# Patient Record
Sex: FEMALE | Race: WHITE | NOT HISPANIC OR LATINO | Employment: OTHER | URBAN - METROPOLITAN AREA
[De-identification: names, ages, dates, MRNs, and addresses within clinical notes are randomized per-mention and may not be internally consistent; named-entity substitution may affect disease eponyms.]

---

## 2017-01-16 ENCOUNTER — TRANSCRIBE ORDERS (OUTPATIENT)
Dept: ADMINISTRATIVE | Facility: HOSPITAL | Age: 72
End: 2017-01-16

## 2017-01-16 ENCOUNTER — GENERIC CONVERSION - ENCOUNTER (OUTPATIENT)
Dept: OTHER | Facility: OTHER | Age: 72
End: 2017-01-16

## 2017-01-16 DIAGNOSIS — E04.1 NONTOXIC UNINODULAR GOITER: Primary | ICD-10-CM

## 2017-01-24 ENCOUNTER — HOSPITAL ENCOUNTER (OUTPATIENT)
Dept: ULTRASOUND IMAGING | Facility: HOSPITAL | Age: 72
Discharge: HOME | End: 2017-01-24
Admitting: RADIOLOGY
Payer: COMMERCIAL

## 2017-01-24 DIAGNOSIS — E04.1 NONTOXIC UNINODULAR GOITER: ICD-10-CM

## 2017-01-24 PROCEDURE — 76942 ECHO GUIDE FOR BIOPSY: CPT

## 2017-01-24 PROCEDURE — 10022 HB FNA W/IMAGE: CPT

## 2017-01-24 PROCEDURE — 88173 CYTOPATH EVAL FNA REPORT: CPT | Performed by: RADIOLOGY

## 2017-01-24 RX ORDER — LIDOCAINE HYDROCHLORIDE 10 MG/ML
5 INJECTION, SOLUTION INFILTRATION; PERINEURAL ONCE
Status: COMPLETED | OUTPATIENT
Start: 2017-01-24 | End: 2017-01-24

## 2017-01-24 RX ADMIN — LIDOCAINE HYDROCHLORIDE 5 ML: 10 INJECTION, SOLUTION INFILTRATION; PERINEURAL at 12:38

## 2017-04-18 ENCOUNTER — ALLSCRIPTS OFFICE VISIT (OUTPATIENT)
Dept: OTHER | Facility: OTHER | Age: 72
End: 2017-04-18

## 2017-05-10 ENCOUNTER — ALLSCRIPTS OFFICE VISIT (OUTPATIENT)
Dept: OTHER | Facility: OTHER | Age: 72
End: 2017-05-10

## 2017-07-19 ENCOUNTER — HOSPITAL ENCOUNTER (EMERGENCY)
Facility: HOSPITAL | Age: 72
Discharge: HOME | End: 2017-07-19
Attending: EMERGENCY MEDICINE | Admitting: EMERGENCY MEDICINE
Payer: COMMERCIAL

## 2017-07-19 VITALS
RESPIRATION RATE: 18 BRPM | WEIGHT: 170.19 LBS | HEART RATE: 88 BPM | DIASTOLIC BLOOD PRESSURE: 71 MMHG | BODY MASS INDEX: 30.16 KG/M2 | SYSTOLIC BLOOD PRESSURE: 148 MMHG | OXYGEN SATURATION: 98 % | TEMPERATURE: 98.4 F | HEIGHT: 63 IN

## 2017-07-19 DIAGNOSIS — N30.90 CYSTITIS: Primary | ICD-10-CM

## 2017-07-19 LAB
ALBUMIN SERPL BCP-MCNC: 3.9 G/DL (ref 3.5–5)
ALP SERPL-CCNC: 82 U/L (ref 46–116)
ALT SERPL W P-5'-P-CCNC: 36 U/L (ref 12–78)
ANION GAP SERPL CALCULATED.3IONS-SCNC: 9 MMOL/L (ref 4–13)
AST SERPL W P-5'-P-CCNC: 24 U/L (ref 5–45)
BACTERIA UR QL AUTO: ABNORMAL /HPF
BASOPHILS # BLD AUTO: 0.04 THOUSANDS/ΜL (ref 0–0.1)
BASOPHILS NFR BLD AUTO: 0 % (ref 0–1)
BILIRUB SERPL-MCNC: 0.5 MG/DL (ref 0.2–1)
BILIRUB UR QL STRIP: NEGATIVE
BUN SERPL-MCNC: 13 MG/DL (ref 5–25)
CALCIUM SERPL-MCNC: 9.5 MG/DL (ref 8.3–10.1)
CHLORIDE SERPL-SCNC: 104 MMOL/L (ref 100–108)
CLARITY UR: ABNORMAL
CLARITY, POC: CLEAR
CO2 SERPL-SCNC: 29 MMOL/L (ref 21–32)
COLOR UR: YELLOW
COLOR, POC: YELLOW
CREAT SERPL-MCNC: 0.82 MG/DL (ref 0.6–1.3)
EOSINOPHIL # BLD AUTO: 0.21 THOUSAND/ΜL (ref 0–0.61)
EOSINOPHIL NFR BLD AUTO: 2 % (ref 0–6)
ERYTHROCYTE [DISTWIDTH] IN BLOOD BY AUTOMATED COUNT: 12.7 % (ref 11.6–15.1)
EXT BILIRUBIN, UA: ABNORMAL
EXT BLOOD URINE: ABNORMAL
EXT GLUCOSE, UA: ABNORMAL
EXT KETONES: ABNORMAL
EXT NITRITE, UA: ABNORMAL
EXT PH, UA: 7.5
EXT PROTEIN, UA: ABNORMAL
EXT SPECIFIC GRAVITY, UA: 1
EXT UROBILINOGEN: 0.2
GFR SERPL CREATININE-BSD FRML MDRD: >60 ML/MIN/1.73SQ M
GLUCOSE SERPL-MCNC: 144 MG/DL (ref 65–140)
GLUCOSE UR STRIP-MCNC: NEGATIVE MG/DL
HCT VFR BLD AUTO: 41.8 % (ref 34.8–46.1)
HGB BLD-MCNC: 14.4 G/DL (ref 11.5–15.4)
HGB UR QL STRIP.AUTO: NEGATIVE
KETONES UR STRIP-MCNC: ABNORMAL MG/DL
LEUKOCYTE ESTERASE UR QL STRIP: ABNORMAL
LIPASE SERPL-CCNC: 144 U/L (ref 73–393)
LYMPHOCYTES # BLD AUTO: 2.94 THOUSANDS/ΜL (ref 0.6–4.47)
LYMPHOCYTES NFR BLD AUTO: 26 % (ref 14–44)
MAGNESIUM SERPL-MCNC: 2.1 MG/DL (ref 1.6–2.6)
MCH RBC QN AUTO: 31.8 PG (ref 26.8–34.3)
MCHC RBC AUTO-ENTMCNC: 34.4 G/DL (ref 31.4–37.4)
MCV RBC AUTO: 92 FL (ref 82–98)
MONOCYTES # BLD AUTO: 0.94 THOUSAND/ΜL (ref 0.17–1.22)
MONOCYTES NFR BLD AUTO: 8 % (ref 4–12)
NEUTROPHILS # BLD AUTO: 7.02 THOUSANDS/ΜL (ref 1.85–7.62)
NEUTS SEG NFR BLD AUTO: 64 % (ref 43–75)
NITRITE UR QL STRIP: NEGATIVE
NON-SQ EPI CELLS URNS QL MICRO: ABNORMAL /HPF
OTHER STN SPEC: ABNORMAL
PH UR STRIP.AUTO: 7 [PH] (ref 4.5–8)
PLATELET # BLD AUTO: 336 THOUSANDS/UL (ref 149–390)
PMV BLD AUTO: 9.3 FL (ref 8.9–12.7)
POTASSIUM SERPL-SCNC: 3.4 MMOL/L (ref 3.5–5.3)
PROT SERPL-MCNC: 7.8 G/DL (ref 6.4–8.2)
PROT UR STRIP-MCNC: NEGATIVE MG/DL
RBC # BLD AUTO: 4.53 MILLION/UL (ref 3.81–5.12)
RBC #/AREA URNS AUTO: ABNORMAL /HPF
SODIUM SERPL-SCNC: 142 MMOL/L (ref 136–145)
SP GR UR STRIP.AUTO: <=1.005 (ref 1–1.03)
UROBILINOGEN UR QL STRIP.AUTO: 0.2 E.U./DL
WBC # BLD AUTO: 11.15 THOUSAND/UL (ref 4.31–10.16)
WBC # BLD EST: ABNORMAL 10*3/UL
WBC #/AREA URNS AUTO: ABNORMAL /HPF
WBC CLUMPS # UR AUTO: PRESENT /UL

## 2017-07-19 PROCEDURE — 83690 ASSAY OF LIPASE: CPT | Performed by: PHYSICIAN ASSISTANT

## 2017-07-19 PROCEDURE — 83735 ASSAY OF MAGNESIUM: CPT | Performed by: PHYSICIAN ASSISTANT

## 2017-07-19 PROCEDURE — 81002 URINALYSIS NONAUTO W/O SCOPE: CPT | Performed by: PHYSICIAN ASSISTANT

## 2017-07-19 PROCEDURE — 99284 EMERGENCY DEPT VISIT MOD MDM: CPT

## 2017-07-19 PROCEDURE — 85025 COMPLETE CBC W/AUTO DIFF WBC: CPT | Performed by: PHYSICIAN ASSISTANT

## 2017-07-19 PROCEDURE — 96360 HYDRATION IV INFUSION INIT: CPT

## 2017-07-19 PROCEDURE — 87086 URINE CULTURE/COLONY COUNT: CPT | Performed by: PHYSICIAN ASSISTANT

## 2017-07-19 PROCEDURE — 81001 URINALYSIS AUTO W/SCOPE: CPT | Performed by: PHYSICIAN ASSISTANT

## 2017-07-19 PROCEDURE — 80053 COMPREHEN METABOLIC PANEL: CPT | Performed by: PHYSICIAN ASSISTANT

## 2017-07-19 PROCEDURE — 87147 CULTURE TYPE IMMUNOLOGIC: CPT | Performed by: PHYSICIAN ASSISTANT

## 2017-07-19 PROCEDURE — 36415 COLL VENOUS BLD VENIPUNCTURE: CPT | Performed by: PHYSICIAN ASSISTANT

## 2017-07-19 RX ORDER — CEPHALEXIN 500 MG/1
500 CAPSULE ORAL 2 TIMES DAILY
Qty: 10 CAPSULE | Refills: 0 | Status: SHIPPED | OUTPATIENT
Start: 2017-07-19 | End: 2017-07-24

## 2017-07-19 RX ORDER — CEPHALEXIN 250 MG/1
500 CAPSULE ORAL ONCE
Status: COMPLETED | OUTPATIENT
Start: 2017-07-19 | End: 2017-07-19

## 2017-07-19 RX ORDER — PHENAZOPYRIDINE HYDROCHLORIDE 100 MG/1
100 TABLET, FILM COATED ORAL 3 TIMES DAILY PRN
Qty: 6 TABLET | Refills: 0 | Status: SHIPPED | OUTPATIENT
Start: 2017-07-19 | End: 2017-07-21

## 2017-07-19 RX ORDER — POTASSIUM CHLORIDE 20 MEQ/1
20 TABLET, EXTENDED RELEASE ORAL ONCE
Status: COMPLETED | OUTPATIENT
Start: 2017-07-19 | End: 2017-07-19

## 2017-07-19 RX ORDER — PHENAZOPYRIDINE HYDROCHLORIDE 100 MG/1
100 TABLET, FILM COATED ORAL ONCE
Status: COMPLETED | OUTPATIENT
Start: 2017-07-19 | End: 2017-07-19

## 2017-07-19 RX ADMIN — SODIUM CHLORIDE 500 ML: 0.9 INJECTION, SOLUTION INTRAVENOUS at 20:54

## 2017-07-19 RX ADMIN — POTASSIUM CHLORIDE 20 MEQ: 1500 TABLET, EXTENDED RELEASE ORAL at 21:57

## 2017-07-19 RX ADMIN — PHENAZOPYRIDINE HYDROCHLORIDE 100 MG: 100 TABLET ORAL at 22:01

## 2017-07-19 RX ADMIN — CEPHALEXIN 500 MG: 250 CAPSULE ORAL at 22:01

## 2017-07-22 LAB
BACTERIA UR CULT: NORMAL
BACTERIA UR CULT: NORMAL

## 2017-07-28 ENCOUNTER — GENERIC CONVERSION - ENCOUNTER (OUTPATIENT)
Dept: OTHER | Facility: OTHER | Age: 72
End: 2017-07-28

## 2017-07-28 LAB
CHOLEST SERPL-MCNC: 177 MG/DL (ref 100–199)
CHOLEST/HDLC SERPL: 5.2 RATIO UNITS (ref 0–4.4)
GLUCOSE SERPL-MCNC: 120 MG/DL (ref 65–99)
HBA1C MFR BLD HPLC: 6.4 % (ref 4.8–5.6)
HDLC SERPL-MCNC: 34 MG/DL
LDLC SERPL CALC-MCNC: 101 MG/DL (ref 0–99)
TRIGL SERPL-MCNC: 209 MG/DL (ref 0–149)
VLDLC SERPL CALC-MCNC: 42 MG/DL (ref 5–40)

## 2017-07-29 LAB — TSH SERPL DL<=0.05 MIU/L-ACNC: 2.5 UIU/ML (ref 0.45–4.5)

## 2017-08-03 ENCOUNTER — ALLSCRIPTS OFFICE VISIT (OUTPATIENT)
Dept: OTHER | Facility: OTHER | Age: 72
End: 2017-08-03

## 2017-10-19 ENCOUNTER — ALLSCRIPTS OFFICE VISIT (OUTPATIENT)
Dept: OTHER | Facility: OTHER | Age: 72
End: 2017-10-19

## 2017-10-20 NOTE — PROGRESS NOTES
Assessment  1  Acute non-recurrent sinusitis, unspecified location (461 9) (J01 90)    Plan  Acute non-recurrent sinusitis, unspecified location    · Azithromycin 250 MG Oral Tablet; TAKE 2 TABLETS ON DAY 1 THEN TAKE 1  TABLET A DAY FOR 4 DAYS  Hypertension    · AmLODIPine Besylate 5 MG Oral Tablet; TAKE 1 TABLET DAILY   · Metoprolol Succinate  MG Oral Tablet Extended Release 24 Hour (Toprol  XL); TAKE 1 TABLET BY MOUTH ONCE DAILY  Hypertension, Microalbuminuria    · Lisinopril 40 MG Oral Tablet; take 1 and 1/2 tablets by mouth once daily    Discussion/Summary    Acute sinusitis that has worsened despite time and conservative treatment  treat with antibiotic  to call with any worsening or no improvement  Possible side effects of new medications were reviewed with the patient/guardian today  The treatment plan was reviewed with the patient/guardian  The patient/guardian understands and agrees with the treatment plan      Chief Complaint  Pt is here with c/o congestion      History of Present Illness  HPI: Nasal congestion and runny nose for about 1 1/2 weeks ago  Was getting better but now with sinus pain and pressure and bad HA  Denies fever,chills  Daughter and grandson with same symptoms  Denies sob and wheezing  has cough  Taking Claritin-D, Tylenol and Robitussin  Ears are popping  No sore throat  Review of Systems    Constitutional: as noted in HPI    ENT: as noted in HPI  Respiratory: as noted in HPI  Neurological: as noted in HPI  Active Problems  1  Allergic rhinitis (477 9) (J30 9)   2  Anxiety disorder (300 00) (F41 9)   3  Colon cancer screening (V76 51) (Z12 11)   4  Colon, diverticulosis (562 10) (K57 30)   5  Colonoscopy (Fiberoptic) Screening   6  Colonoscopy (Fiberoptic) Screening   7  Constipation (564 00) (K59 00)   8  Diabetes mellitus, type 2 (250 00) (E11 9)   9  Dyslipidemia (272 4) (E78 5)   10  Elevated TSH (794 5) (R94 6)   11   Encounter for screening mammogram for malignant neoplasm of breast (V76 12)    (Z12 31)   12  Esophageal reflux (530 81) (K21 9)   13  Flu vaccine need (V04 81) (Z23)   14  Headache (784 0) (R51)   15  Herniated nucleus pulposus, L4-5 (722 10) (M51 26)   16  History of allergy (V15 09) (Z88 9)   17  Hypertension (401 9) (I10)   18  Irritable bowel syndrome (564 1) (K58 9)   19  Leiomyoma of uterus (218 9) (D25 9)   20  Microalbuminuria (791 0) (R80 9)   21  Multiple thyroid nodules (241 1) (E04 2)   22  Osteopenia (733 90) (M85 80)   23  Overweight (278 02) (E66 3)   24  Pain, joint, shoulder (719 41) (M25 519)   25  Renal cyst, acquired (593 2) (N28 1)   26  Rhinitis (472 0) (J31 0)   27  Screen for colon cancer (V76 51) (Z12 11)   28  Screening for osteoporosis (V82 81) (Z13 820)   29  Streptococcus pneumoniae vaccination indicated (V49 89) (Z91 89)   30  Uncontrolled type 2 diabetes mellitus with microalbuminuria, without long-term current    use of insulin (250 42,791 0) (E11 29,E11 65,R80 9)    Past Medical History  1  History of Abdominal pain, left upper quadrant (789 02) (R10 12)   2  History of Abdominal pain, RUQ (789 01) (R10 11)   3  History of Alcohol abuse (305 00) (F10 10)   4  History of Ankle pain, unspecified laterality   5  History of Bilateral upper abdominal pain (789 01,789 02) (R10 11,R10 12)   6  History of Closed Compression Fracture Of Lumbar Vertebral Body (805 4)   7  Colonoscopy (Fiberoptic) Screening   8  History of Compression Fracture Of Thoracic Vertebral Body (805 2)   9  History of Dysfunction of left eustachian tube (381 81) (H69 82)   10  History of Dysuria (788 1) (R30 0)   11  History of acute bronchitis (V12 69) (Z87 09)   12  History of depression (V11 8) (Z86 59)   13  History of hemorrhoids (V13 89) (Z87 19)   14  History of mastitis (V13 89) (Z87 898)   15  History of nausea (V12 79) (Z87 898)   16  History of substance abuse (V13 89) (Z87 898)   17   History of upper respiratory infection (V12 09) (Z87 09) 18  History of upper respiratory infection (V12 09) (Z87 09)   19  History of urinary frequency (V13 09) (Z87 898)   20  History of Impaired fasting glucose (790 21) (R73 01)   21  History of Initial Medicare annual wellness visit (V70 0) (Z00 00)   22  History of Mass in neck (784 2) (R22 1)   23  History of Nephrolithiasis (V13 01)   24  History of Other closed nondisplaced fracture of proximal end of right humerus with    routine healing, subsequent encounter (V54 11) (S42 294D)   25  History of Other closed nondisplaced fracture of proximal end of right humerus, sequela    (905 2) (S42 294S)   26  History of Pain, upper back (724 5) (M54 9)   27  History of Skin rash (782 1) (R21)    Family History  Mother    1  Denied: Family history of Alcohol abuse   2  Denied: Family history of depression   3  Family history of hypertension (V17 49) (Z82 49)   4  Denied: Family history of substance abuse   5  Family history of thyroid disease (V18 19) (Z83 49)  Father    10  Denied: Family history of Alcohol abuse   7  Family history of    6  Family history of coronary artery disease (V17 3) (Z82 49)   9  Denied: Family history of depression   10  Family history of hypertension (V17 49) (Z82 49)   11  Denied: Family history of substance abuse   12  Family history of Smoker  Sibling    15  Denied: Family history of Alcohol abuse   14  Denied: Family history of depression   15  Denied: Family history of substance abuse  Paternal Grandmother    12  Family history of diabetes mellitus (V18 0) (Z83 3)    Social History   · Being A Social Drinker   · Marital History - Currently    · Never A Smoker   · Occupation: Retired   · History of Working Full Time  The social history was reviewed and updated today  The social history was reviewed and is unchanged  Surgical History  1  History of Appendectomy   2  History of Complete Colonoscopy   3  History of Complete Colonoscopy   4  History of Hemorrhoidectomy   5  History of Tonsillectomy   6  History of Tubal Ligation    Current Meds   1  AmLODIPine Besylate 5 MG Oral Tablet; TAKE 1 TABLET DAILY; Therapy: 34PTR7373 to (Evaluate:14Nov2017)  Requested for: 18Apr2017; Last   Rx:[id number] Ordered   2  Centrum Oral Tablet; TAKE 1 TABLET DAILY; Therapy: 49RJI7944 to Recorded   3  Ibuprofen 800 MG Oral Tablet; TAKE 1 TABLET 3 TIMES DAILY WITH FOOD AS   NEEDED; Therapy: 41JGZ8681 to (Valdez White)  Requested for: 54DXE9105; Last   Rx:51Qot9800 Ordered   4  Lisinopril 40 MG Oral Tablet; take 1 and 1/2 tablets by mouth once daily; Therapy: 22YTM5295 to (Evaluate:14Nov2017)  Requested for: 18Apr2017; Last   Rx:[id number] Ordered   5  MetFORMIN HCl - 500 MG Oral Tablet; TAKE 1 TABLET TWICE DAILY; Therapy: 67Spa0001 to (Evaluate:01Mar2018)  Requested for: 22Ckc4557; Last   Rx:[id number] Ordered   6  Metoprolol Succinate  MG Oral Tablet Extended Release 24 Hour; TAKE 1   TABLET BY MOUTH ONCE DAILY; Therapy: 40XDO4233 to (Evaluate:14Nov2017)  Requested for: 18Apr2017; Last   Rx:18Apr2017 Ordered   7  Montelukast Sodium 10 MG Oral Tablet; TAKE 1 TABLET DAILY AS DIRECTED; Therapy: 21YZB0559 to (Evaluate:69Ilq4138)  Requested for: 05HAZ4317; Last   Rx:[id number] Ordered   8  Vitamin D3 1000 UNIT Oral Tablet; TAKE 1 TABLET DAILY; Therapy: 61ZJU3772 to (Evaluate:25Oct2012); Last Rx:07Noo1743 Ordered   9  ZyrTEC Allergy 10 MG Oral Tablet; TAKE 1 TABLET DAILY AS DIRECTED; Therapy: 03MIS1250 to (Evaluate:11Jun2014); Last Rx:50Plh0581 Ordered    The medication list was reviewed and updated today  Allergies  1  Flagyl TABS   2  Penicillins   3  Sulfa Drugs   4  HydroCHLOROthiazide TABS   5  Pravastatin Sodium TABS   6  Tetracycline HCl CAPS   7   Ultram TABS    Vitals   Recorded: 98MOU6441 01:56PM   Temperature 99 2 F, Tympanic   Heart Rate 76, L Radial   Pulse Quality Regular, L Radial   Systolic 823, LUE, Sitting   Diastolic 78, LUE, Sitting   Height 5 ft 4 in Weight 168 lb    BMI Calculated 28 84   BSA Calculated 1 82     Physical Exam    Constitutional   General appearance: No acute distress, well appearing and well nourished  Head and Face   Palpation of the face and sinuses: Abnormal   Examination of the Sinuses: right maxillary tenderness,-- left maxillary tenderness,-- right frontal tenderness,-- left frontal tenderness,-- right ethmoid tenderness-- and-- left ethmoid tenderness  Ears, Nose, Mouth, and Throat   External inspection of ears and nose: Normal     Otoscopic examination: Tympanic membranes translucent with normal light reflex  Canals patent without erythema  Nasal mucosa, septum, and turbinates: Normal without edema or erythema  Oropharynx: Normal with no erythema, edema, exudate or lesions  Pulmonary   Respiratory effort: No increased work of breathing or signs of respiratory distress  Auscultation of lungs: Clear to auscultation  Cardiovascular   Auscultation of heart: Normal rate and rhythm, normal S1 and S2, no murmurs  Lymphatic   Palpation of lymph nodes in neck: Abnormal   bilateral tender-- and-- mobile submandibular node enlargement, but-- no anterior cervical node enlargement,-- no posterior cervical node enlargement-- and-- no supraclavicular node enlargement     Psychiatric   Orientation to person, place, and time: Normal     Mood and affect: Normal        Future Appointments    Date/Time Provider Specialty Site   02/05/2018 10:20 AM Agustina Ascencio DO Internal Medicine [facility] MD     Signatures   Electronically signed by : Maikol Ramos; Oct 19 2017  2:23PM EST                       (Author)    Electronically signed by : Kaitlynn Valentine DO; Oct 19 2017  3:29PM EST                       (Author)

## 2017-12-24 ENCOUNTER — APPOINTMENT (EMERGENCY)
Dept: CT IMAGING | Facility: HOSPITAL | Age: 72
End: 2017-12-24
Payer: COMMERCIAL

## 2017-12-24 ENCOUNTER — HOSPITAL ENCOUNTER (EMERGENCY)
Facility: HOSPITAL | Age: 72
Discharge: HOME | End: 2017-12-25
Attending: EMERGENCY MEDICINE | Admitting: EMERGENCY MEDICINE
Payer: COMMERCIAL

## 2017-12-24 VITALS
WEIGHT: 172 LBS | RESPIRATION RATE: 16 BRPM | OXYGEN SATURATION: 96 % | HEIGHT: 63 IN | HEART RATE: 80 BPM | BODY MASS INDEX: 30.48 KG/M2 | SYSTOLIC BLOOD PRESSURE: 131 MMHG | DIASTOLIC BLOOD PRESSURE: 62 MMHG

## 2017-12-24 DIAGNOSIS — R10.10 PAIN OF UPPER ABDOMEN: Primary | ICD-10-CM

## 2017-12-24 LAB
ALBUMIN SERPL BCP-MCNC: 4 G/DL (ref 3.5–5)
ALP SERPL-CCNC: 78 U/L (ref 46–116)
ALT SERPL W P-5'-P-CCNC: 35 U/L (ref 12–78)
ANION GAP SERPL CALCULATED.3IONS-SCNC: 7 MMOL/L (ref 4–13)
AST SERPL W P-5'-P-CCNC: 18 U/L (ref 5–45)
BASOPHILS # BLD AUTO: 0.06 THOUSANDS/ΜL (ref 0–0.1)
BASOPHILS NFR BLD AUTO: 1 % (ref 0–1)
BILIRUB SERPL-MCNC: 0.2 MG/DL (ref 0.2–1)
BUN SERPL-MCNC: 17 MG/DL (ref 5–25)
CALCIUM SERPL-MCNC: 9.3 MG/DL (ref 8.3–10.1)
CHLORIDE SERPL-SCNC: 105 MMOL/L (ref 100–108)
CO2 SERPL-SCNC: 31 MMOL/L (ref 21–32)
CREAT SERPL-MCNC: 0.73 MG/DL (ref 0.6–1.3)
EOSINOPHIL # BLD AUTO: 0.37 THOUSAND/ΜL (ref 0–0.61)
EOSINOPHIL NFR BLD AUTO: 6 % (ref 0–6)
ERYTHROCYTE [DISTWIDTH] IN BLOOD BY AUTOMATED COUNT: 12.7 % (ref 11.6–15.1)
GFR SERPL CREATININE-BSD FRML MDRD: 84 ML/MIN/1.73SQ M
GLUCOSE SERPL-MCNC: 134 MG/DL (ref 65–140)
HCT VFR BLD AUTO: 42.1 % (ref 34.8–46.1)
HGB BLD-MCNC: 14.6 G/DL (ref 11.5–15.4)
LIPASE SERPL-CCNC: 235 U/L (ref 73–393)
LYMPHOCYTES # BLD AUTO: 3.59 THOUSANDS/ΜL (ref 0.6–4.47)
LYMPHOCYTES NFR BLD AUTO: 54 % (ref 14–44)
MCH RBC QN AUTO: 31.5 PG (ref 26.8–34.3)
MCHC RBC AUTO-ENTMCNC: 34.7 G/DL (ref 31.4–37.4)
MCV RBC AUTO: 91 FL (ref 82–98)
MONOCYTES # BLD AUTO: 0.58 THOUSAND/ΜL (ref 0.17–1.22)
MONOCYTES NFR BLD AUTO: 9 % (ref 4–12)
NEUTROPHILS # BLD AUTO: 1.97 THOUSANDS/ΜL (ref 1.85–7.62)
NEUTS SEG NFR BLD AUTO: 30 % (ref 43–75)
PLATELET # BLD AUTO: 335 THOUSANDS/UL (ref 149–390)
PMV BLD AUTO: 9.2 FL (ref 8.9–12.7)
POTASSIUM SERPL-SCNC: 3.8 MMOL/L (ref 3.5–5.3)
PROT SERPL-MCNC: 7.4 G/DL (ref 6.4–8.2)
RBC # BLD AUTO: 4.63 MILLION/UL (ref 3.81–5.12)
SODIUM SERPL-SCNC: 143 MMOL/L (ref 136–145)
WBC # BLD AUTO: 6.57 THOUSAND/UL (ref 4.31–10.16)

## 2017-12-24 PROCEDURE — 96374 THER/PROPH/DIAG INJ IV PUSH: CPT

## 2017-12-24 PROCEDURE — 93005 ELECTROCARDIOGRAM TRACING: CPT

## 2017-12-24 PROCEDURE — 36415 COLL VENOUS BLD VENIPUNCTURE: CPT | Performed by: EMERGENCY MEDICINE

## 2017-12-24 PROCEDURE — 74175 CTA ABDOMEN W/CONTRAST: CPT

## 2017-12-24 PROCEDURE — 85025 COMPLETE CBC W/AUTO DIFF WBC: CPT | Performed by: EMERGENCY MEDICINE

## 2017-12-24 PROCEDURE — 71275 CT ANGIOGRAPHY CHEST: CPT

## 2017-12-24 PROCEDURE — 93005 ELECTROCARDIOGRAM TRACING: CPT | Performed by: EMERGENCY MEDICINE

## 2017-12-24 PROCEDURE — 80053 COMPREHEN METABOLIC PANEL: CPT | Performed by: EMERGENCY MEDICINE

## 2017-12-24 PROCEDURE — 83690 ASSAY OF LIPASE: CPT | Performed by: EMERGENCY MEDICINE

## 2017-12-24 RX ORDER — ONDANSETRON 2 MG/ML
4 INJECTION INTRAMUSCULAR; INTRAVENOUS ONCE
Status: COMPLETED | OUTPATIENT
Start: 2017-12-24 | End: 2017-12-24

## 2017-12-24 RX ADMIN — ONDANSETRON 4 MG: 2 INJECTION INTRAMUSCULAR; INTRAVENOUS at 22:06

## 2017-12-24 RX ADMIN — IOHEXOL 100 ML: 350 INJECTION, SOLUTION INTRAVENOUS at 23:48

## 2017-12-25 PROCEDURE — 99284 EMERGENCY DEPT VISIT MOD MDM: CPT

## 2017-12-25 NOTE — DISCHARGE INSTRUCTIONS
Abdominal Pain   WHAT YOU NEED TO KNOW:   Abdominal pain can be dull, achy, or sharp  You may have pain in one area of your abdomen, or in your entire abdomen  Your pain may be caused by a condition such as constipation, food sensitivity or poisoning, infection, or a blockage  Abdominal pain can also be from a hernia, appendicitis, or an ulcer  Liver, gallbladder, or kidney conditions can also cause abdominal pain  The cause of your abdominal pain may be unknown  DISCHARGE INSTRUCTIONS:   Return to the emergency department if:   · You have new chest pain or shortness of breath  · You have pulsing pain in your upper abdomen or lower back that suddenly becomes constant  · Your pain is in the right lower abdominal area and worsens with movement  · You have a fever over 100 4°F (38°C) or shaking chills  · You are vomiting and cannot keep food or liquids down  · Your pain does not improve or gets worse over the next 8 to 12 hours  · You see blood in your vomit or bowel movements, or they look black and tarry  · Your skin or the whites of your eyes turn yellow  · You are a woman and have a large amount of vaginal bleeding that is not your monthly period  Contact your healthcare provider if:   · You have pain in your lower back  · You are a man and have pain in your testicles  · You have pain when you urinate  · You have questions or concerns about your condition or care  Follow up with your healthcare provider within 24 hours or as directed:  Write down your questions so you remember to ask them during your visits  Medicines:   · Medicines  may be given to calm your stomach and prevent vomiting or to decrease pain  Ask how to take pain medicine safely  IF THIS RETURNS TAKE MAALOX OR TUMS OR PEPCID OR TAGAMET OR ZANTAC  · Take your medicine as directed  Contact your healthcare provider if you think your medicine is not helping or if you have side effects   Tell him of her if you are allergic to any medicine  Keep a list of the medicines, vitamins, and herbs you take  Include the amounts, and when and why you take them  Bring the list or the pill bottles to follow-up visits  Carry your medicine list with you in case of an emergency  © 2017 [employer] Information is for End User's use only and may not be sold, redistributed or otherwise used for commercial purposes  All illustrations and images included in CareNotes® are the copyrighted property of A D [employer] M [employer] Inc  or Adonis Cade  The above information is an  only  It is not intended as medical advice for individual conditions or treatments  Talk to your doctor, nurse or pharmacist before following any medical regimen to see if it is safe and effective for you

## 2017-12-25 NOTE — ED PROVIDER NOTES
History  Chief Complaint   Patient presents with    Abdominal Pain     patient presents to ED c/o of mid abdominal pain and nausea  Also has pain behin shoulder blades  This 72-year-old female with history of hypertension, hyperlipidemia and diabetes complains of sudden onset of a squeezing tightness of her abdomen with radiation to the back and nausea  This began approximately 0 5 hour ago and is much improved now  She still feels nauseous  Patient denies recent illness, injury, spoiled food and ill contacts  She denies any known family history of cholelithiasis and abdominal aortic aneurysm  Prior to Admission Medications   Prescriptions Last Dose Informant Patient Reported? Taking? Cetirizine HCl (ZYRTEC ALLERGY) 10 MG CAPS   Yes No   Sig: ZyrTEC Allergy 10 MG Oral Tablet TAKE 1 TABLET DAILY AS DIRECTED  Quantity: 30;  Refills: 0    Lynnette Saucedo;  Started 12-May-2014 Active   Cholecalciferol (VITAMIN D-3) 1000 UNITS CAPS   Yes No   Sig: Vitamin D3 1000 UNIT Oral Tablet TAKE 1 TABLET DAILY  Quantity: 30;  Refills: 0    Xochitl Tan DO;  Started 25-Sep-2012 Active   LISINOPRIL PO   Yes No   Si mg daily Lisinopril 40 MG Oral Tablet take 1 and 1/2 tablets by mouth once daily  Quantity: 45;  Refills: 6    CasaeXochitl DO;  Started 12-Sep-2011 Active    Multiple Vitamins-Minerals (CENTRUM ADULTS PO)   Yes No   Sig: Centrum Oral Tablet TAKE 1 TABLET DAILY  Refills: 0    Xochitl Tan DO;  Started 25-Sep-2012 Active   amLODIPine (NORVASC) 5 mg tablet   Yes No   Sig: AmLODIPine Besylate 5 MG Oral Tablet TAKE 1 TABLET DAILY  Quantity: 30;  Refills: 6    Xochitl Tan DO;  Started 7-May-2013 Active   butalbital-aspirin-caffeine-codeine (FIORINAL WITH CODEINE) -92-30 mg per capsule   Yes No   Sig: Butalbital-ASA-Caff-Codeine -87-30 MG Oral Capsule TAKE 1 CAPSULE EVERY 4 TO 6 HOURS AS NEEDED  Quantity: 10;   Refills: 1    Wm Harriett Saucedo Jr;  Started 10-May-2011 Active   ibuprofen (MOTRIN) 800 mg tablet   Yes No   Sig: Ibuprofen 800 MG Oral Tablet TAKE 1 TABLET 3 TIMES DAILY WITH FOOD AS NEEDED  Quantity: 30;  Refills: 0    Xochitl Tan DO;  Started 10-July-2014 Active   metFORMIN (GLUCOPHAGE) 500 mg tablet   Yes No   Sig: Take 500 mg by mouth 2 (two) times a day with meals   metoprolol succinate (TOPROL-XL) 100 mg 24 hr tablet   Yes No   Sig: daily Metoprolol Succinate ER 50 MG Oral Tablet Extended Release 24 Hour take 2 tablet daily  Quantity: 60;  Refills: 0    Xochitl Tan DO;  Started 15-Dec-2011 Active    naproxen (NAPROSYN) 500 mg tablet   No No   Sig: Take 1 tablet (500 mg total) by mouth 2 (two) times a day with meals  omeprazole (PriLOSEC) 10 mg delayed release capsule   Yes No   Sig: Omeprazole 20 MG Oral Capsule Delayed Release TAKE 1 CAPSULE DAILY AS NEEDED FOR HEART BURN  Quantity: 30;  Refills: 6    Xochitl Tan DO;  Started 27-Sep-2012 Active   oxyCODONE-acetaminophen (PERCOCET) 5-325 mg per tablet   No No   Sig: Take 1 tablet by mouth every 4 (four) hours as needed for moderate pain  Max Daily Amount: 6 tablets      Facility-Administered Medications: None       Past Medical History:   Diagnosis Date    High cholesterol     Hypertension     Migraine        Past Surgical History:   Procedure Laterality Date    APPENDECTOMY      KIDNEY STONE SURGERY      TONSILLECTOMY      TUBAL LIGATION         History reviewed  No pertinent family history  I have reviewed and agree with the history as documented  Social History   Substance Use Topics    Smoking status: Never Smoker    Smokeless tobacco: Never Used    Alcohol use Yes      Comment: rarely        Review of Systems   Constitutional: Negative  HENT: Negative  Eyes: Negative  Respiratory: Negative  Cardiovascular: Negative  Gastrointestinal: Negative  See HPI   Endocrine: Negative  Genitourinary: Negative  Musculoskeletal: Negative      Skin: Negative  Allergic/Immunologic: Negative  Neurological: Negative  Hematological: Negative  Psychiatric/Behavioral: Negative  All other systems reviewed and are negative  Physical Exam  ED Triage Vitals [12/24/17 2141]   Temp Pulse Respirations Blood Pressure SpO2   -- 102 20 163/88 98 %      Temp src Heart Rate Source Patient Position - Orthostatic VS BP Location FiO2 (%)   -- Monitor Sitting Right arm --      Pain Score       9           Orthostatic Vital Signs  Vitals:    12/24/17 2230 12/24/17 2245 12/24/17 2300 12/24/17 2315   BP: 142/67 137/63 146/64 131/62   Pulse: 81 81 81 80   Patient Position - Orthostatic VS: Lying Sitting Lying Sitting       Physical Exam   Constitutional: She is oriented to person, place, and time  She appears well-developed and well-nourished  HENT:   Head: Normocephalic and atraumatic  Eyes: Conjunctivae and EOM are normal  Pupils are equal, round, and reactive to light  Neck: Normal range of motion  Neck supple  Cardiovascular: Normal rate and regular rhythm  No murmur heard  Pulmonary/Chest: Effort normal and breath sounds normal    Abdominal: Soft  Bowel sounds are normal  She exhibits no distension and no mass  There is no tenderness  There is no guarding  No hernia  Musculoskeletal: Normal range of motion  She exhibits no edema  Neurological: She is alert and oriented to person, place, and time  She has normal reflexes  No cranial nerve deficit  Coordination normal    Skin: Skin is warm and dry  Capillary refill takes less than 2 seconds  No rash noted  Psychiatric: She has a normal mood and affect  Nursing note and vitals reviewed        ED Medications  Medications   ondansetron (ZOFRAN) injection 4 mg (4 mg Intravenous Given 12/24/17 2206)   iohexol (OMNIPAQUE) 350 MG/ML injection (SINGLE-DOSE) 100 mL (100 mL Intravenous Given 12/24/17 2348)       Diagnostic Studies  Results Reviewed     Procedure Component Value Units Date/Time Comprehensive metabolic panel [[id number]] Collected:  12/24/17 2157    Lab Status:  Final result Specimen:  Blood from Arm, Right Updated:  12/24/17 2306     Sodium 143 mmol/L      Potassium 3 8 mmol/L      Chloride 105 mmol/L      CO2 31 mmol/L      Anion Gap 7 mmol/L      BUN 17 mg/dL      Creatinine 0 73 mg/dL      Glucose 134 mg/dL      Calcium 9 3 mg/dL      AST 18 U/L      ALT 35 U/L      Alkaline Phosphatase 78 U/L      Total Protein 7 4 g/dL      Albumin 4 0 g/dL      Total Bilirubin 0 20 mg/dL      eGFR 84 ml/min/1 73sq m     Narrative:         National Kidney Disease Education Program recommendations are as follows:  GFR calculation is accurate only with a steady state creatinine  Chronic Kidney disease less than 60 ml/min/1 73 sq  meters  Kidney failure less than 15 ml/min/1 73 sq  meters  Lipase [[id number]]  (Normal) Collected:  12/24/17 2157    Lab Status:  Final result Specimen:  Blood from Arm, Right Updated:  12/24/17 2306     Lipase 235 u/L     CBC and differential [[id number]]  (Abnormal) Collected:  12/24/17 2157    Lab Status:  Final result Specimen:  Blood from Arm, Right Updated:  12/24/17 2244     WBC 6 57 Thousand/uL      RBC 4 63 Million/uL      Hemoglobin 14 6 g/dL      Hematocrit 42 1 %      MCV 91 fL      MCH 31 5 pg      MCHC 34 7 g/dL      RDW 12 7 %      MPV 9 2 fL      Platelets 058 Thousands/uL      Neutrophils Relative 30 (L) %      Lymphocytes Relative 54 (H) %      Monocytes Relative 9 %      Eosinophils Relative 6 %      Basophils Relative 1 %      Neutrophils Absolute 1 97 Thousands/µL      Lymphocytes Absolute 3 59 Thousands/µL      Monocytes Absolute 0 58 Thousand/µL      Eosinophils Absolute 0 37 Thousand/µL      Basophils Absolute 0 06 Thousands/µL                  CTA dissection protocol chest and abdomen   ED Interpretation by Loerna Robertson DO (12/25 0032)   Per [facility], Mild atherosclerosis  No dissection  Ascending aorta 4 centimeter in diameter    Small nonobstructing bilateral renal calculi  Distal colonic diverticulosis  Mild degenerative disc disease and spine  Procedures  ECG 12 Lead Documentation  Date/Time: 12/24/2017 10:04 PM  Performed by: Sid Galloway by: Rosario Goodwin     ECG reviewed by me, the ED Provider: yes    Patient location:  ED  Previous ECG:     Previous ECG:  Compared to current    Comparison ECG info:    January 15, 2012    Similarity:  No change  Interpretation:     Interpretation comment:  Normal sinus rhythm  No ectopy  Anterior fascicular  Left ventricular hypertrophy QRS widening repolarization           Phone Contacts  ED Phone Contact    ED Course  ED Course as of Dec 25 0034   Mon Dec 25, 2017   0033   Patient asymptomatic throughout most of the ER course  Hemodynamically stable  I have reviewed results with patient and her  and we talked about follow-up  All  questions answered  MDM  Number of Diagnoses or Management Options  Pain of upper abdomen: new and requires workup     Amount and/or Complexity of Data Reviewed  Clinical lab tests: ordered and reviewed  Tests in the radiology section of CPT®: ordered and reviewed  Independent visualization of images, tracings, or specimens: yes      CritCare Time    Disposition  Final diagnoses:   Pain of upper abdomen     Time reflects when diagnosis was documented in both MDM as applicable and the Disposition within this note     Time User Action Codes Description Comment    12/25/2017 12:30 AM Valencia Akhtar Add [R10 10] Pain of upper abdomen       ED Disposition     ED Disposition Condition Comment    Discharge  Ree Watts discharge to home/self care      Condition at discharge: Stable        Follow-up Information     Follow up With Specialties Details Why Noble Carrino, DO Internal Medicine, Family Medicine  As needed Alan  [facility]  [phone]          Patient's Medications   Discharge Prescriptions    No medications on file     No discharge procedures on file      ED Provider  Electronically Signed by           Alexa Sommer DO  12/25/17 2832

## 2017-12-26 LAB
ATRIAL RATE: 87 BPM
P AXIS: 28 DEGREES
PR INTERVAL: 146 MS
QRS AXIS: -61 DEGREES
QRSD INTERVAL: 122 MS
QT INTERVAL: 382 MS
QTC INTERVAL: 459 MS
T WAVE AXIS: 72 DEGREES
VENTRICULAR RATE: 87 BPM

## 2017-12-29 ENCOUNTER — GENERIC CONVERSION - ENCOUNTER (OUTPATIENT)
Dept: OTHER | Facility: OTHER | Age: 72
End: 2017-12-29

## 2018-01-10 NOTE — RESULT NOTES
Verified Results  [address] [place] [address] [id number] 12:52PM Levi Gonzalez Order Number: [id number]     Test Name Result Flag Reference   US THYROID (Report)     THYROID ULTRASOUND     INDICATION: Multinodular goiter     COMPARISON: Neck ultrasound 7/27/2015  TECHNIQUE:  Ultrasound of the thyroid was performed with a high frequency linear transducer in transverse and sagittal planes including volumetric imaging sweeps as well as traditional still imaging technique  FINDINGS:   Thyroid parenchyma is diffusely heterogeneous in echotexture with focal nodule(s) as described below  Right gland: 5 3 x 2 1 x 2 0 cm  Right upper pole  1 3 x 1 1 x 1 5 cm  Solid isoechoic nodule  Smooth, well defined margins  No calcifications  Nodule is not taller than it is wide  Given differences in measuring technique, no significant change from prior  Left gland: 5 0 x 1 8 x 2 1 cm  Left upper pole  1 2 x 1 0 x 1 2 cm  Solid isoechoic nodule  Smooth, well defined margins  No calcifications  Nodule is not taller than it is wide  Given differences in measuring technique, no significant change from prior  Left upper pole  0 8 x 0 6 x 0 8 cm  Solid isoechoic nodule  Smooth, well defined margins  No calcifications  Nodule is not taller than it is wide  Given differences in measuring technique, no significant change from prior  Left mid to lower pole  0 8 x 0 6 x 0 6 cm  Solid isoechoic nodule  Smooth, well defined margins  No calcifications  Nodule is not taller than it is wide  Given differences in measuring technique, no significant change from prior  Isthmus: 0 6 cm in AP dimension  No dominant nodules  IMPRESSION:      There are multiple thyroid nodules as above  These are stable  While stable, the following nodule does meet current American Thyroid Association criteria for requiring biopsy:     The 1 3 x 1 1 x 1 5 right upper pole nodule   (Image number 6) (CRITERIA: Low suspicion sonographic pattern, >/= 1 5 cm )         Reference: 2015 American Thyroid Association Management Guidelines for Adult Patients with Thyroid Nodules and Differentiated Thyroid Cancer  Thyroid, Volume 26, Number 1, 2016             ##sigslh##sigslh            Workstation performed: [id number]     Signed by:   Raymond Garcia MD   9/22/16        Pt aware    Discussion/Summary   please notify pt that her thyroid US con't to show multiple nodules but one nodule in the R upper thyroid gland does meet criteria for biopsy - she should make appt with Endo - either Dr Cici Wiseman in [facility] or Dr Joseph/Mark down the [place] - will put order for endo when she lets us know which office she prefers to go to

## 2018-01-12 VITALS
SYSTOLIC BLOOD PRESSURE: 126 MMHG | WEIGHT: 175.6 LBS | BODY MASS INDEX: 29.98 KG/M2 | DIASTOLIC BLOOD PRESSURE: 76 MMHG | TEMPERATURE: 99.2 F | HEART RATE: 84 BPM | HEIGHT: 64 IN

## 2018-01-13 VITALS
HEART RATE: 80 BPM | HEIGHT: 64 IN | DIASTOLIC BLOOD PRESSURE: 74 MMHG | TEMPERATURE: 99 F | BODY MASS INDEX: 28.68 KG/M2 | SYSTOLIC BLOOD PRESSURE: 122 MMHG | WEIGHT: 168 LBS

## 2018-01-13 VITALS
WEIGHT: 168 LBS | HEIGHT: 64 IN | TEMPERATURE: 99.2 F | SYSTOLIC BLOOD PRESSURE: 130 MMHG | BODY MASS INDEX: 28.68 KG/M2 | DIASTOLIC BLOOD PRESSURE: 78 MMHG | HEART RATE: 76 BPM

## 2018-01-15 VITALS
HEIGHT: 64 IN | SYSTOLIC BLOOD PRESSURE: 132 MMHG | BODY MASS INDEX: 30.9 KG/M2 | DIASTOLIC BLOOD PRESSURE: 78 MMHG | HEART RATE: 88 BPM | WEIGHT: 181 LBS | TEMPERATURE: 97.2 F

## 2018-01-15 NOTE — MISCELLANEOUS
Message   Recorded as Task   Date: 03/24/2016 10:34 AM, Created By: Noble Campuzano   Task Name: Medical Complaint Callback   Assigned To: Noble Campuzano   Regarding Patient: Shiva Perales, Status: Active   Comment:    Tatianna Brooks - 24 Mar 2016 10:34 AM     TASK CREATED  Caller: Self; Medical Complaint; [phone] (Home); [phone] (Work)  Stopped her cholesterol medicine because of severe abdominal pain, has been off  the med for 2 days and wants to know if she should try it again? PCB   Xochitl Tan - 24 Mar 2016 11:02 AM     TASK REPLIED TO: Previously Assigned To Xochitl Tan  when abd pain resolves restart the med every other day and if able to tolerate after a week at every other day try to increase back up to daily - if able to tolerate every other day but not daily then stay at every other day   Tatianna Brooks - 24 Mar 2016 11:34 AM     TASK EDITED  Patient aware        Active Problems    1  Anxiety disorder (300 00) (F41 9)   2  Colon, diverticulosis (562 10) (K57 30)   3  Colonoscopy (Fiberoptic) Screening   4  Colonoscopy (Fiberoptic) Screening   5  Diabetes mellitus, type 2 (250 00) (E11 9)   6  Dyslipidemia (272 4) (E78 5)   7  Encounter for screening mammogram for malignant neoplasm of breast (V76 12)   (Z12 31)   8  Esophageal reflux (530 81) (K21 9)   9  Flu vaccine need (V04 81) (Z23)   10  Headache (784 0) (R51)   11  Herniated nucleus pulposus, L4-5 (722 10) (M51 26)   12  History of allergy (V15 09) (Z88 9)   13  Hypertension (401 9) (I10)   14  Irritable bowel syndrome (564 1) (K58 9)   15  Leiomyoma of uterus (218 9) (D25 9)   16  Microalbuminuria (791 0) (R80 9)   17  Multiple thyroid nodules (241 1) (E04 2)   18  Overweight (278 02) (E66 3)   19  Pain, joint, shoulder (719 41) (M25 519)   20  Pain, upper back (724 5) (M54 9)   21  Renal cyst, acquired (593 2) (N28 1)   22  Rhinitis (472 0) (J31 0)   23  Screen for colon cancer (V76 51) (Z12 11)   24   Screening for osteoporosis (V82 81) (Z13 820)   25  Streptococcus pneumoniae vaccination indicated (V49 89) (Z91 89)    Current Meds   1  AmLODIPine Besylate 5 MG Oral Tablet; TAKE 1 TABLET DAILY; Therapy: 14VKD2484 to (Evaluate:10Oct2016)  Requested for: 81KEJ0275; Last   Rx:14Mar2016 Ordered   2  Butalbital-ASA-Caff-Codeine -10-30 MG Oral Capsule; TAKE 1 CAPSULE EVERY   4 TO 6 HOURS AS NEEDED; Therapy: 38CSH9167 to (Evaluate:55Szx8552)  Requested for: 59RAF2190; Last   Rx:36Gsm1766 Ordered   3  Centrum Oral Tablet; TAKE 1 TABLET DAILY; Therapy: 68CSV7274 to Recorded   4  Ibuprofen 800 MG Oral Tablet; TAKE 1 TABLET 3 TIMES DAILY WITH FOOD AS   NEEDED; Therapy: 41HOM2954 to (Benjamin Randle)  Requested for: 73XMY8288; Last   Rx:57Vey6727 Ordered   5  Lisinopril 40 MG Oral Tablet; take 1 and 1/2 tablets by mouth once daily; Therapy: 45FLT1928 to (Evaluate:10Oct2016)  Requested for: 84KDH6218; Last   Rx:14Mar2016 Ordered   6  Metoprolol Succinate  MG Oral Tablet Extended Release 24 Hour; TAKE 1   TABLET BY MOUTH ONCE DAILY; Therapy: 12ECI5230 to (Evaluate:10Oct2016)  Requested for: 42MIZ7445; Last   Rx:14Mar2016 Ordered   7  Omeprazole 20 MG Oral Capsule Delayed Release; TAKE 1 CAPSULE DAILY AS   NEEDED FOR HEART BURN;   Therapy: 46XPE1555 to (Evaluate:25Apr2013) Recorded   8  Pravastatin Sodium 20 MG Oral Tablet; TAKE 1 TABLET AT BEDTIME; Therapy: 08QLH2235 to (Evaluate:10Oct2016)  Requested for: 77GNG6091; Last   Rx:14Mar2016 Ordered   9  Vitamin D3 1000 UNIT Oral Tablet; TAKE 1 TABLET DAILY; Therapy: 81UFH3531 to (Evaluate:25Oct2012); Last Rx:87Jbi4645 Ordered   10  ZyrTEC Allergy 10 MG Oral Tablet; TAKE 1 TABLET DAILY AS DIRECTED; Therapy: 84CNC6022 to (Evaluate:11Jun2014); Last Rx:[id number] Ordered    Allergies    1  Flagyl TABS   2  Penicillins   3  Sulfa Drugs   4  Hydrochlorothiazide TABS   5  Tetracycline HCl CAPS   6   Ultram TABS    Signatures   Electronically signed by : Lynette Brito, DO; Mar 24 2016 12:05PM EST                       (Author)

## 2018-02-02 PROBLEM — IMO0002 UNCONTROLLED TYPE 2 DIABETES MELLITUS WITH MICROALBUMINURIA, WITHOUT LONG-TERM CURRENT USE OF INSULIN: Status: ACTIVE | Noted: 2017-04-18

## 2018-02-02 PROBLEM — R79.89 ELEVATED TSH: Status: ACTIVE | Noted: 2017-04-18

## 2018-02-02 PROBLEM — J30.9 ALLERGIC RHINITIS: Status: ACTIVE | Noted: 2017-05-10

## 2018-02-02 PROBLEM — K59.00 CONSTIPATION: Status: ACTIVE | Noted: 2017-08-03

## 2018-02-02 RX ORDER — MONTELUKAST SODIUM 10 MG/1
1 TABLET ORAL DAILY
COMMUNITY
Start: 2017-05-10 | End: 2018-02-12 | Stop reason: ALTCHOICE

## 2018-02-02 RX ORDER — LISINOPRIL 40 MG/1
1.5 TABLET ORAL DAILY
COMMUNITY
Start: 2011-09-12 | End: 2018-06-14 | Stop reason: SDUPTHER

## 2018-02-02 RX ORDER — METOPROLOL SUCCINATE 100 MG/1
1 TABLET, EXTENDED RELEASE ORAL DAILY
COMMUNITY
Start: 2011-12-15 | End: 2018-06-14 | Stop reason: SDUPTHER

## 2018-02-04 LAB
GLUCOSE P FAST SERPL-MCNC: 128 MG/DL (ref 65–99)
HBA1C MFR BLD: 6.6 % (ref 4.8–5.6)

## 2018-02-12 ENCOUNTER — OFFICE VISIT (OUTPATIENT)
Dept: FAMILY MEDICINE CLINIC | Facility: HOSPITAL | Age: 73
End: 2018-02-12
Payer: COMMERCIAL

## 2018-02-12 VITALS
HEART RATE: 78 BPM | BODY MASS INDEX: 30.65 KG/M2 | DIASTOLIC BLOOD PRESSURE: 78 MMHG | WEIGHT: 173 LBS | TEMPERATURE: 97.9 F | SYSTOLIC BLOOD PRESSURE: 132 MMHG | HEIGHT: 63 IN

## 2018-02-12 DIAGNOSIS — I10 HYPERTENSION, UNSPECIFIED TYPE: ICD-10-CM

## 2018-02-12 DIAGNOSIS — R80.9 CONTROLLED TYPE 2 DIABETES MELLITUS WITH MICROALBUMINURIA, WITHOUT LONG-TERM CURRENT USE OF INSULIN (HCC): ICD-10-CM

## 2018-02-12 DIAGNOSIS — Z12.31 SCREENING MAMMOGRAM, ENCOUNTER FOR: Primary | ICD-10-CM

## 2018-02-12 DIAGNOSIS — K59.00 CONSTIPATION, UNSPECIFIED CONSTIPATION TYPE: ICD-10-CM

## 2018-02-12 DIAGNOSIS — E11.29 CONTROLLED TYPE 2 DIABETES MELLITUS WITH MICROALBUMINURIA, WITHOUT LONG-TERM CURRENT USE OF INSULIN (HCC): ICD-10-CM

## 2018-02-12 PROCEDURE — 3075F SYST BP GE 130 - 139MM HG: CPT | Performed by: INTERNAL MEDICINE

## 2018-02-12 PROCEDURE — 99214 OFFICE O/P EST MOD 30 MIN: CPT | Performed by: INTERNAL MEDICINE

## 2018-02-12 PROCEDURE — 3078F DIAST BP <80 MM HG: CPT | Performed by: INTERNAL MEDICINE

## 2018-02-12 NOTE — PROGRESS NOTES
Assessment/Plan:    Constipation  Advised to increase fluids and high fiber foods in diet, advised to try OTC MiraLax and if still not better Colace, call with abd pain/blood in stool/fever or if not better    Controlled type 2 diabetes mellitus with microalbuminuria, without long-term current use of insulin (HCC)  DM type 2 with microalbuminuria - controlled with a1C 6 6 - advised to watch diet and get wgt back down, con't current metformin, on an ACE, recheck Bw in 6 mos - order given,  UTD on foot exam, urged to do eye exam again    Hypertension  BP at goal, con't current meds, low sodium diet/exercise encouraged       Diagnoses and all orders for this visit:    Screening mammogram, encounter for  -     Mammo screening bilateral w cad; Future    Controlled type 2 diabetes mellitus with microalbuminuria, without long-term current use of insulin (HCC)  -     metFORMIN (GLUCOPHAGE) 500 mg tablet; Take 1 tablet (500 mg total) by mouth 2 (two) times a day with meals for 30 days  -     CBC and differential  -     Comprehensive metabolic panel  -     Hemoglobin A1c; Future  -     Lipid panel; Future  -     Microalbumin / creatinine urine ratio; Future  -     TSH, 3rd generation with T4 reflex; Future  -     Hemoglobin A1c  -     Lipid panel  -     TSH, 3rd generation with T4 reflex    Hypertension, unspecified type  -     CBC and differential  -     Comprehensive metabolic panel  -     Hemoglobin A1c; Future  -     Lipid panel; Future  -     Microalbumin / creatinine urine ratio; Future  -     TSH, 3rd generation with T4 reflex; Future  -     Hemoglobin A1c  -     Lipid panel  -     TSH, 3rd generation with T4 reflex    Constipation, unspecified constipation type  -     CBC and differential  -     Comprehensive metabolic panel  -     Hemoglobin A1c; Future  -     Lipid panel; Future  -     Microalbumin / creatinine urine ratio; Future  -     TSH, 3rd generation with T4 reflex;  Future  -     Hemoglobin A1c  -     Lipid panel  -     TSH, 3rd generation with T4 reflex      Order for mammo given    UTD on Dexa    UTD on [place]    UTD on immunizations    Has appt with GYN in June    Subjective:      Patient ID: Neena Elam is a 81 y o  female  HPI Pt here for routine follow up appt and Bw results    BW results were d/w pt in detail: FBS/A1C up a bit at 128/6 6  Def of controlled vs uncontrolled DM was reviewed  Diet was reviewed - wgt is up 5 lbs from last appt  Pt is taking her Metformin as directed  She is on an ACE but no statin  She is overdue for eye exam still and she is UTD on her foot exam      BP at goal today and meds were reviewed and no changes have occurred  She notes no SE with the medication  She notes no Ha/dizziness/double vision/CP  She has been having some issues with constipation  She states she has not been eating very well recently  She has some intermittent abd pain when the constipation does occur but will have a BM and pain resolves  She notes no blood in the stool  She has really only tried dietary changes  Pt is due for mammo    She is UTD on Dexa    She is UTD on [place]      Review of Systems   Constitutional: Negative for chills, fatigue, fever and unexpected weight change  HENT: Negative for congestion and sore throat  Eyes: Negative for pain and discharge  Respiratory: Negative for shortness of breath and wheezing  Cardiovascular: Negative for chest pain, palpitations and leg swelling  Gastrointestinal: Negative for abdominal pain, blood in stool, constipation, diarrhea and nausea  Endocrine: Negative for polydipsia and polyuria  Genitourinary: Negative for difficulty urinating and dysuria  Musculoskeletal: Negative for back pain and neck pain  Skin: Negative for rash and wound  Neurological: Negative for dizziness, syncope, light-headedness and headaches  Hematological: Negative for adenopathy     Psychiatric/Behavioral: Negative for behavioral problems and confusion  Objective:    Vitals:    02/12/18 0752   BP: 132/78   Pulse: 78   Temp: 97 9 °F (36 6 °C)        Physical Exam   Constitutional: She is oriented to person, place, and time  She appears well-developed and well-nourished  No distress  HENT:   Head: Normocephalic and atraumatic  Mouth/Throat: No oropharyngeal exudate  Eyes: EOM are normal  Pupils are equal, round, and reactive to light  Right eye exhibits no discharge  Left eye exhibits no discharge  Neck: Neck supple  No tracheal deviation present  Cardiovascular: Normal rate, regular rhythm and normal heart sounds  Exam reveals no friction rub  No murmur heard  Pulmonary/Chest: Effort normal and breath sounds normal  No respiratory distress  She has no wheezes  She has no rales  Abdominal: Soft  She exhibits no distension  There is no tenderness  There is no guarding  Musculoskeletal: She exhibits no edema  Neurological: She is alert and oriented to person, place, and time  No cranial nerve deficit  Skin: Skin is warm  No rash noted  Psychiatric: She has a normal mood and affect   Her behavior is normal

## 2018-02-12 NOTE — ASSESSMENT & PLAN NOTE
DM type 2 with microalbuminuria - controlled with a1C 6 6 - advised to watch diet and get wgt back down, con't current metformin, on an ACE, recheck Bw in 6 mos - order given,  UTD on foot exam, urged to do eye exam again

## 2018-04-06 ENCOUNTER — HOSPITAL ENCOUNTER (OUTPATIENT)
Dept: BONE DENSITY | Facility: IMAGING CENTER | Age: 73
Discharge: HOME | End: 2018-04-06
Payer: COMMERCIAL

## 2018-04-06 DIAGNOSIS — Z12.31 SCREENING MAMMOGRAM, ENCOUNTER FOR: ICD-10-CM

## 2018-04-06 PROCEDURE — 77067 SCR MAMMO BI INCL CAD: CPT

## 2018-06-14 DIAGNOSIS — I10 ESSENTIAL HYPERTENSION: Primary | ICD-10-CM

## 2018-06-14 DIAGNOSIS — I10 ESSENTIAL HYPERTENSION: ICD-10-CM

## 2018-06-14 PROCEDURE — 4010F ACE/ARB THERAPY RXD/TAKEN: CPT | Performed by: INTERNAL MEDICINE

## 2018-06-14 RX ORDER — AMLODIPINE BESYLATE 5 MG/1
5 TABLET ORAL DAILY
Qty: 30 TABLET | Refills: 6 | Status: SHIPPED | OUTPATIENT
Start: 2018-06-14 | End: 2018-06-14 | Stop reason: SDUPTHER

## 2018-06-14 RX ORDER — METOPROLOL SUCCINATE 100 MG/1
100 TABLET, EXTENDED RELEASE ORAL DAILY
Qty: 30 TABLET | Refills: 6 | Status: SHIPPED | OUTPATIENT
Start: 2018-06-14 | End: 2018-11-15 | Stop reason: SDUPTHER

## 2018-06-14 RX ORDER — LISINOPRIL 40 MG/1
60 TABLET ORAL DAILY
Qty: 45 TABLET | Refills: 6 | Status: SHIPPED | OUTPATIENT
Start: 2018-06-14 | End: 2018-11-15 | Stop reason: SDUPTHER

## 2018-06-14 RX ORDER — AMLODIPINE BESYLATE 5 MG/1
5 TABLET ORAL DAILY
Qty: 30 TABLET | Refills: 5 | Status: SHIPPED | OUTPATIENT
Start: 2018-06-14 | End: 2018-11-15 | Stop reason: SDUPTHER

## 2018-08-10 LAB
ALBUMIN SERPL-MCNC: 4.8 G/DL (ref 3.5–4.8)
ALBUMIN/GLOB SERPL: 2 {RATIO} (ref 1.2–2.2)
ALP SERPL-CCNC: 77 IU/L (ref 39–117)
ALT SERPL-CCNC: 39 IU/L (ref 0–32)
AST SERPL-CCNC: 33 IU/L (ref 0–40)
BASOPHILS # BLD AUTO: 0.1 X10E3/UL (ref 0–0.2)
BASOPHILS NFR BLD AUTO: 1 %
BILIRUB SERPL-MCNC: 0.5 MG/DL (ref 0–1.2)
BUN SERPL-MCNC: 12 MG/DL (ref 8–27)
BUN/CREAT SERPL: 14 (ref 12–28)
CALCIUM SERPL-MCNC: 9.9 MG/DL (ref 8.7–10.3)
CHLORIDE SERPL-SCNC: 97 MMOL/L (ref 96–106)
CHOLEST SERPL-MCNC: 196 MG/DL (ref 100–199)
CHOLEST/HDLC SERPL: 5.4 RATIO (ref 0–4.4)
CO2 SERPL-SCNC: 25 MMOL/L (ref 20–29)
CREAT SERPL-MCNC: 0.86 MG/DL (ref 0.57–1)
EOSINOPHIL # BLD AUTO: 0.2 X10E3/UL (ref 0–0.4)
EOSINOPHIL NFR BLD AUTO: 4 %
ERYTHROCYTE [DISTWIDTH] IN BLOOD BY AUTOMATED COUNT: 13.5 % (ref 12.3–15.4)
EST. AVERAGE GLUCOSE BLD GHB EST-MCNC: 171 MG/DL
GLOBULIN SER-MCNC: 2.4 G/DL (ref 1.5–4.5)
GLUCOSE SERPL-MCNC: 155 MG/DL (ref 65–99)
HBA1C MFR BLD: 7.6 % (ref 4.8–5.6)
HCT VFR BLD AUTO: 42.8 % (ref 34–46.6)
HDLC SERPL-MCNC: 36 MG/DL
HGB BLD-MCNC: 14.8 G/DL (ref 11.1–15.9)
IMM GRANULOCYTES # BLD: 0 X10E3/UL (ref 0–0.1)
IMM GRANULOCYTES NFR BLD: 0 %
LDLC SERPL CALC-MCNC: 105 MG/DL (ref 0–99)
LDLC SERPL DIRECT ASSAY-MCNC: 125 MG/DL (ref 0–99)
LYMPHOCYTES # BLD AUTO: 2.8 X10E3/UL (ref 0.7–3.1)
LYMPHOCYTES NFR BLD AUTO: 46 %
MCH RBC QN AUTO: 31.7 PG (ref 26.6–33)
MCHC RBC AUTO-ENTMCNC: 34.6 G/DL (ref 31.5–35.7)
MCV RBC AUTO: 92 FL (ref 79–97)
MONOCYTES # BLD AUTO: 0.5 X10E3/UL (ref 0.1–0.9)
MONOCYTES NFR BLD AUTO: 8 %
NEUTROPHILS # BLD AUTO: 2.5 X10E3/UL (ref 1.4–7)
NEUTROPHILS NFR BLD AUTO: 41 %
PLATELET # BLD AUTO: 299 X10E3/UL (ref 150–379)
POTASSIUM SERPL-SCNC: 4.7 MMOL/L (ref 3.5–5.2)
PROT SERPL-MCNC: 7.2 G/DL (ref 6–8.5)
RBC # BLD AUTO: 4.67 X10E6/UL (ref 3.77–5.28)
SL AMB EGFR AFRICAN AMERICAN: 79 ML/MIN/1.73
SL AMB EGFR NON AFRICAN AMERICAN: 68 ML/MIN/1.73
SL AMB T4, FREE (DIRECT): 1.15 NG/DL (ref 0.82–1.77)
SL AMB VLDL CHOLESTEROL CALC: 55 MG/DL (ref 5–40)
SODIUM SERPL-SCNC: 141 MMOL/L (ref 134–144)
TRIGL SERPL-MCNC: 276 MG/DL (ref 0–149)
TSH SERPL DL<=0.005 MIU/L-ACNC: 6.65 UIU/ML (ref 0.45–4.5)
WBC # BLD AUTO: 6.1 X10E3/UL (ref 3.4–10.8)

## 2018-08-20 ENCOUNTER — OFFICE VISIT (OUTPATIENT)
Dept: FAMILY MEDICINE CLINIC | Facility: HOSPITAL | Age: 73
End: 2018-08-20
Payer: COMMERCIAL

## 2018-08-20 VITALS
BODY MASS INDEX: 31.18 KG/M2 | SYSTOLIC BLOOD PRESSURE: 148 MMHG | HEIGHT: 63 IN | WEIGHT: 176 LBS | TEMPERATURE: 99 F | DIASTOLIC BLOOD PRESSURE: 88 MMHG | HEART RATE: 92 BPM

## 2018-08-20 DIAGNOSIS — R80.9 CONTROLLED TYPE 2 DIABETES MELLITUS WITH MICROALBUMINURIA, WITHOUT LONG-TERM CURRENT USE OF INSULIN (HCC): Primary | ICD-10-CM

## 2018-08-20 DIAGNOSIS — E04.2 MULTIPLE THYROID NODULES: ICD-10-CM

## 2018-08-20 DIAGNOSIS — R79.89 ELEVATED TSH: ICD-10-CM

## 2018-08-20 DIAGNOSIS — I10 ESSENTIAL HYPERTENSION: ICD-10-CM

## 2018-08-20 DIAGNOSIS — E78.5 DYSLIPIDEMIA: ICD-10-CM

## 2018-08-20 DIAGNOSIS — E11.29 CONTROLLED TYPE 2 DIABETES MELLITUS WITH MICROALBUMINURIA, WITHOUT LONG-TERM CURRENT USE OF INSULIN (HCC): Primary | ICD-10-CM

## 2018-08-20 PROBLEM — F32.A DEPRESSION: Status: RESOLVED | Noted: 2018-08-20 | Resolved: 2018-08-20

## 2018-08-20 PROCEDURE — 3725F SCREEN DEPRESSION PERFORMED: CPT | Performed by: INTERNAL MEDICINE

## 2018-08-20 PROCEDURE — 99214 OFFICE O/P EST MOD 30 MIN: CPT | Performed by: INTERNAL MEDICINE

## 2018-08-20 PROCEDURE — 3008F BODY MASS INDEX DOCD: CPT | Performed by: INTERNAL MEDICINE

## 2018-08-20 NOTE — ASSESSMENT & PLAN NOTE
BP elevated today for first time - pt admits she was anxious over BW results - recheck in 3 mos and if not better will need to increase Amlodipine, cont' current meds for now, diet/exercise/wgt loss encouraged

## 2018-08-20 NOTE — PROGRESS NOTES
Assessment/Plan:    Multiple thyroid nodules  Order for thyroid US given, neg biopsy 2017    Controlled type 2 diabetes mellitus with microalbuminuria, without long-term current use of insulin (HCC)  Lab Results   Component Value Date    HGBA1C 7 6 (H) 08/08/2018       No results for input(s): POCGLU in the last 72 hours  Blood Sugar Average: Last 72 hrs:does not check sugars  DM type 2 with hyperglycemia - uncontrolled with A1C 7 6 - urged low sugar/carb diet and get wgt down - con't current Metformin for now - recheck BW in 3mos - if A1C still > 7 5 will increase Metformin, foot exam done today, will get copy of eye exam she states she had in the spring, on ACE but UTT statin      Hypertension  BP elevated today for first time - pt admits she was anxious over BW results - recheck in 3 mos and if not better will need to increase Amlodipine, cont' current meds for now, diet/exercise/wgt loss encouraged    Dyslipidemia  Low fat/cholesterol diet/exercise/wgt loss reviewed, pt has tried statin in past and was UTT - deferring trying a different one    Elevated TSH  Clinically euthyroid, recheck TFT's in 3 mos- order given       Diagnoses and all orders for this visit:    Controlled type 2 diabetes mellitus with microalbuminuria, without long-term current use of insulin (HCC)  -     Glucose, fasting; Future  -     Hemoglobin A1C; Future  -     Microalbumin / creatinine urine ratio; Future  -     Glucose, fasting  -     Hemoglobin A1C  -     Microalbumin / creatinine urine ratio    Dyslipidemia    Elevated TSH  -     TSH, 3rd generation with Free T4 reflex; Future  -     TSH, 3rd generation with Free T4 reflex    Essential hypertension    Multiple thyroid nodules  -     US thyroid; Future      Mammo - 4/18    Dexa 12/16    [place] 11/11 - due in 5 yrs -  having his [place] - she will d/w GI when she is there for that    PAP 6/18     Subjective:      Patient ID: Frank Roblero is a 72 y o  female      HPI Pt here for follow up appt and BW results    BW results were d/w pt in detail: FBS/A1C 155/7 6, ALT up at 39 but rest of CMP/CBC was wnl, FLP with elevated TG at 276, LDL at 105 (dLDL 125)  and HDL low at 36, TSH up at 6 650 but FT4 wnl at 1 15  Def of controlled vs uncontrolled DM was reviewed  Diet was reviewed - wgt up 3 lbs from Feb 2018  She admits diet has not been that good  She does no formal exercise  She is taking her DM meds as directed  She is due for foot and eye exam   She states she saw her eye doc in April and is watching her eye pressures  She notes no numbness/tingling/burning in the feet  She denies sores/ulcers  She is on an ACE but no ASA/statin  Goal FLP was d/w pt in detail  Diet/exercise was reviewed as noted above  RF for CVD were reviewed  She has tried cholesterol meds in the past but was not able to tolerate and con't to defer trying different statin  Goal TSH was reviewed  Pt has never been on thyroid meds  Her mother and 2 sisters both have thyroid dz  She has had about 3 lb wgt gain over the last 6 mos  She notes no excessive fatigue/C/D/hair loss/skin changes/tremor/palp  BP above goal today and meds were reviewed and no changes have occurred  She denies missing doses of meds or SE with the meds  She does not check her BP outside the office  She notes no frequent Ha's/dizziness/double vision/CP  Mammo 4/18    Dexa 12/16    [place] 11/11 - repeat in 5 yrs    PAP 6/18    Review of Systems   Constitutional: Negative for chills, fatigue and fever  HENT: Positive for hearing loss  Negative for congestion and sore throat  Eyes: Negative for pain and discharge  Respiratory: Negative for cough, shortness of breath and wheezing  Cardiovascular: Negative for chest pain, palpitations and leg swelling  Gastrointestinal: Negative for abdominal pain, constipation, diarrhea and nausea  Endocrine: Negative for polydipsia and polyuria     Genitourinary: Negative for difficulty urinating and dysuria  Musculoskeletal: Positive for arthralgias  Negative for back pain and neck pain  Skin: Negative for rash and wound  Neurological: Negative for dizziness, syncope and headaches  Hematological: Negative for adenopathy  Psychiatric/Behavioral: Negative for behavioral problems and confusion  Objective:    /88   Pulse 92   Temp 99 °F (37 2 °C)   Ht 5' 3" (1 6 m)   Wt 79 8 kg (176 lb)   BMI 31 18 kg/m²      Physical Exam   Constitutional: She appears well-developed and well-nourished  No distress  HENT:   Head: Normocephalic and atraumatic  Eyes: Conjunctivae are normal  Right eye exhibits no discharge  Left eye exhibits no discharge  Neck: Neck supple  No tracheal deviation present  Cardiovascular: Normal rate, regular rhythm and normal heart sounds  Exam reveals no friction rub  Pulses are no weak pulses  No murmur heard  Pulses:       Dorsalis pedis pulses are 2+ on the right side, and 2+ on the left side  Pulmonary/Chest: Effort normal and breath sounds normal  No respiratory distress  She has no wheezes  She has no rales  Abdominal: Soft  She exhibits no distension  There is no tenderness  There is no guarding  Musculoskeletal: She exhibits no edema  Feet:   Right Foot:   Skin Integrity: Negative for ulcer, skin breakdown, erythema, warmth, callus or dry skin  Left Foot:   Skin Integrity: Negative for ulcer, skin breakdown, erythema, warmth, callus or dry skin  Neurological: She is alert  She exhibits normal muscle tone  Skin: Skin is warm  No rash noted  Psychiatric: She has a normal mood and affect  Her behavior is normal    Nursing note and vitals reviewed  Patient's shoes and socks removed  Right Foot/Ankle   Right Foot Inspection  Skin Exam: skin normal and skin intact no dry skin, no warmth, no callus, no erythema, no maceration, no abnormal color, no pre-ulcer, no ulcer and no callus                          Toe Exam: ROM and strength within normal limits  Sensory   Vibration: intact    Monofilament testing: intact  Vascular    The right DP pulse is 2+  Left Foot/Ankle  Left Foot Inspection  Skin Exam: skin normal and skin intactno dry skin, no warmth, no erythema, no maceration, normal color, no pre-ulcer, no ulcer and no callus                         Toe Exam: ROM and strength within normal limits                   Sensory   Vibration: intact    Monofilament: intact  Vascular    The left DP pulse is 2+  Assign Risk Category:  No deformity present; No loss of protective sensation;  No weak pulses       Risk: 0

## 2018-08-20 NOTE — ASSESSMENT & PLAN NOTE
Low fat/cholesterol diet/exercise/wgt loss reviewed, pt has tried statin in past and was UTT - deferring trying a different one

## 2018-08-20 NOTE — ASSESSMENT & PLAN NOTE
Lab Results   Component Value Date    HGBA1C 7 6 (H) 08/08/2018       No results for input(s): POCGLU in the last 72 hours      Blood Sugar Average: Last 72 hrs:does not check sugars  DM type 2 with hyperglycemia - uncontrolled with A1C 7 6 - urged low sugar/carb diet and get wgt down - con't current Metformin for now - recheck BW in 3mos - if A1C still > 7 5 will increase Metformin, foot exam done today, will get copy of eye exam she states she had in the spring, on ACE but UTT statin

## 2018-09-17 DIAGNOSIS — R80.9 CONTROLLED TYPE 2 DIABETES MELLITUS WITH MICROALBUMINURIA, WITHOUT LONG-TERM CURRENT USE OF INSULIN (HCC): ICD-10-CM

## 2018-09-17 DIAGNOSIS — E11.29 CONTROLLED TYPE 2 DIABETES MELLITUS WITH MICROALBUMINURIA, WITHOUT LONG-TERM CURRENT USE OF INSULIN (HCC): ICD-10-CM

## 2018-11-11 LAB
ALBUMIN/CREAT UR: 47.6 MG/G CREAT (ref 0–30)
CREAT UR-MCNC: 58 MG/DL
EST. AVERAGE GLUCOSE BLD GHB EST-MCNC: 163 MG/DL
GLUCOSE SERPL-MCNC: 143 MG/DL (ref 65–99)
HBA1C MFR BLD: 7.3 % (ref 4.8–5.6)
MICROALBUMIN UR-MCNC: 27.6 UG/ML
TSH SERPL DL<=0.005 MIU/L-ACNC: 4.25 UIU/ML (ref 0.45–4.5)

## 2018-11-15 ENCOUNTER — OFFICE VISIT (OUTPATIENT)
Dept: FAMILY MEDICINE CLINIC | Facility: HOSPITAL | Age: 73
End: 2018-11-15
Payer: COMMERCIAL

## 2018-11-15 VITALS
SYSTOLIC BLOOD PRESSURE: 140 MMHG | HEART RATE: 80 BPM | HEIGHT: 63 IN | BODY MASS INDEX: 30.65 KG/M2 | TEMPERATURE: 98.6 F | DIASTOLIC BLOOD PRESSURE: 78 MMHG | WEIGHT: 173 LBS

## 2018-11-15 DIAGNOSIS — R79.89 ELEVATED TSH: ICD-10-CM

## 2018-11-15 DIAGNOSIS — E11.29 CONTROLLED TYPE 2 DIABETES MELLITUS WITH MICROALBUMINURIA, WITHOUT LONG-TERM CURRENT USE OF INSULIN (HCC): Primary | ICD-10-CM

## 2018-11-15 DIAGNOSIS — R80.9 MICROALBUMINURIA: ICD-10-CM

## 2018-11-15 DIAGNOSIS — R80.9 CONTROLLED TYPE 2 DIABETES MELLITUS WITH MICROALBUMINURIA, WITHOUT LONG-TERM CURRENT USE OF INSULIN (HCC): Primary | ICD-10-CM

## 2018-11-15 DIAGNOSIS — I10 ESSENTIAL HYPERTENSION: ICD-10-CM

## 2018-11-15 PROCEDURE — 99214 OFFICE O/P EST MOD 30 MIN: CPT | Performed by: INTERNAL MEDICINE

## 2018-11-15 PROCEDURE — 4040F PNEUMOC VAC/ADMIN/RCVD: CPT | Performed by: INTERNAL MEDICINE

## 2018-11-15 PROCEDURE — 1170F FXNL STATUS ASSESSED: CPT | Performed by: INTERNAL MEDICINE

## 2018-11-15 PROCEDURE — 3066F NEPHROPATHY DOC TX: CPT | Performed by: INTERNAL MEDICINE

## 2018-11-15 PROCEDURE — 3008F BODY MASS INDEX DOCD: CPT | Performed by: INTERNAL MEDICINE

## 2018-11-15 PROCEDURE — 1160F RVW MEDS BY RX/DR IN RCRD: CPT | Performed by: INTERNAL MEDICINE

## 2018-11-15 PROCEDURE — 4010F ACE/ARB THERAPY RXD/TAKEN: CPT | Performed by: INTERNAL MEDICINE

## 2018-11-15 PROCEDURE — G0439 PPPS, SUBSEQ VISIT: HCPCS | Performed by: INTERNAL MEDICINE

## 2018-11-15 PROCEDURE — 1036F TOBACCO NON-USER: CPT | Performed by: INTERNAL MEDICINE

## 2018-11-15 PROCEDURE — 1125F AMNT PAIN NOTED PAIN PRSNT: CPT | Performed by: INTERNAL MEDICINE

## 2018-11-15 RX ORDER — METOPROLOL SUCCINATE 100 MG/1
100 TABLET, EXTENDED RELEASE ORAL DAILY
Qty: 30 TABLET | Refills: 6 | Status: SHIPPED | OUTPATIENT
Start: 2018-11-15 | End: 2019-03-25 | Stop reason: SDUPTHER

## 2018-11-15 RX ORDER — LISINOPRIL 40 MG/1
60 TABLET ORAL DAILY
Qty: 45 TABLET | Refills: 6 | Status: SHIPPED | OUTPATIENT
Start: 2018-11-15 | End: 2019-03-25

## 2018-11-15 RX ORDER — AMLODIPINE BESYLATE 5 MG/1
5 TABLET ORAL DAILY
Qty: 30 TABLET | Refills: 6 | Status: SHIPPED | OUTPATIENT
Start: 2018-11-15 | End: 2019-03-25 | Stop reason: SDUPTHER

## 2018-11-15 NOTE — PROGRESS NOTES
Assessment/Plan:    Controlled type 2 diabetes mellitus with microalbuminuria, without long-term current use of insulin ([facility])  Lab Results   Component Value Date    HGBA1C 7 3 (H) 11/10/2018       No results for input(s): POCGLU in the last 72 hours  Blood Sugar Average: Last 72 hrs:does not check sugars at home  DM type 2 with microalbuminuria and hyperglycemia - uncontrolled but improved with A1C down to 7 3 - con't current Metformin and con't watching sugar/carbs and keep active, recheck BW in about 4 mos - UTD on foot exam and needs to schedule eye exam, on statin and ACE      Elevated TSH  Repeat TFT's back to normal, clinically euthyroid, con't to monitor off meds, recheck in 6 mos or so    Hypertension  Bp above goal again today - pt was rushing to appt in bad weather and was just cleaning out her mom's apartment - urged to increase Amlodipine but she is deferring until next appt after things settle down with moving her mom into an assisted living, encouraged healthy diet/exercise/wgt loss, con't current meds for now as per pts request    Microalbuminuria  Greatly improved, again stressed importance of BP/BS control,  She is on an ACE - con't current regimen       Diagnoses and all orders for this visit:    Controlled type 2 diabetes mellitus with microalbuminuria, without long-term current use of insulin ([facility])  -     Glucose, fasting; Future  -     Hemoglobin A1C; Future  -     Glucose, fasting  -     Hemoglobin A1C  -     metFORMIN (GLUCOPHAGE) 500 mg tablet; Take 1 tablet (500 mg total) by mouth 2 (two) times a day with meals for 30 days    Elevated TSH    Microalbuminuria    Essential hypertension  -     amLODIPine (NORVASC) 5 mg tablet; Take 1 tablet (5 mg total) by mouth daily AmLODIPine Besylate 5 MG Oral Tablet TAKE 1 TABLET DAILY  -     lisinopril (ZESTRIL) 40 mg tablet; Take 1 5 tablets (60 mg total) by mouth daily  -     metoprolol succinate (TOPROL-XL) 100 mg 24 hr tablet;  Take 1 tablet (100 mg total) by mouth daily      Mammo 4/18    Dexa 12/16 - osteopenia - will order with Mammo in the spring    Placentia 11/11 - 5 yr follow up - pt aware and will call for appt after mom settled in to her new assisted living apartment     PAP 6/18    Pt had flu vaccine this year    Subjective:      Patient ID: Malik Estrada is a 72 y o  female  HPI Pt here for follow up appt/BW results and AWV    BW results were d/w pt in detail: FBS/A1C slightly better at 143/7 3, microalbumin:cr ratio up at 47 6 but is down from last year, TSH wnl  Def of controlled vs uncontrolled DM was reviewed  Diet was reviewed - wgt unchanged  She is really trying to bake less and eat better  She does no formal exercise but is very active  She is taking her DM meds as directed  She does not check her sugars at home  She is UTD on foot exam (8/18) but still no copy of eye exam in the chart - she was there in April for exam but was supposed to go back in Oct and has to schedule that appt  She is on statin and ACE  TSH back to normal   Pt has had no significant wgt changes/excessive fatigue/tremor/palp/hair loss/skin changes  She has intermittent constipation but that is diet related  She has family h/o thyroid issues  BP above goal today and meds were reviewed and no changes have occurred  She is down from last appt but still elevated  She denies missing doses of meds or SE with the meds  She does not check her BP outside the office  She notes no frequent Ha's/dizziness/double vision/CP  Mammo 4/18    Dexa 12/16 - osteopenia    Placentia 11/11 - 5 yr follow up     PAP 6/18    Pt had flu vaccine this year    Review of Systems   Constitutional: Negative for chills and fever  HENT: Negative for congestion and sore throat  Eyes: Negative for pain and visual disturbance  Respiratory: Negative for cough, shortness of breath and wheezing  Cardiovascular: Negative for chest pain, palpitations and leg swelling  Gastrointestinal: Negative for abdominal pain, constipation, diarrhea and nausea  Endocrine: Negative for polydipsia and polyuria  Genitourinary: Negative for difficulty urinating and dysuria  Musculoskeletal: Negative for back pain and neck pain  Skin: Negative for rash and wound  Neurological: Negative for dizziness, syncope and headaches  Hematological: Negative for adenopathy  Psychiatric/Behavioral: Negative for behavioral problems and confusion  Objective:    /78   Pulse 80   Temp 98 6 °F (37 °C)   Ht 5' 3" (1 6 m)   Wt 78 5 kg (173 lb)   BMI 30 65 kg/m²      Physical Exam   Constitutional: She appears well-developed and well-nourished  No distress  HENT:   Head: Normocephalic and atraumatic  Right Ear: External ear normal    Left Ear: External ear normal    Mouth/Throat: Oropharynx is clear and moist    Eyes: Conjunctivae are normal  Right eye exhibits no discharge  Left eye exhibits no discharge  Neck: Neck supple  No tracheal deviation present  Cardiovascular: Normal rate, regular rhythm and normal heart sounds  Exam reveals no friction rub  No murmur heard  Neg carotid bruits B/L   Pulmonary/Chest: Effort normal and breath sounds normal  No respiratory distress  She has no wheezes  She has no rales  Abdominal: Soft  She exhibits no distension  There is no tenderness  There is no guarding  Musculoskeletal: She exhibits no edema  Lymphadenopathy:     She has no cervical adenopathy  Neurological: She is alert  She exhibits normal muscle tone  Skin: Skin is warm and dry  No rash noted  Psychiatric: She has a normal mood and affect  Her behavior is normal    Nursing note and vitals reviewed

## 2018-11-15 NOTE — PROGRESS NOTES
Assessment and Plan:  Problem List Items Addressed This Visit     Elevated TSH     Repeat TFT's back to normal, clinically euthyroid, con't to monitor off meds, recheck in 6 mos or so         Hypertension     Bp above goal again today - pt was rushing to appt in bad weather and was just cleaning out her mom's apartment - urged to increase Amlodipine but she is deferring until next appt after things settle down with moving her mom into an assisted living, encouraged healthy diet/exercise/wgt loss, con't current meds for now as per pts request         Microalbuminuria     Greatly improved, again stressed importance of BP/BS control,  She is on an ACE - con't current regimen         Controlled type 2 diabetes mellitus with microalbuminuria, without long-term current use of insulin ([facility] [address] ) - Primary     Lab Results   Component Value Date    HGBA1C 7 3 (H) 11/10/2018       No results for input(s): POCGLU in the last 72 hours      Blood Sugar Average: Last 72 hrs:does not check sugars at home  DM type 2 with microalbuminuria and hyperglycemia - uncontrolled but improved with A1C down to 7 3 - con't current Metformin and con't watching sugar/carbs and keep active, recheck BW in about 4 mos - UTD on foot exam and needs to schedule eye exam, on statin and ACE           Relevant Orders    Glucose, fasting    Hemoglobin A1C        Health Maintenance Due   Topic Date Due    Hepatitis C Screening  1945    SLP PLAN OF CARE  1945    CRC Screening: Colonoscopy  1945    DTaP,Tdap,and Td Vaccines (1 - Tdap) 05/31/2006    Fall Risk  06/23/2012    Urinary Incontinence Screening  06/23/2012    DM Eye Exam  04/21/2017         HPI:  Patient Active Problem List   Diagnosis    Allergic rhinitis    Anxiety disorder    Colon, diverticulosis    Constipation    Dyslipidemia    Elevated TSH    Esophageal reflux    Headache    Herniated nucleus pulposus, L4-5    Hypertension    Irritable bowel syndrome    Leiomyoma of uterus    Microalbuminuria    Multiple thyroid nodules    Osteopenia    Overweight    Acquired cystic kidney disease    Controlled type 2 diabetes mellitus with microalbuminuria, without long-term current use of insulin (HCC)     Past Medical History:   Diagnosis Date    Depression     Hypertension     Migraine      Past Surgical History:   Procedure Laterality Date    APPENDECTOMY      COLONOSCOPY      complete 11/29/11- 5 years / complete 6/9/04 -10years    HEMORRHOID SURGERY      KIDNEY STONE SURGERY      TONSILLECTOMY      TUBAL LIGATION       Family History   Problem Relation Age of Onset    Hypertension Mother     Thyroid disease Mother     Coronary artery disease Father     Hypertension Father     Other Father         smoker    Diabetes Paternal Grandmother      History   Smoking Status    Never Smoker   Smokeless Tobacco    Never Used     History   Alcohol Use    Yes     Comment: rarely / social per [employer]      History   Drug Use No         Current Outpatient Prescriptions   Medication Sig Dispense Refill    amLODIPine (NORVASC) 5 mg tablet Take 1 tablet (5 mg total) by mouth daily AmLODIPine Besylate 5 MG Oral Tablet TAKE 1 TABLET DAILY  Quantity: 30;  Refills: 6    Xochitl Tan DO;  Started 7-May-2013 Active 30 tablet 5    Cetirizine HCl (ZYRTEC ALLERGY) 10 MG CAPS ZyrTEC Allergy 10 MG Oral Tablet TAKE 1 TABLET DAILY AS DIRECTED  Quantity: 30;  Refills: 0    Lynnette Saucedo;  Started 12-May-2014 Active      Cholecalciferol (VITAMIN D-3) 1000 UNITS CAPS Vitamin D3 1000 UNIT Oral Tablet TAKE 1 TABLET DAILY  Quantity: 30;  Refills: 0    Xochitl Tan DO;  Started 25-Sep-2012 Active      ibuprofen (MOTRIN) 800 mg tablet Ibuprofen 800 MG Oral Tablet TAKE 1 TABLET 3 TIMES DAILY WITH FOOD AS NEEDED    Quantity: 30;  Refills: 0    Xochitl Tan DO;  Started 10-July-2014 Active      lisinopril (ZESTRIL) 40 mg tablet Take 1 5 tablets (60 mg total) by mouth daily 45 tablet 6    metFORMIN (GLUCOPHAGE) 500 mg tablet Take 1 tablet (500 mg total) by mouth 2 (two) times a day with meals for 30 days 60 tablet 5    metoprolol succinate (TOPROL-XL) 100 mg 24 hr tablet Take 1 tablet (100 mg total) by mouth daily 30 tablet 6    Multiple Vitamins-Minerals (CENTRUM ADULTS PO) Centrum Oral Tablet TAKE 1 TABLET DAILY  Refills: 0    GrazynabreonnaXochitl yeung ;  Started 25-Sep-2012 Active       No current facility-administered medications for this visit  Allergies   Allergen Reactions    Acetazolamide Hives    Metronidazole Anaphylaxis    Penicillins Hives, Itching and Rash    Statins     Tramadol     Hydrochlorothiazide Rash    Tetracycline Rash     Immunization History   Administered Date(s) Administered    Influenza 09/10/2014, 09/08/2015, 09/29/2016, 10/11/2018    Influenza Split High Dose Preservative Free IM 09/08/2015, 09/29/2016    Influenza TIV (IM) 09/15/2017    Pneumococcal Conjugate 13-Valent 09/08/2015    Pneumococcal Polysaccharide PPV23 04/18/2017    TD (adult) Preservative Free 05/30/2006    Tetanus, adsorbed 05/30/2006       Patient Care Team:  Yandel Worrell DO as PCP - General  KLAUDIA Wolff MD    Medicare Screening Tests and Risk Assessments:  Guerline Lemons is here for her Subsequent Wellness visit  Last Medicare Wellness visit information reviewed, patient interviewed and updates made to the record today  Health Risk Assessment:  Patient rates overall health as very good  Patient feels that their physical health rating is Same  Eyesight was rated as Slightly worse  Hearing was rated as Slightly worse  Patient feels that their emotional and mental health rating is Same  Pain experienced by patient in the last 7 days has been None  Patient states that she has experienced no weight loss or gain in last 6 months  Emotional/Mental Health:  Patient has been feeling nervous/anxious      PHQ-9 Depression Screening:    Frequency of the following problems over the past two weeks:      1  Little interest or pleasure in doing things: 0 - not at all      2  Feeling down, depressed, or hopeless: 0 - not at all  PHQ-2 Score: 0          Broken Bones/Falls: Fall Risk Assessment:    In the past year, patient has experienced: No history of falling in past year  visual disturbance    Bladder/Bowel:  Patient has not leaked urine accidently in the last six months  Patient reports no loss of bowel control  Immunizations:  Patient has had a flu vaccination within the last year  Patient has received a pneumonia shot  Patient has not received a shingles shot  Patient has received tetanus/diphtheria shot  Date of tetanus/diphtheria shot: 5/30/2006    Home Safety:  Patient does not have trouble with stairs inside or outside of their home  Patient currently reports that there are no safety hazards present in home, working smoke alarms, working carbon monoxide detectors  Preventative Screenings:   Breast cancer screening performed, 4/6/2018  colon cancer screen completed, 11/29/2011  cholesterol screen completed, 8/8/2018  glaucoma eye exam completed,     Nutrition:  Current diet: Regular, Unhealthy and Frequent junk food with servings of the following:    Medications:  Patient is currently taking over-the-counter supplements  List of OTC medications includes: Vit D3, Sentrum marguerite for women  Patient is able to manage medications  Lifestyle Choices:  Patient reports no tobacco use  Patient has not smoked or used tobacco in the past   Patient reports alcohol use  Alcohol use per week: 1 every 6 months  Patient drives a vehicle  Patient wears seat belt  Current level of exercise of physical activity described by patient as: no formal exercise but active with grand son and around the yard          Activities of Daily Living:  Can get out of bed by his or her self, able to dress self, able to make own meals, able to do own shopping, able to bathe self, can do own laundry/housekeeping, can manage own money, pay bills and track expenses    Previous Hospitalizations:  No hospitalization or ED visit in past 12 months        Advanced Directives:  Patient has decided on a power of   Patient has spoken to designated power of   Patient has completed advanced directive          Preventative Screening/Counseling:      Cardiovascular:      General: Risks and Benefits Discussed and Screening Current      Counseling: Healthy Diet, Healthy Weight and Improve Exercise Tolerance          Diabetes:      General: Risks and Benefits Discussed and Screening Current      Counseling: Healthy Diet and Improve Physical Activity          Colorectal Cancer:      General: Risks and Benefits Discussed      Due for studies: Colonoscopy - Low Risk      Comments: [facility]an 11/29/11 - due q 5 yrs        Breast Cancer:      General: Screening Current and Risks and Benefits Discussed          Cervical Cancer:      General: Risks and Benefits Discussed and Screening Current          Osteoporosis:      General: Risks and Benefits Discussed      Counseling: Calcium and Vitamin D Intake and Regular Weightbearing Exercise      Due for studies: Bone Density CT Axial          AAA:      General: Risks and Benefits Discussed and Screening Not Indicated          Glaucoma:      General: Risks and Benefits Discussed and Screening Current      Comments: Need to get copy of eye exam from the Spring        HIV:      General: Risks and Benefits Discussed and Screening Not Indicated          Hepatitis C:      General: Risks and Benefits Discussed and Screening Not Indicated        Advanced Directives:   Patient has living will for healthcare, has durable POA for healthcare,          Visual Acuity Screening    Right eye Left eye Both eyes   Without correction:      With correction: 20/70 20/70 20/50       Physical Exam:  Review of Systems   Constitutional: Negative for chills, fatigue, fever and unexpected weight change  HENT: Positive for hearing loss  Negative for congestion and sore throat  Eyes: Negative for pain and visual disturbance  Respiratory: Negative for cough, shortness of breath and wheezing  Cardiovascular: Negative for chest pain, palpitations and leg swelling  Gastrointestinal: Positive for constipation  Negative for abdominal pain, bowel incontinence, diarrhea and nausea  Endocrine: Negative for polydipsia and polyuria  Genitourinary: Negative for difficulty urinating and dysuria  Musculoskeletal: Negative for back pain and neck pain  Skin: Negative for rash and wound  Neurological: Negative for dizziness, syncope, light-headedness and headaches  Hematological: Negative for adenopathy  Psychiatric/Behavioral: Negative for behavioral problems and confusion  The patient is not nervous/anxious  Vitals:    11/15/18 1331 11/15/18 1405   BP: 142/82 140/78   Pulse: 80    Temp: 98 6 °F (37 °C)    Weight: 78 5 kg (173 lb)    Height: 5' 3" (1 6 m)    Body mass index is 30 65 kg/m²  Physical Exam   Constitutional: She appears well-developed and well-nourished  No distress  HENT:   Head: Atraumatic  Right Ear: External ear normal    Left Ear: External ear normal    Mouth/Throat: Oropharynx is clear and moist    Eyes: Conjunctivae are normal  Right eye exhibits no discharge  Left eye exhibits no discharge  Neck: Neck supple  No tracheal deviation present  Cardiovascular: Normal rate, regular rhythm and normal heart sounds  No murmur heard  Neg carotid bruits B/L   Pulmonary/Chest: Effort normal and breath sounds normal  No respiratory distress  She has no wheezes  She has no rales  Abdominal: Soft  There is no tenderness  There is no guarding  Musculoskeletal: She exhibits no edema or deformity  Lymphadenopathy:     She has no cervical adenopathy  Neurological: She is alert  She exhibits normal muscle tone     Skin: Skin is warm and dry  No rash noted  Psychiatric: She has a normal mood and affect  Her behavior is normal  Judgment normal    Nursing note and vitals reviewed

## 2018-11-15 NOTE — ASSESSMENT & PLAN NOTE
Greatly improved, again stressed importance of BP/BS control,  She is on an ACE - con't current regimen

## 2018-11-15 NOTE — ASSESSMENT & PLAN NOTE
Bp above goal again today - pt was rushing to appt in bad weather and was just cleaning out her mom's apartment - urged to increase Amlodipine but she is deferring until next appt after things settle down with moving her mom into an assisted living, encouraged healthy diet/exercise/wgt loss, con't current meds for now as per pts request

## 2018-11-15 NOTE — ASSESSMENT & PLAN NOTE
Repeat TFT's back to normal, clinically euthyroid, con't to monitor off meds, recheck in 6 mos or so

## 2018-11-15 NOTE — ASSESSMENT & PLAN NOTE
Lab Results   Component Value Date    HGBA1C 7 3 (H) 11/10/2018       No results for input(s): POCGLU in the last 72 hours      Blood Sugar Average: Last 72 hrs:does not check sugars at home  DM type 2 with microalbuminuria and hyperglycemia - uncontrolled but improved with A1C down to 7 3 - con't current Metformin and con't watching sugar/carbs and keep active, recheck BW in about 4 mos - UTD on foot exam and needs to schedule eye exam, on statin and ACE

## 2018-12-24 ENCOUNTER — OFFICE VISIT (OUTPATIENT)
Dept: FAMILY MEDICINE CLINIC | Facility: HOSPITAL | Age: 73
End: 2018-12-24
Payer: COMMERCIAL

## 2018-12-24 VITALS
HEIGHT: 64 IN | SYSTOLIC BLOOD PRESSURE: 138 MMHG | BODY MASS INDEX: 29.94 KG/M2 | WEIGHT: 175.4 LBS | HEART RATE: 94 BPM | TEMPERATURE: 97.7 F | DIASTOLIC BLOOD PRESSURE: 88 MMHG

## 2018-12-24 DIAGNOSIS — I10 ESSENTIAL HYPERTENSION: ICD-10-CM

## 2018-12-24 DIAGNOSIS — B96.89 BACTERIAL URI: Primary | ICD-10-CM

## 2018-12-24 DIAGNOSIS — E11.29 CONTROLLED TYPE 2 DIABETES MELLITUS WITH MICROALBUMINURIA, WITHOUT LONG-TERM CURRENT USE OF INSULIN (HCC): ICD-10-CM

## 2018-12-24 DIAGNOSIS — R80.9 CONTROLLED TYPE 2 DIABETES MELLITUS WITH MICROALBUMINURIA, WITHOUT LONG-TERM CURRENT USE OF INSULIN (HCC): ICD-10-CM

## 2018-12-24 DIAGNOSIS — J06.9 BACTERIAL URI: Primary | ICD-10-CM

## 2018-12-24 DIAGNOSIS — J30.9 ALLERGIC RHINITIS, UNSPECIFIED SEASONALITY, UNSPECIFIED TRIGGER: ICD-10-CM

## 2018-12-24 PROCEDURE — 1036F TOBACCO NON-USER: CPT | Performed by: FAMILY MEDICINE

## 2018-12-24 PROCEDURE — 3079F DIAST BP 80-89 MM HG: CPT | Performed by: FAMILY MEDICINE

## 2018-12-24 PROCEDURE — 3075F SYST BP GE 130 - 139MM HG: CPT | Performed by: FAMILY MEDICINE

## 2018-12-24 PROCEDURE — 99214 OFFICE O/P EST MOD 30 MIN: CPT | Performed by: FAMILY MEDICINE

## 2018-12-24 PROCEDURE — 1160F RVW MEDS BY RX/DR IN RCRD: CPT | Performed by: FAMILY MEDICINE

## 2018-12-24 RX ORDER — AZITHROMYCIN 250 MG/1
TABLET, FILM COATED ORAL
Qty: 6 TABLET | Refills: 0 | Status: SHIPPED | OUTPATIENT
Start: 2018-12-24 | End: 2018-12-28

## 2018-12-24 NOTE — PROGRESS NOTES
[facility]  [id number] [place] [address]  Tel: [phone]      Assessment/Plan:    Problem List Items Addressed This Visit        Endocrine    Controlled type 2 diabetes mellitus with microalbuminuria, without long-term current use of insulin (HCC)       Respiratory    Allergic rhinitis       Cardiovascular and Mediastinum    Hypertension      Other Visit Diagnoses     Bacterial URI    -  Primary    Relevant Medications    azithromycin (ZITHROMAX) 250 mg tablet          One month of coughing and chest congestion  No improvement noted  Does have underlying allergic rhinitis and is on ACE inhibitor which we will continue to consider as an etiology    At this time will empirically treat with azithromycin, increase fluids, otc cough medications  To call if not improving  Diabetes  Stable  conitnue with the same regimen      _____________________________________________________________________    History of Present Illness:     Patient is here for an acute visit      Cough; Nasal Congestion; and Headache    1 month or so of coughing, somehwat productive  With chest congestion  Worsening and worse at night  Taking otc robitussin DM with some relief  No wheezing, no sob, no chest pain  She does have some nasal congestion  Mild sore throat, some sinus pressure and PND  No discolored discharge  Over the past month this has not been improving  She does have allergies for which she takes prn zyrtec and recently has also been using otc antihistamines with no benefit  She is diabetic  Her DM has been well controlled  Cough   Associated symptoms include headaches and rhinorrhea  Pertinent negatives include no chest pain, chills, ear pain, fever, postnasal drip, sore throat, shortness of breath or wheezing  Headache    Associated symptoms include coughing, rhinorrhea and sinus pressure   Pertinent negatives include no abdominal pain, ear pain, fever, nausea or sore throat            -----------------------------  Review of Systems   Constitutional: Negative  Negative for activity change, appetite change, chills, diaphoresis, fatigue and fever  HENT: Positive for rhinorrhea, sinus pain and sinus pressure  Negative for congestion, ear discharge, ear pain, facial swelling, postnasal drip, sneezing, sore throat, trouble swallowing and voice change  Eyes: Negative for discharge and itching  Respiratory: Positive for cough  Negative for apnea, chest tightness, shortness of breath, wheezing and stridor  Cardiovascular: Negative  Negative for chest pain and palpitations  Gastrointestinal: Negative  Negative for abdominal distention, abdominal pain, blood in stool, constipation, diarrhea and nausea  Genitourinary: Negative  Negative for difficulty urinating, dysuria, flank pain and frequency  Neurological: Positive for headaches  Social Hx reviewed:    Social History     Social History    Marital status: /Civil Union     Spouse name: N/A    Number of children: N/A    Years of education: N/A     Occupational History     full time     retired      Social History Main Topics    Smoking status: Never Smoker    Smokeless tobacco: Never Used    Alcohol use Yes      Comment: rarely / social per [employer]    Drug use: No    Sexual activity: Not on file     Other Topics Concern    Not on file     Social History Narrative    No narrative on file       Past Medical History:   Diagnosis Date    Depression     Hypertension     Migraine            Allergies   Allergen Reactions    Acetazolamide Hives    Metronidazole Anaphylaxis    Penicillins Hives, Itching and Rash    Statins     Tramadol     Hydrochlorothiazide Rash    Tetracycline Rash         Vitals:    12/24/18 0938   BP: 138/88   Pulse: 94   Temp: 97 7 °F (36 5 °C)     Physical Exam   Constitutional: She appears well-developed and well-nourished     HENT:   Head: Normocephalic and atraumatic  Right Ear: Tympanic membrane, external ear and ear canal normal    Left Ear: Tympanic membrane, external ear and ear canal normal    Nose: No mucosal edema or rhinorrhea  Right sinus exhibits no maxillary sinus tenderness and no frontal sinus tenderness  Left sinus exhibits no maxillary sinus tenderness and no frontal sinus tenderness  Eyes: Pupils are equal, round, and reactive to light  Conjunctivae, EOM and lids are normal  Right eye exhibits no discharge and no exudate  Left eye exhibits no discharge and no exudate  Right conjunctiva is not injected  Right conjunctiva has no hemorrhage  Left conjunctiva is not injected  Left conjunctiva has no hemorrhage  Neck: Normal range of motion  Neck supple  Cardiovascular: Normal rate, regular rhythm and normal heart sounds  Exam reveals no gallop and no friction rub  No murmur heard  Pulmonary/Chest: Effort normal and breath sounds normal  No respiratory distress  She has no wheezes  She has no rales  She exhibits no tenderness  Abdominal: Soft  Bowel sounds are normal    Skin: Skin is warm and dry  Psychiatric: She has a normal mood and affect  Nursing note and vitals reviewed  To call our office if any concerns/questions at [phone]

## 2019-02-17 ENCOUNTER — TELEPHONE (OUTPATIENT)
Dept: OTHER | Facility: OTHER | Age: 74
End: 2019-02-17

## 2019-02-17 ENCOUNTER — HOSPITAL ENCOUNTER (EMERGENCY)
Facility: HOSPITAL | Age: 74
Discharge: HOME | End: 2019-02-17
Attending: EMERGENCY MEDICINE | Admitting: EMERGENCY MEDICINE
Payer: COMMERCIAL

## 2019-02-17 VITALS
RESPIRATION RATE: 20 BRPM | OXYGEN SATURATION: 97 % | HEIGHT: 63 IN | SYSTOLIC BLOOD PRESSURE: 146 MMHG | WEIGHT: 175 LBS | TEMPERATURE: 98.8 F | HEART RATE: 96 BPM | DIASTOLIC BLOOD PRESSURE: 69 MMHG | BODY MASS INDEX: 31.01 KG/M2

## 2019-02-17 DIAGNOSIS — R11.2 NON-INTRACTABLE VOMITING WITH NAUSEA, UNSPECIFIED VOMITING TYPE: Primary | ICD-10-CM

## 2019-02-17 LAB
ALBUMIN SERPL BCP-MCNC: 3.9 G/DL (ref 3.5–5)
ALP SERPL-CCNC: 95 U/L (ref 46–116)
ALT SERPL W P-5'-P-CCNC: 42 U/L (ref 12–78)
ANION GAP SERPL CALCULATED.3IONS-SCNC: 12 MMOL/L (ref 4–13)
AST SERPL W P-5'-P-CCNC: 27 U/L (ref 5–45)
BASOPHILS # BLD AUTO: 0.04 THOUSANDS/ΜL (ref 0–0.1)
BASOPHILS NFR BLD AUTO: 0 % (ref 0–1)
BILIRUB SERPL-MCNC: 0.6 MG/DL (ref 0.2–1)
BUN SERPL-MCNC: 20 MG/DL (ref 5–25)
CALCIUM SERPL-MCNC: 9.1 MG/DL (ref 8.3–10.1)
CHLORIDE SERPL-SCNC: 102 MMOL/L (ref 100–108)
CO2 SERPL-SCNC: 30 MMOL/L (ref 21–32)
CREAT SERPL-MCNC: 0.99 MG/DL (ref 0.6–1.3)
EOSINOPHIL # BLD AUTO: 0.22 THOUSAND/ΜL (ref 0–0.61)
EOSINOPHIL NFR BLD AUTO: 2 % (ref 0–6)
ERYTHROCYTE [DISTWIDTH] IN BLOOD BY AUTOMATED COUNT: 12.2 % (ref 11.6–15.1)
GFR SERPL CREATININE-BSD FRML MDRD: 58 ML/MIN/1.73SQ M
GLUCOSE SERPL-MCNC: 216 MG/DL (ref 65–140)
HCT VFR BLD AUTO: 45.9 % (ref 34.8–46.1)
HGB BLD-MCNC: 15.7 G/DL (ref 11.5–15.4)
IMM GRANULOCYTES # BLD AUTO: 0.03 THOUSAND/UL (ref 0–0.2)
IMM GRANULOCYTES NFR BLD AUTO: 0 % (ref 0–2)
LYMPHOCYTES # BLD AUTO: 0.56 THOUSANDS/ΜL (ref 0.6–4.47)
LYMPHOCYTES NFR BLD AUTO: 5 % (ref 14–44)
MCH RBC QN AUTO: 31.8 PG (ref 26.8–34.3)
MCHC RBC AUTO-ENTMCNC: 34.2 G/DL (ref 31.4–37.4)
MCV RBC AUTO: 93 FL (ref 82–98)
MONOCYTES # BLD AUTO: 0.39 THOUSAND/ΜL (ref 0.17–1.22)
MONOCYTES NFR BLD AUTO: 4 % (ref 4–12)
NEUTROPHILS # BLD AUTO: 9.28 THOUSANDS/ΜL (ref 1.85–7.62)
NEUTS SEG NFR BLD AUTO: 89 % (ref 43–75)
NRBC BLD AUTO-RTO: 0 /100 WBCS
PLATELET # BLD AUTO: 329 THOUSANDS/UL (ref 149–390)
PMV BLD AUTO: 9.2 FL (ref 8.9–12.7)
POTASSIUM SERPL-SCNC: 4.2 MMOL/L (ref 3.5–5.3)
PROT SERPL-MCNC: 7.6 G/DL (ref 6.4–8.2)
RBC # BLD AUTO: 4.93 MILLION/UL (ref 3.81–5.12)
SODIUM SERPL-SCNC: 144 MMOL/L (ref 136–145)
WBC # BLD AUTO: 10.52 THOUSAND/UL (ref 4.31–10.16)

## 2019-02-17 PROCEDURE — 96374 THER/PROPH/DIAG INJ IV PUSH: CPT

## 2019-02-17 PROCEDURE — 80053 COMPREHEN METABOLIC PANEL: CPT | Performed by: PHYSICIAN ASSISTANT

## 2019-02-17 PROCEDURE — 36415 COLL VENOUS BLD VENIPUNCTURE: CPT | Performed by: PHYSICIAN ASSISTANT

## 2019-02-17 PROCEDURE — 96361 HYDRATE IV INFUSION ADD-ON: CPT

## 2019-02-17 PROCEDURE — 85025 COMPLETE CBC W/AUTO DIFF WBC: CPT | Performed by: PHYSICIAN ASSISTANT

## 2019-02-17 PROCEDURE — 99283 EMERGENCY DEPT VISIT LOW MDM: CPT

## 2019-02-17 RX ORDER — ACETAMINOPHEN 325 MG/1
650 TABLET ORAL ONCE
Status: COMPLETED | OUTPATIENT
Start: 2019-02-17 | End: 2019-02-17

## 2019-02-17 RX ORDER — ONDANSETRON 4 MG/1
4 TABLET, ORALLY DISINTEGRATING ORAL EVERY 8 HOURS PRN
Qty: 10 TABLET | Refills: 0 | Status: SHIPPED | OUTPATIENT
Start: 2019-02-17 | End: 2020-01-16

## 2019-02-17 RX ORDER — METOPROLOL SUCCINATE 50 MG/1
100 TABLET, EXTENDED RELEASE ORAL DAILY
Status: DISCONTINUED | OUTPATIENT
Start: 2019-02-17 | End: 2019-02-17 | Stop reason: HOSPADM

## 2019-02-17 RX ORDER — ONDANSETRON 2 MG/ML
4 INJECTION INTRAMUSCULAR; INTRAVENOUS ONCE
Status: COMPLETED | OUTPATIENT
Start: 2019-02-17 | End: 2019-02-17

## 2019-02-17 RX ADMIN — METOPROLOL SUCCINATE 100 MG: 50 TABLET, EXTENDED RELEASE ORAL at 15:52

## 2019-02-17 RX ADMIN — ACETAMINOPHEN 650 MG: 325 TABLET, FILM COATED ORAL at 14:50

## 2019-02-17 RX ADMIN — ONDANSETRON 4 MG: 2 INJECTION INTRAMUSCULAR; INTRAVENOUS at 14:44

## 2019-02-17 RX ADMIN — SODIUM CHLORIDE 1000 ML: 0.9 INJECTION, SOLUTION INTRAVENOUS at 14:41

## 2019-02-17 NOTE — ED PROVIDER NOTES
History  Chief Complaint   Patient presents with    Vomiting     Patient presents to the ER stating she has been vomiting since 7am, denies abdominal pain, diarrhea and fever  71 yo female w/ hx of HTN and NIDDM presents to the Emergency Department for evaluation of acute onset nausea and vomiting beginning this AM  Numerous episodes of NBNB emesis today, not tolerating PO  No associated fever, chills, sweats, abd pain, CP, SOB, or LUTS   was ill with N/V/D earlier in the week  No recent international travel, antibiotic use or hospitalizations  Denies pain  Prior abd surgeries include appendectomy  Prior to Admission Medications   Prescriptions Last Dose Informant Patient Reported? Taking? Cetirizine HCl (ZYRTEC ALLERGY) 10 MG CAPS   Yes No   Sig: ZyrTEC Allergy 10 MG Oral Tablet TAKE 1 TABLET DAILY AS DIRECTED  Quantity: 30;  Refills: 0    Lynnette Saucedo;  Started 12-May-2014 Active   Cholecalciferol (VITAMIN D-3) 1000 UNITS CAPS   Yes No   Sig: Vitamin D3 1000 UNIT Oral Tablet TAKE 1 TABLET DAILY  Quantity: 30;  Refills: 0    Semonche Xochitl DO;  Started 25-Sep-2012 Active   Multiple Vitamins-Minerals (CENTRUM ADULTS PO)   Yes No   Sig: Centrum Oral Tablet TAKE 1 TABLET DAILY  Refills: 0    Semonche, Xochitl DO;  Started 25-Sep-2012 Active   amLODIPine (NORVASC) 5 mg tablet   No No   Sig: Take 1 tablet (5 mg total) by mouth daily AmLODIPine Besylate 5 MG Oral Tablet TAKE 1 TABLET DAILY     lisinopril (ZESTRIL) 40 mg tablet   No No   Sig: Take 1 5 tablets (60 mg total) by mouth daily   metFORMIN (GLUCOPHAGE) 500 mg tablet   No No   Sig: Take 1 tablet (500 mg total) by mouth 2 (two) times a day with meals for 30 days   metoprolol succinate (TOPROL-XL) 100 mg 24 hr tablet   No No   Sig: Take 1 tablet (100 mg total) by mouth daily      Facility-Administered Medications: None       Past Medical History:   Diagnosis Date    Depression     [facility]     Hypertension     Migraine        Past Surgical History:   Procedure Laterality Date    APPENDECTOMY      COLONOSCOPY      complete 11/29/11- 5 years / complete 6/9/04 -10years    HEMORRHOID SURGERY      KIDNEY STONE SURGERY      TONSILLECTOMY      TUBAL LIGATION         Family History   Problem Relation Age of Onset    Hypertension Mother     Thyroid disease Mother     Coronary artery disease Father     Hypertension Father     Other Father         smoker    Diabetes Paternal Grandmother      I have reviewed and agree with the history as documented  Social History     Tobacco Use    Smoking status: Never Smoker    Smokeless tobacco: Never Used   Substance Use Topics    Alcohol use: Yes     Comment: rarely / social per [employer]    Drug use: No        Review of Systems   Constitutional: Negative for chills, diaphoresis, fatigue and fever  Respiratory: Negative for chest tightness and shortness of breath  Cardiovascular: Negative for chest pain and palpitations  Gastrointestinal: Positive for nausea and vomiting  Negative for abdominal pain, blood in stool, constipation and diarrhea  Genitourinary: Negative for dysuria, flank pain, frequency and hematuria  Musculoskeletal: Negative for arthralgias, back pain and myalgias  Skin: Negative for color change and rash  Neurological: Negative for dizziness, weakness, light-headedness and headaches  Hematological: Does not bruise/bleed easily  Psychiatric/Behavioral: Negative for confusion  Physical Exam  Physical Exam   Constitutional: She is oriented to person, place, and time  She appears well-developed and well-nourished  No distress  HENT:   Head: Normocephalic and atraumatic  Mouth/Throat: Oropharynx is clear and moist    Eyes: Pupils are equal, round, and reactive to light  No scleral icterus  Neck: No JVD present  Cardiovascular: Regular rhythm  Tachycardia present  Exam reveals no gallop and no friction rub     No murmur heard   Pulmonary/Chest: No respiratory distress  She has no wheezes  She has no rales  Abdominal: Soft  Bowel sounds are normal  She exhibits no distension and no mass  There is no tenderness  There is no rebound and no guarding  Musculoskeletal: She exhibits no edema  Neurological: She is alert and oriented to person, place, and time  Skin: Skin is warm and dry  Capillary refill takes less than 2 seconds  She is not diaphoretic  No pallor  Psychiatric: She has a normal mood and affect  Her behavior is normal    Vitals reviewed        Vital Signs  ED Triage Vitals [02/17/19 1404]   Temperature Pulse Respirations Blood Pressure SpO2   (!) 101 1 °F (38 4 °C) (!) 114 20 128/71 93 %      Temp Source Heart Rate Source Patient Position - Orthostatic VS BP Location FiO2 (%)   Tympanic Monitor Lying Right arm --      Pain Score       No Pain           Vitals:    02/17/19 1500 02/17/19 1530 02/17/19 1552 02/17/19 1600   BP:  145/64 145/64 146/69   Pulse: 101 100 98 96   Patient Position - Orthostatic VS:  Lying  Lying       Visual Acuity      ED Medications  Medications   metoprolol succinate (TOPROL-XL) 24 hr tablet 100 mg (100 mg Oral Given 2/17/19 1552)   sodium chloride 0 9 % bolus 1,000 mL (0 mL Intravenous Stopped 2/17/19 1615)   ondansetron (ZOFRAN) injection 4 mg (4 mg Intravenous Given 2/17/19 1444)   acetaminophen (TYLENOL) tablet 650 mg (650 mg Oral Given 2/17/19 1450)       Diagnostic Studies  Results Reviewed     Procedure Component Value Units Date/Time    Comprehensive metabolic panel [[id number]]  (Abnormal) Collected:  02/17/19 1442    Lab Status:  Final result Specimen:  Blood from Line, Venous Updated:  02/17/19 1522     Sodium 144 mmol/L      Potassium 4 2 mmol/L      Chloride 102 mmol/L      CO2 30 mmol/L      ANION GAP 12 mmol/L      BUN 20 mg/dL      Creatinine 0 99 mg/dL      Glucose 216 mg/dL      Calcium 9 1 mg/dL      AST 27 U/L      ALT 42 U/L      Alkaline Phosphatase 95 U/L      Total Protein 7 6 g/dL      Albumin 3 9 g/dL      Total Bilirubin 0 60 mg/dL      eGFR 58 ml/min/1 73sq m     Narrative:       National Kidney Disease Education Program recommendations are as follows:  GFR calculation is accurate only with a steady state creatinine  Chronic Kidney disease less than 60 ml/min/1 73 sq  meters  Kidney failure less than 15 ml/min/1 73 sq  meters  CBC and differential [[id number]]  (Abnormal) Collected:  02/17/19 1442    Lab Status:  Final result Specimen:  Blood from Line, Venous Updated:  02/17/19 1504     WBC 10 52 Thousand/uL      RBC 4 93 Million/uL      Hemoglobin 15 7 g/dL      Hematocrit 45 9 %      MCV 93 fL      MCH 31 8 pg      MCHC 34 2 g/dL      RDW 12 2 %      MPV 9 2 fL      Platelets 715 Thousands/uL      nRBC 0 /100 WBCs      Neutrophils Relative 89 %      Immat GRANS % 0 %      Lymphocytes Relative 5 %      Monocytes Relative 4 %      Eosinophils Relative 2 %      Basophils Relative 0 %      Neutrophils Absolute 9 28 Thousands/µL      Immature Grans Absolute 0 03 Thousand/uL      Lymphocytes Absolute 0 56 Thousands/µL      Monocytes Absolute 0 39 Thousand/µL      Eosinophils Absolute 0 22 Thousand/µL      Basophils Absolute 0 04 Thousands/µL                  No orders to display              Procedures  Procedures       Phone Contacts  ED Phone Contact    ED Course  ED Course as of Feb 17 1746   Sun Feb 17, 2019   1532 Pt feeling improved  Will PO challenge, give her missed dose of home metoprolol PO                                  MDM  Number of Diagnoses or Management Options  Non-intractable vomiting with nausea, unspecified vomiting type: new and requires workup  Diagnosis management comments: 73 yo female presents w/ acute N/V  She is initially tachycardic and febrile, however this resolved after administration of antipyretics and IV hydration  She has no abdominal tenderness concerning for acute surgical abdomen  Labs unremarkable, tolerating PO at discharge  Discussed option for CT abdomen/pelvis to definitively r/o acute process, however I do not feel this is warranted at this time; pt is in agreement  She will return if she develops pain or intractable vomiting  Amount and/or Complexity of Data Reviewed  Clinical lab tests: ordered and reviewed  Tests in the medicine section of CPT®: ordered and reviewed  Review and summarize past medical records: yes        Disposition  Final diagnoses:   Non-intractable vomiting with nausea, unspecified vomiting type     Time reflects when diagnosis was documented in both MDM as applicable and the Disposition within this note     Time User Action Codes Description Comment    2/17/2019  4:09 PM Rush Quigley Add [R11 2] Non-intractable vomiting with nausea, unspecified vomiting type       ED Disposition     ED Disposition Condition Date/Time Comment    Discharge Stable Sun Feb 17, 2019  4:08 PM Verlee Hatchet discharge to home/self care  Follow-up Information     Follow up With Specialties Details Why Contact Info Additional Kaila James [age], DO Internal Medicine, Family Medicine In 2 days  [facility]  [address]  [address] [address] [facility] [place],[address]       [facility] Emergency Department Emergency Medicine  If symptoms worsen [address]  [address]  [phone] [address] ED, [address], [address]          Discharge Medication List as of 2/17/2019  4:09 PM      START taking these medications    Details   ondansetron (ZOFRAN-ODT) 4 mg disintegrating tablet Take 1 tablet (4 mg total) by mouth every 8 (eight) hours as needed for nausea or vomiting, Starting Sun 2/17/2019, Print         CONTINUE these medications which have NOT CHANGED    Details   amLODIPine (NORVASC) 5 mg tablet Take 1 tablet (5 mg total) by mouth daily AmLODIPine Besylate 5 MG Oral Tablet TAKE 1 TABLET DAILY  , Starting Thu 11/15/2018, Normal      Cetirizine HCl (ZYRTEC ALLERGY) 10 MG CAPS ZyrTEC Allergy 10 MG Oral Tablet TAKE 1 TABLET DAILY AS DIRECTED  Quantity: 30;  Refills: 0    Karol Saucedo;  Started 12-May-2014 Active, Starting 5/12/2014, Until Discontinued, Historical Med      Cholecalciferol (VITAMIN D-3) 1000 UNITS CAPS Vitamin D3 1000 UNIT Oral Tablet TAKE 1 TABLET DAILY  Quantity: 30;  Refills: 0    Xochitl Tan DO;  Started 25-Sep-2012 Active, Starting 9/25/2012, Until Discontinued, Historical Med      lisinopril (ZESTRIL) 40 mg tablet Take 1 5 tablets (60 mg total) by mouth daily, Starting Thu 11/15/2018, Normal      metFORMIN (GLUCOPHAGE) 500 mg tablet Take 1 tablet (500 mg total) by mouth 2 (two) times a day with meals for 30 days, Starting Thu 11/15/2018, Until Mon 12/24/2018, Normal      metoprolol succinate (TOPROL-XL) 100 mg 24 hr tablet Take 1 tablet (100 mg total) by mouth daily, Starting Th 11/15/2018, Normal      Multiple Vitamins-Minerals (CENTRUM ADULTS PO) Centrum Oral Tablet TAKE 1 TABLET DAILY  Refills: 0    Xochitl Tan DO;  Started 25-Sep-2012 Active, Starting 9/25/2012, Until Discontinued, Historical Med           No discharge procedures on file      ED Provider  Electronically Signed by           Roxie Sim PA-C  02/17/19 7583

## 2019-02-17 NOTE — ED NOTES
Pt states possibility of illness after eating dinner with spouse at a restaurant 2/14/2019, spouse had 2 days diarrhea---resolved, pt now with N/V today, no diarrhea        Juan Chavez RN  02/17/19 2653

## 2019-02-17 NOTE — TELEPHONE ENCOUNTER
Abigail Blackman 1945  CONFIDENTIALTY NOTICE: This fax transmission is intended only for the addressee  It contains information that is legally privileged,  confidential or otherwise protected from use or disclosure  If you are not the intended recipient, you are strictly prohibited from reviewing,  disclosing, copying using or disseminating any of this information or taking any action in reliance on or regarding this information  If you have  received this fax in error, please notify us immediately by telephone so that we can arrange for its return to us  Page: 1  2  Call Id: [id number]  Health Call  Standard Call Report  Health Call  Patient Name: Abigail Blackman  Gender: Female  : 1945  Age: 72 Y 9 M 22 D  Return Phone  Number: [phone] (Home)  Address: [address] [place] [address]/[facility]/Zip: [address] [address]  Practice Name: [address] [place] [address]  Practice Charged:  Physician:  [facility] Name: Sinai Godoy  Relationship To  Patient: Spouse  Return Phone Number: [phone] (Home)  Presenting Problem: " My wife has been vomiting all day "  Service Type: Triage  Charged Service 1: N/A  Pharmacy Name and  Number:  Nurse Assessment  Nurse: Leanna Stein RN Date/Time: 2019 1:04:42 PM  Type of assessment required:  ---General (Adult or Child)  Duration of Current S/S  ---Today  Location/Radiation  ---GI  Temperature (F) and route:  ---Denies fever  Symptom Specific Meds (Dose/Time):  ---None  Other S/S  ---Vomiting x 5  Pain Scale on scale of 1-10, 10 being the worst:  ---# 7 stomachache  Symptom progression:  ---worse  Intake and Output  ---Sips of ginger ale Everything comes back up  Last void: 12:30  Last Exam/Treatment:  ---was seen 20  Abigail Blackman 1945  CONFIDENTIALTY NOTICE: This fax transmission is intended only for the addressee  It contains information that is legally privileged,  confidential or otherwise protected from use or disclosure   If you are not the intended recipient, you are strictly prohibited from reviewing,  disclosing, copying using or disseminating any of this information or taking any action in reliance on or regarding this information  If you have  received this fax in error, please notify us immediately by telephone so that we can arrange for its return to us  Page: 2 of 2  Call Id: [id number]  Nurse Assessment  Protocols  Protocol Title Nurse Date/Time  Vomiting Zaira Pendleton RN, Ankita Lombardo 2/17/2019 1:03:38 PM  Question Caller Affirmed  Disp  Time Disposition Final User  2/17/2019 1:12:06 PM Go to ED Now (or PCP triage) Artemio Marcum RN, Ankita Lombardo  2/17/2019 1:13:07 PM RN Triaged Yes Artemio Marcum RN, [address] [facility] Advice Given Per Protocol  GO TO ED NOW (OR PCP TRIAGE): * IF NO PCP TRIAGE: You need to be seen  Go to the [facility] at [facility]  within the next hour  Leave as soon as you can  CONTAINER: You may wish to bring a bucket or container with you in case there is  more vomiting during the drive  BRING MEDICINES: * Please bring a list of your current medicines when you go to see the doctor  CARE ADVICE per Vomiting (Adult) guideline  Caller Understands: Yes  Caller Disagree/Comply: Comply  PreDisposition: Unsure  Comments  User: Karthik Bahena RN Date/Time: 2/17/2019 1:13:00 PM  Pt  will be taking her to [facility] ER for evaluation

## 2019-03-17 LAB
EST. AVERAGE GLUCOSE BLD GHB EST-MCNC: 183 MG/DL
GLUCOSE SERPL-MCNC: 150 MG/DL (ref 65–99)
HBA1C MFR BLD: 8 % (ref 4.8–5.6)

## 2019-03-19 ENCOUNTER — HOSPITAL ENCOUNTER (OUTPATIENT)
Dept: ULTRASOUND IMAGING | Facility: HOSPITAL | Age: 74
Discharge: HOME | End: 2019-03-19
Payer: COMMERCIAL

## 2019-03-19 DIAGNOSIS — E04.2 MULTIPLE THYROID NODULES: ICD-10-CM

## 2019-03-19 PROCEDURE — 76536 US EXAM OF HEAD AND NECK: CPT

## 2019-03-25 ENCOUNTER — OFFICE VISIT (OUTPATIENT)
Dept: FAMILY MEDICINE CLINIC | Facility: HOSPITAL | Age: 74
End: 2019-03-25
Payer: COMMERCIAL

## 2019-03-25 VITALS
WEIGHT: 177 LBS | HEART RATE: 98 BPM | DIASTOLIC BLOOD PRESSURE: 70 MMHG | BODY MASS INDEX: 31.36 KG/M2 | SYSTOLIC BLOOD PRESSURE: 128 MMHG | TEMPERATURE: 98.9 F | HEIGHT: 63 IN

## 2019-03-25 DIAGNOSIS — I10 ESSENTIAL HYPERTENSION: ICD-10-CM

## 2019-03-25 DIAGNOSIS — M25.512 ACUTE PAIN OF LEFT SHOULDER: ICD-10-CM

## 2019-03-25 DIAGNOSIS — E11.29 CONTROLLED TYPE 2 DIABETES MELLITUS WITH MICROALBUMINURIA, WITHOUT LONG-TERM CURRENT USE OF INSULIN (HCC): Primary | ICD-10-CM

## 2019-03-25 DIAGNOSIS — E66.9 OBESITY (BMI 30.0-34.9): ICD-10-CM

## 2019-03-25 DIAGNOSIS — Z12.39 SCREENING FOR MALIGNANT NEOPLASM OF BREAST: ICD-10-CM

## 2019-03-25 DIAGNOSIS — Z13.820 SCREENING FOR OSTEOPOROSIS: ICD-10-CM

## 2019-03-25 DIAGNOSIS — R80.9 CONTROLLED TYPE 2 DIABETES MELLITUS WITH MICROALBUMINURIA, WITHOUT LONG-TERM CURRENT USE OF INSULIN (HCC): Primary | ICD-10-CM

## 2019-03-25 PROCEDURE — 3008F BODY MASS INDEX DOCD: CPT | Performed by: INTERNAL MEDICINE

## 2019-03-25 PROCEDURE — 3078F DIAST BP <80 MM HG: CPT | Performed by: INTERNAL MEDICINE

## 2019-03-25 PROCEDURE — 1160F RVW MEDS BY RX/DR IN RCRD: CPT | Performed by: INTERNAL MEDICINE

## 2019-03-25 PROCEDURE — 3074F SYST BP LT 130 MM HG: CPT | Performed by: INTERNAL MEDICINE

## 2019-03-25 PROCEDURE — 99214 OFFICE O/P EST MOD 30 MIN: CPT | Performed by: INTERNAL MEDICINE

## 2019-03-25 PROCEDURE — 4010F ACE/ARB THERAPY RXD/TAKEN: CPT | Performed by: INTERNAL MEDICINE

## 2019-03-25 RX ORDER — METOPROLOL SUCCINATE 100 MG/1
100 TABLET, EXTENDED RELEASE ORAL DAILY
Qty: 30 TABLET | Refills: 6 | Status: SHIPPED | OUTPATIENT
Start: 2019-03-25 | End: 2019-12-12 | Stop reason: SDUPTHER

## 2019-03-25 RX ORDER — LISINOPRIL 40 MG/1
60 TABLET ORAL DAILY
Qty: 45 TABLET | Refills: 6 | Status: SHIPPED | OUTPATIENT
Start: 2019-03-25 | End: 2019-12-12 | Stop reason: SDUPTHER

## 2019-03-25 RX ORDER — AMLODIPINE BESYLATE 5 MG/1
5 TABLET ORAL DAILY
Qty: 30 TABLET | Refills: 6 | Status: SHIPPED | OUTPATIENT
Start: 2019-03-25 | End: 2019-12-12 | Stop reason: SDUPTHER

## 2019-03-25 NOTE — ASSESSMENT & PLAN NOTE
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

## 2019-03-25 NOTE — PATIENT INSTRUCTIONS
Obesity   AMBULATORY CARE:   Obesity  is when your body mass index (BMI) is greater than 30  Your healthcare provider will use your height and weight to measure your BMI  The risks of obesity include  many health problems, such as injuries or physical disability  You may need tests to check for the following:  · Diabetes     · High blood pressure or high cholesterol     · Heart disease     · Gallbladder or liver disease     · Cancer of the colon, breast, prostate, liver, or kidney     · Sleep apnea     · Arthritis or gout  Seek care immediately if:   · You have a severe headache, confusion, or difficulty speaking  · You have weakness on one side of your body  · You have chest pain, sweating, or shortness of breath  Contact your healthcare provider if:   · You have symptoms of gallbladder or liver disease, such as pain in your upper abdomen  · You have knee or hip pain and discomfort while walking  · You have symptoms of diabetes, such as intense hunger and thirst, and frequent urination  · You have symptoms of sleep apnea, such as snoring or daytime sleepiness  · You have questions or concerns about your condition or care  Treatment for obesity  focuses on helping you lose weight to improve your health  Even a small decrease in BMI can reduce the risk for many health problems  Your healthcare provider will help you set a weight-loss goal   · Lifestyle changes  are the first step in treating obesity  These include making healthy food choices and getting regular physical activity  Your healthcare provider may suggest a weight-loss program that involves coaching, education, and therapy  · Medicine  may help you lose weight when it is used with a healthy diet and physical activity  · Surgery  can help you lose weight if you are very obese and have other health problems  There are several types of weight-loss surgery  Ask your healthcare provider for more information    Be successful losing weight:   · Set small, realistic goals  An example of a small goal is to walk for 20 minutes 5 days a week  Anther goal is to lose 5% of your body weight  · Tell friends, family members, and coworkers about your goals  and ask for their support  Ask a friend to lose weight with you, or join a weight-loss support group  · Identify foods or triggers that may cause you to overeat , and find ways to avoid them  Remove tempting high-calorie foods from your home and workplace  Place a bowl of fresh fruit on your kitchen counter  If stress causes you to eat, then find other ways to cope with stress  · Keep a diary to track what you eat and drink  Also write down how many minutes of physical activity you do each day  Weigh yourself once a week and record it in your diary  Eating changes: You will need to eat 500 to 1,000 fewer calories each day than you currently eat to lose 1 to 2 pounds a week  The following changes will help you cut calories:  · Eat smaller portions  Use small plates, no larger than 9 inches in diameter  Fill your plate half full of fruits and vegetables  Measure your food using measuring cups until you know what a serving size looks like  · Eat 3 meals and 1 or 2 snacks each day  Plan your meals in advance  Jimi Dunn and eat at home most of the time  Eat slowly  · Eat fruits and vegetables at every meal   They are low in calories and high in fiber, which makes you feel full  Do not add butter, margarine, or cream sauce to vegetables  Use herbs to season steamed vegetables  · Eat less fat and fewer fried foods  Eat more baked or grilled chicken and fish  These protein sources are lower in calories and fat than red meat  Limit fast food  Dress your salads with olive oil and vinegar instead of bottled dressing  · Limit the amount of sugar you eat  Do not drink sugary beverages  Limit alcohol  Activity changes:  Physical activity is good for your body in many ways   It helps you burn calories and build strong muscles  It decreases stress and depression, and improves your mood  It can also help you sleep better  Talk to your healthcare provider before you begin an exercise program   · Exercise for at least 30 minutes 5 days a week  Start slowly  Set aside time each day for physical activity that you enjoy and that is convenient for you  It is best to do both weight training and an activity that increases your heart rate, such as walking, bicycling, or swimming  · Find ways to be more active  Do yard work and housecleaning  Walk up the stairs instead of using elevators  Spend your leisure time going to events that require walking, such as outdoor festivals or fairs  This extra physical activity can help you lose weight and keep it off  Follow up with your healthcare provider as directed: You may need to meet with a dietitian  Write down your questions so you remember to ask them during your visits  © 2017 [employer] [employer] Information is for End User's use only and may not be sold, redistributed or otherwise used for commercial purposes  All illustrations and images included in CareNotes® are the copyrighted property of [employer]  or [employer]onis Cade  The above information is an  only  It is not intended as medical advice for individual conditions or treatments  Talk to your doctor, nurse or pharmacist before following any medical regimen to see if it is safe and effective for you  Heart Healthy Diet   AMBULATORY CARE:   A heart healthy diet  is an eating plan low in total fat, unhealthy fats, and sodium (salt)  A heart healthy diet helps decrease your risk for heart disease and stroke  Limit the amount of fat you eat to 25% to 35% of your total daily calories  Limit sodium to less than 2,300 mg each day  Healthy fats:  Healthy fats can help improve cholesterol levels   The risk for heart disease is decreased when cholesterol levels are normal  Choose healthy fats, such as the following:  · Unsaturated fat  is found in foods such as soybean, canola, olive, corn, and safflower oils  It is also found in soft tub margarine that is made with liquid vegetable oil  · Omega-3 fat  is found in certain fish, such as salmon, tuna, and trout, and in walnuts and flaxseed  Unhealthy fats:  Unhealthy fats can cause unhealthy cholesterol levels in your blood and increase your risk of heart disease  Limit unhealthy fats, such as the following:  · Cholesterol  is found in animal foods, such as eggs and lobster, and in dairy products made from whole milk  Limit cholesterol to less than 300 milligrams (mg) each day  You may need to limit cholesterol to 200 mg each day if you have heart disease  · Saturated fat  is found in meats, such as mendes and hamburger  It is also found in chicken or turkey skin, whole milk, and butter  Limit saturated fat to less than 7% of your total daily calories  Limit saturated fat to less than 6% if you have heart disease or are at increased risk for it  · Trans fat  is found in packaged foods, such as potato chips and cookies  It is also in hard margarine, some fried foods, and shortening  Avoid trans fats as much as possible    Heart healthy foods and drinks to include:  Ask your dietitian or healthcare provider how many servings to have from each of the following food groups:  · Grains:      ¨ Whole-wheat breads, cereals, and pastas, and brown rice    ¨ Low-fat, low-sodium crackers and chips    · Vegetables:      ¨ Broccoli, green beans, green peas, and spinach    ¨ Collards, kale, and lima beans    ¨ Carrots, sweet potatoes, tomatoes, and peppers    ¨ Canned vegetables with no salt added    · Fruits:      ¨ Bananas, peaches, pears, and pineapple    ¨ Grapes, raisins, and dates    ¨ Oranges, tangerines, grapefruit, orange juice, and grapefruit juice    ¨ Apricots, mangoes, melons, and papaya    ¨ Raspberries and strawberries    ¨ Canned fruit with no added sugar    · Low-fat dairy products:      ¨ Nonfat (skim) milk, 1% milk, and low-fat almond, cashew, or soy milks fortified with calcium    ¨ Low-fat cheese, regular or frozen yogurt, and cottage cheese    · Meats and proteins , such as lean cuts of beef and pork (loin, leg, round), skinless chicken and turkey, legumes, soy products, egg whites, and nuts  Foods and drinks to limit or avoid:  Ask your dietitian or healthcare provider about these and other foods that are high in unhealthy fat, sodium, and sugar:  · Snack or packaged foods , such as frozen dinners, cookies, macaroni and cheese, and cereals with more than 300 mg of sodium per serving    · Canned or dry mixes  for cakes, soups, sauces, or gravies    · Vegetables with added sodium , such as instant potatoes, vegetables with added sauces, or regular canned vegetables    · Other foods high in sodium , such as ketchup, barbecue sauce, salad dressing, pickles, olives, soy sauce, and miso    · High-fat dairy foods  such as whole or 2% milk, cream cheese, or sour cream, and cheeses     · High-fat protein foods  such as high-fat cuts of beef (T-bone steaks, ribs), chicken or turkey with skin, and organ meats, such as liver    · Cured or smoked meats , such as hot dogs, mendes, and sausage    · Unhealthy fats and oils , such as butter, stick margarine, shortening, and cooking oils such as coconut or palm oil    · Food and drinks high in sugar , such as soft drinks (soda), sports drinks, sweetened tea, candy, cake, cookies, pies, and doughnuts  Other diet guidelines to follow:   · Eat more foods containing omega-3 fats  Eat fish high in omega-3 fats at least 2 times a week  · Limit alcohol  Too much alcohol can damage your heart and raise your blood pressure  Women should limit alcohol to 1 drink a day  Men should limit alcohol to 2 drinks a day   A drink of alcohol is 12 ounces of beer, 5 ounces of wine, or 1½ ounces of liquor  · Choose low-sodium foods  High-sodium foods can lead to high blood pressure  Add little or no salt to food you prepare  Use herbs and spices in place of salt  · Eat more fiber  to help lower cholesterol levels  Eat at least 5 servings of fruits and vegetables each day  Eat 3 ounces of whole-grain foods each day  Legumes (beans) are also a good source of fiber  Lifestyle guidelines:   · Do not smoke  Nicotine and other chemicals in cigarettes and cigars can cause lung and heart damage  Ask your healthcare provider for information if you currently smoke and need help to quit  E-cigarettes or smokeless tobacco still contain nicotine  Talk to your healthcare provider before you use these products  · Exercise regularly  to help you maintain a healthy weight and improve your blood pressure and cholesterol levels  Ask your healthcare provider about the best exercise plan for you  Do not start an exercise program without asking your healthcare provider  Follow up with your healthcare provider as directed:  Write down your questions so you remember to ask them during your visits  © 2017 [facility] Information is for End User's use only and may not be sold, redistributed or otherwise used for commercial purposes  All illustrations and images included in CareNotes® are the copyrighted property of A [employer] M [employer]  or Adonis Cade  The above information is an  only  It is not intended as medical advice for individual conditions or treatments  Talk to your doctor, nurse or pharmacist before following any medical regimen to see if it is safe and effective for you

## 2019-03-25 NOTE — PROGRESS NOTES
Assessment/Plan:    Controlled type 2 diabetes mellitus with microalbuminuria, without long-term current use of insulin ([facility])  Lab Results   Component Value Date    HGBA1C 8 0 (H) 03/16/2019       No results for input(s): POCGLU in the last 72 hours  Blood Sugar Average: Last 72 hrs: does not check sugars at home  DM Type 2 with microalbuminuria and hyperglycemia - uncontrolled with a1C 8 0 - will increase Metformin to 1000 mg bid and urged to con't healthy diet and increase activity, recheck BW in 3 mos or so - order given, on ACE but no statin, UTD on foot exam (8/18) and minda get copy of eye exam - has appt coming up again      Hypertension  Bp looks great!, con't current regimen, diet/exercise/wgt loss encouraged       Diagnoses and all orders for this visit:    Controlled type 2 diabetes mellitus with microalbuminuria, without long-term current use of insulin ([facility] [address] )  -     Discontinue: metFORMIN (GLUCOPHAGE) 850 mg tablet; Take 1 tablet (850 mg total) by mouth 2 (two) times a day with meals for 30 days  -     metFORMIN (GLUCOPHAGE) 1000 MG tablet; Take 1 tablet (1,000 mg total) by mouth 2 (two) times a day with meals for 30 days  -     Glucose, fasting; Future  -     Hemoglobin A1C; Future  -     Glucose, fasting  -     Hemoglobin A1C    Essential hypertension  -     amLODIPine (NORVASC) 5 mg tablet; Take 1 tablet (5 mg total) by mouth daily AmLODIPine Besylate 5 MG Oral Tablet TAKE 1 TABLET DAILY  -     lisinopril (ZESTRIL) 40 mg tablet; Take 1 5 tablets (60 mg total) by mouth daily  -     metoprolol succinate (TOPROL-XL) 100 mg 24 hr tablet; Take 1 tablet (100 mg total) by mouth daily    Acute pain of left shoulder  Comments:  D/w pt c/w bicep tendonitis - urged gentle ROM and heat/ice, can con't OTC NSAID or Tyelnol and advised if not better in 2 wks will need to see Ortho - will richie    Obesity (BMI 30 0-34  9)  Comments:  Diet/exercise/wgt loss encouraged    Screening for malignant neoplasm of breast  - Mammo screening bilateral w cad; Future    Screening for osteoporosis  -     DXA bone density spine hip and pelvis; Future      [place] 11/11 - 10 yrs    Mammo 4/18 - order given    Dexa 12/16 - osteopenia - order given    Subjective:      Patient ID: Russell Burroughs is a 72 y o  female  HPI Pt here for follow up appt and BW results    BW results were d/w pt in detail: FBS/A1c 150/8 0  Def of controlled vs uncontrolled DM was reviewed  Diet was reviewed - wgt up 1 lb from Aug 2018  She admits her diet was poor over the winter and she was a lot less active  She is taking her DM meds as directed  She is UTD on foot exam (8/18) but is overdue for an eye exam   She is on  ACE but no statin  BP at goal today and meds were reviewed and no changes have occurred  She denies missing doses of meds or SE with the meds  She does not check her BP outside the office  She notes no frequent Ha's/dizziness/double vision/CP  She con't to note intermittent L arm pain  Pain started a month or so ago - she notes no specific fall/injury/new activity  She was doing a lot of heavy lifting/carrying heavy objects  Pain is at lower L neck/L shoulder and bicep region down into L shoulder  She notes no weakness/numbness/tingling/dropping objects  She notes pain worse with reaching forward  [place] 11/11 - 10 yrs    Mammo 4/18    Dexa 12/16 - osteopenia    Review of Systems   Constitutional: Negative for chills, fatigue, fever and unexpected weight change  HENT: Negative for congestion and sore throat  Eyes: Negative for pain and visual disturbance  Respiratory: Negative for cough, shortness of breath and wheezing  Cardiovascular: Negative for chest pain, palpitations and leg swelling  Gastrointestinal: Negative for abdominal pain, constipation, diarrhea and nausea  Endocrine: Negative for polydipsia and polyuria  Genitourinary: Negative for difficulty urinating and dysuria     Musculoskeletal: Positive for arthralgias  Negative for back pain and neck pain  Skin: Negative for rash and wound  Neurological: Negative for dizziness, light-headedness and headaches  Hematological: Negative for adenopathy  Psychiatric/Behavioral: Negative for behavioral problems and confusion  Objective:    /70 (BP Location: Right arm, Patient Position: Sitting, Cuff Size: Standard)   Pulse 98   Temp 98 9 °F (37 2 °C)   Ht 5' 3" (1 6 m)   Wt 80 3 kg (177 lb)   BMI 31 35 kg/m²      Physical Exam   Constitutional: She appears well-developed and well-nourished  No distress  HENT:   Head: Normocephalic and atraumatic  Eyes: Conjunctivae are normal  Right eye exhibits no discharge  Left eye exhibits no discharge  Neck: Neck supple  No tracheal deviation present  Cardiovascular: Normal rate, regular rhythm and normal heart sounds  Exam reveals no friction rub  No murmur heard  Pulmonary/Chest: Effort normal and breath sounds normal  No respiratory distress  She has no wheezes  She has no rales  Abdominal: Soft  She exhibits no distension  There is no tenderness  There is no guarding  Musculoskeletal: She exhibits no edema  L shoulder: no pain with PROM, no popping/clicking with PROM, pain with AROM in abduction and internal rotation, some weakness resisting flexion and + drop arm test, 5/5 UE muscle strength otherwise   Lymphadenopathy:     She has no cervical adenopathy  Neurological: She is alert  No sensory deficit  She exhibits normal muscle tone  Skin: Skin is warm and dry  No rash noted  Psychiatric: She has a normal mood and affect  Her behavior is normal    Nursing note and vitals reviewed  BMI Counseling: Body mass index is 31 35 kg/m²  Discussed the patient's BMI with her  The BMI is above average  BMI counseling and education was provided to the patient   Nutrition recommendations include reducing portion sizes, consuming healthier snacks, moderation in carbohydrate intake, increasing intake of lean protein and reducing intake of saturated fat and trans fat  Exercise recommendations include exercising 3-5 times per week

## 2019-04-12 LAB
LEFT EYE DIABETIC RETINOPATHY: NORMAL
RIGHT EYE DIABETIC RETINOPATHY: NORMAL

## 2019-04-15 ENCOUNTER — HOSPITAL ENCOUNTER (OUTPATIENT)
Dept: BONE DENSITY | Facility: IMAGING CENTER | Age: 74
Discharge: HOME | End: 2019-04-15
Payer: COMMERCIAL

## 2019-04-15 VITALS — WEIGHT: 178 LBS | BODY MASS INDEX: 31.54 KG/M2 | HEIGHT: 63 IN

## 2019-04-15 DIAGNOSIS — Z12.39 SCREENING FOR MALIGNANT NEOPLASM OF BREAST: ICD-10-CM

## 2019-04-15 DIAGNOSIS — Z13.820 SCREENING FOR OSTEOPOROSIS: ICD-10-CM

## 2019-04-15 PROCEDURE — 77067 SCR MAMMO BI INCL CAD: CPT

## 2019-04-15 PROCEDURE — 77080 DXA BONE DENSITY AXIAL: CPT

## 2019-06-26 LAB
EST. AVERAGE GLUCOSE BLD GHB EST-MCNC: 157 MG/DL
GLUCOSE SERPL-MCNC: 129 MG/DL (ref 65–99)
HBA1C MFR BLD: 7.1 % (ref 4.8–5.6)

## 2019-06-26 PROCEDURE — 3045F PR MOST RECENT HEMOGLOBIN A1C LEVEL 7.0-9.0%: CPT | Performed by: INTERNAL MEDICINE

## 2019-07-18 ENCOUNTER — OFFICE VISIT (OUTPATIENT)
Dept: FAMILY MEDICINE CLINIC | Facility: HOSPITAL | Age: 74
End: 2019-07-18
Payer: COMMERCIAL

## 2019-07-18 VITALS
TEMPERATURE: 98.6 F | SYSTOLIC BLOOD PRESSURE: 128 MMHG | HEIGHT: 63 IN | BODY MASS INDEX: 30.12 KG/M2 | DIASTOLIC BLOOD PRESSURE: 72 MMHG | WEIGHT: 170 LBS | HEART RATE: 83 BPM

## 2019-07-18 DIAGNOSIS — E78.5 DYSLIPIDEMIA: ICD-10-CM

## 2019-07-18 DIAGNOSIS — R80.9 MICROALBUMINURIA: ICD-10-CM

## 2019-07-18 DIAGNOSIS — R80.9 CONTROLLED TYPE 2 DIABETES MELLITUS WITH MICROALBUMINURIA, WITHOUT LONG-TERM CURRENT USE OF INSULIN (HCC): Primary | ICD-10-CM

## 2019-07-18 DIAGNOSIS — R79.89 ELEVATED TSH: ICD-10-CM

## 2019-07-18 DIAGNOSIS — E11.29 CONTROLLED TYPE 2 DIABETES MELLITUS WITH MICROALBUMINURIA, WITHOUT LONG-TERM CURRENT USE OF INSULIN (HCC): Primary | ICD-10-CM

## 2019-07-18 DIAGNOSIS — I10 ESSENTIAL HYPERTENSION: ICD-10-CM

## 2019-07-18 PROCEDURE — 3074F SYST BP LT 130 MM HG: CPT | Performed by: INTERNAL MEDICINE

## 2019-07-18 PROCEDURE — 99214 OFFICE O/P EST MOD 30 MIN: CPT | Performed by: INTERNAL MEDICINE

## 2019-07-18 PROCEDURE — 3008F BODY MASS INDEX DOCD: CPT | Performed by: INTERNAL MEDICINE

## 2019-07-18 PROCEDURE — 3066F NEPHROPATHY DOC TX: CPT | Performed by: INTERNAL MEDICINE

## 2019-07-18 PROCEDURE — 1036F TOBACCO NON-USER: CPT | Performed by: INTERNAL MEDICINE

## 2019-07-18 NOTE — PROGRESS NOTES
Assessment/Plan:    Controlled type 2 diabetes mellitus with microalbuminuria, without long-term current use of insulin (HCC)  Lab Results   Component Value Date    HGBA1C 7 1 (H) 06/25/2019       No results for input(s): POCGLU in the last 72 hours  Blood Sugar Average: Last 72 hrs:doesn't check sugars  DM type 2 with microalbuminuria - uncontrolled but improved with A1C 7 1 - urged to con't low sugar/carb diet and increase activity, con't current Metformin, recheck BW in 4-6 mos, con't current ACE, not on a statin, foot exam done today, eye exam done 4/19      Hypertension  BP at goal, con't current meds, diet/exercise/wgt loss encouraged    Dyslipidemia  FLP due with next labs - order given, currently not on a statin, diet/exercise/wgt loss encouraged    Elevated TSH  Recheck TFT's with next labs - order given    Microalbuminuria  Order for urine test given to be done with next labs, encouraged BP/BS control, con't current ACE       Diagnoses and all orders for this visit:    Controlled type 2 diabetes mellitus with microalbuminuria, without long-term current use of insulin (HCC)  -     metFORMIN (GLUCOPHAGE) 1000 MG tablet; Take 1 tablet (1,000 mg total) by mouth 2 (two) times a day with meals for 30 days  -     Comprehensive metabolic panel; Future  -     CBC and differential; Future  -     Hemoglobin A1C; Future  -     Lipid panel; Future  -     Microalbumin / creatinine urine ratio; Future  -     TSH, 3rd generation with Free T4 reflex; Future  -     Comprehensive metabolic panel  -     CBC and differential  -     Hemoglobin A1C  -     Lipid panel  -     Microalbumin / creatinine urine ratio  -     TSH, 3rd generation with Free T4 reflex    Essential hypertension  -     Comprehensive metabolic panel; Future  -     CBC and differential; Future  -     Hemoglobin A1C; Future  -     Lipid panel; Future  -     Microalbumin / creatinine urine ratio;  Future  -     TSH, 3rd generation with Free T4 reflex; Future  -     Comprehensive metabolic panel  -     CBC and differential  -     Hemoglobin A1C  -     Lipid panel  -     Microalbumin / creatinine urine ratio  -     TSH, 3rd generation with Free T4 reflex    Elevated TSH  -     Comprehensive metabolic panel; Future  -     CBC and differential; Future  -     Hemoglobin A1C; Future  -     Lipid panel; Future  -     Microalbumin / creatinine urine ratio; Future  -     TSH, 3rd generation with Free T4 reflex; Future  -     Comprehensive metabolic panel  -     CBC and differential  -     Hemoglobin A1C  -     Lipid panel  -     Microalbumin / creatinine urine ratio  -     TSH, 3rd generation with Free T4 reflex    Dyslipidemia  -     Comprehensive metabolic panel; Future  -     CBC and differential; Future  -     Hemoglobin A1C; Future  -     Lipid panel; Future  -     Microalbumin / creatinine urine ratio; Future  -     TSH, 3rd generation with Free T4 reflex; Future  -     Comprehensive metabolic panel  -     CBC and differential  -     Hemoglobin A1C  -     Lipid panel  -     Microalbumin / creatinine urine ratio  -     TSH, 3rd generation with Free T4 reflex    Microalbuminuria  -     Comprehensive metabolic panel; Future  -     CBC and differential; Future  -     Hemoglobin A1C; Future  -     Lipid panel; Future  -     Microalbumin / creatinine urine ratio; Future  -     TSH, 3rd generation with Free T4 reflex; Future  -     Comprehensive metabolic panel  -     CBC and differential  -     Hemoglobin A1C  -     Lipid panel  -     Microalbumin / creatinine urine ratio  -     TSH, 3rd generation with Free T4 reflex      Colonoscopy  11/11  - 10 yrs    Mammo 4/19    Dexa 4/19 - osteopenia    Thyroid US - 3/19    Daughter moving so she will not be putting grandson on the bus for the first time in years    Subjective:      Patient ID: Jordyn Robles is a 69 y o  female      HPI Pt here for follow up appt and BW results    BW results were d/w pt in detail: FBS/A1C 129/7 1  Def of controlled vs uncontrolled DM was reviewed  Diet was reviewed - wgt down 3 lbs from Nov 2018  She is watching sugars and carbs  She is doing a lot of gardening but does no other formal exercise  She is taking her DM meds as directed  She is UTD on foot (8/18) and eye exam (4/19)  She notes no numbness/tingling/pain/sores/ulcers with her feet  She is on an ACE but no statin  BP at goal today and meds were reviewed and no changes have occurred  She denies missing doses of meds or SE with the meds  She does not check her BP outside the office  She notes no frequent HA's/dizziness/double vision/CP  Colonoscopy  11/11  - 10 yrs    Mammo 4/19    Dexa 4/19 - osteopenia    Thyroid US - 3/19      Review of Systems   Constitutional: Negative for chills and fever  HENT: Negative for congestion and sore throat  Eyes: Negative for pain and visual disturbance  Respiratory: Negative for cough, shortness of breath and wheezing  Cardiovascular: Negative for chest pain, palpitations and leg swelling  Gastrointestinal: Negative for abdominal pain, constipation, diarrhea and nausea  Endocrine: Negative for polydipsia and polyuria  Genitourinary: Negative for difficulty urinating, dysuria, vaginal bleeding and vaginal pain  Musculoskeletal: Negative for back pain and neck pain  Skin: Negative for rash and wound  Neurological: Negative for dizziness, light-headedness and headaches  Hematological: Negative for adenopathy  Psychiatric/Behavioral: Negative for behavioral problems and confusion  Objective:    /72 (BP Location: Right arm, Patient Position: Sitting, Cuff Size: Standard)   Pulse 83   Temp 98 6 °F (37 °C)   Ht 5' 3" (1 6 m)   Wt 77 1 kg (170 lb)   BMI 30 11 kg/m²      Physical Exam   Constitutional: She appears well-developed and well-nourished  No distress  HENT:   Head: Normocephalic and atraumatic     Eyes: Conjunctivae are normal  Right eye exhibits no discharge  Left eye exhibits no discharge  Neck: Neck supple  No tracheal deviation present  Cardiovascular: Normal rate, regular rhythm and normal heart sounds  Exam reveals no friction rub  Pulses are no weak pulses  No murmur heard  Pulses:       Dorsalis pedis pulses are 2+ on the right side, and 2+ on the left side  Pulmonary/Chest: Effort normal and breath sounds normal  No respiratory distress  She has no wheezes  She has no rales  Abdominal: Soft  She exhibits no distension  There is no tenderness  There is no guarding  Musculoskeletal: She exhibits no edema  Feet:   Right Foot:   Skin Integrity: Negative for ulcer, skin breakdown, erythema, warmth, callus or dry skin  Left Foot:   Skin Integrity: Negative for ulcer, skin breakdown, erythema, warmth, callus or dry skin  Neurological: She is alert  She exhibits normal muscle tone  Skin: Skin is warm and dry  No rash noted  Psychiatric: She has a normal mood and affect  Her behavior is normal    Nursing note and vitals reviewed  Patient's shoes and socks removed  Right Foot/Ankle   Right Foot Inspection  Skin Exam: skin normal and skin intact no dry skin, no warmth, no callus, no erythema, no maceration, no abnormal color, no pre-ulcer, no ulcer and no callus                          Toe Exam: ROM and strength within normal limits  Sensory   Vibration: intact    Monofilament testing: intact  Vascular    The right DP pulse is 2+  Left Foot/Ankle  Left Foot Inspection  Skin Exam: skin normal and skin intactno dry skin, no warmth, no erythema, no maceration, normal color, no pre-ulcer, no ulcer and no callus                         Toe Exam: ROM and strength within normal limits                   Sensory   Vibration: intact    Monofilament: intact  Vascular    The left DP pulse is 2+  Assign Risk Category:  No deformity present; No loss of protective sensation;  No weak pulses       Risk: 0

## 2019-07-18 NOTE — ASSESSMENT & PLAN NOTE
Lab Results   Component Value Date    HGBA1C 7 1 (H) 06/25/2019       No results for input(s): POCGLU in the last 72 hours      Blood Sugar Average: Last 72 hrs:doesn't check sugars  DM type 2 with microalbuminuria - uncontrolled but improved with A1C 7 1 - urged to con't low sugar/carb diet and increase activity, con't current Metformin, recheck BW in 4-6 mos, con't current ACE, not on a statin, foot exam done today, eye exam done 4/19

## 2019-09-19 ENCOUNTER — OFFICE VISIT (OUTPATIENT)
Dept: FAMILY MEDICINE CLINIC | Facility: HOSPITAL | Age: 74
End: 2019-09-19
Payer: COMMERCIAL

## 2019-09-19 VITALS
HEART RATE: 84 BPM | SYSTOLIC BLOOD PRESSURE: 128 MMHG | WEIGHT: 174 LBS | BODY MASS INDEX: 30.83 KG/M2 | TEMPERATURE: 98.5 F | HEIGHT: 63 IN | DIASTOLIC BLOOD PRESSURE: 70 MMHG

## 2019-09-19 DIAGNOSIS — Z23 INFLUENZA VACCINATION ADMINISTERED AT CURRENT VISIT: ICD-10-CM

## 2019-09-19 DIAGNOSIS — R35.0 URINARY FREQUENCY: Primary | ICD-10-CM

## 2019-09-19 LAB
SL AMB  POCT GLUCOSE, UA: ABNORMAL
SL AMB LEUKOCYTE ESTERASE,UA: ABNORMAL
SL AMB POCT BILIRUBIN,UA: ABNORMAL
SL AMB POCT BLOOD,UA: ABNORMAL
SL AMB POCT CLARITY,UA: CLEAR
SL AMB POCT COLOR,UA: YELLOW
SL AMB POCT KETONES,UA: ABNORMAL
SL AMB POCT NITRITE,UA: ABNORMAL
SL AMB POCT PH,UA: 7
SL AMB POCT SPECIFIC GRAVITY,UA: 1
SL AMB POCT URINE PROTEIN: ABNORMAL
SL AMB POCT UROBILINOGEN: ABNORMAL

## 2019-09-19 PROCEDURE — 81002 URINALYSIS NONAUTO W/O SCOPE: CPT | Performed by: INTERNAL MEDICINE

## 2019-09-19 PROCEDURE — 99213 OFFICE O/P EST LOW 20 MIN: CPT | Performed by: INTERNAL MEDICINE

## 2019-09-19 PROCEDURE — G0008 ADMIN INFLUENZA VIRUS VAC: HCPCS | Performed by: INTERNAL MEDICINE

## 2019-09-19 PROCEDURE — 3008F BODY MASS INDEX DOCD: CPT | Performed by: INTERNAL MEDICINE

## 2019-09-19 PROCEDURE — 90662 IIV NO PRSV INCREASED AG IM: CPT | Performed by: INTERNAL MEDICINE

## 2019-09-19 RX ORDER — NITROFURANTOIN 25; 75 MG/1; MG/1
100 CAPSULE ORAL 2 TIMES DAILY
Qty: 10 CAPSULE | Refills: 0 | Status: SHIPPED | OUTPATIENT
Start: 2019-09-19 | End: 2019-09-24

## 2019-09-19 NOTE — PROGRESS NOTES
Assessment/Plan:       Diagnoses and all orders for this visit:    Urinary frequency  Comments:  Symptoms c/w UTI and urine dip with trace leukocytes and blood - will start Macrobid and send culture, fluids were encouraged and advised to call with new/worse symptoms or if not better by end of abx  Orders:  -     POCT urine dip  -     Urine culture; Future  -     nitrofurantoin (MACROBID) 100 mg capsule; Take 1 capsule (100 mg total) by mouth 2 (two) times a day for 5 days          Subjective:      Patient ID: Jordyn Robles is a 69 y o  female  HPI Pt here with c/o not feeling well x 5 days  She states over the past few days she has noted gradually progressive symptoms and now has frequent urination and burning when she does urinate  She denies blood in the urine  She has some abd discomfort and diarrhea  She has had no N/V/flank pain  She denies vaginal pain/bleeding/discharge  She has been drinking cranberry juice and Tylenol for symptoms  Review of Systems   Constitutional: Negative for chills and fever  Respiratory: Negative for cough and shortness of breath  Cardiovascular: Negative for chest pain and palpitations  Gastrointestinal: Positive for abdominal pain and diarrhea  Negative for blood in stool, nausea and vomiting  Genitourinary: Positive for dysuria and frequency  Negative for difficulty urinating, vaginal bleeding and vaginal pain  Musculoskeletal: Negative for back pain  Skin: Negative for rash and wound  Objective:    /70 (BP Location: Right arm, Patient Position: Sitting, Cuff Size: Standard)   Pulse 84   Temp 98 5 °F (36 9 °C)   Ht 5' 3" (1 6 m)   Wt 78 9 kg (174 lb)   BMI 30 82 kg/m²      Physical Exam   Constitutional: She appears well-developed and well-nourished  No distress  Neck: Neck supple  No tracheal deviation present  Cardiovascular: Normal rate, regular rhythm and normal heart sounds  Exam reveals no friction rub     No murmur heard   Pulmonary/Chest: Effort normal and breath sounds normal  No respiratory distress  She has no wheezes  She has no rales  Abdominal: Soft  She exhibits no distension  There is no tenderness  There is no rebound and no guarding  Neg CVA tenderness B/L   Skin: Skin is warm and dry  No rash noted  Psychiatric: She has a normal mood and affect  Her behavior is normal    Nursing note and vitals reviewed

## 2019-09-23 LAB
BACTERIA UR CULT: NORMAL
Lab: NO GROWTH

## 2019-12-12 ENCOUNTER — OFFICE VISIT (OUTPATIENT)
Dept: FAMILY MEDICINE CLINIC | Facility: HOSPITAL | Age: 74
End: 2019-12-12
Payer: COMMERCIAL

## 2019-12-12 VITALS
TEMPERATURE: 98.6 F | SYSTOLIC BLOOD PRESSURE: 128 MMHG | DIASTOLIC BLOOD PRESSURE: 68 MMHG | HEIGHT: 63 IN | HEART RATE: 88 BPM | BODY MASS INDEX: 30.12 KG/M2 | WEIGHT: 170 LBS

## 2019-12-12 DIAGNOSIS — J06.9 UPPER RESPIRATORY TRACT INFECTION, UNSPECIFIED TYPE: Primary | ICD-10-CM

## 2019-12-12 DIAGNOSIS — I10 ESSENTIAL HYPERTENSION: ICD-10-CM

## 2019-12-12 PROCEDURE — 3008F BODY MASS INDEX DOCD: CPT | Performed by: INTERNAL MEDICINE

## 2019-12-12 PROCEDURE — 1160F RVW MEDS BY RX/DR IN RCRD: CPT | Performed by: INTERNAL MEDICINE

## 2019-12-12 PROCEDURE — 4010F ACE/ARB THERAPY RXD/TAKEN: CPT | Performed by: INTERNAL MEDICINE

## 2019-12-12 PROCEDURE — 3078F DIAST BP <80 MM HG: CPT | Performed by: INTERNAL MEDICINE

## 2019-12-12 PROCEDURE — 1036F TOBACCO NON-USER: CPT | Performed by: INTERNAL MEDICINE

## 2019-12-12 PROCEDURE — 3725F SCREEN DEPRESSION PERFORMED: CPT | Performed by: INTERNAL MEDICINE

## 2019-12-12 PROCEDURE — 99214 OFFICE O/P EST MOD 30 MIN: CPT | Performed by: INTERNAL MEDICINE

## 2019-12-12 PROCEDURE — 3074F SYST BP LT 130 MM HG: CPT | Performed by: INTERNAL MEDICINE

## 2019-12-12 RX ORDER — LISINOPRIL 40 MG/1
60 TABLET ORAL DAILY
Qty: 45 TABLET | Refills: 6 | Status: SHIPPED | OUTPATIENT
Start: 2019-12-12 | End: 2020-07-23 | Stop reason: SDUPTHER

## 2019-12-12 RX ORDER — METOPROLOL SUCCINATE 100 MG/1
100 TABLET, EXTENDED RELEASE ORAL DAILY
Qty: 30 TABLET | Refills: 6 | Status: SHIPPED | OUTPATIENT
Start: 2019-12-12 | End: 2020-07-23 | Stop reason: SDUPTHER

## 2019-12-12 RX ORDER — AMLODIPINE BESYLATE 5 MG/1
5 TABLET ORAL DAILY
Qty: 30 TABLET | Refills: 6 | Status: SHIPPED | OUTPATIENT
Start: 2019-12-12 | End: 2020-07-23 | Stop reason: SDUPTHER

## 2019-12-12 NOTE — PROGRESS NOTES
Assessment/Plan:    Hypertension  Bp at goal, con't current meds - refilled upon request, keep f/u appt scheduled for Jan 2020       Diagnoses and all orders for this visit:    Upper respiratory tract infection, unspecified type  Comments:  D/w pt that although she has typical sinusitis symptoms they have only been present for 3 days and she is not adequately treating them with OTC meds - will restart her Zyrtec daily but she is deferring nasal spray as they have caused incontinence in the past, encouraged gargles/hot tea/lozenges/rest/fluids, if not better at all by Monday OR if new/worse symptoms occur she was instructed to call and will start an abx, encouraged to tx diarrhea with Imodium and to encourage a bland diet    Essential hypertension  -     metoprolol succinate (TOPROL-XL) 100 mg 24 hr tablet; Take 1 tablet (100 mg total) by mouth daily  -     lisinopril (ZESTRIL) 40 mg tablet; Take 1 5 tablets (60 mg total) by mouth daily  -     amLODIPine (NORVASC) 5 mg tablet; Take 1 tablet (5 mg total) by mouth daily AmLODIPine Besylate 5 MG Oral Tablet TAKE 1 TABLET DAILY  Subjective:      Patient ID: Tanmay Jacobs is a 69 y o  female  HPI Pt here with c/o 3 days of not feeling well  Symptoms started with L sided HA and sinus pain and dental pain  She has had ST and some discomfort with swallowing  She has had no F/C but has had fatigue and nasal congestion  She notes L ear soreness but no actual pain or drainage  She has had no N/V/urinary symptoms/rashes  She has had generalized abd pain and diarrhea  She notes no blood in the stool  She does feel she has a bit of indigestion  She feels her appetite is good  She is using Tylenol for her symptoms  She is not using anything for the congestion or diarrhea  She is not using her Zyrtec regularly right now she has incontinence if she uses a nasal spray  Pt requesting refill on all BP meds   BP at goal today and meds were reviewed and no changes have occurred  She denies missing doses of meds or SE with the meds  She does not check her BP outside the office  She notes no frequent or recurrent HA's/dizziness/double vision/CP  Review of Systems   Constitutional: Positive for fatigue  Negative for appetite change, chills and fever  HENT: Positive for congestion and ear pain  Negative for ear discharge and sore throat  Eyes: Negative for pain, discharge, redness and itching  Respiratory: Negative for cough, shortness of breath and wheezing  Cardiovascular: Negative for chest pain and palpitations  Gastrointestinal: Positive for abdominal pain and diarrhea  Negative for blood in stool, constipation, nausea and vomiting  Endocrine: Negative for polydipsia and polyuria  Genitourinary: Negative for difficulty urinating and dysuria  Musculoskeletal: Negative for back pain and neck pain  Skin: Negative for rash and wound  Neurological: Positive for dizziness and headaches  Negative for light-headedness  Hematological: Negative for adenopathy  Psychiatric/Behavioral: Negative for behavioral problems and confusion  Objective:    /68 (BP Location: Right arm, Patient Position: Sitting, Cuff Size: Standard)   Pulse 88   Temp 98 6 °F (37 °C)   Ht 5' 3" (1 6 m)   Wt 77 1 kg (170 lb)   BMI 30 11 kg/m²      Physical Exam   Constitutional: She appears well-developed and well-nourished  No distress  HENT:   Head: Normocephalic and atraumatic  Right Ear: External ear normal    Left Ear: External ear normal    Mouth/Throat: Oropharynx is clear and moist    Eyes: Conjunctivae are normal  Right eye exhibits no discharge  Left eye exhibits no discharge  Neck: Neck supple  No tracheal deviation present  Cardiovascular: Normal rate, regular rhythm and normal heart sounds  Exam reveals no friction rub  No murmur heard  Pulmonary/Chest: Effort normal and breath sounds normal  No respiratory distress   She has no wheezes  She has no rales  Abdominal: Soft  She exhibits no distension  There is no tenderness  There is no rebound and no guarding  Lymphadenopathy:     She has cervical adenopathy  Neurological: She is alert  She exhibits normal muscle tone  Skin: Skin is warm and dry  No rash noted  Psychiatric: She has a normal mood and affect  Her behavior is normal    Nursing note and vitals reviewed

## 2020-01-09 LAB
ALBUMIN SERPL-MCNC: 4.6 G/DL (ref 3.5–4.8)
ALBUMIN/CREAT UR: 45 MG/G CREAT (ref 0–30)
ALBUMIN/GLOB SERPL: 1.8 {RATIO} (ref 1.2–2.2)
ALP SERPL-CCNC: 75 IU/L (ref 39–117)
ALT SERPL-CCNC: 22 IU/L (ref 0–32)
AST SERPL-CCNC: 16 IU/L (ref 0–40)
BASOPHILS # BLD AUTO: 0.1 X10E3/UL (ref 0–0.2)
BASOPHILS NFR BLD AUTO: 1 %
BILIRUB SERPL-MCNC: 0.4 MG/DL (ref 0–1.2)
BUN SERPL-MCNC: 15 MG/DL (ref 8–27)
BUN/CREAT SERPL: 21 (ref 12–28)
CALCIUM SERPL-MCNC: 9.6 MG/DL (ref 8.7–10.3)
CHLORIDE SERPL-SCNC: 98 MMOL/L (ref 96–106)
CHOLEST SERPL-MCNC: 208 MG/DL (ref 100–199)
CHOLEST/HDLC SERPL: 5.9 RATIO (ref 0–4.4)
CO2 SERPL-SCNC: 25 MMOL/L (ref 20–29)
CREAT SERPL-MCNC: 0.73 MG/DL (ref 0.57–1)
CREAT UR-MCNC: 25.8 MG/DL
EOSINOPHIL # BLD AUTO: 0.3 X10E3/UL (ref 0–0.4)
EOSINOPHIL NFR BLD AUTO: 5 %
ERYTHROCYTE [DISTWIDTH] IN BLOOD BY AUTOMATED COUNT: 13.2 % (ref 11.7–15.4)
EST. AVERAGE GLUCOSE BLD GHB EST-MCNC: 151 MG/DL
GLOBULIN SER-MCNC: 2.5 G/DL (ref 1.5–4.5)
GLUCOSE SERPL-MCNC: 146 MG/DL (ref 65–99)
HBA1C MFR BLD: 6.9 % (ref 4.8–5.6)
HCT VFR BLD AUTO: 42.7 % (ref 34–46.6)
HDLC SERPL-MCNC: 35 MG/DL
HGB BLD-MCNC: 14.6 G/DL (ref 11.1–15.9)
IMM GRANULOCYTES # BLD: 0 X10E3/UL (ref 0–0.1)
IMM GRANULOCYTES NFR BLD: 0 %
LDLC SERPL CALC-MCNC: 113 MG/DL (ref 0–99)
LDLC SERPL DIRECT ASSAY-MCNC: 124 MG/DL (ref 0–99)
LYMPHOCYTES # BLD AUTO: 2.7 X10E3/UL (ref 0.7–3.1)
LYMPHOCYTES NFR BLD AUTO: 46 %
MCH RBC QN AUTO: 31.7 PG (ref 26.6–33)
MCHC RBC AUTO-ENTMCNC: 34.2 G/DL (ref 31.5–35.7)
MCV RBC AUTO: 93 FL (ref 79–97)
MICROALBUMIN UR-MCNC: 11.6 UG/ML
MONOCYTES # BLD AUTO: 0.4 X10E3/UL (ref 0.1–0.9)
MONOCYTES NFR BLD AUTO: 7 %
NEUTROPHILS # BLD AUTO: 2.3 X10E3/UL (ref 1.4–7)
NEUTROPHILS NFR BLD AUTO: 41 %
PLATELET # BLD AUTO: 318 X10E3/UL (ref 150–450)
POTASSIUM SERPL-SCNC: 4 MMOL/L (ref 3.5–5.2)
PROT SERPL-MCNC: 7.1 G/DL (ref 6–8.5)
RBC # BLD AUTO: 4.61 X10E6/UL (ref 3.77–5.28)
SL AMB EGFR AFRICAN AMERICAN: 95 ML/MIN/1.73
SL AMB EGFR NON AFRICAN AMERICAN: 83 ML/MIN/1.73
SL AMB T4, FREE (DIRECT): 1.02 NG/DL (ref 0.82–1.77)
SL AMB VLDL CHOLESTEROL CALC: 60 MG/DL (ref 5–40)
SODIUM SERPL-SCNC: 141 MMOL/L (ref 134–144)
TRIGL SERPL-MCNC: 302 MG/DL (ref 0–149)
TSH SERPL DL<=0.005 MIU/L-ACNC: 7.57 UIU/ML (ref 0.45–4.5)
WBC # BLD AUTO: 5.7 X10E3/UL (ref 3.4–10.8)

## 2020-01-09 PROCEDURE — 3044F HG A1C LEVEL LT 7.0%: CPT | Performed by: INTERNAL MEDICINE

## 2020-01-16 ENCOUNTER — OFFICE VISIT (OUTPATIENT)
Dept: FAMILY MEDICINE CLINIC | Facility: HOSPITAL | Age: 75
End: 2020-01-16
Payer: COMMERCIAL

## 2020-01-16 VITALS
HEART RATE: 82 BPM | DIASTOLIC BLOOD PRESSURE: 78 MMHG | BODY MASS INDEX: 30.12 KG/M2 | TEMPERATURE: 97.9 F | HEIGHT: 63 IN | SYSTOLIC BLOOD PRESSURE: 142 MMHG | WEIGHT: 170 LBS

## 2020-01-16 DIAGNOSIS — R80.9 CONTROLLED TYPE 2 DIABETES MELLITUS WITH MICROALBUMINURIA, WITHOUT LONG-TERM CURRENT USE OF INSULIN (HCC): ICD-10-CM

## 2020-01-16 DIAGNOSIS — Z12.31 ENCOUNTER FOR SCREENING MAMMOGRAM FOR BREAST CANCER: ICD-10-CM

## 2020-01-16 DIAGNOSIS — J98.8 BACTERIAL RESPIRATORY INFECTION: Primary | ICD-10-CM

## 2020-01-16 DIAGNOSIS — E78.5 DYSLIPIDEMIA: ICD-10-CM

## 2020-01-16 DIAGNOSIS — R79.89 ELEVATED TSH: ICD-10-CM

## 2020-01-16 DIAGNOSIS — Z00.00 MEDICARE ANNUAL WELLNESS VISIT, SUBSEQUENT: ICD-10-CM

## 2020-01-16 DIAGNOSIS — E11.29 CONTROLLED TYPE 2 DIABETES MELLITUS WITH MICROALBUMINURIA, WITHOUT LONG-TERM CURRENT USE OF INSULIN (HCC): ICD-10-CM

## 2020-01-16 DIAGNOSIS — R80.9 MICROALBUMINURIA: ICD-10-CM

## 2020-01-16 DIAGNOSIS — B96.89 BACTERIAL RESPIRATORY INFECTION: Primary | ICD-10-CM

## 2020-01-16 DIAGNOSIS — I10 ESSENTIAL HYPERTENSION: ICD-10-CM

## 2020-01-16 PROCEDURE — 3066F NEPHROPATHY DOC TX: CPT | Performed by: INTERNAL MEDICINE

## 2020-01-16 PROCEDURE — G0439 PPPS, SUBSEQ VISIT: HCPCS | Performed by: INTERNAL MEDICINE

## 2020-01-16 PROCEDURE — 3008F BODY MASS INDEX DOCD: CPT | Performed by: INTERNAL MEDICINE

## 2020-01-16 PROCEDURE — 3066F NEPHROPATHY DOC TX: CPT | Performed by: UROLOGY

## 2020-01-16 PROCEDURE — 1101F PT FALLS ASSESS-DOCD LE1/YR: CPT | Performed by: INTERNAL MEDICINE

## 2020-01-16 PROCEDURE — 1170F FXNL STATUS ASSESSED: CPT | Performed by: INTERNAL MEDICINE

## 2020-01-16 PROCEDURE — 99214 OFFICE O/P EST MOD 30 MIN: CPT | Performed by: INTERNAL MEDICINE

## 2020-01-16 PROCEDURE — 3288F FALL RISK ASSESSMENT DOCD: CPT | Performed by: INTERNAL MEDICINE

## 2020-01-16 PROCEDURE — 1160F RVW MEDS BY RX/DR IN RCRD: CPT | Performed by: INTERNAL MEDICINE

## 2020-01-16 PROCEDURE — 3725F SCREEN DEPRESSION PERFORMED: CPT | Performed by: INTERNAL MEDICINE

## 2020-01-16 PROCEDURE — 1125F AMNT PAIN NOTED PAIN PRSNT: CPT | Performed by: INTERNAL MEDICINE

## 2020-01-16 RX ORDER — AZITHROMYCIN 250 MG/1
TABLET, FILM COATED ORAL
Qty: 6 TABLET | Refills: 0 | Status: SHIPPED | OUTPATIENT
Start: 2020-01-16 | End: 2020-01-20

## 2020-01-16 NOTE — ASSESSMENT & PLAN NOTE
FLP not at goal, 10 yr ASCVD risk reviewed - pt deferring statin d/t SE after trying mult different ones, diet/exercise/wgt loss encouraged, recheck FLP annually

## 2020-01-16 NOTE — ASSESSMENT & PLAN NOTE
Lab Results   Component Value Date    HGBA1C 6 9 (H) 01/07/2020   DM type 2 with microalbuminuria - borderline controlled with A1C 6 9 - urged get on track with diet and increase exercise, con't current Metformin, recheck BW in 6 mos - order given, UTD on foot exam (7/19) and has appt with eye doc Jan 29th, she is on an ACE but defers statin

## 2020-01-16 NOTE — PROGRESS NOTES
Assessment/Plan:    Controlled type 2 diabetes mellitus with microalbuminuria, without long-term current use of insulin ([facility])    Lab Results   Component Value Date    HGBA1C 6 9 (H) 01/07/2020   DM type 2 with microalbuminuria - borderline controlled with A1C 6 9 - urged get on track with diet and increase exercise, con't current Metformin, recheck BW in 6 mos - order given, UTD on foot exam (7/19) and has appt with eye doc Jan 29th, she is on an ACE but defers statin    Dyslipidemia  FLP not at goal, 10 yr ASCVD risk reviewed - pt deferring statin d/t SE after trying mult different ones, diet/exercise/wgt loss encouraged, recheck FLP annually    Elevated TSH  TSH still elevated but FT4 wnl and pt is clinically euthyroid, monitor - recheck in 6 mo - order given    Microalbuminuria  Importance of BP/BS control and avoiding NSAIDs reviewed, she is on an ACE, recheck annually       Diagnoses and all orders for this visit:    Bacterial respiratory infection  Comments:  D/t duration of symptoms and abnormal exam will start abx, rx for Javi sent d/t her allergies,   Rest/fluids/lozenges/hot tea/garles and OTC decongestant and cough medication was recommended; d/w pt that cough can last 4-6 wks but call with new/worsening symptoms, call if no better at all by end of abx OR with new/worse symptoms    Orders:  -     azithromycin (ZITHROMAX) 250 mg tablet; 2 tab PO x 1 on day 1 then 1 tab PO q day x 4 more days    Controlled type 2 diabetes mellitus with microalbuminuria, without long-term current use of insulin ([facility])  -     Glucose, fasting; Future  -     Hemoglobin A1C; Future  -     Glucose, fasting  -     Hemoglobin A1C    Dyslipidemia    Elevated TSH  -     TSH, 3rd generation with Free T4 reflex; Future  -     TSH, 3rd generation with Free T4 reflex    Microalbuminuria    Essential hypertension    Encounter for screening mammogram for breast cancer  -     Mammo screening bilateral w cad;  Future    Medicare annual wellness visit, subsequent      [place] 11/11 - 5 yrs - she is going to call now her husbands health issues have settled down    Mammo 4/19 - order given for 4/20    Dexa 4/19 - osteopenia    Pt had flu vaccine this year    Subjective:      Patient ID: Vinny Morales is a 69 y o  female  HPI Pt here with c/o "I just feel lousy" x 6 days  She states symptoms started with with congestion and runny nose  She then started to develop ST and cough  Pt states the cough is productive but she has no SOB/wheezing/F but she has had some chills  She noted some nausea last week but that resolved  She notes no V/D/abd pain/urinary symptoms/rashes  She has some R rib pain with coughing  She has had sick contacts  She has been using Zyrtec and Robitussin DM and tea with honey and cough drops  She states symptoms are a bit better but are always worse in the  She is diabetic but does not check her sugars at home  BW results were d/w pt in detail: FBS/A1C 146/6 9, rest of CMP/CBC were wnl, urine microalbumin:Cr ratio was elevated at 45, FLP with TC up at 208, TG up 302, , and HDL low at 35, TSh still elevated but FT4 was wnl  Def of controlled vs uncontrolled DM was reviewed  Diet was reviewed - wgt   She is taking her DM meds as directed  She admits diet was poor with the holidays  She does no formal exercise  She is UTD on foot exam (7/19) and eye exam over due but she has it scheduled for Jan 29th  She is on statin and ACE  Goal FLP was d/w pt in detail  Diet/exercise reviewed as noted above  10 yr ASCVD risk was reviewed  RF modification reviewed  She is not on a statin as she has had SE with mult statin (GI SE and arthralgias)  She notes no stroke/TIA symptoms/CP  BP above goal today and meds were reviewed and no changes have occurred  She denies missing doses of meds or SE with the meds  She does not check her BP outside the office    She notes no frequent Ha's/dizziness/double vision/CP  [place] 11/11 - 5 yrs    Mammo 4/19    Dexa 4/19 - osteopenia    Pt had flu vaccine this year    Review of Systems   Constitutional: Positive for chills and fatigue  Negative for fever and unexpected weight change  HENT: Positive for congestion, rhinorrhea and sore throat  Negative for ear pain  Eyes: Positive for visual disturbance  Negative for pain  Respiratory: Positive for cough  Negative for shortness of breath and wheezing  Cardiovascular: Negative for chest pain, palpitations and leg swelling  Gastrointestinal: Positive for nausea  Negative for abdominal pain, blood in stool, constipation, diarrhea and vomiting  Endocrine: Negative for polydipsia and polyuria  Genitourinary: Negative for difficulty urinating, dysuria, vaginal bleeding and vaginal pain  Musculoskeletal: Negative for back pain and neck pain  Skin: Negative for rash and wound  Neurological: Negative for dizziness, light-headedness and headaches  Hematological: Negative for adenopathy  Psychiatric/Behavioral: Negative for behavioral problems and confusion  Objective:    /78 (BP Location: Right arm, Patient Position: Sitting, Cuff Size: Standard)   Pulse 82   Temp 97 9 °F (36 6 °C)   Ht 5' 3" (1 6 m)   Wt 77 1 kg (170 lb)   BMI 30 11 kg/m²      Physical Exam   Constitutional: She appears well-developed and well-nourished  No distress  HENT:   Head: Normocephalic and atraumatic  Right Ear: External ear normal    Left Ear: External ear normal    + post pharyngeal erythema, no exudate noted   Eyes: Conjunctivae are normal  Right eye exhibits no discharge  Left eye exhibits no discharge  Neck: Neck supple  No tracheal deviation present  Cardiovascular: Normal rate, regular rhythm and normal heart sounds  Exam reveals no friction rub  No murmur heard  Neg carotid bruits B/L   Pulmonary/Chest: Effort normal  No respiratory distress  She has no wheezes  She has no rales     + L basilar crackles   Abdominal: Soft  She exhibits no distension  There is no tenderness  There is no rebound and no guarding  Musculoskeletal: She exhibits no edema  Lymphadenopathy:     She has cervical adenopathy  Neurological: She is alert  She exhibits normal muscle tone  Skin: Skin is warm and dry  No rash noted  Psychiatric: She has a normal mood and affect  Her behavior is normal    Nursing note and vitals reviewed

## 2020-01-16 NOTE — PATIENT INSTRUCTIONS
Medicare Preventive Visit Patient Instructions  Thank you for completing your Welcome to Medicare Visit or Medicare Annual Wellness Visit today  Your next wellness visit will be due in one year (1/16/2021)  The screening/preventive services that you may require over the next 5-10 years are detailed below  Some tests may not apply to you based off risk factors and/or age  Screening tests ordered at today's visit but not completed yet may show as past due  Also, please note that scanned in results may not display below  Preventive Screenings:  Service Recommendations Previous Testing/Comments   Colorectal Cancer Screening  * Colonoscopy    * Fecal Occult Blood Test (FOBT)/Fecal Immunochemical Test (FIT)  * Fecal DNA/Cologuard Test  * Flexible Sigmoidoscopy Age: 54-65 years old   Colonoscopy: every 10 years (may be performed more frequently if at higher risk)  OR  FOBT/FIT: every 1 year  OR  Cologuard: every 3 years  OR  Sigmoidoscopy: every 5 years  Screening may be recommended earlier than age 48 if at higher risk for colorectal cancer  Also, an individualized decision between you and your healthcare provider will decide whether screening between the ages of 74-80 would be appropriate  Colonoscopy: 12/23/2016  FOBT/FIT: Not on file  Cologuard: Not on file  Sigmoidoscopy: Not on file    Screening Current     Breast Cancer Screening Age: 36 years old  Frequency: every 1-2 years  Not required if history of left and right mastectomy Mammogram: 04/15/2019    Screening Current  Risks and Benefits Discussed   Cervical Cancer Screening Between the ages of 21-29, pap smear recommended once every 3 years  Between the ages of 33-67, can perform pap smear with HPV co-testing every 5 years     Recommendations may differ for women with a history of total hysterectomy, cervical cancer, or abnormal pap smears in past  Pap Smear: Not on file    Screening Not Indicated  Risks and Benefits Discussed   Hepatitis C Screening Once for adults born between 1945 and 1965  More frequently in patients at high risk for Hepatitis C Hep C Antibody: Not on file    Risks and Benefits Discussed  Patient Declines   Diabetes Screening 1-2 times per year if you're at risk for diabetes or have pre-diabetes Fasting glucose: 128 mg/dL   A1C: 6 9 %    History Diabetes  Risks and Benefits Discussed   Cholesterol Screening Once every 5 years if you don't have a lipid disorder  May order more often based on risk factors  Lipid panel: 01/07/2020    Screening Current  Risks and Benefits Discussed     Other Preventive Screenings Covered by Medicare:  1  Abdominal Aortic Aneurysm (AAA) Screening: covered once if your at risk  You're considered to be at risk if you have a family history of AAA  2  Lung Cancer Screening: covers low dose CT scan once per year if you meet all of the following conditions: (1) Age 50-69; (2) No signs or symptoms of lung cancer; (3) Current smoker or have quit smoking within the last 15 years; (4) You have a tobacco smoking history of at least 30 pack years (packs per day multiplied by number of years you smoked); (5) You get a written order from a healthcare provider  3  Glaucoma Screening: covered annually if you're considered high risk: (1) You have diabetes OR (2) Family history of glaucoma OR (3)  aged 48 and older OR (3)  American aged 72 and older  3  Osteoporosis Screening: covered every 2 years if you meet one of the following conditions: (1) You're estrogen deficient and at risk for osteoporosis based off medical history and other findings; (2) Have a vertebral abnormality; (3) On glucocorticoid therapy for more than 3 months; (4) Have primary hyperparathyroidism; (5) On osteoporosis medications and need to assess response to drug therapy  · Last bone density test (DXA Scan): 04/15/2019   5  HIV Screening: covered annually if you're between the age of 15-65   Also covered annually if you are younger than 13 and older than 72 with risk factors for HIV infection  For pregnant patients, it is covered up to 3 times per pregnancy  Immunizations:  Immunization Recommendations   Influenza Vaccine Annual influenza vaccination during flu season is recommended for all persons aged >= 6 months who do not have contraindications   Pneumococcal Vaccine (Prevnar and Pneumovax)  * Prevnar = PCV13  * Pneumovax = PPSV23   Adults 25-60 years old: 1-3 doses may be recommended based on certain risk factors  Adults 72 years old: Prevnar (PCV13) vaccine recommended followed by Pneumovax (PPSV23) vaccine  If already received PPSV23 since turning 65, then PCV13 recommended at least one year after PPSV23 dose  Hepatitis B Vaccine 3 dose series if at intermediate or high risk (ex: diabetes, end stage renal disease, liver disease)   Tetanus (Td) Vaccine - COST NOT COVERED BY MEDICARE PART B Following completion of primary series, a booster dose should be given every 10 years to maintain immunity against tetanus  Td may also be given as tetanus wound prophylaxis  Tdap Vaccine - COST NOT COVERED BY MEDICARE PART B Recommended at least once for all adults  For pregnant patients, recommended with each pregnancy  Shingles Vaccine (Shingrix) - COST NOT COVERED BY MEDICARE PART B  2 shot series recommended in those aged 48 and above     Health Maintenance Due:      Topic Date Due    Hepatitis C Screening  1945    MAMMOGRAM  04/15/2020    DXA SCAN  04/15/2021    CRC Screening: Colonoscopy  12/23/2026     Immunizations Due:      Topic Date Due    DTaP,Tdap,and Td Vaccines (1 - Tdap) [birth year]/1958    Hepatitis B Vaccine (1 of 3 - Risk 3-dose series) 06/23/1966     Advance Directives   What are advance directives? Advance directives are legal documents that state your wishes and plans for medical care  These plans are made ahead of time in case you lose your ability to make decisions for yourself   Advance directives can apply to any medical decision, such as the treatments you want, and if you want to donate organs  What are the types of advance directives? There are many types of advance directives, and each state has rules about how to use them  You may choose a combination of any of the following:  · Living will: This is a written record of the treatment you want  You can also choose which treatments you do not want, which to limit, and which to stop at a certain time  This includes surgery, medicine, IV fluid, and tube feedings  · Durable power of  for healthcare [facility]): This is a written record that states who you want to make healthcare choices for you when you are unable to make them for yourself  This person, called a proxy, is usually a family member or a friend  You may choose more than 1 proxy  · Do not resuscitate (DNR) order:  A DNR order is used in case your heart stops beating or you stop breathing  It is a request not to have certain forms of treatment, such as CPR  A DNR order may be included in other types of advance directives  · Medical directive: This covers the care that you want if you are in a coma, near death, or unable to make decisions for yourself  You can list the treatments you want for each condition  Treatment may include pain medicine, surgery, blood transfusions, dialysis, IV or tube feedings, and a ventilator (breathing machine)  · Values history: This document has questions about your views, beliefs, and how you feel and think about life  This information can help others choose the care that you would choose  Why are advance directives important? An advance directive helps you control your care  Although spoken wishes may be used, it is better to have your wishes written down  Spoken wishes can be misunderstood, or not followed  Treatments may be given even if you do not want them  An advance directive may make it easier for your family to make difficult choices about your care  Urinary Incontinence   Urinary incontinence (UI)  is when you lose control of your bladder  UI develops because your bladder cannot store or empty urine properly  The 3 most common types of UI are stress incontinence, urge incontinence, or both  Medicines:   · May be given to help strengthen your bladder control  Report any side effects of medication to your healthcare provider  Do pelvic muscle exercises often:  Your pelvic muscles help you stop urinating  Squeeze these muscles tight for 5 seconds, then relax for 5 seconds  Gradually work up to squeezing for 10 seconds  Do 3 sets of 15 repetitions a day, or as directed  This will help strengthen your pelvic muscles and improve bladder control  Train your bladder:  Go to the bathroom at set times, such as every 2 hours, even if you do not feel the urge to go  You can also try to hold your urine when you feel the urge to go  For example, hold your urine for 5 minutes when you feel the urge to go  As that becomes easier, hold your urine for 10 minutes  Self-care:   · Keep a UI record  Write down how often you leak urine and how much you leak  Make a note of what you were doing when you leaked urine  · Drink liquids as directed  You may need to limit the amount of liquid you drink to help control your urine leakage  Do not drink any liquid right before you go to bed  Limit or do not have drinks that contain caffeine or alcohol  · Prevent constipation  Eat a variety of high-fiber foods  Good examples are high-fiber cereals, beans, vegetables, and whole-grain breads  Walking is the best way to trigger your intestines to have a bowel movement  · Exercise regularly and maintain a healthy weight  Weight loss and exercise will decrease pressure on your bladder and help you control your leakage  · Use a catheter as directed  to help empty your bladder  A catheter is a tiny, plastic tube that is put into your bladder to drain your urine     · Go to behavior therapy as directed  Behavior therapy may be used to help you learn to control your urge to urinate  Weight Management   Why it is important to manage your weight:  Being overweight increases your risk of health conditions such as heart disease, high blood pressure, type 2 diabetes, and certain types of cancer  It can also increase your risk for osteoarthritis, sleep apnea, and other respiratory problems  Aim for a slow, steady weight loss  Even a small amount of weight loss can lower your risk of health problems  How to lose weight safely:  A safe and healthy way to lose weight is to eat fewer calories and get regular exercise  You can lose up about 1 pound a week by decreasing the number of calories you eat by 500 calories each day  Healthy meal plan for weight management:  A healthy meal plan includes a variety of foods, contains fewer calories, and helps you stay healthy  A healthy meal plan includes the following:  · Eat whole-grain foods more often  A healthy meal plan should contain fiber  Fiber is the part of grains, fruits, and vegetables that is not broken down by your body  Whole-grain foods are healthy and provide extra fiber in your diet  Some examples of whole-grain foods are whole-wheat breads and pastas, oatmeal, brown rice, and bulgur  · Eat a variety of vegetables every day  Include dark, leafy greens such as spinach, kale, humberto greens, and mustard greens  Eat yellow and orange vegetables such as carrots, sweet potatoes, and winter squash  · Eat a variety of fruits every day  Choose fresh or canned fruit (canned in its own juice or light syrup) instead of juice  Fruit juice has very little or no fiber  · Eat low-fat dairy foods  Drink fat-free (skim) milk or 1% milk  Eat fat-free yogurt and low-fat cottage cheese  Try low-fat cheeses such as mozzarella and other reduced-fat cheeses  · Choose meat and other protein foods that are low in fat    Choose beans or other legumes such as split peas or lentils  Choose fish, skinless poultry (chicken or turkey), or lean cuts of red meat (beef or pork)  Before you cook meat or poultry, cut off any visible fat  · Use less fat and oil  Try baking foods instead of frying them  Add less fat, such as margarine, sour cream, regular salad dressing and mayonnaise to foods  Eat fewer high-fat foods  Some examples of high-fat foods include french fries, doughnuts, ice cream, and cakes  · Eat fewer sweets  Limit foods and drinks that are high in sugar  This includes candy, cookies, regular soda, and sweetened drinks  Exercise:  Exercise at least 30 minutes per day on most days of the week  Some examples of exercise include walking, biking, dancing, and swimming  You can also fit in more physical activity by taking the stairs instead of the elevator or parking farther away from stores  Ask your healthcare provider about the best exercise plan for you  © Copyright [employer] 2018 Information is for End User's use only and may not be sold, redistributed or otherwise used for commercial purposes   All illustrations and images included in CareNotes® are the copyrighted property of [employer]  or [address] [place] [address]

## 2020-01-16 NOTE — ASSESSMENT & PLAN NOTE
TSH still elevated but FT4 wnl and pt is clinically euthyroid, monitor - recheck in 6 mo - order given

## 2020-01-16 NOTE — PROGRESS NOTES
Assessment and Plan:     Problem List Items Addressed This Visit        Endocrine    Controlled type 2 diabetes mellitus with microalbuminuria, without long-term current use of insulin ([facility] [address] )       Lab Results   Component Value Date    HGBA1C 6 9 (H) 01/07/2020   DM type 2 with microalbuminuria - borderline controlled with A1C 6 9 - urged get on track with diet and increase exercise, con't current Metformin, recheck BW in 6 mos - order given, UTD on foot exam (7/19) and has appt with eye doc Jan 29th, she is on an ACE but defers statin         Relevant Orders    Glucose, fasting    Hemoglobin A1C       Cardiovascular and Mediastinum    Hypertension       Other    Dyslipidemia     FLP not at goal, 10 yr ASCVD risk reviewed - pt deferring statin d/t SE after trying mult different ones, diet/exercise/wgt loss encouraged, recheck FLP annually         Elevated TSH     TSH still elevated but FT4 wnl and pt is clinically euthyroid, monitor - recheck in 6 mo - order given         Relevant Orders    TSH, 3rd generation with Free T4 reflex    Microalbuminuria     Importance of BP/BS control and avoiding NSAIDs reviewed, she is on an ACE, recheck annually           Other Visit Diagnoses     Bacterial respiratory infection    -  Primary    D/t duration of symptoms and abnormal exam will start abx, rx for Zpack sent d/t her allergies,     Relevant Medications    azithromycin (ZITHROMAX) 250 mg tablet    Encounter for screening mammogram for breast cancer        Relevant Orders    Mammo screening bilateral w cad    Medicare annual wellness visit, subsequent               Preventive health issues were discussed with patient, and age appropriate screening tests were ordered as noted in patient's After Visit Summary  Personalized health advice and appropriate referrals for health education or preventive services given if needed, as noted in patient's After Visit Summary       History of Present Illness:     Patient presents for Medicare Annual Wellness visit    Patient Care Team:  Mae Whipple DO as PCP - General  KLAUDIA Shoemaker MD Carma Fulling (Obstetrics and Gynecology)     Problem List:     Patient Active Problem List   Diagnosis    Allergic rhinitis    Anxiety disorder    Colon, diverticulosis    Constipation    Dyslipidemia    Elevated TSH    Esophageal reflux    Headache    Herniated nucleus pulposus, L4-5    Hypertension    Irritable bowel syndrome    Leiomyoma of uterus    Microalbuminuria    Multiple thyroid nodules    Osteopenia    Overweight    Acquired cystic kidney disease    Controlled type 2 diabetes mellitus with microalbuminuria, without long-term current use of insulin (HCC)      Past Medical and Surgical History:     Past Medical History:   Diagnosis Date    Depression     Hypertension     Migraine      Past Surgical History:   Procedure Laterality Date    APPENDECTOMY      COLONOSCOPY      complete 11/29/11- 5 years / complete 6/9/04 -10years    HEMORRHOID SURGERY      KIDNEY STONE SURGERY      TONSILLECTOMY      TUBAL LIGATION        Family History:     Family History   Problem Relation Age of Onset    Hypertension Mother     Thyroid disease Mother     Breast cancer Mother 80    Coronary artery disease Father     Hypertension Father     Other Father         smoker    Diabetes Paternal Grandmother     Endometrial cancer Paternal [de-identified]         age unknown    Prostate cancer Maternal Grandfather 76    Breast cancer Cousin         age unknown      Social History:     Social History     Socioeconomic History    Marital status: /Civil Union     Spouse name: None    Number of children: None    Years of education: None    Highest education level: None   Occupational History    Occupation:  full time    Occupation: retired   Social Needs    Financial resource strain: None    Food insecurity:     Worry: None     Inability: None   Estefania Payne Transportation needs:     Medical: None     Non-medical: None   Tobacco Use    Smoking status: Never Smoker    Smokeless tobacco: Never Used   Substance and Sexual Activity    Alcohol use: Yes     Comment: rarely / social per [employer]    Drug use: No    Sexual activity: None   Lifestyle    Physical activity:     Days per week: None     Minutes per session: None    Stress: None   Relationships    Social connections:     Talks on phone: None     Gets together: None     Attends Islam service: None     Active member of club or organization: None     Attends meetings of clubs or organizations: None     Relationship status: None    Intimate partner violence:     Fear of current or ex partner: None     Emotionally abused: None     Physically abused: None     Forced sexual activity: None   Other Topics Concern    None   Social History Narrative    None       Medications and Allergies:     Current Outpatient Medications   Medication Sig Dispense Refill    amLODIPine (NORVASC) 5 mg tablet Take 1 tablet (5 mg total) by mouth daily AmLODIPine Besylate 5 MG Oral Tablet TAKE 1 TABLET DAILY  30 tablet 6    azithromycin (ZITHROMAX) 250 mg tablet 2 tab PO x 1 on day 1 then 1 tab PO q day x 4 more days 6 tablet 0    Cetirizine HCl (ZYRTEC ALLERGY) 10 MG CAPS ZyrTEC Allergy 10 MG Oral Tablet TAKE 1 TABLET DAILY AS DIRECTED  Quantity: 30;  Refills: 0    Lynnette Saucedo;  Started 12-May-2014 Active      Cholecalciferol (VITAMIN D-3) 1000 UNITS CAPS Vitamin D3 1000 UNIT Oral Tablet TAKE 1 TABLET DAILY    Quantity: 30;  Refills: 0    Xochitl Tan DO;  Started 25-Sep-2012 Active      lisinopril (ZESTRIL) 40 mg tablet Take 1 5 tablets (60 mg total) by mouth daily 45 tablet 6    metFORMIN (GLUCOPHAGE) 1000 MG tablet Take 1 tablet (1,000 mg total) by mouth 2 (two) times a day with meals for 30 days 60 tablet 5    metoprolol succinate (TOPROL-XL) 100 mg 24 hr tablet Take 1 tablet (100 mg total) by mouth daily 30 tablet 6    Multiple Vitamins-Minerals (CENTRUM ADULTS PO) Centrum Oral Tablet TAKE 1 TABLET DAILY  Refills: 0    Xochitl Tan DO;  Started 25-Sep-2012 Active       No current facility-administered medications for this visit  Allergies   Allergen Reactions    Acetazolamide Hives    Metronidazole Anaphylaxis    Penicillins Hives, Itching and Rash    Statins     Tramadol     Hydrochlorothiazide Rash    Tetracycline Rash      Immunizations:     Immunization History   Administered Date(s) Administered    INFLUENZA 09/10/2014, 09/08/2015, 09/29/2016, 10/11/2018    Influenza Split High Dose Preservative Free IM 09/08/2015, 09/29/2016    Influenza TIV (IM) 09/15/2017    Influenza, high dose seasonal 0 5 mL 09/19/2019    Pneumococcal Conjugate 13-Valent 09/08/2015    Pneumococcal Polysaccharide PPV23 04/18/2017    TD (adult) Preservative Free 05/30/2006    Tetanus, adsorbed 05/30/2006      Health Maintenance:         Topic Date Due    Hepatitis C Screening  1945    MAMMOGRAM  04/15/2020    DXA SCAN  04/15/2021    CRC Screening: Colonoscopy  12/23/2026         Topic Date Due    DTaP,Tdap,and Td Vaccines (1 - Tdap) 06/23/1958    Hepatitis B Vaccine (1 of 3 - Risk 3-dose series) 06/23/1966     Review of Systems   Constitutional: Positive for chills  Negative for fatigue, fever and unexpected weight change  HENT: Positive for congestion, rhinorrhea and sore throat  Eyes: Positive for visual disturbance  Negative for pain  Respiratory: Positive for cough  Negative for shortness of breath and wheezing  Cardiovascular: Negative for chest pain, palpitations and leg swelling  Gastrointestinal: Positive for nausea  Negative for abdominal pain, blood in stool, constipation, diarrhea and vomiting  Endocrine: Negative for polydipsia and polyuria  Genitourinary: Negative for difficulty urinating and dysuria  Musculoskeletal: Negative for back pain and neck pain     Skin: Negative for rash and wound  Neurological: Negative for dizziness, light-headedness and headaches  Hematological: Negative for adenopathy  Psychiatric/Behavioral: Negative for behavioral problems, confusion, dysphoric mood and sleep disturbance  Medicare Health Risk Assessment:     /78 (BP Location: Right arm, Patient Position: Sitting, Cuff Size: Standard)   Pulse 82   Temp 97 9 °F (36 6 °C)   Ht 5' 3" (1 6 m)   Wt 77 1 kg (170 lb)   BMI 30 11 kg/m²      Physical Exam   Constitutional: She appears well-developed and well-nourished  No distress  HENT:   Head: Normocephalic and atraumatic  Right Ear: External ear normal    Left Ear: External ear normal    Mouth/Throat: Oropharynx is clear and moist  No oropharyngeal exudate  Eyes: Conjunctivae are normal  Right eye exhibits no discharge  Left eye exhibits no discharge  Neck: Neck supple  No tracheal deviation present  Cardiovascular: Normal rate, regular rhythm and normal heart sounds  No murmur heard  Neg carotid bruits B/L   Pulmonary/Chest: Effort normal  No respiratory distress  She has no wheezes  She has no rales  + L basilar crackle   Abdominal: Soft  Bowel sounds are normal  There is no tenderness  There is no guarding  Musculoskeletal: Normal range of motion  She exhibits no deformity  Lymphadenopathy:     She has cervical adenopathy  Neurological: She is alert  She exhibits normal muscle tone  Skin: Skin is warm and dry  No rash noted  Psychiatric: She has a normal mood and affect  Her behavior is normal  Thought content normal    Nursing note and vitals reviewed  Karyna Combs is here for her Subsequent Wellness visit  Last Medicare Wellness visit information reviewed, patient interviewed and updates made to the record today  Health Risk Assessment:   Patient rates overall health as good  Patient feels that their physical health rating is same  Eyesight was rated as slightly worse  Hearing was rated as same  Patient feels that their emotional and mental health rating is same  Pain experienced in the last 7 days has been none  Patient states that she has experienced no weight loss or gain in last 6 months  Eye sight worsening as she has a cataract that needs to be addressed    Depression Screening:   PHQ-2 Score: 0      Fall Risk Screening: In the past year, patient has experienced: no history of falling in past year      Urinary Incontinence Screening:   Patient has leaked urine accidently in the last six months  Sneezing or laughing     Home Safety:  Patient does not have trouble with stairs inside or outside of their home  Patient has working smoke alarms and has working carbon monoxide detector  Home safety hazards include: none  Nutrition:   Current diet is Regular and Limited junk food  Medications:   Patient is currently taking over-the-counter supplements  OTC medications include: see medication list  Patient is able to manage medications  Activities of Daily Living (ADLs)/Instrumental Activities of Daily Living (IADLs):   Walk and transfer into and out of bed and chair?: Yes  Dress and groom yourself?: Yes    Bathe or shower yourself?: Yes    Feed yourself? Yes  Do your laundry/housekeeping?: Yes  Manage your money, pay your bills and track your expenses?: Yes  Make your own meals?: Yes    Do your own shopping?: Yes    Previous Hospitalizations:   Any hospitalizations or ED visits within the last 12 months?: No      Advance Care Planning:   Living will: Yes    Durable POA for healthcare:  Yes    Advanced directive: Yes    Patient declined ACP directive: No      Cognitive Screening:   Provider or family/friend/caregiver concerned regarding cognition?: No    PREVENTIVE SCREENINGS      Cardiovascular Screening:    General: Screening Current and Risks and Benefits Discussed      Diabetes Screening:     General: History Diabetes and Risks and Benefits Discussed      Colorectal Cancer Screening: General: Screening Current    Due for: Colonoscopy - High Risk      Breast Cancer Screening:     General: Screening Current and Risks and Benefits Discussed      Cervical Cancer Screening:    General: Screening Not Indicated and Risks and Benefits Discussed      Osteoporosis Screening:    General: Screening Current and Risks and Benefits Discussed      Abdominal Aortic Aneurysm (AAA) Screening:        General: Risks and Benefits Discussed and Screening Not Indicated      Lung Cancer Screening:     General: Risks and Benefits Discussed and Screening Not Indicated      Hepatitis C Screening:    General: Risks and Benefits Discussed and Patient Declines    Hep C Screening Accepted: No     Other Counseling Topics:   Car/seat belt/driving safety, sunscreen and regular weightbearing exercise         Nataila Harp DO

## 2020-02-07 ENCOUNTER — OFFICE VISIT (OUTPATIENT)
Dept: FAMILY MEDICINE CLINIC | Facility: HOSPITAL | Age: 75
End: 2020-02-07
Payer: COMMERCIAL

## 2020-02-07 VITALS
TEMPERATURE: 97.6 F | HEIGHT: 63 IN | HEART RATE: 79 BPM | DIASTOLIC BLOOD PRESSURE: 82 MMHG | BODY MASS INDEX: 30.12 KG/M2 | WEIGHT: 170 LBS | SYSTOLIC BLOOD PRESSURE: 132 MMHG

## 2020-02-07 DIAGNOSIS — E78.5 DYSLIPIDEMIA: ICD-10-CM

## 2020-02-07 DIAGNOSIS — R80.9 CONTROLLED TYPE 2 DIABETES MELLITUS WITH MICROALBUMINURIA, WITHOUT LONG-TERM CURRENT USE OF INSULIN (HCC): ICD-10-CM

## 2020-02-07 DIAGNOSIS — H26.9 CATARACT OF BOTH EYES, UNSPECIFIED CATARACT TYPE: ICD-10-CM

## 2020-02-07 DIAGNOSIS — I10 ESSENTIAL HYPERTENSION: ICD-10-CM

## 2020-02-07 DIAGNOSIS — E11.29 CONTROLLED TYPE 2 DIABETES MELLITUS WITH MICROALBUMINURIA, WITHOUT LONG-TERM CURRENT USE OF INSULIN (HCC): ICD-10-CM

## 2020-02-07 DIAGNOSIS — Z01.818 PREOP EXAMINATION: Primary | ICD-10-CM

## 2020-02-07 PROCEDURE — 93000 ELECTROCARDIOGRAM COMPLETE: CPT | Performed by: INTERNAL MEDICINE

## 2020-02-07 PROCEDURE — 2022F DILAT RTA XM EVC RTNOPTHY: CPT | Performed by: INTERNAL MEDICINE

## 2020-02-07 PROCEDURE — 1036F TOBACCO NON-USER: CPT | Performed by: INTERNAL MEDICINE

## 2020-02-07 PROCEDURE — 1160F RVW MEDS BY RX/DR IN RCRD: CPT | Performed by: INTERNAL MEDICINE

## 2020-02-07 PROCEDURE — 99214 OFFICE O/P EST MOD 30 MIN: CPT | Performed by: INTERNAL MEDICINE

## 2020-02-07 PROCEDURE — 3079F DIAST BP 80-89 MM HG: CPT | Performed by: INTERNAL MEDICINE

## 2020-02-07 PROCEDURE — 3075F SYST BP GE 130 - 139MM HG: CPT | Performed by: INTERNAL MEDICINE

## 2020-02-07 PROCEDURE — 1170F FXNL STATUS ASSESSED: CPT | Performed by: INTERNAL MEDICINE

## 2020-02-07 PROCEDURE — 4040F PNEUMOC VAC/ADMIN/RCVD: CPT | Performed by: INTERNAL MEDICINE

## 2020-02-07 PROCEDURE — 3008F BODY MASS INDEX DOCD: CPT | Performed by: INTERNAL MEDICINE

## 2020-02-07 PROCEDURE — 3066F NEPHROPATHY DOC TX: CPT | Performed by: INTERNAL MEDICINE

## 2020-02-07 NOTE — ASSESSMENT & PLAN NOTE
Lab Results   Component Value Date    HGBA1C 6 9 (H) 01/07/2020   Con't current Metformin, has order for BW, UTD on foot and eye exam, con't low sugar/carb diet and exercise

## 2020-02-07 NOTE — PROGRESS NOTES
Subjective:     Kumar Moreno is a 69 y o  female who presents to the office today for a preoperative consultation at the request of surgeon Dr Marianne Alonzo who plans on performing R cataract surgery with IOLI on February 17, 2020  Prior anesthesia adverse reactions - None    Exercise capacity - Able to walk 4 blocks or climb 2 flights of stairs without symptoms - Yes    Easy bleeding/bruising - No    Chest pain/palpitation/edema - No    Dyspnea/wheezing/cough - No    Sleep apnea/COPD/asthma - No    HPI Pt notes progressive decrease vision and blurry vision  Is going to have R eye cataract surgery with IOLI and then 2 wks later L eye done  She is diabetic but does not check her sugars at home  Past Medical History:   Diagnosis Date    Depression     Hypertension     Migraine      Mo  Past Surgical History:   Procedure Laterality Date    APPENDECTOMY      COLONOSCOPY      complete 11/29/11- 5 years / complete 6/9/04 -10years    HEMORRHOID SURGERY      KIDNEY STONE SURGERY      TONSILLECTOMY      TUBAL LIGATION     ther 80     Father      Father      Father          Paternal Grandmother      Paternal Grandmother          Maternal Grandfather 76    Breast cancer Cousin         age unknown        Tobacco Use    Smoking status: Never Smoker    Smokeless tobacco: Never Used   Substance Use Topics    Alcohol use: Yes     Comment: rarely / social per [employer]    Drug use: No       Current Outpatient Medications   Medication Sig Dispense Refill    amLODIPine (NORVASC) 5 mg tablet Take 1 tablet (5 mg total) by mouth daily AmLODIPine Besylate 5 MG Oral Tablet TAKE 1 TABLET DAILY  30 tablet 6    Cetirizine HCl (ZYRTEC ALLERGY) 10 MG CAPS ZyrTEC Allergy 10 MG Oral Tablet TAKE 1 TABLET DAILY AS DIRECTED  Quantity: 30;  Refills: 0    Lynnette Saucedo;  Started 12-May-2014 Active      Cholecalciferol (VITAMIN D-3) 1000 UNITS CAPS Vitamin D3 1000 UNIT Oral Tablet TAKE 1 TABLET DAILY    Quantity: 30;  Refills: 0    Xochitl Tan DO;  Started 25-Sep-2012 Active      lisinopril (ZESTRIL) 40 mg tablet Take 1 5 tablets (60 mg total) by mouth daily 45 tablet 6    metFORMIN (GLUCOPHAGE) 1000 MG tablet Take 1 tablet (1,000 mg total) by mouth 2 (two) times a day with meals 60 tablet 5    metoprolol succinate (TOPROL-XL) 100 mg 24 hr tablet Take 1 tablet (100 mg total) by mouth daily 30 tablet 6    Multiple Vitamins-Minerals (CENTRUM ADULTS PO) Centrum Oral Tablet TAKE 1 TABLET DAILY  Refills: 0    Xochitl Tan DO;  Started 25-Sep-2012 Active       No current facility-administered medications for this visit  Acetazolamide; Metronidazole; Penicillins; Statins; Tramadol; Hydrochlorothiazide; and Tetracycline      Review of Systems  Review of Systems   Constitutional: Negative for chills, fatigue, fever and unexpected weight change  HENT: Negative for congestion and sore throat  Eyes: Positive for visual disturbance  Negative for pain  Respiratory: Negative for cough, shortness of breath and wheezing  Cardiovascular: Negative for chest pain, palpitations and leg swelling  Gastrointestinal: Negative for abdominal pain, blood in stool, constipation, diarrhea and nausea  Endocrine: Negative for polydipsia and polyuria  Genitourinary: Negative for difficulty urinating, dysuria and hematuria  Musculoskeletal: Negative for back pain and neck pain  Skin: Negative for rash and wound  Neurological: Negative for dizziness, light-headedness and headaches  Hematological: Negative for adenopathy  Psychiatric/Behavioral: Negative for behavioral problems and confusion  Objective:    /82 (BP Location: Right arm, Patient Position: Sitting, Cuff Size: Standard)   Pulse 79   Temp 97 6 °F (36 4 °C)   Ht 5' 3" (1 6 m)   Wt 77 1 kg (170 lb)   BMI 30 11 kg/m²      Physical Exam   Constitutional: She appears well-developed and well-nourished  No distress     HENT:   Head: Normocephalic and atraumatic  Right Ear: External ear normal    Left Ear: External ear normal    Mouth/Throat: Oropharynx is clear and moist    Eyes: Conjunctivae are normal  Right eye exhibits no discharge  Left eye exhibits no discharge  Neck: Neck supple  No tracheal deviation present  Cardiovascular: Normal rate, regular rhythm and normal heart sounds  No murmur heard  Neg carotid bruits B/L   Pulmonary/Chest: Effort normal and breath sounds normal  No respiratory distress  She has no wheezes  She has no rales  Abdominal: Soft  Bowel sounds are normal  There is no tenderness  There is no guarding  Musculoskeletal: Normal range of motion  She exhibits no deformity  Lymphadenopathy:     She has no cervical adenopathy  Neurological: She is alert  She exhibits normal muscle tone  Skin: Skin is warm and dry  No rash noted  Psychiatric: She has a normal mood and affect  Her behavior is normal  Thought content normal    Nursing note and vitals reviewed        Cardiographics  EC2020: NSR @ 77 bpm, RAD, nml PA and QTC, no acute ischemic changes noted    Imaging  Chest x-ray: not indicated    Lab Review   Recent labs: 2020 reviewed - A1C 6 9    Assessment:    Patient Active Problem List   Diagnosis    Allergic rhinitis    Anxiety disorder    Colon, diverticulosis    Constipation    Dyslipidemia    Elevated TSH    Esophageal reflux    Headache    Herniated nucleus pulposus, L4-5    Hypertension    Irritable bowel syndrome    Leiomyoma of uterus    Microalbuminuria    Multiple thyroid nodules    Osteopenia    Overweight    Acquired cystic kidney disease    Controlled type 2 diabetes mellitus with microalbuminuria, without long-term current use of insulin (HCC)        Plan:     Surgical Clearance - Cleared    Risk - Pt low risk for low risk surgery    Discussion/Summary:   (1) preoperative clearance: Pt low risk for low risk surgery, recent BW and ECG from today reviewed, meds reviewed, urged to con't all current  meds and no further w//u is needed, forms filled out and faxed to Dr Dinora Sol office, call with any changes in health btw now and date of procedure    (2) Cataracts - for B/L extraction and IOLI as per Optho    (3) DM type 2 with microalbuminuria - controlled with A1C 6 9 - con't current Metformin, on ACE but no statin d/t SE, does not check sugars at home, hold am Meformin the day of surgery may take  nml dose the evening before and the night of after eating    (4) HTN - BP at goal, con't current meds    (5) Dyslipidemia - UTT statin, healthy diet/exercise/wgt loss encouraged     Colonoscopy 11/11 - 5 yrs - is aware she is overdue    Mammo 4/19    Dexa 4/19 - osteopenia    DM foot exam 7/19  DM eye exam 4/19      Rhonda Caraballo DO

## 2020-05-02 ENCOUNTER — OFFICE VISIT (OUTPATIENT)
Dept: URGENT CARE | Facility: CLINIC | Age: 75
End: 2020-05-02
Payer: COMMERCIAL

## 2020-05-02 ENCOUNTER — NURSE TRIAGE (OUTPATIENT)
Dept: OTHER | Facility: OTHER | Age: 75
End: 2020-05-02

## 2020-05-02 VITALS
TEMPERATURE: 99.3 F | DIASTOLIC BLOOD PRESSURE: 98 MMHG | HEIGHT: 63 IN | HEART RATE: 96 BPM | OXYGEN SATURATION: 97 % | WEIGHT: 180.3 LBS | SYSTOLIC BLOOD PRESSURE: 114 MMHG | BODY MASS INDEX: 31.95 KG/M2 | RESPIRATION RATE: 16 BRPM

## 2020-05-02 DIAGNOSIS — N39.0 URINARY TRACT INFECTION WITH HEMATURIA, SITE UNSPECIFIED: Primary | ICD-10-CM

## 2020-05-02 DIAGNOSIS — R31.9 URINARY TRACT INFECTION WITH HEMATURIA, SITE UNSPECIFIED: Primary | ICD-10-CM

## 2020-05-02 LAB
SL AMB  POCT GLUCOSE, UA: 250
SL AMB LEUKOCYTE ESTERASE,UA: ABNORMAL
SL AMB POCT BILIRUBIN,UA: ABNORMAL
SL AMB POCT BLOOD,UA: ABNORMAL
SL AMB POCT CLARITY,UA: ABNORMAL
SL AMB POCT COLOR,UA: ABNORMAL
SL AMB POCT KETONES,UA: ABNORMAL
SL AMB POCT NITRITE,UA: ABNORMAL
SL AMB POCT PH,UA: 7
SL AMB POCT SPECIFIC GRAVITY,UA: 1
SL AMB POCT URINE PROTEIN: ABNORMAL
SL AMB POCT UROBILINOGEN: 0.2

## 2020-05-02 PROCEDURE — 81002 URINALYSIS NONAUTO W/O SCOPE: CPT | Performed by: FAMILY MEDICINE

## 2020-05-02 PROCEDURE — 99213 OFFICE O/P EST LOW 20 MIN: CPT | Performed by: FAMILY MEDICINE

## 2020-05-02 RX ORDER — NITROFURANTOIN 25; 75 MG/1; MG/1
100 CAPSULE ORAL 2 TIMES DAILY
Qty: 14 CAPSULE | Refills: 0 | Status: SHIPPED | OUTPATIENT
Start: 2020-05-02 | End: 2020-05-09

## 2020-05-04 LAB
BACTERIA UR CULT: ABNORMAL
Lab: ABNORMAL

## 2020-05-06 ENCOUNTER — TELEPHONE (OUTPATIENT)
Dept: URGENT CARE | Facility: CLINIC | Age: 75
End: 2020-05-06

## 2020-05-08 ENCOUNTER — TELEPHONE (OUTPATIENT)
Dept: URGENT CARE | Facility: CLINIC | Age: 75
End: 2020-05-08

## 2020-05-08 DIAGNOSIS — B37.3 VAGINAL CANDIDA: Primary | ICD-10-CM

## 2020-05-08 RX ORDER — FLUCONAZOLE 150 MG/1
150 TABLET ORAL ONCE
Qty: 1 TABLET | Refills: 0 | Status: SHIPPED | OUTPATIENT
Start: 2020-05-08 | End: 2020-05-08

## 2020-06-15 ENCOUNTER — HOSPITAL ENCOUNTER (OUTPATIENT)
Dept: BONE DENSITY | Facility: IMAGING CENTER | Age: 75
Discharge: HOME | End: 2020-06-15
Payer: COMMERCIAL

## 2020-06-15 VITALS — HEIGHT: 63 IN | BODY MASS INDEX: 31.54 KG/M2 | WEIGHT: 178 LBS

## 2020-06-15 DIAGNOSIS — Z12.31 ENCOUNTER FOR SCREENING MAMMOGRAM FOR BREAST CANCER: ICD-10-CM

## 2020-06-15 PROCEDURE — 77067 SCR MAMMO BI INCL CAD: CPT

## 2020-06-15 PROCEDURE — 77063 BREAST TOMOSYNTHESIS BI: CPT

## 2020-07-09 LAB
EST. AVERAGE GLUCOSE BLD GHB EST-MCNC: 169 MG/DL
GLUCOSE SERPL-MCNC: 150 MG/DL (ref 65–99)
HBA1C MFR BLD: 7.5 % (ref 4.8–5.6)
SL AMB T4, FREE (DIRECT): 1.08 NG/DL (ref 0.82–1.77)
TSH SERPL DL<=0.005 MIU/L-ACNC: 6.83 UIU/ML (ref 0.45–4.5)

## 2020-07-09 PROCEDURE — 3051F HG A1C>EQUAL 7.0%<8.0%: CPT | Performed by: UROLOGY

## 2020-07-13 ENCOUNTER — OFFICE VISIT (OUTPATIENT)
Dept: GASTROENTEROLOGY | Facility: CLINIC | Age: 75
End: 2020-07-13
Payer: COMMERCIAL

## 2020-07-13 VITALS
HEIGHT: 63 IN | BODY MASS INDEX: 31.01 KG/M2 | SYSTOLIC BLOOD PRESSURE: 158 MMHG | HEART RATE: 100 BPM | TEMPERATURE: 96.3 F | DIASTOLIC BLOOD PRESSURE: 98 MMHG | WEIGHT: 175 LBS

## 2020-07-13 DIAGNOSIS — K21.9 GASTROESOPHAGEAL REFLUX DISEASE, ESOPHAGITIS PRESENCE NOT SPECIFIED: ICD-10-CM

## 2020-07-13 DIAGNOSIS — R19.8 CHANGE IN BOWEL FUNCTION: ICD-10-CM

## 2020-07-13 DIAGNOSIS — R10.31 RIGHT LOWER QUADRANT ABDOMINAL PAIN: Primary | ICD-10-CM

## 2020-07-13 DIAGNOSIS — Z12.11 SCREENING FOR COLON CANCER: ICD-10-CM

## 2020-07-13 PROCEDURE — 99204 OFFICE O/P NEW MOD 45 MIN: CPT | Performed by: INTERNAL MEDICINE

## 2020-07-13 NOTE — LETTER
July 13, 2020     Adrienne Montgomery, [address]  [id number] [address]    Patient: Malik Estrada   YOB: 1945   Date of Visit: 7/13/2020       Dear Dr Samira Brand: Thank you for referring Linda Davis to me for evaluation  Below are my notes for this consultation  If you have questions, please do not hesitate to call me  I look forward to following your patient along with you  Sincerely,        Renu Lobo MD        CC: No Recipients  Renu Lobo MD  7/13/2020 12:34 PM  Sign at close encounter    [address] [facility] - Outpatient Consultation  Malik Estrada 70 y o  female MRN: [id number]  Encounter: [id number]    ASSESSMENT AND PLAN:      1  Right lower quadrant abdominal pain  Intermittent right lower quadrant pain with altered bowel habits is little disconcerting given that awakes the patient from her sleep  That aspect makes it less likely to be irritable bowel, although her symptoms otherwise could be consistent with just being functional   Rule out stricture or mass in the right lower quadrant  Less likely inflammatory bowel disease/colitis ileitis  Plan colonoscopy  If negative will treat for functional bowel  · Schedule colonoscopy [address] [place] [address]    2  Change in bowel function  As above sounds functional pain is uncharacteristic  Workup as outlined  3  Gastroesophageal reflux disease, esophagitis presence not specified  Chronic reflux without alarm symptoms of dysphagia weight loss or bleeding  Rule out Suarez's esophagus  · Schedule EGD    4  Screening for colon cancer  Average risk  Colonoscopy planned to evaluate right lower quadrant pain and altered bowel habits    Further screening pending the result of that exam       Followup Appointment:  2-3 months pending endoscopies  ______________________________________________________________________    Chief Complaint   Patient presents with    Abd  and side pain, constipation and diarrhea, nausea       HPI:   Gulshan Rodriguez is a 70y o  year old female who presents right lower quadrant pain, altered bowel habits and reflux  Intermittent constipation and diarrhea  RLQ intermittent pain  Sharp and sometimes pressure like  Wakes patient at night  Worse with seeds and ice cream   Takes imodium for diarrhea, about once a month  Stools brown and formed mostly  No melena or heme  No change in appetite or weight  Also gets indigestion and heartburn  Some nausea, no vomiting  Worse with tomatoes, pasta, acids  Heartburn a few times  Week  Takes Tums,  Feels reflux, but no dysphagia  Only occasional NSAID  Historical Information   Past Medical History:   Diagnosis Date    Depression     Hypertension     Migraine      Past Surgical History:   Procedure Laterality Date    APPENDECTOMY      CATARACT EXTRACTION, BILATERAL Bilateral 2020    COLONOSCOPY      complete 11/29/11- 5 years / complete 6/9/04 -10years    COLONOSCOPY      Exam in November 2011 revealed small polyp, sigmoid diverticulosis, internal and external hemorrhoids  The polyp was  inflammatory polyp so followup in 10 years recommended   HEMORRHOID SURGERY      KIDNEY STONE SURGERY      TONSILLECTOMY      TUBAL LIGATION      UPPER GASTROINTESTINAL ENDOSCOPY      November 2011 with hiatal hernia irregular Z-line and gastritis  Biopsies show question of intestinal metaplasia verses just reflux inflammation, H pylori was negative, nonspecific duodenitis       Social History     Substance and Sexual Activity   Alcohol Use Yes    Comment: rarely / social per [employer]     Social History     Substance and Sexual Activity   Drug Use No     Social History     Tobacco Use   Smoking Status Never Smoker   Smokeless Tobacco Never Used     Family History   Problem Relation Age of Onset    Hypertension Mother     Thyroid disease Mother    [facility] Breast cancer Mother 80    Coronary artery disease Father     Hypertension Father     Other Father         smoker    Diabetes Paternal Grandmother     Endometrial cancer Paternal Grandmother         age unknown    Prostate cancer Maternal Grandfather 76    Breast cancer Cousin         age unknown    No Known Problems Sister     No Known Problems Daughter     No Known Problems Sister     No Known Problems Daughter     No Known Problems Maternal Aunt     No Known Problems Maternal Aunt     Endometrial cancer Cousin         age unknown, maybe 64       Meds/Allergies     Current Outpatient Medications:     amLODIPine (NORVASC) 5 mg tablet    Cetirizine HCl (ZYRTEC ALLERGY) 10 MG CAPS    Cholecalciferol (VITAMIN D-3) 1000 UNITS CAPS    lisinopril (ZESTRIL) 40 mg tablet    metFORMIN (GLUCOPHAGE) 1000 MG tablet    metFORMIN (GLUCOPHAGE) 1000 MG tablet    metoprolol succinate (TOPROL-XL) 100 mg 24 hr tablet    Multiple Vitamins-Minerals (CENTRUM ADULTS PO)    Allergies   Allergen Reactions    Acetazolamide Hives    Metronidazole Anaphylaxis    Penicillins Hives, Itching and Rash    Statins     Tramadol     Hydrochlorothiazide Rash    Tetracycline Rash       PHYSICAL EXAM:    Blood pressure 158/98, pulse 100, temperature (!) 96 3 °F (35 7 °C), height 5' 3" (1 6 m), weight 79 4 kg (175 lb)  Body mass index is 31 kg/m²  General Appearance: NAD, cooperative, alert  Eyes: Anicteric, PERRLA, EOMI  ENT:  Normocephalic, atraumatic, normal mucosa  Neck:  Supple, symmetrical, trachea midline,   Resp:  Clear to auscultation bilaterally; no rales, rhonchi or wheezing; respirations unlabored   CV:  S1 S2, Regular rate and rhythm; no murmur, rub, or gallop  GI:  Soft, non-tender, non-distended; normal bowel sounds; no masses, no organomegaly, no hernias appreciated    Rectal: Deferred  Musculoskeletal: No cyanosis, clubbing or edema  Normal ROM    Skin:  No jaundice, rashes, or lesions   Heme/Lymph: No palpable cervical lymphadenopathy  Psych: Normal affect, good eye contact  Neuro: No gross deficits, AAOx3    Lab Results:   Lab Results   Component Value Date    WBC 5 7 01/07/2020    HGB 14 6 01/07/2020    HCT 42 7 01/07/2020    MCV 93 01/07/2020     01/07/2020     Lab Results   Component Value Date     04/08/2017    K 4 0 01/07/2020    CL 98 01/07/2020    CO2 25 01/07/2020    ANIONGAP 8 11/18/2013    BUN 15 01/07/2020    CREATININE 0 73 01/07/2020    GLUCOSE 120 (H) 07/28/2017    GLUF 128 (H) 02/03/2018    CALCIUM 9 1 02/17/2019    AST 16 01/07/2020    ALT 22 01/07/2020    ALKPHOS 95 02/17/2019    PROT 7 1 04/08/2017    BILITOT 0 5 04/08/2017    EGFR 58 02/17/2019     No results found for: IRON, TIBC, FERRITIN  Lab Results   Component Value Date    LIPASE 235 12/24/2017       Radiology Results:   Mammo Screening Bilateral W 3d & Cad    Result Date: 6/15/2020  Narrative: DIAGNOSIS: Encounter for screening mammogram for breast cancer TECHNIQUE: Digital screening mammography was performed  Computer Aided Detection (CAD) analyzed all applicable images  COMPARISONS: Prior breast imaging dated: 04/15/2019, 04/06/2018, 12/08/2016, 12/21/2015, and 11/25/2014 RELEVANT HISTORY: Family Breast Cancer History: History of breast cancer in Mother, Lugene Hiss  Family Medical History: Family medical history includes breast cancer in cousin, breast cancer in mother, endometrial cancer in cousin, and endometrial cancer in paternal grandmother  Personal History: No known relevant hormone history  No known relevant surgical history  No known relevant medical history  The patient is scheduled in a reminder system for screening mammography  8-10% of cancers will be missed on mammography  Management of a palpable abnormality must be based on clinical grounds  Patients will be notified of their results via letter from our facility  Accredited by [employer] and FDA   RISK ASSESSMENT: 5 Year Tyrer-Cuzick: 1 37 % 10 Year Tyrer-Cuzick: 2 92 % Lifetime Tyrer-Cuzick: 3 92 % TISSUE DENSITY: The breasts are almost entirely fatty  INDICATION: Kennedy Malik is a 69 y o  female presenting for screening mammography  FINDINGS: There are no suspicious masses, grouped microcalcifications or areas of architectural distortion  The skin and nipple areolar complex are unremarkable  Impression: No mammographic evidence of malignancy  ASSESSMENT/BI-RADS CATEGORY: Left: 1 - Negative Right: 1 - Negative Overall: 1 - Negative RECOMMENDATION:      - Routine screening mammogram in 1 year for both breasts  Workstation ID: [id number]         REVIEW OF SYSTEMS:    CONSTITUTIONAL: Denies any fever, chills, rigors, and weight loss  HEENT: No earache or tinnitus  Denies hearing loss or visual disturbances  CARDIOVASCULAR: No chest pain or palpitations  RESPIRATORY: Denies any cough, hemoptysis, shortness of breath or dyspnea on exertion  GASTROINTESTINAL: As noted in the History of Present Illness  GENITOURINARY: No problems with urination  Denies any hematuria or dysuria  NEUROLOGIC: No dizziness or vertigo, denies headaches  MUSCULOSKELETAL: Denies any muscle or joint pain  SKIN: Denies skin rashes or itching  ENDOCRINE: Denies excessive thirst  Denies intolerance to heat or cold  PSYCHOSOCIAL: Denies depression or anxiety  Denies any recent memory loss

## 2020-07-13 NOTE — PATIENT INSTRUCTIONS
Reflux diet and precautions  Avoid trigger foods  Okay to continue with Tums  Schedule EGD and colonoscopy

## 2020-07-13 NOTE — PROGRESS NOTES
[facility] - Outpatient Consultation  Ronny Duenas 70 y o  female MRN: [id number]  Encounter: [id number]    ASSESSMENT AND PLAN:      1  Right lower quadrant abdominal pain  Intermittent right lower quadrant pain with altered bowel habits is little disconcerting given that awakes the patient from her sleep  That aspect makes it less likely to be irritable bowel, although her symptoms otherwise could be consistent with just being functional   Rule out stricture or mass in the right lower quadrant  Less likely inflammatory bowel disease/colitis ileitis  Plan colonoscopy  If negative will treat for functional bowel  · Schedule colonoscopy [address] [place] [address]    2  Change in bowel function  As above sounds functional pain is uncharacteristic  Workup as outlined  3  Gastroesophageal reflux disease, esophagitis presence not specified  Chronic reflux without alarm symptoms of dysphagia weight loss or bleeding  Rule out Suarez's esophagus  · Schedule EGD    4  Screening for colon cancer  Average risk  Colonoscopy planned to evaluate right lower quadrant pain and altered bowel habits  Further screening pending the result of that exam       Followup Appointment:  2-3 months pending endoscopies  ______________________________________________________________________    Chief Complaint   Patient presents with    Abd  and side pain, constipation and diarrhea, nausea       HPI:   Ronny Duenas is a 70y o  year old female who presents right lower quadrant pain, altered bowel habits and reflux  Intermittent constipation and diarrhea  RLQ intermittent pain  Sharp and sometimes pressure like  Wakes patient at night  Worse with seeds and ice cream   Takes imodium for diarrhea, about once a month  Stools brown and formed mostly  No melena or heme  No change in appetite or weight  Also gets indigestion and heartburn  Some nausea, no vomiting  Worse with tomatoes, pasta, acids  Heartburn a few times  Week  Takes Tums,  Feels reflux, but no dysphagia  Only occasional NSAID  Historical Information   Past Medical History:   Diagnosis Date    Depression     Hypertension     Migraine      Past Surgical History:   Procedure Laterality Date    APPENDECTOMY      CATARACT EXTRACTION, BILATERAL Bilateral 2020    COLONOSCOPY      complete 11/29/11- 5 years / complete 6/9/04 -10years    COLONOSCOPY      Exam in November 2011 revealed small polyp, sigmoid diverticulosis, internal and external hemorrhoids  The polyp was  inflammatory polyp so followup in 10 years recommended   HEMORRHOID SURGERY      KIDNEY STONE SURGERY      TONSILLECTOMY      TUBAL LIGATION      UPPER GASTROINTESTINAL ENDOSCOPY      November 2011 with hiatal hernia irregular Z-line and gastritis  Biopsies show question of intestinal metaplasia verses just reflux inflammation, H pylori was negative, nonspecific duodenitis       Social History     Substance and Sexual Activity   Alcohol Use Yes    Comment: rarely / social per [employer]     Social History     Substance and Sexual Activity   Drug Use No     Social History     Tobacco Use   Smoking Status Never Smoker   Smokeless Tobacco Never Used     Family History   Problem Relation Age of Onset    Hypertension Mother     Thyroid disease Mother     Breast cancer Mother 80    Coronary artery disease Father     Hypertension Father     Other Father         smoker    Diabetes Paternal Grandmother     Endometrial cancer Paternal Grandmother         age unknown    Prostate cancer Maternal Grandfather 76    Breast cancer Cousin         age unknown    No Known Problems Sister     No Known Problems Daughter     No Known Problems Sister     No Known Problems Daughter     No Known Problems Maternal Aunt     No Known Problems Maternal Aunt     Endometrial cancer Cousin         age unknown, maybe 64       Meds/Allergies     Current Outpatient Medications:    amLODIPine (NORVASC) 5 mg tablet    Cetirizine HCl (ZYRTEC ALLERGY) 10 MG CAPS    Cholecalciferol (VITAMIN D-3) 1000 UNITS CAPS    lisinopril (ZESTRIL) 40 mg tablet    metFORMIN (GLUCOPHAGE) 1000 MG tablet    metFORMIN (GLUCOPHAGE) 1000 MG tablet    metoprolol succinate (TOPROL-XL) 100 mg 24 hr tablet    Multiple Vitamins-Minerals (CENTRUM ADULTS PO)    Allergies   Allergen Reactions    Acetazolamide Hives    Metronidazole Anaphylaxis    Penicillins Hives, Itching and Rash    Statins     Tramadol     Hydrochlorothiazide Rash    Tetracycline Rash       PHYSICAL EXAM:    Blood pressure 158/98, pulse 100, temperature (!) 96 3 °F (35 7 °C), height 5' 3" (1 6 m), weight 79 4 kg (175 lb)  Body mass index is 31 kg/m²  General Appearance: NAD, cooperative, alert  Eyes: Anicteric, PERRLA, EOMI  ENT:  Normocephalic, atraumatic, normal mucosa  Neck:  Supple, symmetrical, trachea midline,   Resp:  Clear to auscultation bilaterally; no rales, rhonchi or wheezing; respirations unlabored   CV:  S1 S2, Regular rate and rhythm; no murmur, rub, or gallop  GI:  Soft, non-tender, non-distended; normal bowel sounds; no masses, no organomegaly, no hernias appreciated    Rectal: Deferred  Musculoskeletal: No cyanosis, clubbing or edema  Normal ROM    Skin:  No jaundice, rashes, or lesions   Heme/Lymph: No palpable cervical lymphadenopathy  Psych: Normal affect, good eye contact  Neuro: No gross deficits, AAOx3    Lab Results:   Lab Results   Component Value Date    WBC 5 7 01/07/2020    HGB 14 6 01/07/2020    HCT 42 7 01/07/2020    MCV 93 01/07/2020     01/07/2020     Lab Results   Component Value Date     04/08/2017    K 4 0 01/07/2020    CL 98 01/07/2020    CO2 25 01/07/2020    ANIONGAP 8 11/18/2013    BUN 15 01/07/2020    CREATININE 0 73 01/07/2020    GLUCOSE 120 (H) 07/28/2017    GLUF 128 (H) 02/03/2018    CALCIUM 9 1 02/17/2019    AST 16 01/07/2020    ALT 22 01/07/2020    ALKPHOS 95 02/17/2019    PROT 7 1 04/08/2017    BILITOT 0 5 04/08/2017    EGFR 58 02/17/2019     No results found for: IRON, TIBC, FERRITIN  Lab Results   Component Value Date    LIPASE 235 12/24/2017       Radiology Results:   Mammo Screening Bilateral W 3d & Cad    Result Date: 6/15/2020  Narrative: DIAGNOSIS: Encounter for screening mammogram for breast cancer TECHNIQUE: Digital screening mammography was performed  Computer Aided Detection (CAD) analyzed all applicable images  COMPARISONS: Prior breast imaging dated: 04/15/2019, 04/06/2018, 12/08/2016, 12/21/2015, and 11/25/2014 RELEVANT HISTORY: Family Breast Cancer History: History of breast cancer in Mother, Reed Shawna  Family Medical History: Family medical history includes breast cancer in cousin, breast cancer in mother, endometrial cancer in cousin, and endometrial cancer in paternal grandmother  Personal History: No known relevant hormone history  No known relevant surgical history  No known relevant medical history  The patient is scheduled in a reminder system for screening mammography  8-10% of cancers will be missed on mammography  Management of a palpable abnormality must be based on clinical grounds  Patients will be notified of their results via letter from our facility  Accredited by [employer] and FDA  RISK ASSESSMENT: 5 Year Tyrer-Cuzick: 1 37 % 10 Year Tyrer-Cuzick: 2 92 % Lifetime Tyrer-Cuzick: 3 92 % TISSUE DENSITY: The breasts are almost entirely fatty  INDICATION: Shad Wong is a 69 y o  female presenting for screening mammography  FINDINGS: There are no suspicious masses, grouped microcalcifications or areas of architectural distortion  The skin and nipple areolar complex are unremarkable  Impression: No mammographic evidence of malignancy  ASSESSMENT/BI-RADS CATEGORY: Left: 1 - Negative Right: 1 - Negative Overall: 1 - Negative RECOMMENDATION:      - Routine screening mammogram in 1 year for both breasts   Workstation ID: [id number] REVIEW OF SYSTEMS:    CONSTITUTIONAL: Denies any fever, chills, rigors, and weight loss  HEENT: No earache or tinnitus  Denies hearing loss or visual disturbances  CARDIOVASCULAR: No chest pain or palpitations  RESPIRATORY: Denies any cough, hemoptysis, shortness of breath or dyspnea on exertion  GASTROINTESTINAL: As noted in the History of Present Illness  GENITOURINARY: No problems with urination  Denies any hematuria or dysuria  NEUROLOGIC: No dizziness or vertigo, denies headaches  MUSCULOSKELETAL: Denies any muscle or joint pain  SKIN: Denies skin rashes or itching  ENDOCRINE: Denies excessive thirst  Denies intolerance to heat or cold  PSYCHOSOCIAL: Denies depression or anxiety  Denies any recent memory loss

## 2020-07-15 ENCOUNTER — TRANSCRIBE ORDERS (OUTPATIENT)
Dept: ADMINISTRATIVE | Facility: HOSPITAL | Age: 75
End: 2020-07-15

## 2020-07-15 DIAGNOSIS — R14.0 ABDOMINAL DISTENTION: Primary | ICD-10-CM

## 2020-07-18 DIAGNOSIS — Z11.59 SCREENING FOR VIRAL DISEASE: ICD-10-CM

## 2020-07-18 PROCEDURE — U0003 INFECTIOUS AGENT DETECTION BY NUCLEIC ACID (DNA OR RNA); SEVERE ACUTE RESPIRATORY SYNDROME CORONAVIRUS 2 (SARS-COV-2) (CORONAVIRUS DISEASE [COVID-19]), AMPLIFIED PROBE TECHNIQUE, MAKING USE OF HIGH THROUGHPUT TECHNOLOGIES AS DESCRIBED BY CMS-2020-01-R: HCPCS

## 2020-07-20 ENCOUNTER — HOSPITAL ENCOUNTER (OUTPATIENT)
Dept: ULTRASOUND IMAGING | Facility: HOSPITAL | Age: 75
Discharge: HOME | End: 2020-07-20
Payer: COMMERCIAL

## 2020-07-20 DIAGNOSIS — R14.0 ABDOMINAL DISTENTION: ICD-10-CM

## 2020-07-20 LAB — SARS-COV-2 RNA SPEC QL NAA+PROBE: NOT DETECTED

## 2020-07-20 PROCEDURE — 76830 TRANSVAGINAL US NON-OB: CPT

## 2020-07-20 PROCEDURE — 76856 US EXAM PELVIC COMPLETE: CPT

## 2020-07-21 ENCOUNTER — CLINICAL SUPPORT (OUTPATIENT)
Dept: GASTROENTEROLOGY | Facility: CLINIC | Age: 75
End: 2020-07-21

## 2020-07-21 VITALS — HEIGHT: 63 IN | BODY MASS INDEX: 31.01 KG/M2 | WEIGHT: 175 LBS

## 2020-07-21 DIAGNOSIS — R19.7 DIARRHEA, UNSPECIFIED TYPE: Primary | ICD-10-CM

## 2020-07-21 DIAGNOSIS — K59.00 CONSTIPATION, UNSPECIFIED CONSTIPATION TYPE: ICD-10-CM

## 2020-07-21 NOTE — PROGRESS NOTES
Phone prep for colon dx constipation instructions given for suprep    Pt will  at [address] office on Friday pt had covid test done

## 2020-07-23 ENCOUNTER — OFFICE VISIT (OUTPATIENT)
Dept: FAMILY MEDICINE CLINIC | Facility: HOSPITAL | Age: 75
End: 2020-07-23
Payer: COMMERCIAL

## 2020-07-23 VITALS
HEART RATE: 88 BPM | DIASTOLIC BLOOD PRESSURE: 84 MMHG | SYSTOLIC BLOOD PRESSURE: 160 MMHG | BODY MASS INDEX: 30.65 KG/M2 | HEIGHT: 63 IN | WEIGHT: 173 LBS | TEMPERATURE: 98 F

## 2020-07-23 DIAGNOSIS — I10 ESSENTIAL HYPERTENSION: ICD-10-CM

## 2020-07-23 DIAGNOSIS — E66.9 OBESITY (BMI 30.0-34.9): ICD-10-CM

## 2020-07-23 DIAGNOSIS — R79.89 ELEVATED TSH: ICD-10-CM

## 2020-07-23 DIAGNOSIS — E11.29 CONTROLLED TYPE 2 DIABETES MELLITUS WITH MICROALBUMINURIA, WITHOUT LONG-TERM CURRENT USE OF INSULIN (HCC): Primary | ICD-10-CM

## 2020-07-23 DIAGNOSIS — R80.9 CONTROLLED TYPE 2 DIABETES MELLITUS WITH MICROALBUMINURIA, WITHOUT LONG-TERM CURRENT USE OF INSULIN (HCC): Primary | ICD-10-CM

## 2020-07-23 PROCEDURE — 4010F ACE/ARB THERAPY RXD/TAKEN: CPT | Performed by: INTERNAL MEDICINE

## 2020-07-23 PROCEDURE — 4010F ACE/ARB THERAPY RXD/TAKEN: CPT | Performed by: UROLOGY

## 2020-07-23 PROCEDURE — 3008F BODY MASS INDEX DOCD: CPT | Performed by: INTERNAL MEDICINE

## 2020-07-23 PROCEDURE — 3066F NEPHROPATHY DOC TX: CPT | Performed by: INTERNAL MEDICINE

## 2020-07-23 PROCEDURE — 2022F DILAT RTA XM EVC RTNOPTHY: CPT | Performed by: INTERNAL MEDICINE

## 2020-07-23 PROCEDURE — 1036F TOBACCO NON-USER: CPT | Performed by: INTERNAL MEDICINE

## 2020-07-23 PROCEDURE — 3077F SYST BP >= 140 MM HG: CPT | Performed by: INTERNAL MEDICINE

## 2020-07-23 PROCEDURE — 99214 OFFICE O/P EST MOD 30 MIN: CPT | Performed by: INTERNAL MEDICINE

## 2020-07-23 PROCEDURE — 1160F RVW MEDS BY RX/DR IN RCRD: CPT | Performed by: INTERNAL MEDICINE

## 2020-07-23 PROCEDURE — 3079F DIAST BP 80-89 MM HG: CPT | Performed by: INTERNAL MEDICINE

## 2020-07-23 PROCEDURE — 4040F PNEUMOC VAC/ADMIN/RCVD: CPT | Performed by: INTERNAL MEDICINE

## 2020-07-23 RX ORDER — AMLODIPINE BESYLATE 10 MG/1
10 TABLET ORAL DAILY
Qty: 30 TABLET | Refills: 5 | Status: SHIPPED | OUTPATIENT
Start: 2020-07-23 | End: 2020-11-10 | Stop reason: SDUPTHER

## 2020-07-23 RX ORDER — LISINOPRIL 40 MG/1
60 TABLET ORAL DAILY
Qty: 45 TABLET | Refills: 6 | Status: SHIPPED | OUTPATIENT
Start: 2020-07-23 | End: 2020-11-10 | Stop reason: SDUPTHER

## 2020-07-23 RX ORDER — METOPROLOL SUCCINATE 100 MG/1
100 TABLET, EXTENDED RELEASE ORAL DAILY
Qty: 30 TABLET | Refills: 6 | Status: SHIPPED | OUTPATIENT
Start: 2020-07-23 | End: 2020-11-10 | Stop reason: SDUPTHER

## 2020-07-23 NOTE — ASSESSMENT & PLAN NOTE
BP elevated - increase Amlodipine to 10 mg 1 tab PO q day, con't all other BP meds, recheck in 3 mos, diet/exercise/wgt loss encouraged

## 2020-07-23 NOTE — ASSESSMENT & PLAN NOTE
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

## 2020-07-23 NOTE — PROGRESS NOTES
Assessment/Plan:    Controlled type 2 diabetes mellitus with microalbuminuria, without long-term current use of insulin (HCC)    Lab Results   Component Value Date    HGBA1C 7 5 (H) 07/08/2020   DM type 2 with hyperglycemia and microalbuminuria - now uncontrolled with A1c 7 5 - urged to get on track with diet and keep active, con't current Metformin for now - recheck BW in 3 mos and  If not better will need to add 2nd agent, foot exam done today, eye exam 4/19 - 2 yrs, on an Ace but no statin    Elevated TSH  TSH still elevated but FT4 wnl, clinically euthyroid, recheck in 6 mo - will order at next appt    Hypertension  BP elevated - increase Amlodipine to 10 mg 1 tab PO q day, con't all other BP meds, recheck in 3 mos, diet/exercise/wgt loss encouraged       Diagnoses and all orders for this visit:    Controlled type 2 diabetes mellitus with microalbuminuria, without long-term current use of insulin (HCC)  -     metFORMIN (GLUCOPHAGE) 1000 MG tablet; Take 1 tablet (1,000 mg total) by mouth 2 (two) times a day with meals  -     Glucose, fasting; Future  -     Hemoglobin A1C; Future  -     Glucose, fasting  -     Hemoglobin A1C    Obesity (BMI 30 0-34  9)    BMI 30 0-30 9,adult  Comments:  diet/exercise/wgt loss encouraged    Elevated TSH    Essential hypertension  -     metoprolol succinate (TOPROL-XL) 100 mg 24 hr tablet; Take 1 tablet (100 mg total) by mouth daily  -     lisinopril (ZESTRIL) 40 mg tablet; Take 1 5 tablets (60 mg total) by mouth daily  -     amLODIPine (NORVASC) 10 mg tablet; Take 1 tablet (10 mg total) by mouth daily AmLODIPine Besylate 5 MG Oral Tablet TAKE 1 TABLET DAILY  Colonoscopy - scheduled for next week    Mammo 6/20    Dexa 4/19 - osteopenia      Subjective:      Patient ID: Rimma Ly is a 70 y o  female  HPI Pt here for follow up appt and BW results    BW results were d/w pt in detail: FBS/A1c 150/7 5, TSH up at 6 830 but FT4 was wnl        Def of controlled vs uncontrolled DM was reviewed  Diet was reviewed - wgt up 3 lbs from Jan 2020  BMI reviewed  She admits diet has not been good during COVID  She is getting out in the garden more  She is taking her DM meds as directed  She does admit to missing a dose of Metformin here or there  She is UTD on eye exam (4/19 - 2 yrs) but is due for her foot exam   She notes no numbness/tingling/pain/sores/ulcers with her feet  She is not on a statin but she is on an ACE  Pt is not on any thyroid medication daily  They deny any significant wgt changes/fatigue/C/D/tremor/palp/hair loss or skin changes  BP above goal today and meds were reviewed and no changes have occurred  She denies missing doses of meds or SE with the meds  She does not check her BP outside the office  She notes no frequent Ha's/dizziness/double vision/CP  She is anxious about colonoscopy next week  She states she has had a lot of stress  Colonoscopy - scheduled for next week    Mammo 6/20    Dexa 4/19 - osteopenia      Review of Systems   Constitutional: Negative for chills, fatigue, fever and unexpected weight change  HENT: Negative for congestion and sore throat  Eyes: Negative for pain and visual disturbance  Respiratory: Negative for cough, shortness of breath and wheezing  Cardiovascular: Negative for chest pain, palpitations and leg swelling  Gastrointestinal: Positive for abdominal pain  Negative for blood in stool, constipation, diarrhea, nausea and vomiting  Endocrine: Negative for polydipsia and polyuria  Genitourinary: Negative for difficulty urinating and dysuria  Musculoskeletal: Negative for back pain and neck pain  Skin: Negative for rash and wound  Neurological: Negative for dizziness, light-headedness, numbness and headaches  Hematological: Negative for adenopathy  Psychiatric/Behavioral: Negative for behavioral problems and confusion           Objective:    /84   Pulse 88   Temp 98 °F (36 7 °C)   Ht 5' 3" (1 6 m)   Wt 78 5 kg (173 lb)   BMI 30 65 kg/m²      Physical Exam   Constitutional: She appears well-developed and well-nourished  No distress  HENT:   Head: Normocephalic and atraumatic  Eyes: Conjunctivae are normal  Right eye exhibits no discharge  Left eye exhibits no discharge  Neck: Neck supple  No tracheal deviation present  Cardiovascular: Normal rate, regular rhythm and normal heart sounds  Exam reveals no friction rub  Pulses are no weak pulses  No murmur heard  Pulses:       Dorsalis pedis pulses are 2+ on the right side, and 2+ on the left side  Pulmonary/Chest: Effort normal and breath sounds normal  No respiratory distress  She has no wheezes  She has no rales  Abdominal: Soft  She exhibits no distension  There is no tenderness  There is no rebound and no guarding  Musculoskeletal: She exhibits no edema  Feet:   Right Foot:   Skin Integrity: Positive for dry skin  Negative for ulcer, skin breakdown, erythema, warmth or callus  Left Foot:   Skin Integrity: Positive for dry skin  Negative for ulcer, skin breakdown, erythema, warmth or callus  Lymphadenopathy:     She has no cervical adenopathy  Neurological: She is alert  She exhibits normal muscle tone  Skin: Skin is warm and dry  No rash noted  Psychiatric: She has a normal mood and affect  Her behavior is normal    Nursing note and vitals reviewed  Patient's shoes and socks removed  Right Foot/Ankle   Right Foot Inspection  Skin Exam: skin normal, skin intact and dry skin no warmth, no callus, no erythema, no maceration, no abnormal color, no pre-ulcer, no ulcer and no callus                          Toe Exam: ROM and strength within normal limits  Sensory   Vibration: intact    Monofilament testing: intact  Vascular    The right DP pulse is 2+       Left Foot/Ankle  Left Foot Inspection  Skin Exam: skin normal, skin intact and dry skinno warmth, no erythema, no maceration, normal color, no pre-ulcer, no ulcer and no callus                         Toe Exam: ROM and strength within normal limits                   Sensory   Vibration: intact    Monofilament: intact  Vascular    The left DP pulse is 2+  Assign Risk Category:  No deformity present; No loss of protective sensation; No weak pulses       Risk: 0      BMI Counseling: Body mass index is 30 65 kg/m²  The BMI is above normal  Nutrition recommendations include reducing portion sizes, 3-5 servings of fruits/vegetables daily, consuming healthier snacks, moderation in carbohydrate intake, increasing intake of lean protein and reducing intake of saturated fat and trans fat  Exercise recommendations include exercising 3-5 times per week

## 2020-07-23 NOTE — PATIENT INSTRUCTIONS
Obesity   AMBULATORY CARE:   Obesity  is when your body mass index (BMI) is greater than 30  Your healthcare provider will use your height and weight to measure your BMI  The risks of obesity include  many health problems, such as injuries or physical disability  You may need tests to check for the following:  · Diabetes     · High blood pressure or high cholesterol     · Heart disease     · Gallbladder or liver disease     · Cancer of the colon, breast, prostate, liver, or kidney     · Sleep apnea     · Arthritis or gout  Seek care immediately if:   · You have a severe headache, confusion, or difficulty speaking  · You have weakness on one side of your body  · You have chest pain, sweating, or shortness of breath  Contact your healthcare provider if:   · You have symptoms of gallbladder or liver disease, such as pain in your upper abdomen  · You have knee or hip pain and discomfort while walking  · You have symptoms of diabetes, such as intense hunger and thirst, and frequent urination  · You have symptoms of sleep apnea, such as snoring or daytime sleepiness  · You have questions or concerns about your condition or care  Treatment for obesity  focuses on helping you lose weight to improve your health  Even a small decrease in BMI can reduce the risk for many health problems  Your healthcare provider will help you set a weight-loss goal   · Lifestyle changes  are the first step in treating obesity  These include making healthy food choices and getting regular physical activity  Your healthcare provider may suggest a weight-loss program that involves coaching, education, and therapy  · Medicine  may help you lose weight when it is used with a healthy diet and physical activity  · Surgery  can help you lose weight if you are very obese and have other health problems  There are several types of weight-loss surgery  Ask your healthcare provider for more information    Be successful losing weight:   · Set small, realistic goals  An example of a small goal is to walk for 20 minutes 5 days a week  Anther goal is to lose 5% of your body weight  · Tell friends, family members, and coworkers about your goals  and ask for their support  Ask a friend to lose weight with you, or join a weight-loss support group  · Identify foods or triggers that may cause you to overeat , and find ways to avoid them  Remove tempting high-calorie foods from your home and workplace  Place a bowl of fresh fruit on your kitchen counter  If stress causes you to eat, then find other ways to cope with stress  · Keep a diary to track what you eat and drink  Also write down how many minutes of physical activity you do each day  Weigh yourself once a week and record it in your diary  Eating changes: You will need to eat 500 to 1,000 fewer calories each day than you currently eat to lose 1 to 2 pounds a week  The following changes will help you cut calories:  · Eat smaller portions  Use small plates, no larger than 9 inches in diameter  Fill your plate half full of fruits and vegetables  Measure your food using measuring cups until you know what a serving size looks like  · Eat 3 meals and 1 or 2 snacks each day  Plan your meals in advance  Valentina Norris and eat at home most of the time  Eat slowly  · Eat fruits and vegetables at every meal   They are low in calories and high in fiber, which makes you feel full  Do not add butter, margarine, or cream sauce to vegetables  Use herbs to season steamed vegetables  · Eat less fat and fewer fried foods  Eat more baked or grilled chicken and fish  These protein sources are lower in calories and fat than red meat  Limit fast food  Dress your salads with olive oil and vinegar instead of bottled dressing  · Limit the amount of sugar you eat  Do not drink sugary beverages  Limit alcohol  Activity changes:  Physical activity is good for your body in many ways   It helps you burn calories and build strong muscles  It decreases stress and depression, and improves your mood  It can also help you sleep better  Talk to your healthcare provider before you begin an exercise program   · Exercise for at least 30 minutes 5 days a week  Start slowly  Set aside time each day for physical activity that you enjoy and that is convenient for you  It is best to do both weight training and an activity that increases your heart rate, such as walking, bicycling, or swimming  · Find ways to be more active  Do yard work and housecleaning  Walk up the stairs instead of using elevators  Spend your leisure time going to events that require walking, such as outdoor festivals or fairs  This extra physical activity can help you lose weight and keep it off  Follow up with your healthcare provider as directed: You may need to meet with a dietitian  Write down your questions so you remember to ask them during your visits  © 2017 [employer] Information is for End User's use only and may not be sold, redistributed or otherwise used for commercial purposes  All illustrations and images included in CareNotes® are the copyrighted property of [employer]  or [employer]on[employer] Cade  The above information is an  only  It is not intended as medical advice for individual conditions or treatments  Talk to your doctor, nurse or pharmacist before following any medical regimen to see if it is safe and effective for you  Heart Healthy Diet   AMBULATORY CARE:   A heart healthy diet  is an eating plan low in total fat, unhealthy fats, and sodium (salt)  A heart healthy diet helps decrease your risk for heart disease and stroke  Limit the amount of fat you eat to 25% to 35% of your total daily calories  Limit sodium to less than 2,300 mg each day  Healthy fats:  Healthy fats can help improve cholesterol levels   The risk for heart disease is decreased when cholesterol levels are normal  Choose healthy fats, such as the following:  · Unsaturated fat  is found in foods such as soybean, canola, olive, corn, and safflower oils  It is also found in soft tub margarine that is made with liquid vegetable oil  · Omega-3 fat  is found in certain fish, such as salmon, tuna, and trout, and in walnuts and flaxseed  Unhealthy fats:  Unhealthy fats can cause unhealthy cholesterol levels in your blood and increase your risk of heart disease  Limit unhealthy fats, such as the following:  · Cholesterol  is found in animal foods, such as eggs and lobster, and in dairy products made from whole milk  Limit cholesterol to less than 300 milligrams (mg) each day  You may need to limit cholesterol to 200 mg each day if you have heart disease  · Saturated fat  is found in meats, such as mendes and hamburger  It is also found in chicken or turkey skin, whole milk, and butter  Limit saturated fat to less than 7% of your total daily calories  Limit saturated fat to less than 6% if you have heart disease or are at increased risk for it  · Trans fat  is found in packaged foods, such as potato chips and cookies  It is also in hard margarine, some fried foods, and shortening  Avoid trans fats as much as possible    Heart healthy foods and drinks to include:  Ask your dietitian or healthcare provider how many servings to have from each of the following food groups:  · Grains:      ¨ Whole-wheat breads, cereals, and pastas, and brown rice    ¨ Low-fat, low-sodium crackers and chips    · Vegetables:      ¨ Broccoli, green beans, green peas, and spinach    ¨ Collards, kale, and lima beans    ¨ Carrots, sweet potatoes, tomatoes, and peppers    ¨ Canned vegetables with no salt added    · Fruits:      ¨ Bananas, peaches, pears, and pineapple    ¨ Grapes, raisins, and dates    ¨ Oranges, tangerines, grapefruit, orange juice, and grapefruit juice    ¨ Apricots, mangoes, melons, and papaya    ¨ Raspberries and strawberries    ¨ Canned fruit with no added sugar    · Low-fat dairy products:      ¨ Nonfat (skim) milk, 1% milk, and low-fat almond, cashew, or soy milks fortified with calcium    ¨ Low-fat cheese, regular or frozen yogurt, and cottage cheese    · Meats and proteins , such as lean cuts of beef and pork (loin, leg, round), skinless chicken and turkey, legumes, soy products, egg whites, and nuts  Foods and drinks to limit or avoid:  Ask your dietitian or healthcare provider about these and other foods that are high in unhealthy fat, sodium, and sugar:  · Snack or packaged foods , such as frozen dinners, cookies, macaroni and cheese, and cereals with more than 300 mg of sodium per serving    · Canned or dry mixes  for cakes, soups, sauces, or gravies    · Vegetables with added sodium , such as instant potatoes, vegetables with added sauces, or regular canned vegetables    · Other foods high in sodium , such as ketchup, barbecue sauce, salad dressing, pickles, olives, soy sauce, and miso    · High-fat dairy foods  such as whole or 2% milk, cream cheese, or sour cream, and cheeses     · High-fat protein foods  such as high-fat cuts of beef (T-bone steaks, ribs), chicken or turkey with skin, and organ meats, such as liver    · Cured or smoked meats , such as hot dogs, mendes, and sausage    · Unhealthy fats and oils , such as butter, stick margarine, shortening, and cooking oils such as coconut or palm oil    · Food and drinks high in sugar , such as soft drinks (soda), sports drinks, sweetened tea, candy, cake, cookies, pies, and doughnuts  Other diet guidelines to follow:   · Eat more foods containing omega-3 fats  Eat fish high in omega-3 fats at least 2 times a week  · Limit alcohol  Too much alcohol can damage your heart and raise your blood pressure  Women should limit alcohol to 1 drink a day  Men should limit alcohol to 2 drinks a day   A drink of alcohol is 12 ounces of beer, 5 ounces of wine, or 1½ ounces of liquor  · Choose low-sodium foods  High-sodium foods can lead to high blood pressure  Add little or no salt to food you prepare  Use herbs and spices in place of salt  · Eat more fiber  to help lower cholesterol levels  Eat at least 5 servings of fruits and vegetables each day  Eat 3 ounces of whole-grain foods each day  Legumes (beans) are also a good source of fiber  Lifestyle guidelines:   · Do not smoke  Nicotine and other chemicals in cigarettes and cigars can cause lung and heart damage  Ask your healthcare provider for information if you currently smoke and need help to quit  E-cigarettes or smokeless tobacco still contain nicotine  Talk to your healthcare provider before you use these products  · Exercise regularly  to help you maintain a healthy weight and improve your blood pressure and cholesterol levels  Ask your healthcare provider about the best exercise plan for you  Do not start an exercise program without asking your healthcare provider  Follow up with your healthcare provider as directed:  Write down your questions so you remember to ask them during your visits  © 2017 [facility] [facility] Information is for End User's use only and may not be sold, redistributed or otherwise used for commercial purposes  All illustrations and images included in CareNotes® are the copyrighted property of [employer]  or [employer]on[employer] Cade  The above information is an  only  It is not intended as medical advice for individual conditions or treatments  Talk to your doctor, nurse or pharmacist before following any medical regimen to see if it is safe and effective for you

## 2020-07-27 ENCOUNTER — HOSPITAL ENCOUNTER (EMERGENCY)
Facility: HOSPITAL | Age: 75
Discharge: HOME | End: 2020-07-27
Attending: EMERGENCY MEDICINE | Admitting: EMERGENCY MEDICINE
Payer: COMMERCIAL

## 2020-07-27 ENCOUNTER — APPOINTMENT (EMERGENCY)
Dept: RADIOLOGY | Facility: HOSPITAL | Age: 75
End: 2020-07-27
Payer: COMMERCIAL

## 2020-07-27 ENCOUNTER — APPOINTMENT (EMERGENCY)
Dept: CT IMAGING | Facility: HOSPITAL | Age: 75
End: 2020-07-27
Payer: COMMERCIAL

## 2020-07-27 VITALS
SYSTOLIC BLOOD PRESSURE: 180 MMHG | DIASTOLIC BLOOD PRESSURE: 79 MMHG | RESPIRATION RATE: 16 BRPM | HEART RATE: 73 BPM | WEIGHT: 173 LBS | TEMPERATURE: 98.7 F | HEIGHT: 63 IN | BODY MASS INDEX: 30.65 KG/M2 | OXYGEN SATURATION: 94 %

## 2020-07-27 DIAGNOSIS — N13.30 HYDRONEPHROSIS OF RIGHT KIDNEY: ICD-10-CM

## 2020-07-27 DIAGNOSIS — N23 RENAL COLIC ON RIGHT SIDE: ICD-10-CM

## 2020-07-27 DIAGNOSIS — N20.1 RIGHT URETERAL STONE: Primary | ICD-10-CM

## 2020-07-27 DIAGNOSIS — R19.7 DIARRHEA: ICD-10-CM

## 2020-07-27 LAB
ALBUMIN SERPL BCP-MCNC: 4.3 G/DL (ref 3.5–5)
ALP SERPL-CCNC: 75 U/L (ref 46–116)
ALT SERPL W P-5'-P-CCNC: 43 U/L (ref 12–78)
ANION GAP SERPL CALCULATED.3IONS-SCNC: 12 MMOL/L (ref 4–13)
AST SERPL W P-5'-P-CCNC: 23 U/L (ref 5–45)
ATRIAL RATE: 83 BPM
BACTERIA UR QL AUTO: ABNORMAL /HPF
BASOPHILS # BLD AUTO: 0.07 THOUSANDS/ΜL (ref 0–0.1)
BASOPHILS NFR BLD AUTO: 1 % (ref 0–1)
BILIRUB SERPL-MCNC: 0.6 MG/DL (ref 0.2–1)
BILIRUB UR QL STRIP: NEGATIVE
BUN SERPL-MCNC: 13 MG/DL (ref 5–25)
CALCIUM SERPL-MCNC: 9.4 MG/DL (ref 8.3–10.1)
CHLORIDE SERPL-SCNC: 104 MMOL/L (ref 100–108)
CLARITY UR: ABNORMAL
CLARITY, POC: CLEAR
CO2 SERPL-SCNC: 26 MMOL/L (ref 21–32)
COLOR UR: ABNORMAL
COLOR, POC: YELLOW
CREAT SERPL-MCNC: 0.82 MG/DL (ref 0.6–1.3)
EOSINOPHIL # BLD AUTO: 0.2 THOUSAND/ΜL (ref 0–0.61)
EOSINOPHIL NFR BLD AUTO: 3 % (ref 0–6)
ERYTHROCYTE [DISTWIDTH] IN BLOOD BY AUTOMATED COUNT: 11.9 % (ref 11.6–15.1)
EXT BILIRUBIN, UA: NEGATIVE
EXT BLOOD URINE: NEGATIVE
EXT GLUCOSE, UA: NEGATIVE
EXT KETONES: NEGATIVE
EXT NITRITE, UA: NEGATIVE
EXT PH, UA: 6
EXT PROTEIN, UA: NEGATIVE
EXT SPECIFIC GRAVITY, UA: 1.01
EXT UROBILINOGEN: 0.2
GFR SERPL CREATININE-BSD FRML MDRD: 71 ML/MIN/1.73SQ M
GLUCOSE SERPL-MCNC: 121 MG/DL (ref 65–140)
GLUCOSE UR STRIP-MCNC: NEGATIVE MG/DL
HCT VFR BLD AUTO: 43.8 % (ref 34.8–46.1)
HGB BLD-MCNC: 15.4 G/DL (ref 11.5–15.4)
HGB UR QL STRIP.AUTO: NEGATIVE
IMM GRANULOCYTES # BLD AUTO: 0.01 THOUSAND/UL (ref 0–0.2)
IMM GRANULOCYTES NFR BLD AUTO: 0 % (ref 0–2)
KETONES UR STRIP-MCNC: NEGATIVE MG/DL
LEUKOCYTE ESTERASE UR QL STRIP: ABNORMAL
LIPASE SERPL-CCNC: 168 U/L (ref 73–393)
LYMPHOCYTES # BLD AUTO: 2.73 THOUSANDS/ΜL (ref 0.6–4.47)
LYMPHOCYTES NFR BLD AUTO: 42 % (ref 14–44)
MCH RBC QN AUTO: 32.4 PG (ref 26.8–34.3)
MCHC RBC AUTO-ENTMCNC: 35.2 G/DL (ref 31.4–37.4)
MCV RBC AUTO: 92 FL (ref 82–98)
MONOCYTES # BLD AUTO: 0.64 THOUSAND/ΜL (ref 0.17–1.22)
MONOCYTES NFR BLD AUTO: 10 % (ref 4–12)
NEUTROPHILS # BLD AUTO: 2.85 THOUSANDS/ΜL (ref 1.85–7.62)
NEUTS SEG NFR BLD AUTO: 44 % (ref 43–75)
NITRITE UR QL STRIP: NEGATIVE
NON-SQ EPI CELLS URNS QL MICRO: ABNORMAL /HPF
NRBC BLD AUTO-RTO: 0 /100 WBCS
P AXIS: 34 DEGREES
PH UR STRIP.AUTO: 6.5 [PH]
PLATELET # BLD AUTO: 322 THOUSANDS/UL (ref 149–390)
PMV BLD AUTO: 9.2 FL (ref 8.9–12.7)
POTASSIUM SERPL-SCNC: 3.7 MMOL/L (ref 3.5–5.3)
PR INTERVAL: 154 MS
PROT SERPL-MCNC: 8 G/DL (ref 6.4–8.2)
PROT UR STRIP-MCNC: NEGATIVE MG/DL
QRS AXIS: -64 DEGREES
QRSD INTERVAL: 128 MS
QT INTERVAL: 398 MS
QTC INTERVAL: 467 MS
RBC # BLD AUTO: 4.76 MILLION/UL (ref 3.81–5.12)
RBC #/AREA URNS AUTO: ABNORMAL /HPF
SODIUM SERPL-SCNC: 142 MMOL/L (ref 136–145)
SP GR UR STRIP.AUTO: 1.01 (ref 1–1.03)
T WAVE AXIS: 39 DEGREES
TROPONIN I SERPL-MCNC: <0.02 NG/ML
UROBILINOGEN UR QL STRIP.AUTO: 0.2 E.U./DL
VENTRICULAR RATE: 83 BPM
WBC # BLD AUTO: 6.5 THOUSAND/UL (ref 4.31–10.16)
WBC # BLD EST: NORMAL 10*3/UL
WBC #/AREA URNS AUTO: ABNORMAL /HPF

## 2020-07-27 PROCEDURE — 96360 HYDRATION IV INFUSION INIT: CPT

## 2020-07-27 PROCEDURE — 85025 COMPLETE CBC W/AUTO DIFF WBC: CPT

## 2020-07-27 PROCEDURE — 36415 COLL VENOUS BLD VENIPUNCTURE: CPT

## 2020-07-27 PROCEDURE — 71046 X-RAY EXAM CHEST 2 VIEWS: CPT

## 2020-07-27 PROCEDURE — 80053 COMPREHEN METABOLIC PANEL: CPT

## 2020-07-27 PROCEDURE — 74177 CT ABD & PELVIS W/CONTRAST: CPT

## 2020-07-27 PROCEDURE — 87147 CULTURE TYPE IMMUNOLOGIC: CPT | Performed by: EMERGENCY MEDICINE

## 2020-07-27 PROCEDURE — 81001 URINALYSIS AUTO W/SCOPE: CPT | Performed by: EMERGENCY MEDICINE

## 2020-07-27 PROCEDURE — 99285 EMERGENCY DEPT VISIT HI MDM: CPT | Performed by: EMERGENCY MEDICINE

## 2020-07-27 PROCEDURE — 83690 ASSAY OF LIPASE: CPT

## 2020-07-27 PROCEDURE — 99284 EMERGENCY DEPT VISIT MOD MDM: CPT

## 2020-07-27 PROCEDURE — 87086 URINE CULTURE/COLONY COUNT: CPT | Performed by: EMERGENCY MEDICINE

## 2020-07-27 PROCEDURE — 84484 ASSAY OF TROPONIN QUANT: CPT | Performed by: EMERGENCY MEDICINE

## 2020-07-27 PROCEDURE — 96361 HYDRATE IV INFUSION ADD-ON: CPT

## 2020-07-27 PROCEDURE — 93005 ELECTROCARDIOGRAM TRACING: CPT

## 2020-07-27 PROCEDURE — 93010 ELECTROCARDIOGRAM REPORT: CPT | Performed by: INTERNAL MEDICINE

## 2020-07-27 RX ORDER — DICYCLOMINE HCL 20 MG
20 TABLET ORAL EVERY 6 HOURS PRN
Qty: 20 TABLET | Refills: 0 | Status: SHIPPED | OUTPATIENT
Start: 2020-07-27 | End: 2021-05-10

## 2020-07-27 RX ORDER — ONDANSETRON 4 MG/1
4 TABLET, ORALLY DISINTEGRATING ORAL EVERY 8 HOURS PRN
Qty: 12 TABLET | Refills: 0 | Status: SHIPPED | OUTPATIENT
Start: 2020-07-27 | End: 2020-10-15

## 2020-07-27 RX ADMIN — SODIUM CHLORIDE 1000 ML: 0.9 INJECTION, SOLUTION INTRAVENOUS at 12:51

## 2020-07-27 RX ADMIN — IOHEXOL 100 ML: 350 INJECTION, SOLUTION INTRAVENOUS at 13:53

## 2020-07-27 NOTE — ED PROVIDER NOTES
History  Chief Complaint   Patient presents with    Abdominal Pain     pt states having on and off abdominal pain for years  states today she had bowel incontinence in bed and got concerned  states she canceled her colonoscopy for tomorrow to come here today      78y F here for evaluation of abd pain and diarhea  Pt reports long hx of episodic abd pain and some loose stools, but last night was different  Usually has a "squeezing" pain around the trunk region and states it feels like an "air bubble" is in the MANPREET/substernal region  Symptoms are abrupt in onset, occur at rest or w/ activity and usually resolve spontaneously  Last night had 2 episodes that woke her from sleep assoc w/ some dyspnea and nausea  Nothing makes the pain better or worse  Pain last night radiated into the back last night which is also new  That was different than any of her other episodes  Started w/ watery diarrhea last night and was incontinent of stool x1 (had sudden urge w/ some stool leakage before she got to the bathroom  Throughout the day has continued w/ the watery diarrhea  Denies melena / hematochezia  Follows w/ GI and had an EGD/Colonscopy scheduled for tomorrow - hadn't started her bowel prep yesterday and since the pain was different than usual and assoc w/ watery diarrhea (also different than her usual loose stools) she came in for evaluation  Denies f/c/s, no cough/congestion  Denies cp, no change in activity/excercise tolerance  Hx of stress test many years ago that was negative  +FH of early CAD (father  of MI at 61)  No abx use in the last 1-2m, no recent travel, no contaminated foods  +previous abd surgeries (appy and tubal)  No hx of obstructions/partial obstructions        History provided by:  Patient   used: No    Abdominal Pain   Pain location:  Generalized  Pain quality: squeezing    Pain radiates to:  Back  Pain severity:  Moderate  Onset quality:  Sudden  Timing: Intermittent  Progression:  Waxing and waning  Chronicity:  Recurrent  Context: awakening from sleep and previous surgery    Context: not recent illness, not recent travel, not sick contacts and not suspicious food intake    Relieved by:  Nothing  Worsened by:  Nothing  Ineffective treatments:  None tried  Associated symptoms: anorexia, diarrhea, nausea and shortness of breath    Associated symptoms: no belching, no chest pain, no chills, no constipation, no cough, no dysuria, no fatigue, no fever, no hematemesis, no hematochezia, no melena and no vomiting    Risk factors: no NSAID use and no recent hospitalization        Prior to Admission Medications   Prescriptions Last Dose Informant Patient Reported? Taking? Cetirizine HCl (ZYRTEC ALLERGY) 10 MG CAPS  Self Yes No   Sig: ZyrTEC Allergy 10 MG Oral Tablet TAKE 1 TABLET DAILY AS DIRECTED  Quantity: 30;  Refills: 0    Lynnette Saucedo;  Started 12-May-2014 Active   Cholecalciferol (VITAMIN D-3) 1000 UNITS CAPS  Self Yes No   Sig: Vitamin D3 1000 UNIT Oral Tablet TAKE 1 TABLET DAILY  Quantity: 30;  Refills: 0    CasaeXochitl DO;  Started 25-Sep-2012 Active   Multiple Vitamins-Minerals (CENTRUM ADULTS PO)  Self Yes No   Sig: Centrum Oral Tablet TAKE 1 TABLET DAILY  Refills: 0    CasaeXochitl DO;  Started 25-Sep-2012 Active   Na Sulfate-K Sulfate-Mg Sulf (Suprep Bowel Prep Kit) 17 5-3 13-1 6 GM/177ML SOLN   No No   Sig: Use as directed   amLODIPine (NORVASC) 10 mg tablet   No No   Sig: Take 1 tablet (10 mg total) by mouth daily AmLODIPine Besylate 5 MG Oral Tablet TAKE 1 TABLET DAILY     lisinopril (ZESTRIL) 40 mg tablet   No No   Sig: Take 1 5 tablets (60 mg total) by mouth daily   metFORMIN (GLUCOPHAGE) 1000 MG tablet   No No   Sig: Take 1 tablet (1,000 mg total) by mouth 2 (two) times a day with meals   metoprolol succinate (TOPROL-XL) 100 mg 24 hr tablet   No No   Sig: Take 1 tablet (100 mg total) by mouth daily      Facility-Administered Medications: None       Past Medical History:   Diagnosis Date    Depression     Diabetes mellitus ([address] )     Hypertension     Migraine        Past Surgical History:   Procedure Laterality Date    APPENDECTOMY      CATARACT EXTRACTION, BILATERAL Bilateral 2020    COLONOSCOPY      complete 11/29/11- 5 years / complete 6/9/04 -10years    COLONOSCOPY      Exam in November 2011 revealed small polyp, sigmoid diverticulosis, internal and external hemorrhoids  The polyp was  inflammatory polyp so followup in 10 years recommended   HEMORRHOID SURGERY      KIDNEY STONE SURGERY      TONSILLECTOMY      TUBAL LIGATION      UPPER GASTROINTESTINAL ENDOSCOPY      November 2011 with hiatal hernia irregular Z-line and gastritis  Biopsies show question of intestinal metaplasia verses just reflux inflammation, H pylori was negative, nonspecific duodenitis  Family History   Problem Relation Age of Onset    Hypertension Mother     Thyroid disease Mother     Breast cancer Mother 80    Coronary artery disease Father     Hypertension Father     Other Father         smoker    Diabetes Paternal Grandmother     Endometrial cancer Paternal Grandmother         age unknown    Prostate cancer Maternal Grandfather 76    Breast cancer Cousin         age unknown    No Known Problems Sister     No Known Problems Daughter     No Known Problems Sister     No Known Problems Daughter     No Known Problems Maternal Aunt     No Known Problems Maternal Aunt     Endometrial cancer Cousin         age unknown, maybe 64     I have reviewed and agree with the history as documented      E-Cigarette/Vaping    E-Cigarette Use Never User      E-Cigarette/Vaping Substances    Nicotine No     THC No     CBD No     Flavoring No     Other No     Unknown No      Social History     Tobacco Use    Smoking status: Never Smoker    Smokeless tobacco: Never Used   Substance Use Topics    Alcohol use: Yes     Frequency: Monthly or less     Comment: rarely / social per [employer]    Drug use: No       Review of Systems   Constitutional: Negative for chills, fatigue and fever  Respiratory: Positive for shortness of breath  Negative for cough  Cardiovascular: Negative for chest pain  Gastrointestinal: Positive for abdominal pain, anorexia, diarrhea and nausea  Negative for constipation, hematemesis, hematochezia, melena and vomiting  Genitourinary: Negative for dysuria  All other systems reviewed and are negative  Physical Exam  Physical Exam   Constitutional: She appears well-developed and well-nourished  HENT:   Nose: Nose normal    Mouth/Throat: Oropharynx is clear and moist    Eyes: Conjunctivae are normal    Neck: Neck supple  Cardiovascular: Normal rate and regular rhythm  Pulmonary/Chest: Effort normal and breath sounds normal    Abdominal: Soft  Normal appearance and bowel sounds are normal  There is generalized tenderness (vague)  There is no rigidity, no rebound, no guarding and no CVA tenderness  Musculoskeletal: She exhibits no deformity  Neurological: She is alert  Skin: Skin is warm  Psychiatric: She has a normal mood and affect  Nursing note and vitals reviewed        Vital Signs  ED Triage Vitals   Temperature Pulse Respirations Blood Pressure SpO2   07/27/20 1224 07/27/20 1221 07/27/20 1221 07/27/20 1221 07/27/20 1221   98 7 °F (37 1 °C) (!) 107 19 163/90 93 %      Temp src Heart Rate Source Patient Position - Orthostatic VS BP Location FiO2 (%)   -- 07/27/20 1221 07/27/20 1221 07/27/20 1221 --    Monitor Lying Right arm       Pain Score       07/27/20 1221       9           Vitals:    07/27/20 1315 07/27/20 1330 07/27/20 1415 07/27/20 1500   BP: 154/74 140/66 (!) 180/79    Pulse: 79 75 75 73   Patient Position - Orthostatic VS:             Visual Acuity      ED Medications  Medications   sodium chloride 0 9 % bolus 1,000 mL (0 mL Intravenous Stopped 7/27/20 1545)   iohexol (OMNIPAQUE) 350 MG/ML injection (MULTI-DOSE) 100 mL (100 mL Intravenous Given 7/27/20 1353)       Diagnostic Studies  Results Reviewed     Procedure Component Value Units Date/Time    Urine Microscopic [[id number]]  (Abnormal) Collected:  07/27/20 1556    Lab Status:  Final result Specimen:  Urine, Clean Catch Updated:  07/27/20 1700     RBC, UA 2-4 /hpf      WBC, UA 10-20 /hpf      Epithelial Cells Moderate /hpf      Bacteria, UA Occasional /hpf     Urine culture [[id number]] Collected:  07/27/20 1556    Lab Status:   In process Specimen:  Urine, Clean Catch Updated:  07/27/20 1700    UA w Reflex to Microscopic w Reflex to Culture [[id number]]  (Abnormal) Collected:  07/27/20 1556    Lab Status:  Final result Specimen:  Urine, Clean Catch Updated:  07/27/20 1611     Color, UA Straw     Clarity, UA Slightly Cloudy     Specific [place], UA 1 010     pH, UA 6 5     Leukocytes, UA Large     Nitrite, UA Negative     Protein, UA Negative mg/dl      Glucose, UA Negative mg/dl      Ketones, UA Negative mg/dl      Urobilinogen, UA 0 2 E U /dl      Bilirubin, UA Negative     Blood, UA Negative    POCT urinalysis dipstick [[id number]]  (Normal) Resulted:  07/27/20 1549    Lab Status:  Final result Specimen:  Urine Updated:  07/27/20 1550     Color, UA yellow     Clarity, UA Clear     Glucose, UA (Ref: Negative) Negative     Bilirubin, UA (Ref: Negative) Negative     Ketones, UA (Ref: Negative) Negative     Spec Grav, UA (Ref:1 003-1 030) 1 010     Blood, UA (Ref: Negative) Negative     pH, UA (Ref: 4 5-8 0) 6 0     Protein, UA (Ref: Negative) Negative     Urobilinogen, UA (Ref: 0 2- 1 0) 0 2      Leukocytes, UA (Ref: Negative) Moderate     Nitrite, UA (Ref: Negative) Negative    Troponin I [[id number]]  (Normal) Collected:  07/27/20 1251    Lab Status:  Final result Specimen:  Blood Updated:  07/27/20 1339     Troponin I <0 02 ng/mL     Comprehensive metabolic panel [[id number]] Collected:  07/27/20 1231    Lab Status:  Final result Specimen:  Blood from Arm, Right Updated:  07/27/20 1259     Sodium 142 mmol/L      Potassium 3 7 mmol/L      Chloride 104 mmol/L      CO2 26 mmol/L      ANION GAP 12 mmol/L      BUN 13 mg/dL      Creatinine 0 82 mg/dL      Glucose 121 mg/dL      Calcium 9 4 mg/dL      AST 23 U/L      ALT 43 U/L      Alkaline Phosphatase 75 U/L      Total Protein 8 0 g/dL      Albumin 4 3 g/dL      Total Bilirubin 0 60 mg/dL      eGFR 71 ml/min/1 73sq m     Narrative:       National Kidney Disease Foundation guidelines for Chronic Kidney Disease (CKD):     Stage 1 with normal or high GFR (GFR > 90 mL/min/1 73 square meters)    Stage 2 Mild CKD (GFR = 60-89 mL/min/1 73 square meters)    Stage 3A Moderate CKD (GFR = 45-59 mL/min/1 73 square meters)    Stage 3B Moderate CKD (GFR = 30-44 mL/min/1 73 square meters)    Stage 4 Severe CKD (GFR = 15-29 mL/min/1 73 square meters)    Stage 5 End Stage CKD (GFR <15 mL/min/1 73 square meters)  Note: GFR calculation is accurate only with a steady state creatinine    Lipase [[id number]]  (Normal) Collected:  07/27/20 1231    Lab Status:  Final result Specimen:  Blood from Arm, Right Updated:  07/27/20 1259     Lipase 168 u/L     CBC and differential [[id number]] Collected:  07/27/20 1231    Lab Status:  Final result Specimen:  Blood from Arm, Right Updated:  07/27/20 1241     WBC 6 50 Thousand/uL      RBC 4 76 Million/uL      Hemoglobin 15 4 g/dL      Hematocrit 43 8 %      MCV 92 fL      MCH 32 4 pg      MCHC 35 2 g/dL      RDW 11 9 %      MPV 9 2 fL      Platelets 240 Thousands/uL      nRBC 0 /100 WBCs      Neutrophils Relative 44 %      Immat GRANS % 0 %      Lymphocytes Relative 42 %      Monocytes Relative 10 %      Eosinophils Relative 3 %      Basophils Relative 1 %      Neutrophils Absolute 2 85 Thousands/µL      Immature Grans Absolute 0 01 Thousand/uL      Lymphocytes Absolute 2 73 Thousands/µL      Monocytes Absolute 0 64 Thousand/µL      Eosinophils Absolute 0 20 Thousand/µL      Basophils Absolute 0 07 Thousands/µL                  CT abdomen pelvis with contrast   ED Interpretation by Akiko Cruz DO (07/27 1446)   See below      Final Result by Yuan Bartlett DO (07/27 1436)      Severe right-sided hydronephrosis with a 6 mm calculus within the proximal ureter just below the ureteropelvic junction  Diffuse hepatic steatosis  Small simple cysts are seen within the liver  Workstation performed: [id number]         XR chest 2 views   ED Interpretation by Akiko Cruz DO (07/27 1447)   See below      Final Result by Mirna Bay MD (07/27 1445)      No acute cardiopulmonary disease  Workstation performed: [id number]                    Procedures  ECG 12 Lead Documentation Only  Date/Time: 7/27/2020 12:58 PM  Performed by: Akiko Cruz DO  Authorized by: Akiko Cruz DO     ECG reviewed by me, the ED Provider: yes    Patient location:  ED  Previous ECG:     Previous ECG:  Compared to current    Similarity:  No change  Interpretation:     Interpretation: non-specific    Rate:     ECG rate:  83    ECG rate assessment: normal    Rhythm:     Rhythm: sinus rhythm    QRS:     QRS axis:  Left  Conduction:     Conduction: abnormal    ST segments:     ST segments:  Normal  T waves:     T waves: normal    Other findings:     Other findings: LVH               ED Course  ED Course as of Jul 27 1942   Mon Jul 27, 2020   1505 D/w pt lab/rad  Reports hx of previous kidney stone that required stenting, but that was many years ago  Will await urine sample/evaluation to determine if any evidence of infection  Pt currently pain free and hasn't required any pain medication in the ED      1659 Still awaiting urine micro results d/t large leukocytes noted on UA      1727 Will not treat given afebrile normal white count  Will await culture results  Epithelial Cells(!): Moderate       US AUDIT      Most Recent Value   Initial Alcohol Screen: US AUDIT-C    1   How often do you have a drink containing alcohol?  0 Filed at: 07/27/2020 1221   2  How many drinks containing alcohol do you have on a typical day you are drinking? 0 Filed at: 07/27/2020 1221   3a  Male UNDER 65: How often do you have five or more drinks on one occasion? 0 Filed at: 07/27/2020 1221   3b  FEMALE Any Age, or MALE 65+: How often do you have 4 or more drinks on one occassion? 0 Filed at: 07/27/2020 1221   Audit-C Score  0 Filed at: 07/27/2020 1221                  MARSHALL/DAST-10      Most Recent Value   How many times in the past year have you    Used an illegal drug or used a prescription medication for non-medical reasons? Never Filed at: 07/27/2020 1222                                MDM  Number of Diagnoses or Management Options  Diarrhea: new and requires workup  Hydronephrosis of right kidney: new and requires workup  Renal colic on right side: new and requires workup  Right ureteral stone: new and requires workup  Diagnosis management comments: 78y F w/ watery diarrhea and abd pain that is different from her more vague/chronic abd pain  Afebrile, no sick contacts and a relatively benign exam   ddx includes but not limited to enteritis/colitis/divertic, uti/pyelo/stone    Will ck labs, ua, ct a/p and re-eval       Amount and/or Complexity of Data Reviewed  Clinical lab tests: reviewed and ordered  Tests in the radiology section of CPT®: reviewed and ordered  Tests in the medicine section of CPT®: ordered and reviewed  Independent visualization of images, tracings, or specimens: yes    Patient Progress  Patient progress: improved        Disposition  Final diagnoses:   Right ureteral stone   Hydronephrosis of right kidney   Renal colic on right side   Diarrhea     Time reflects when diagnosis was documented in both MDM as applicable and the Disposition within this note     Time User Action Codes Description Comment    7/27/2020  3:19 PM Carrington Nazario Add [N20 1] Right ureteral stone     7/27/2020  3:19 PM Monique Valadez Add [N13 30] Hydronephrosis of right kidney     7/27/2020  3:19 PM Radha Shauna DANIELS Add [P64] Renal colic on right side     7/27/2020  3:20 PM Monique Valadez Add [R19 7] Diarrhea       ED Disposition     ED Disposition Condition Date/Time Comment    Discharge Stable Mon Jul 27, 2020  5:28 PM Olesyamalini Fountain discharge to home/self care  Follow-up Information     Follow up With Specialties Details Why Contact Info Additional [address] [facility] [place] [address] [facility]rology Schedule an appointment as soon as possible for a visit  for further evaluation and treatment [address] [address] [facility] [id number]  [address]  [facility] Urology [facility] [address], [place], [place], [address]    [facility] [address] [facility] Schedule an appointment as soon as possible for a visit  for further evaluation and treatment [address] [id number]  [facility], [address] [place], [place], [address] [phone]          Discharge Medication List as of 7/27/2020  5:29 PM      START taking these medications    Details   dicyclomine (BENTYL) 20 mg tablet Take 1 tablet (20 mg total) by mouth every 6 (six) hours as needed (diarrhea/crampy abdominal pain), Starting Mon 7/27/2020, Normal      ondansetron (ZOFRAN-ODT) 4 mg disintegrating tablet Take 1 tablet (4 mg total) by mouth every 8 (eight) hours as needed for nausea or vomiting, Starting Mon 7/27/2020, Normal         CONTINUE these medications which have NOT CHANGED    Details   amLODIPine (NORVASC) 10 mg tablet Take 1 tablet (10 mg total) by mouth daily AmLODIPine Besylate 5 MG Oral Tablet TAKE 1 TABLET DAILY  , Starting Thu 7/23/2020, Normal      Cetirizine HCl (ZYRTEC ALLERGY) 10 MG CAPS ZyrTEC Allergy 10 MG Oral Tablet TAKE 1 TABLET DAILY AS DIRECTED  Quantity: 30;  Refills: 0    Lynnette Saucedo;  Started 12-May-2014 Active, Starting Mon 5/12/2014, Historical Med      Cholecalciferol (VITAMIN D-3) 1000 UNITS CAPS Vitamin D3 1000 UNIT Oral Tablet TAKE 1 TABLET DAILY  Quantity: 30;  Refills: 0    Xochitl Tan DO;  Started 25-Sep-2012 Active, Starting Tue 9/25/2012, Historical Med      lisinopril (ZESTRIL) 40 mg tablet Take 1 5 tablets (60 mg total) by mouth daily, Starting Thu 7/23/2020, Normal      metFORMIN (GLUCOPHAGE) 1000 MG tablet Take 1 tablet (1,000 mg total) by mouth 2 (two) times a day with meals, Starting Thu 7/23/2020, Until Sat 8/22/2020, Normal      metoprolol succinate (TOPROL-XL) 100 mg 24 hr tablet Take 1 tablet (100 mg total) by mouth daily, Starting Thu 7/23/2020, Normal      Multiple Vitamins-Minerals (CENTRUM ADULTS PO) Centrum Oral Tablet TAKE 1 TABLET DAILY  Refills: 0    Malika Noriega DO;  Started 25-Sep-2012 Active, Starting Tue 9/25/2012, Historical Med      Na Sulfate-K Sulfate-Mg Sulf (Suprep Bowel Prep Kit) 17 5-3 13-1 6 GM/177ML SOLN Use as directed, Sample           No discharge procedures on file      PDMP Review     None          ED Provider  Electronically Signed by           Wade Collins DO  07/27/20 1942

## 2020-07-27 NOTE — DISCHARGE INSTRUCTIONS
Call [facility] tomorrow to schedule a follow up appointment for further evaluation and treatment of the 6mm kidney stone you have  Return for uncontrolled pain, persistent vomiting, fever more than 100 4 or for any concerns

## 2020-07-28 ENCOUNTER — HOSPITAL ENCOUNTER (OUTPATIENT)
Dept: GASTROENTEROLOGY | Facility: AMBULATORY SURGERY CENTER | Age: 75
Discharge: HOME | End: 2020-07-28

## 2020-07-28 LAB
BACTERIA UR CULT: ABNORMAL
BACTERIA UR CULT: ABNORMAL

## 2020-07-29 ENCOUNTER — VBI (OUTPATIENT)
Dept: FAMILY MEDICINE CLINIC | Facility: HOSPITAL | Age: 75
End: 2020-07-29

## 2020-07-30 ENCOUNTER — TELEPHONE (OUTPATIENT)
Dept: FAMILY MEDICINE CLINIC | Facility: HOSPITAL | Age: 75
End: 2020-07-30

## 2020-07-30 ENCOUNTER — TELEPHONE (OUTPATIENT)
Dept: UROLOGY | Facility: MEDICAL CENTER | Age: 75
End: 2020-07-30

## 2020-07-30 DIAGNOSIS — E04.2 MULTIPLE THYROID NODULES: Primary | ICD-10-CM

## 2020-07-30 NOTE — TELEPHONE ENCOUNTER
Scheduled Manuel Gonzalez with Dr Caren Marcos for 8/5 @ 11:30 directions emailed to patient @ [email or web address] net

## 2020-07-30 NOTE — TELEPHONE ENCOUNTER
Complaints/diagnosis new pt er follow up kidney stone 6 cm   Insurance:blue cross   History of Cancer:  No   Previous Urologist: no   Outside testing/where: no   what kind of test: no   Records requested/where: in [facility]   Preferred Location [facility]

## 2020-07-30 NOTE — TELEPHONE ENCOUNTER
This is a new patient and was in the ER for kidney stone  Please call patient back at [phone] and if she does not answer please call her cell then at [phone]  She prefers to go to [facility] office if possible
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
## 2020-08-05 ENCOUNTER — OFFICE VISIT (OUTPATIENT)
Dept: UROLOGY | Facility: HOSPITAL | Age: 75
End: 2020-08-05
Payer: COMMERCIAL

## 2020-08-05 VITALS
HEIGHT: 63 IN | DIASTOLIC BLOOD PRESSURE: 72 MMHG | BODY MASS INDEX: 30.65 KG/M2 | SYSTOLIC BLOOD PRESSURE: 142 MMHG | TEMPERATURE: 97.1 F | HEART RATE: 95 BPM | WEIGHT: 173 LBS

## 2020-08-05 DIAGNOSIS — N20.0 NEPHROLITHIASIS: Primary | ICD-10-CM

## 2020-08-05 PROCEDURE — 99204 OFFICE O/P NEW MOD 45 MIN: CPT | Performed by: NURSE PRACTITIONER

## 2020-08-05 PROCEDURE — 1036F TOBACCO NON-USER: CPT | Performed by: NURSE PRACTITIONER

## 2020-08-05 PROCEDURE — 1160F RVW MEDS BY RX/DR IN RCRD: CPT | Performed by: NURSE PRACTITIONER

## 2020-08-05 RX ORDER — TAMSULOSIN HYDROCHLORIDE 0.4 MG/1
0.4 CAPSULE ORAL
Qty: 30 CAPSULE | Refills: 0 | Status: SHIPPED | OUTPATIENT
Start: 2020-08-05 | End: 2020-08-30 | Stop reason: SDUPTHER

## 2020-08-05 NOTE — PROGRESS NOTES
8/5/2020    Conner Mcnair  1945  [id number]        Assessment  -Nephrolithiais    Discussion/Plan  [facility] is a 70 y o  female who presents in consultation    1  Ureteral calculus- we reviewed the results of her recent CT scan which identified a 6 mm right UPJ calculus with hydronephrosis  At this time, patient is asymptomatic  She wishes to try medical expulsive therapy  Prescription for tamsulosin 0 4 mg was electronically pharmacy  Reviewed mechanism of action, potential side effects, and administration instructions  Encouraged patient to increase water intake and strain all urine  She will return in 2 weeks with KUB and renal ultrasound  If patient unable to pass stone, we will discuss surgical intervention  Reviewed ER precautions  Patient was instructed to call with any issues  -All questions answered, patients agree with plan     History of Present Illness  70 y o  female who presents in consultation today for evaluation of nephrolithiasis  She recently experienced acute onset mid abdominal pain and diarrhea  Patient had been scheduled to have EGD and colonoscopy the following day  Patient presented to the emergency department on 07/27/2020 for further evaluation  CT abdomen pelvis with contrast was performed and identified a 6 mm right UPJ calculus with hydronephrosis  Creatinine 0 82, WBC 6 50, and urine culture positive for beta-hemolytic strep  She denies any lower urinary tract symptoms, gross hematuria, or dysuria  No additional report of fever, chills, nausea, or vomiting  She denies any episodes of flank pain, but continues to experience mid epigastric discomfort and gas  She reports a prior history of nephrolithiasis over 20 years ago  She underwent ureteroscopy at this time  Patient also states her mother, sister, aunt all have history of kidney stones  Review of Systems  Review of Systems   Constitutional: Negative  HENT: Negative  Respiratory: Negative  Cardiovascular: Negative  Gastrointestinal: Positive for abdominal pain  Genitourinary: Negative for decreased urine volume, difficulty urinating, dysuria, flank pain, frequency, hematuria and urgency  Musculoskeletal: Negative  Skin: Negative  Neurological: Negative  Psychiatric/Behavioral: Negative  Past Medical History  Past Medical History:   Diagnosis Date    Depression     Diabetes mellitus ([address] )     Hypertension     Migraine        Past Social History  Past Surgical History:   Procedure Laterality Date    APPENDECTOMY      CATARACT EXTRACTION, BILATERAL Bilateral 2020    COLONOSCOPY      complete 11/29/11- 5 years / complete 6/9/04 -10years    COLONOSCOPY      Exam in November 2011 revealed small polyp, sigmoid diverticulosis, internal and external hemorrhoids  The polyp was  inflammatory polyp so followup in 10 years recommended   HEMORRHOID SURGERY      KIDNEY STONE SURGERY      TONSILLECTOMY      TUBAL LIGATION      UPPER GASTROINTESTINAL ENDOSCOPY      November 2011 with hiatal hernia irregular Z-line and gastritis  Biopsies show question of intestinal metaplasia verses just reflux inflammation, H pylori was negative, nonspecific duodenitis         Past Family History  Family History   Problem Relation Age of Onset    Hypertension Mother     Thyroid disease Mother     Breast cancer Mother 80    Coronary artery disease Father     Hypertension Father     Other Father         smoker    Diabetes Paternal Grandmother     Endometrial cancer Paternal Grandmother         age unknown    Prostate cancer Maternal Grandfather 76    Breast cancer Cousin         age unknown    No Known Problems Sister     No Known Problems Daughter     No Known Problems Sister     No Known Problems Daughter     No Known Problems Maternal Aunt     No Known Problems Maternal Aunt     Endometrial cancer Cousin         age unknown, maybe 64       Past Social history  Social History Socioeconomic History    Marital status: /Civil Union     Spouse name: Not on file    Number of children: Not on file    Years of education: Not on file    Highest education level: Not on file   Occupational History    Occupation:  full time    Occupation: retired   Social Needs    Financial resource strain: Not on file    Food insecurity     Worry: Not on file     Inability: Not on file   [employer] needs     Medical: Not on file     Non-medical: Not on file   Tobacco Use    Smoking status: Never Smoker    Smokeless tobacco: Never Used   Substance and Sexual Activity    Alcohol use: Yes     Frequency: Monthly or less     Comment: rarely / social per [employer]    Drug use: No    Sexual activity: Not on file   Lifestyle    Physical activity     Days per week: Not on file     Minutes per session: Not on file    Stress: Not on file   Relationships    Social connections     Talks on phone: Not on file     Gets together: Not on file     Attends [employer] service: Not on file     Active member of club or organization: Not on file     Attends meetings of clubs or organizations: Not on file     Relationship status: Not on file    Intimate partner violence     Fear of current or ex partner: Not on file     Emotionally abused: Not on file     Physically abused: Not on file     Forced sexual activity: Not on file   Other Topics Concern    Not on file   Social History Narrative    Not on file       Current Medications  Current Outpatient Medications   Medication Sig Dispense Refill    amLODIPine (NORVASC) 10 mg tablet Take 1 tablet (10 mg total) by mouth daily AmLODIPine Besylate 5 MG Oral Tablet TAKE 1 TABLET DAILY  30 tablet 5    Cetirizine HCl (ZYRTEC ALLERGY) 10 MG CAPS ZyrTEC Allergy 10 MG Oral Tablet TAKE 1 TABLET DAILY AS DIRECTED    Quantity: 30;  Refills: 0    Betina RUDOLPH;  Started 12-May-2014 Active      Cholecalciferol (VITAMIN D-3) 1000 UNITS CAPS Vitamin D3 1000 UNIT Oral Tablet TAKE 1 TABLET DAILY  Quantity: 30;  Refills: 0    Xochitl Tan DO;  Started 25-Sep-2012 Active      dicyclomine (BENTYL) 20 mg tablet Take 1 tablet (20 mg total) by mouth every 6 (six) hours as needed (diarrhea/crampy abdominal pain) 20 tablet 0    lisinopril (ZESTRIL) 40 mg tablet Take 1 5 tablets (60 mg total) by mouth daily 45 tablet 6    metFORMIN (GLUCOPHAGE) 1000 MG tablet Take 1 tablet (1,000 mg total) by mouth 2 (two) times a day with meals 60 tablet 5    metoprolol succinate (TOPROL-XL) 100 mg 24 hr tablet Take 1 tablet (100 mg total) by mouth daily 30 tablet 6    Multiple Vitamins-Minerals (CENTRUM ADULTS PO) Centrum Oral Tablet TAKE 1 TABLET DAILY  Refills: 0    Xochitl Tan DO;  Started 25-Sep-2012 Active      Na Sulfate-K Sulfate-Mg Sulf (Suprep Bowel Prep Kit) 17 5-3 13-1 6 GM/177ML SOLN Use as directed 2 Bottle 0    ondansetron (ZOFRAN-ODT) 4 mg disintegrating tablet Take 1 tablet (4 mg total) by mouth every 8 (eight) hours as needed for nausea or vomiting 12 tablet 0    tamsulosin (FLOMAX) 0 4 mg Take 1 capsule (0 4 mg total) by mouth daily with dinner 30 capsule 0     No current facility-administered medications for this visit  Allergies  Allergies   Allergen Reactions    Acetazolamide Hives    Metronidazole Anaphylaxis    Penicillins Hives, Itching and Rash    Statins     Tramadol     Hydrochlorothiazide Rash    Tetracycline Rash       Past medical history, social history, family history, medications and allergies were reviewed  Vitals  Vitals:    08/05/20 1058   BP: 142/72   BP Location: Left arm   Patient Position: Sitting   Cuff Size: Adult   Pulse: 95   Temp: (!) 97 1 °F (36 2 °C)   Weight: 78 5 kg (173 lb)   Height: 5' 3" (1 6 m)       Physical Exam  Physical Exam   Constitutional: She is oriented to person, place, and time  She appears well-developed  HENT:   Head: Normocephalic  Eyes: Pupils are equal, round, and reactive to light     Neck: Normal range of motion  Pulmonary/Chest: Effort normal    Abdominal: Soft  Normal appearance  Genitourinary:    Genitourinary Comments: Negative CVA tenderness     Musculoskeletal: Normal range of motion  Neurological: She is alert and oriented to person, place, and time  Skin: Skin is warm  Psychiatric: Her behavior is normal  Mood, judgment and thought content normal        Results    I have personally reviewed all pertinent lab results and reviewed with patient  Lab Results   Component Value Date    GLUCOSE 120 (H) 07/28/2017    CALCIUM 9 4 07/27/2020     04/08/2017    K 3 7 07/27/2020    CO2 26 07/27/2020     07/27/2020    BUN 13 07/27/2020    CREATININE 0 82 07/27/2020     Lab Results   Component Value Date    WBC 6 50 07/27/2020    HGB 15 4 07/27/2020    HCT 43 8 07/27/2020    MCV 92 07/27/2020     07/27/2020     No results found for this or any previous visit (from the past 1 hour(s))

## 2020-08-08 ENCOUNTER — HOSPITAL ENCOUNTER (OUTPATIENT)
Dept: ULTRASOUND IMAGING | Facility: HOSPITAL | Age: 75
Discharge: HOME | End: 2020-08-08
Payer: COMMERCIAL

## 2020-08-08 ENCOUNTER — HOSPITAL ENCOUNTER (OUTPATIENT)
Dept: RADIOLOGY | Facility: HOSPITAL | Age: 75
Discharge: HOME | End: 2020-08-08
Payer: COMMERCIAL

## 2020-08-08 DIAGNOSIS — N20.0 NEPHROLITHIASIS: ICD-10-CM

## 2020-08-08 PROCEDURE — 76770 US EXAM ABDO BACK WALL COMP: CPT

## 2020-08-08 PROCEDURE — 74018 RADEX ABDOMEN 1 VIEW: CPT

## 2020-08-12 ENCOUNTER — TELEPHONE (OUTPATIENT)
Dept: UROLOGY | Facility: HOSPITAL | Age: 75
End: 2020-08-12

## 2020-08-12 NOTE — TELEPHONE ENCOUNTER
Called Audra Cordova and notified her of results  She is still hydrating and understands Dr Brinda Ahn will go over more extensively at appointment

## 2020-08-12 NOTE — TELEPHONE ENCOUNTER
Attempted call to patient's home and cell number, but unable to contact  Automated message states patient does not have voicemail set up and unable to leave message  Wanted to discuss findings of recent KUB and renal ultrasound, which identified unchanged right proximal ureteral calculus with moderate hydronephrosis  Patient will need to be scheduled for surgery  Please try calling patient back again  She does have an upcoming appointment with Dr Simona Simons on 8/27/2020, but wanted to discuss sooner

## 2020-08-20 ENCOUNTER — TELEPHONE (OUTPATIENT)
Dept: GASTROENTEROLOGY | Facility: CLINIC | Age: 75
End: 2020-08-20

## 2020-08-20 NOTE — TELEPHONE ENCOUNTER
1st call-spoke w/pt-currently having kidney stone issues-will call to resched when she has that issue under control Thinking end of September  Pt has purple chart

## 2020-08-27 ENCOUNTER — OFFICE VISIT (OUTPATIENT)
Dept: UROLOGY | Facility: HOSPITAL | Age: 75
End: 2020-08-27
Payer: COMMERCIAL

## 2020-08-27 VITALS
WEIGHT: 170 LBS | SYSTOLIC BLOOD PRESSURE: 118 MMHG | TEMPERATURE: 97.9 F | HEART RATE: 80 BPM | DIASTOLIC BLOOD PRESSURE: 82 MMHG | BODY MASS INDEX: 30.12 KG/M2 | HEIGHT: 63 IN

## 2020-08-27 DIAGNOSIS — N20.0 NEPHROLITHIASIS: Primary | ICD-10-CM

## 2020-08-27 LAB
SL AMB  POCT GLUCOSE, UA: ABNORMAL
SL AMB LEUKOCYTE ESTERASE,UA: POSITIVE
SL AMB POCT BILIRUBIN,UA: ABNORMAL
SL AMB POCT BLOOD,UA: 3
SL AMB POCT CLARITY,UA: CLEAR
SL AMB POCT COLOR,UA: YELLOW
SL AMB POCT KETONES,UA: ABNORMAL
SL AMB POCT NITRITE,UA: ABNORMAL
SL AMB POCT PH,UA: 6
SL AMB POCT SPECIFIC GRAVITY,UA: 1.02
SL AMB POCT URINE PROTEIN: ABNORMAL
SL AMB POCT UROBILINOGEN: ABNORMAL

## 2020-08-27 PROCEDURE — 1160F RVW MEDS BY RX/DR IN RCRD: CPT | Performed by: UROLOGY

## 2020-08-27 PROCEDURE — 1036F TOBACCO NON-USER: CPT | Performed by: UROLOGY

## 2020-08-27 PROCEDURE — 3061F NEG MICROALBUMINURIA REV: CPT | Performed by: UROLOGY

## 2020-08-27 PROCEDURE — 3008F BODY MASS INDEX DOCD: CPT | Performed by: UROLOGY

## 2020-08-27 PROCEDURE — 4040F PNEUMOC VAC/ADMIN/RCVD: CPT | Performed by: UROLOGY

## 2020-08-27 PROCEDURE — 2022F DILAT RTA XM EVC RTNOPTHY: CPT | Performed by: UROLOGY

## 2020-08-27 PROCEDURE — 81002 URINALYSIS NONAUTO W/O SCOPE: CPT | Performed by: UROLOGY

## 2020-08-27 PROCEDURE — 99214 OFFICE O/P EST MOD 30 MIN: CPT | Performed by: UROLOGY

## 2020-08-27 PROCEDURE — 3074F SYST BP LT 130 MM HG: CPT | Performed by: UROLOGY

## 2020-08-27 PROCEDURE — 3066F NEPHROPATHY DOC TX: CPT | Performed by: UROLOGY

## 2020-08-27 PROCEDURE — 3079F DIAST BP 80-89 MM HG: CPT | Performed by: UROLOGY

## 2020-08-27 RX ORDER — CIPROFLOXACIN 2 MG/ML
400 INJECTION, SOLUTION INTRAVENOUS ONCE
Status: CANCELLED | OUTPATIENT
Start: 2020-08-27 | End: 2020-08-27

## 2020-08-27 NOTE — ASSESSMENT & PLAN NOTE
I reviewed the ultrasound and KUB with her  Unfortunately the stone has not moved  We discussed that we should proceed with right ureteroscopy, laser lithotripsy, stone extraction, and stent placement  Risks and benefits were discussed and she was consented for the surgery  We will schedule this in the near future

## 2020-08-27 NOTE — PROGRESS NOTES
Assessment/Plan:    Nephrolithiasis  I reviewed the ultrasound and KUB with her  Unfortunately the stone has not moved  We discussed that we should proceed with right ureteroscopy, laser lithotripsy, stone extraction, and stent placement  Risks and benefits were discussed and she was consented for the surgery  We will schedule this in the near future  Diagnoses and all orders for this visit:    Nephrolithiasis  -     POCT urine dip  -     Case request operating room: CYSTOSCOPY URETEROSCOPY WITH LITHOTRIPSY HOLMIUM LASER, RETROGRADE PYELOGRAM AND INSERTION STENT URETERAL; Standing  -     Case request operating room: CYSTOSCOPY URETEROSCOPY WITH LITHOTRIPSY HOLMIUM LASER, RETROGRADE PYELOGRAM AND INSERTION STENT URETERAL    Other orders  -     Diet NPO; Sips with meds; Standing  -     Place sequential compression device; Standing  -     ciprofloxacin (CIPRO) IVPB (premix) 400 mg 200 mL          Total visit time was 25 minutes of which over 50% was spent on counseling  Subjective:     Patient ID: Areli Garcia is a 70 y o  female    21-year-old female presents for follow-up for known right ureteral stone  The patient has been asymptomatic with this  She did get repeat imaging to see if a trial of medical expulsion therapy worked  She is here to discuss the imaging results and has no complaints  The following portions of the patient's history were reviewed and updated as appropriate: allergies, current medications, past family history, past medical history, past social history, past surgical history and problem list     Review of Systems   Constitutional: Negative  HENT: Negative  Eyes: Negative  Respiratory: Negative  Cardiovascular: Negative  Gastrointestinal: Negative  Endocrine: Negative  Genitourinary:        As noted per HPI   Musculoskeletal: Negative  Skin: Negative  Allergic/Immunologic: Negative  Neurological: Negative  Hematological: Negative  Psychiatric/Behavioral: Negative  Urinary Incontinence Screening      Most Recent Value   Urinary Incontinence   Urinary Incontinence? No   Incomplete emptying? No   Urinary frequency? No   Urinary urgency? No   Urinary hesitancy? No   Dysuria (painful difficult urination)? No   Nocturia (waking up to use the bathroom)? No   Straining (having to push to go)? No   Weak stream?  No   Intermittent stream?  No   Post void dribbling? No   Vaginal pressure? No   Vaginal dryness? No          Objective:    Physical Exam  Vitals signs reviewed  Constitutional:       Appearance: She is well-developed  HENT:      Head: Normocephalic and atraumatic  Neck:      Musculoskeletal: Normal range of motion and neck supple  Pulmonary:      Effort: Pulmonary effort is normal    Abdominal:      General: Bowel sounds are normal  There is no distension  Palpations: Abdomen is soft  There is no mass  Tenderness: There is no abdominal tenderness  There is no guarding or rebound  Musculoskeletal: Normal range of motion  Skin:     General: Skin is warm and dry  Neurological:      Mental Status: She is alert and oriented to person, place, and time             Results  No results found for: PSA  Lab Results   Component Value Date    GLUCOSE 120 (H) 07/28/2017    CALCIUM 9 4 07/27/2020     04/08/2017    K 3 7 07/27/2020    CO2 26 07/27/2020     07/27/2020    BUN 13 07/27/2020    CREATININE 0 82 07/27/2020     Lab Results   Component Value Date    WBC 6 50 07/27/2020    HGB 15 4 07/27/2020    HCT 43 8 07/27/2020    MCV 92 07/27/2020     07/27/2020       No results found for this or any previous visit (from the past 1 hour(s)) ]

## 2020-08-30 DIAGNOSIS — N20.0 NEPHROLITHIASIS: ICD-10-CM

## 2020-08-30 RX ORDER — TAMSULOSIN HYDROCHLORIDE 0.4 MG/1
CAPSULE ORAL
Qty: 30 CAPSULE | Refills: 0 | Status: SHIPPED | OUTPATIENT
Start: 2020-08-30 | End: 2020-09-27 | Stop reason: SDUPTHER

## 2020-08-31 ENCOUNTER — TELEPHONE (OUTPATIENT)
Dept: UROLOGY | Facility: AMBULATORY SURGERY CENTER | Age: 75
End: 2020-08-31

## 2020-08-31 NOTE — TELEPHONE ENCOUNTER
Called Hans Taveras back and notified her that Flomax script was sent in yesterday  She said she say it - thank you

## 2020-08-31 NOTE — TELEPHONE ENCOUNTER
Confirmed 10/16 @ OR with Dr Soha Johnston  Instructions and testing ([facility]) reviewed  Paperwork sent  Patient stated she will need refill of flomax due to surgery date if she is to continue to take it  Please call Patient and advise  Thank you!

## 2020-09-02 NOTE — TELEPHONE ENCOUNTER
Patient called and asked if she can have flu shot before her surgery and she also asked if we knew if she will have a copay  Please call patient back at [phone]

## 2020-09-02 NOTE — TELEPHONE ENCOUNTER
Asked nurse about flu shot prior to surgery, recommended a week or so before not day before  Provided Patient billing # for question about copay

## 2020-09-21 NOTE — TELEPHONE ENCOUNTER
Dr Shahana Sanchez has been contacted to reschedule cancelled combo ordered from last ov with no response-please advise   Thank you

## 2020-09-22 ENCOUNTER — APPOINTMENT (OUTPATIENT)
Dept: LAB | Facility: HOSPITAL | Age: 75
End: 2020-09-22
Attending: UROLOGY
Payer: COMMERCIAL

## 2020-09-22 DIAGNOSIS — N39.9 DISEASE OF URINARY TRACT: Primary | ICD-10-CM

## 2020-09-22 PROCEDURE — 87086 URINE CULTURE/COLONY COUNT: CPT

## 2020-09-23 LAB — BACTERIA UR CULT: NORMAL

## 2020-09-26 DIAGNOSIS — N20.0 NEPHROLITHIASIS: ICD-10-CM

## 2020-09-27 RX ORDER — TAMSULOSIN HYDROCHLORIDE 0.4 MG/1
CAPSULE ORAL
Qty: 30 CAPSULE | Refills: 0 | Status: SHIPPED | OUTPATIENT
Start: 2020-09-27 | End: 2020-10-15

## 2020-10-10 ENCOUNTER — HOSPITAL ENCOUNTER (OUTPATIENT)
Dept: ULTRASOUND IMAGING | Facility: HOSPITAL | Age: 75
Discharge: HOME | End: 2020-10-10
Payer: COMMERCIAL

## 2020-10-10 DIAGNOSIS — E04.2 MULTIPLE THYROID NODULES: ICD-10-CM

## 2020-10-10 PROCEDURE — 76536 US EXAM OF HEAD AND NECK: CPT

## 2020-10-11 LAB
EST. AVERAGE GLUCOSE BLD GHB EST-MCNC: 160 MG/DL
GLUCOSE SERPL-MCNC: 156 MG/DL (ref 65–99)
HBA1C MFR BLD: 7.2 % (ref 4.8–5.6)

## 2020-10-11 PROCEDURE — 3051F HG A1C>EQUAL 7.0%<8.0%: CPT | Performed by: INTERNAL MEDICINE

## 2020-10-12 ENCOUNTER — TELEPHONE (OUTPATIENT)
Dept: UROLOGY | Facility: MEDICAL CENTER | Age: 75
End: 2020-10-12

## 2020-10-12 DIAGNOSIS — N20.0 NEPHROLITHIASIS: Primary | ICD-10-CM

## 2020-10-15 ENCOUNTER — OFFICE VISIT (OUTPATIENT)
Dept: FAMILY MEDICINE CLINIC | Facility: HOSPITAL | Age: 75
End: 2020-10-15
Payer: COMMERCIAL

## 2020-10-15 VITALS
HEIGHT: 63 IN | WEIGHT: 167.6 LBS | HEART RATE: 88 BPM | BODY MASS INDEX: 29.7 KG/M2 | TEMPERATURE: 98 F | DIASTOLIC BLOOD PRESSURE: 84 MMHG | SYSTOLIC BLOOD PRESSURE: 142 MMHG

## 2020-10-15 DIAGNOSIS — N20.0 NEPHROLITHIASIS: ICD-10-CM

## 2020-10-15 DIAGNOSIS — E04.2 MULTIPLE THYROID NODULES: ICD-10-CM

## 2020-10-15 DIAGNOSIS — E78.5 DYSLIPIDEMIA: ICD-10-CM

## 2020-10-15 DIAGNOSIS — R80.9 CONTROLLED TYPE 2 DIABETES MELLITUS WITH MICROALBUMINURIA, WITHOUT LONG-TERM CURRENT USE OF INSULIN (HCC): Primary | ICD-10-CM

## 2020-10-15 DIAGNOSIS — I10 ESSENTIAL HYPERTENSION: ICD-10-CM

## 2020-10-15 DIAGNOSIS — R79.89 ELEVATED TSH: ICD-10-CM

## 2020-10-15 DIAGNOSIS — E11.29 CONTROLLED TYPE 2 DIABETES MELLITUS WITH MICROALBUMINURIA, WITHOUT LONG-TERM CURRENT USE OF INSULIN (HCC): Primary | ICD-10-CM

## 2020-10-15 PROCEDURE — 3066F NEPHROPATHY DOC TX: CPT | Performed by: INTERNAL MEDICINE

## 2020-10-15 PROCEDURE — 1036F TOBACCO NON-USER: CPT | Performed by: INTERNAL MEDICINE

## 2020-10-15 PROCEDURE — 1160F RVW MEDS BY RX/DR IN RCRD: CPT | Performed by: INTERNAL MEDICINE

## 2020-10-15 PROCEDURE — 99214 OFFICE O/P EST MOD 30 MIN: CPT | Performed by: INTERNAL MEDICINE

## 2020-10-15 PROCEDURE — 3079F DIAST BP 80-89 MM HG: CPT | Performed by: INTERNAL MEDICINE

## 2020-10-16 ENCOUNTER — APPOINTMENT (EMERGENCY)
Dept: RADIOLOGY | Facility: HOSPITAL | Age: 75
End: 2020-10-16
Payer: COMMERCIAL

## 2020-10-16 ENCOUNTER — APPOINTMENT (EMERGENCY)
Dept: CT IMAGING | Facility: HOSPITAL | Age: 75
End: 2020-10-16
Payer: COMMERCIAL

## 2020-10-16 ENCOUNTER — HOSPITAL ENCOUNTER (EMERGENCY)
Facility: HOSPITAL | Age: 75
Discharge: HOME | End: 2020-10-16
Attending: EMERGENCY MEDICINE | Admitting: EMERGENCY MEDICINE
Payer: COMMERCIAL

## 2020-10-16 VITALS
BODY MASS INDEX: 29.59 KG/M2 | TEMPERATURE: 98 F | HEIGHT: 63 IN | RESPIRATION RATE: 18 BRPM | OXYGEN SATURATION: 98 % | WEIGHT: 167 LBS | HEART RATE: 70 BPM | DIASTOLIC BLOOD PRESSURE: 74 MMHG | SYSTOLIC BLOOD PRESSURE: 145 MMHG

## 2020-10-16 VITALS
HEART RATE: 73 BPM | WEIGHT: 167 LBS | OXYGEN SATURATION: 93 % | DIASTOLIC BLOOD PRESSURE: 76 MMHG | TEMPERATURE: 97.2 F | BODY MASS INDEX: 29.58 KG/M2 | RESPIRATION RATE: 15 BRPM | SYSTOLIC BLOOD PRESSURE: 130 MMHG

## 2020-10-16 DIAGNOSIS — S09.90XA HEAD INJURY: Primary | ICD-10-CM

## 2020-10-16 DIAGNOSIS — K76.0 FATTY LIVER: ICD-10-CM

## 2020-10-16 DIAGNOSIS — N20.0 KIDNEY STONE: ICD-10-CM

## 2020-10-16 DIAGNOSIS — R20.2 PARESTHESIAS: Primary | ICD-10-CM

## 2020-10-16 DIAGNOSIS — S62.102A LEFT WRIST FRACTURE: ICD-10-CM

## 2020-10-16 DIAGNOSIS — E04.1 THYROID NODULE: ICD-10-CM

## 2020-10-16 LAB
ANION GAP SERPL CALCULATED.3IONS-SCNC: 8 MMOL/L (ref 4–13)
APTT PPP: 25 SECONDS (ref 23–37)
ATRIAL RATE: 92 BPM
BASOPHILS # BLD AUTO: 0.06 THOUSANDS/ΜL (ref 0–0.1)
BASOPHILS NFR BLD AUTO: 1 % (ref 0–1)
BUN SERPL-MCNC: 11 MG/DL (ref 5–25)
CALCIUM SERPL-MCNC: 9.2 MG/DL (ref 8.3–10.1)
CHLORIDE SERPL-SCNC: 104 MMOL/L (ref 100–108)
CO2 SERPL-SCNC: 30 MMOL/L (ref 21–32)
CREAT SERPL-MCNC: 0.78 MG/DL (ref 0.6–1.3)
EOSINOPHIL # BLD AUTO: 0.34 THOUSAND/ΜL (ref 0–0.61)
EOSINOPHIL NFR BLD AUTO: 5 % (ref 0–6)
ERYTHROCYTE [DISTWIDTH] IN BLOOD BY AUTOMATED COUNT: 12 % (ref 11.6–15.1)
GFR SERPL CREATININE-BSD FRML MDRD: 76 ML/MIN/1.73SQ M
GLUCOSE SERPL-MCNC: 154 MG/DL (ref 65–140)
HCT VFR BLD AUTO: 44.2 % (ref 34.8–46.1)
HGB BLD-MCNC: 15.5 G/DL (ref 11.5–15.4)
IMM GRANULOCYTES # BLD AUTO: 0.02 THOUSAND/UL (ref 0–0.2)
IMM GRANULOCYTES NFR BLD AUTO: 0 % (ref 0–2)
INR PPP: 0.93 (ref 0.84–1.19)
LYMPHOCYTES # BLD AUTO: 3.22 THOUSANDS/ΜL (ref 0.6–4.47)
LYMPHOCYTES NFR BLD AUTO: 47 % (ref 14–44)
MCH RBC QN AUTO: 31.8 PG (ref 26.8–34.3)
MCHC RBC AUTO-ENTMCNC: 35.1 G/DL (ref 31.4–37.4)
MCV RBC AUTO: 91 FL (ref 82–98)
MONOCYTES # BLD AUTO: 0.47 THOUSAND/ΜL (ref 0.17–1.22)
MONOCYTES NFR BLD AUTO: 7 % (ref 4–12)
NEUTROPHILS # BLD AUTO: 2.68 THOUSANDS/ΜL (ref 1.85–7.62)
NEUTS SEG NFR BLD AUTO: 40 % (ref 43–75)
NRBC BLD AUTO-RTO: 0 /100 WBCS
P AXIS: 45 DEGREES
PLATELET # BLD AUTO: 355 THOUSANDS/UL (ref 149–390)
PMV BLD AUTO: 9.2 FL (ref 8.9–12.7)
POTASSIUM SERPL-SCNC: 4.5 MMOL/L (ref 3.5–5.3)
PR INTERVAL: 150 MS
PROTHROMBIN TIME: 12.5 SECONDS (ref 11.6–14.5)
QRS AXIS: -66 DEGREES
QRSD INTERVAL: 124 MS
QT INTERVAL: 382 MS
QTC INTERVAL: 472 MS
RBC # BLD AUTO: 4.88 MILLION/UL (ref 3.81–5.12)
SODIUM SERPL-SCNC: 142 MMOL/L (ref 136–145)
T WAVE AXIS: 61 DEGREES
TROPONIN I SERPL-MCNC: <0.02 NG/ML
VENTRICULAR RATE: 92 BPM
WBC # BLD AUTO: 6.79 THOUSAND/UL (ref 4.31–10.16)

## 2020-10-16 PROCEDURE — 96374 THER/PROPH/DIAG INJ IV PUSH: CPT

## 2020-10-16 PROCEDURE — 93005 ELECTROCARDIOGRAM TRACING: CPT

## 2020-10-16 PROCEDURE — 99284 EMERGENCY DEPT VISIT MOD MDM: CPT

## 2020-10-16 PROCEDURE — 71045 X-RAY EXAM CHEST 1 VIEW: CPT

## 2020-10-16 PROCEDURE — 96361 HYDRATE IV INFUSION ADD-ON: CPT

## 2020-10-16 PROCEDURE — 36415 COLL VENOUS BLD VENIPUNCTURE: CPT | Performed by: EMERGENCY MEDICINE

## 2020-10-16 PROCEDURE — 70450 CT HEAD/BRAIN W/O DYE: CPT

## 2020-10-16 PROCEDURE — 99285 EMERGENCY DEPT VISIT HI MDM: CPT

## 2020-10-16 PROCEDURE — 84484 ASSAY OF TROPONIN QUANT: CPT | Performed by: EMERGENCY MEDICINE

## 2020-10-16 PROCEDURE — 96375 TX/PRO/DX INJ NEW DRUG ADDON: CPT

## 2020-10-16 PROCEDURE — G1004 CDSM NDSC: HCPCS

## 2020-10-16 PROCEDURE — 72125 CT NECK SPINE W/O DYE: CPT

## 2020-10-16 PROCEDURE — 99284 EMERGENCY DEPT VISIT MOD MDM: CPT | Performed by: EMERGENCY MEDICINE

## 2020-10-16 PROCEDURE — 74177 CT ABD & PELVIS W/CONTRAST: CPT

## 2020-10-16 PROCEDURE — 85610 PROTHROMBIN TIME: CPT | Performed by: EMERGENCY MEDICINE

## 2020-10-16 PROCEDURE — 85025 COMPLETE CBC W/AUTO DIFF WBC: CPT | Performed by: EMERGENCY MEDICINE

## 2020-10-16 PROCEDURE — 93010 ELECTROCARDIOGRAM REPORT: CPT | Performed by: INTERNAL MEDICINE

## 2020-10-16 PROCEDURE — 85730 THROMBOPLASTIN TIME PARTIAL: CPT | Performed by: EMERGENCY MEDICINE

## 2020-10-16 PROCEDURE — 80048 BASIC METABOLIC PNL TOTAL CA: CPT | Performed by: EMERGENCY MEDICINE

## 2020-10-16 PROCEDURE — 99285 EMERGENCY DEPT VISIT HI MDM: CPT | Performed by: EMERGENCY MEDICINE

## 2020-10-16 PROCEDURE — 73110 X-RAY EXAM OF WRIST: CPT

## 2020-10-16 PROCEDURE — 29125 APPL SHORT ARM SPLINT STATIC: CPT | Performed by: EMERGENCY MEDICINE

## 2020-10-16 RX ORDER — METOCLOPRAMIDE HYDROCHLORIDE 5 MG/ML
10 INJECTION INTRAMUSCULAR; INTRAVENOUS ONCE
Status: COMPLETED | OUTPATIENT
Start: 2020-10-16 | End: 2020-10-16

## 2020-10-16 RX ORDER — ASPIRIN 81 MG/1
81 TABLET, CHEWABLE ORAL ONCE
Status: COMPLETED | OUTPATIENT
Start: 2020-10-16 | End: 2020-10-16

## 2020-10-16 RX ORDER — ACETAMINOPHEN 325 MG/1
650 TABLET ORAL ONCE
Status: COMPLETED | OUTPATIENT
Start: 2020-10-16 | End: 2020-10-16

## 2020-10-16 RX ORDER — DIPHENHYDRAMINE HYDROCHLORIDE 50 MG/ML
25 INJECTION INTRAMUSCULAR; INTRAVENOUS ONCE
Status: COMPLETED | OUTPATIENT
Start: 2020-10-16 | End: 2020-10-16

## 2020-10-16 RX ORDER — BUTALBITAL, ACETAMINOPHEN AND CAFFEINE 50; 325; 40 MG/1; MG/1; MG/1
2 TABLET ORAL ONCE
Status: COMPLETED | OUTPATIENT
Start: 2020-10-16 | End: 2020-10-16

## 2020-10-16 RX ADMIN — SODIUM CHLORIDE 1000 ML: 0.9 INJECTION, SOLUTION INTRAVENOUS at 06:37

## 2020-10-16 RX ADMIN — IOHEXOL 100 ML: 350 INJECTION, SOLUTION INTRAVENOUS at 08:19

## 2020-10-16 RX ADMIN — ASPIRIN 81 MG 81 MG: 81 TABLET ORAL at 09:53

## 2020-10-16 RX ADMIN — DIPHENHYDRAMINE HYDROCHLORIDE 25 MG: 50 INJECTION, SOLUTION INTRAMUSCULAR; INTRAVENOUS at 06:39

## 2020-10-16 RX ADMIN — ACETAMINOPHEN 650 MG: 325 TABLET ORAL at 10:22

## 2020-10-16 RX ADMIN — BUTALBITAL, ACETAMINOPHEN, AND CAFFEINE 2 TABLET: 50; 325; 40 TABLET ORAL at 06:39

## 2020-10-16 RX ADMIN — METOCLOPRAMIDE HYDROCHLORIDE 10 MG: 5 INJECTION INTRAMUSCULAR; INTRAVENOUS at 06:39

## 2020-10-21 LAB
CALCIUM OXALATE DIHYDRATE MFR STONE IR: 90 %
COLOR STONE: NORMAL
COM MFR STONE: 10 %
COMMENT-STONE3: NORMAL
COMPOSITION: NORMAL
LABORATORY COMMENT REPORT: NORMAL
PHOTO: NORMAL
SIZE STONE: NORMAL MM
SPEC SOURCE SUBJ: NORMAL
STONE ANALYSIS-IMP: NORMAL
WT STONE: 134 MG

## 2020-10-22 ENCOUNTER — VBI (OUTPATIENT)
Dept: FAMILY MEDICINE CLINIC | Facility: HOSPITAL | Age: 75
End: 2020-10-22

## 2020-10-23 ENCOUNTER — PATIENT OUTREACH (OUTPATIENT)
Dept: CASE MANAGEMENT | Facility: HOSPITAL | Age: 75
End: 2020-10-23

## 2020-10-26 ENCOUNTER — TELEPHONE (OUTPATIENT)
Dept: GASTROENTEROLOGY | Facility: CLINIC | Age: 75
End: 2020-10-26

## 2020-10-26 ENCOUNTER — PATIENT OUTREACH (OUTPATIENT)
Dept: CASE MANAGEMENT | Facility: HOSPITAL | Age: 75
End: 2020-10-26

## 2020-11-10 DIAGNOSIS — R80.9 CONTROLLED TYPE 2 DIABETES MELLITUS WITH MICROALBUMINURIA, WITHOUT LONG-TERM CURRENT USE OF INSULIN (HCC): ICD-10-CM

## 2020-11-10 DIAGNOSIS — I10 ESSENTIAL HYPERTENSION: ICD-10-CM

## 2020-11-10 DIAGNOSIS — E11.29 CONTROLLED TYPE 2 DIABETES MELLITUS WITH MICROALBUMINURIA, WITHOUT LONG-TERM CURRENT USE OF INSULIN (HCC): ICD-10-CM

## 2020-11-10 PROCEDURE — 4010F ACE/ARB THERAPY RXD/TAKEN: CPT | Performed by: INTERNAL MEDICINE

## 2020-11-10 RX ORDER — LISINOPRIL 40 MG/1
60 TABLET ORAL DAILY
Qty: 135 TABLET | Refills: 1 | Status: SHIPPED | OUTPATIENT
Start: 2020-11-10 | End: 2021-01-25 | Stop reason: SDUPTHER

## 2020-11-10 RX ORDER — AMLODIPINE BESYLATE 10 MG/1
10 TABLET ORAL DAILY
Qty: 90 TABLET | Refills: 1 | Status: SHIPPED | OUTPATIENT
Start: 2020-11-10 | End: 2021-01-25 | Stop reason: SDUPTHER

## 2020-11-10 RX ORDER — METOPROLOL SUCCINATE 100 MG/1
100 TABLET, EXTENDED RELEASE ORAL DAILY
Qty: 90 TABLET | Refills: 1 | Status: SHIPPED | OUTPATIENT
Start: 2020-11-10 | End: 2021-01-25 | Stop reason: SDUPTHER

## 2020-11-11 DIAGNOSIS — N20.0 NEPHROLITHIASIS: Primary | ICD-10-CM

## 2020-11-12 RX ORDER — TAMSULOSIN HYDROCHLORIDE 0.4 MG/1
0.4 CAPSULE ORAL
Qty: 30 CAPSULE | Refills: 1 | Status: SHIPPED | OUTPATIENT
Start: 2020-11-12 | End: 2020-12-18 | Stop reason: HOSPADM

## 2020-12-07 ENCOUNTER — LAB (OUTPATIENT)
Dept: LAB | Facility: HOSPITAL | Age: 75
End: 2020-12-07
Attending: UROLOGY
Payer: COMMERCIAL

## 2020-12-07 ENCOUNTER — TRANSCRIBE ORDERS (OUTPATIENT)
Dept: ADMINISTRATIVE | Facility: HOSPITAL | Age: 75
End: 2020-12-07

## 2020-12-07 DIAGNOSIS — N20.0 URIC ACID NEPHROLITHIASIS: ICD-10-CM

## 2020-12-07 DIAGNOSIS — N20.0 URIC ACID NEPHROLITHIASIS: Primary | ICD-10-CM

## 2020-12-07 PROCEDURE — 87147 CULTURE TYPE IMMUNOLOGIC: CPT

## 2020-12-07 PROCEDURE — 87086 URINE CULTURE/COLONY COUNT: CPT

## 2020-12-09 LAB
BACTERIA UR CULT: ABNORMAL
BACTERIA UR CULT: ABNORMAL

## 2020-12-18 ENCOUNTER — HOSPITAL ENCOUNTER (OUTPATIENT)
Facility: HOSPITAL | Age: 75
Setting detail: OUTPATIENT SURGERY
Discharge: HOME | End: 2020-12-18
Attending: UROLOGY | Admitting: UROLOGY
Payer: COMMERCIAL

## 2020-12-18 ENCOUNTER — APPOINTMENT (OUTPATIENT)
Dept: RADIOLOGY | Facility: HOSPITAL | Age: 75
End: 2020-12-18
Payer: COMMERCIAL

## 2020-12-18 ENCOUNTER — ANESTHESIA EVENT (OUTPATIENT)
Dept: PERIOP | Facility: HOSPITAL | Age: 75
End: 2020-12-18
Payer: COMMERCIAL

## 2020-12-18 ENCOUNTER — ANESTHESIA (OUTPATIENT)
Dept: PERIOP | Facility: HOSPITAL | Age: 75
End: 2020-12-18
Payer: COMMERCIAL

## 2020-12-18 VITALS — HEART RATE: 74 BPM

## 2020-12-18 VITALS
OXYGEN SATURATION: 97 % | RESPIRATION RATE: 16 BRPM | HEART RATE: 78 BPM | HEIGHT: 63 IN | SYSTOLIC BLOOD PRESSURE: 134 MMHG | BODY MASS INDEX: 29.13 KG/M2 | TEMPERATURE: 98 F | WEIGHT: 164.4 LBS | DIASTOLIC BLOOD PRESSURE: 61 MMHG

## 2020-12-18 DIAGNOSIS — N20.0 NEPHROLITHIASIS: Primary | ICD-10-CM

## 2020-12-18 LAB — GLUCOSE SERPL-MCNC: 165 MG/DL (ref 65–140)

## 2020-12-18 PROCEDURE — 52332 CYSTOSCOPY AND TREATMENT: CPT | Performed by: UROLOGY

## 2020-12-18 PROCEDURE — 74420 UROGRAPHY RTRGR +-KUB: CPT

## 2020-12-18 PROCEDURE — C2617 STENT, NON-COR, TEM W/O DEL: HCPCS | Performed by: UROLOGY

## 2020-12-18 PROCEDURE — NC001 PR NO CHARGE: Performed by: UROLOGY

## 2020-12-18 PROCEDURE — 82948 REAGENT STRIP/BLOOD GLUCOSE: CPT

## 2020-12-18 PROCEDURE — C1769 GUIDE WIRE: HCPCS | Performed by: UROLOGY

## 2020-12-18 PROCEDURE — 52351 CYSTOURETERO & OR PYELOSCOPE: CPT | Performed by: UROLOGY

## 2020-12-18 DEVICE — INLAY OPTIMA URETERAL STENT W/O GUIDEWIRE
Type: IMPLANTABLE DEVICE | Site: URETER | Status: FUNCTIONAL
Brand: BARD® INLAY OPTIMA® URETERAL STENT

## 2020-12-18 RX ORDER — MIDAZOLAM HYDROCHLORIDE 2 MG/2ML
INJECTION, SOLUTION INTRAMUSCULAR; INTRAVENOUS AS NEEDED
Status: DISCONTINUED | OUTPATIENT
Start: 2020-12-18 | End: 2020-12-18

## 2020-12-18 RX ORDER — ONDANSETRON 2 MG/ML
4 INJECTION INTRAMUSCULAR; INTRAVENOUS ONCE AS NEEDED
Status: DISCONTINUED | OUTPATIENT
Start: 2020-12-18 | End: 2020-12-18 | Stop reason: HOSPADM

## 2020-12-18 RX ORDER — LABETALOL 20 MG/4 ML (5 MG/ML) INTRAVENOUS SYRINGE
5
Status: DISCONTINUED | OUTPATIENT
Start: 2020-12-18 | End: 2020-12-18 | Stop reason: HOSPADM

## 2020-12-18 RX ORDER — CIPROFLOXACIN 2 MG/ML
400 INJECTION, SOLUTION INTRAVENOUS ONCE
Status: COMPLETED | OUTPATIENT
Start: 2020-12-18 | End: 2020-12-18

## 2020-12-18 RX ORDER — METOCLOPRAMIDE HYDROCHLORIDE 5 MG/ML
10 INJECTION INTRAMUSCULAR; INTRAVENOUS ONCE AS NEEDED
Status: DISCONTINUED | OUTPATIENT
Start: 2020-12-18 | End: 2020-12-18 | Stop reason: HOSPADM

## 2020-12-18 RX ORDER — FENTANYL CITRATE 50 UG/ML
INJECTION, SOLUTION INTRAMUSCULAR; INTRAVENOUS AS NEEDED
Status: DISCONTINUED | OUTPATIENT
Start: 2020-12-18 | End: 2020-12-18

## 2020-12-18 RX ORDER — SODIUM CHLORIDE, SODIUM LACTATE, POTASSIUM CHLORIDE, CALCIUM CHLORIDE 600; 310; 30; 20 MG/100ML; MG/100ML; MG/100ML; MG/100ML
INJECTION, SOLUTION INTRAVENOUS CONTINUOUS PRN
Status: DISCONTINUED | OUTPATIENT
Start: 2020-12-18 | End: 2020-12-18

## 2020-12-18 RX ORDER — HYDROMORPHONE HCL/PF 1 MG/ML
0.5 SYRINGE (ML) INJECTION
Status: DISCONTINUED | OUTPATIENT
Start: 2020-12-18 | End: 2020-12-18 | Stop reason: HOSPADM

## 2020-12-18 RX ORDER — MAGNESIUM HYDROXIDE 1200 MG/15ML
LIQUID ORAL AS NEEDED
Status: DISCONTINUED | OUTPATIENT
Start: 2020-12-18 | End: 2020-12-18 | Stop reason: HOSPADM

## 2020-12-18 RX ORDER — CIPROFLOXACIN 500 MG/1
500 TABLET, FILM COATED ORAL 2 TIMES DAILY
Qty: 6 TABLET | Refills: 0 | Status: SHIPPED | OUTPATIENT
Start: 2020-12-18 | End: 2020-12-21

## 2020-12-18 RX ORDER — PROPOFOL 10 MG/ML
INJECTION, EMULSION INTRAVENOUS AS NEEDED
Status: DISCONTINUED | OUTPATIENT
Start: 2020-12-18 | End: 2020-12-18

## 2020-12-18 RX ORDER — FENTANYL CITRATE/PF 50 MCG/ML
25 SYRINGE (ML) INJECTION
Status: DISCONTINUED | OUTPATIENT
Start: 2020-12-18 | End: 2020-12-18 | Stop reason: HOSPADM

## 2020-12-18 RX ORDER — HYDRALAZINE HYDROCHLORIDE 20 MG/ML
5 INJECTION INTRAMUSCULAR; INTRAVENOUS
Status: DISCONTINUED | OUTPATIENT
Start: 2020-12-18 | End: 2020-12-18 | Stop reason: HOSPADM

## 2020-12-18 RX ORDER — HYDROMORPHONE HCL/PF 1 MG/ML
0.2 SYRINGE (ML) INJECTION
Status: DISCONTINUED | OUTPATIENT
Start: 2020-12-18 | End: 2020-12-18 | Stop reason: HOSPADM

## 2020-12-18 RX ORDER — HYDROCODONE BITARTRATE AND ACETAMINOPHEN 5; 325 MG/1; MG/1
1 TABLET ORAL EVERY 4 HOURS PRN
Qty: 20 TABLET | Refills: 0 | Status: SHIPPED | OUTPATIENT
Start: 2020-12-18 | End: 2020-12-28

## 2020-12-18 RX ORDER — OXYBUTYNIN CHLORIDE 5 MG/1
5 TABLET ORAL 3 TIMES DAILY PRN
Qty: 15 TABLET | Refills: 0 | Status: SHIPPED | OUTPATIENT
Start: 2020-12-18 | End: 2021-01-25

## 2020-12-18 RX ORDER — ONDANSETRON 2 MG/ML
INJECTION INTRAMUSCULAR; INTRAVENOUS AS NEEDED
Status: DISCONTINUED | OUTPATIENT
Start: 2020-12-18 | End: 2020-12-18

## 2020-12-18 RX ORDER — MEPERIDINE HYDROCHLORIDE 25 MG/ML
12.5 INJECTION INTRAMUSCULAR; INTRAVENOUS; SUBCUTANEOUS
Status: DISCONTINUED | OUTPATIENT
Start: 2020-12-18 | End: 2020-12-18 | Stop reason: HOSPADM

## 2020-12-18 RX ORDER — HYDROCODONE BITARTRATE AND ACETAMINOPHEN 5; 325 MG/1; MG/1
1 TABLET ORAL EVERY 4 HOURS PRN
Status: DISCONTINUED | OUTPATIENT
Start: 2020-12-18 | End: 2020-12-18 | Stop reason: HOSPADM

## 2020-12-18 RX ADMIN — FENTANYL CITRATE 50 MCG: 50 INJECTION, SOLUTION INTRAMUSCULAR; INTRAVENOUS at 08:24

## 2020-12-18 RX ADMIN — ONDANSETRON 4 MG: 2 INJECTION INTRAMUSCULAR; INTRAVENOUS at 08:56

## 2020-12-18 RX ADMIN — CIPROFLOXACIN: 2 INJECTION, SOLUTION INTRAVENOUS at 08:33

## 2020-12-18 RX ADMIN — PROPOFOL 100 MG: 10 INJECTION, EMULSION INTRAVENOUS at 08:29

## 2020-12-18 RX ADMIN — SODIUM CHLORIDE, SODIUM LACTATE, POTASSIUM CHLORIDE, AND CALCIUM CHLORIDE: .6; .31; .03; .02 INJECTION, SOLUTION INTRAVENOUS at 07:32

## 2020-12-18 RX ADMIN — FENTANYL CITRATE 50 MCG: 50 INJECTION, SOLUTION INTRAMUSCULAR; INTRAVENOUS at 08:50

## 2020-12-18 RX ADMIN — MIDAZOLAM 2 MG: 1 INJECTION INTRAMUSCULAR; INTRAVENOUS at 08:24

## 2020-12-21 ENCOUNTER — TELEPHONE (OUTPATIENT)
Dept: UROLOGY | Facility: MEDICAL CENTER | Age: 75
End: 2020-12-21

## 2020-12-21 DIAGNOSIS — N20.0 NEPHROLITHIASIS: Primary | ICD-10-CM

## 2020-12-23 ENCOUNTER — CLINICAL SUPPORT (OUTPATIENT)
Dept: UROLOGY | Facility: HOSPITAL | Age: 75
End: 2020-12-23
Payer: COMMERCIAL

## 2020-12-23 VITALS
SYSTOLIC BLOOD PRESSURE: 120 MMHG | BODY MASS INDEX: 30.55 KG/M2 | HEIGHT: 62 IN | DIASTOLIC BLOOD PRESSURE: 60 MMHG | WEIGHT: 166 LBS | HEART RATE: 80 BPM

## 2020-12-23 DIAGNOSIS — N20.0 NEPHROLITHIASIS: ICD-10-CM

## 2020-12-23 PROCEDURE — 99211 OFF/OP EST MAY X REQ PHY/QHP: CPT

## 2020-12-23 PROCEDURE — 1036F TOBACCO NON-USER: CPT

## 2020-12-23 PROCEDURE — 3008F BODY MASS INDEX DOCD: CPT

## 2021-01-16 LAB
ALBUMIN SERPL-MCNC: 4.6 G/DL (ref 3.7–4.7)
ALBUMIN/CREAT UR: 31 MG/G CREAT (ref 0–29)
ALBUMIN/GLOB SERPL: 1.8 {RATIO} (ref 1.2–2.2)
ALP SERPL-CCNC: 91 IU/L (ref 39–117)
ALT SERPL-CCNC: 28 IU/L (ref 0–32)
AST SERPL-CCNC: 23 IU/L (ref 0–40)
BASOPHILS # BLD AUTO: 0.1 X10E3/UL (ref 0–0.2)
BASOPHILS NFR BLD AUTO: 1 %
BILIRUB SERPL-MCNC: 0.4 MG/DL (ref 0–1.2)
BUN SERPL-MCNC: 15 MG/DL (ref 8–27)
BUN/CREAT SERPL: 19 (ref 12–28)
CALCIUM SERPL-MCNC: 10 MG/DL (ref 8.7–10.3)
CHLORIDE SERPL-SCNC: 97 MMOL/L (ref 96–106)
CHOLEST SERPL-MCNC: 222 MG/DL (ref 100–199)
CHOLEST/HDLC SERPL: 6 RATIO (ref 0–4.4)
CO2 SERPL-SCNC: 25 MMOL/L (ref 20–29)
CREAT SERPL-MCNC: 0.81 MG/DL (ref 0.57–1)
CREAT UR-MCNC: 30.6 MG/DL
EOSINOPHIL # BLD AUTO: 0.3 X10E3/UL (ref 0–0.4)
EOSINOPHIL NFR BLD AUTO: 4 %
ERYTHROCYTE [DISTWIDTH] IN BLOOD BY AUTOMATED COUNT: 12.2 % (ref 11.7–15.4)
EST. AVERAGE GLUCOSE BLD GHB EST-MCNC: 163 MG/DL
GLOBULIN SER-MCNC: 2.5 G/DL (ref 1.5–4.5)
GLUCOSE SERPL-MCNC: 157 MG/DL (ref 65–99)
HBA1C MFR BLD: 7.3 % (ref 4.8–5.6)
HCT VFR BLD AUTO: 43.9 % (ref 34–46.6)
HDLC SERPL-MCNC: 37 MG/DL
HGB BLD-MCNC: 15.1 G/DL (ref 11.1–15.9)
IMM GRANULOCYTES # BLD: 0 X10E3/UL (ref 0–0.1)
IMM GRANULOCYTES NFR BLD: 0 %
LDLC SERPL CALC-MCNC: 125 MG/DL (ref 0–99)
LDLC SERPL DIRECT ASSAY-MCNC: 135 MG/DL (ref 0–99)
LYMPHOCYTES # BLD AUTO: 3.2 X10E3/UL (ref 0.7–3.1)
LYMPHOCYTES NFR BLD AUTO: 44 %
MCH RBC QN AUTO: 31.4 PG (ref 26.6–33)
MCHC RBC AUTO-ENTMCNC: 34.4 G/DL (ref 31.5–35.7)
MCV RBC AUTO: 91 FL (ref 79–97)
MICROALBUMIN UR-MCNC: 9.6 UG/ML
MONOCYTES # BLD AUTO: 0.6 X10E3/UL (ref 0.1–0.9)
MONOCYTES NFR BLD AUTO: 8 %
NEUTROPHILS # BLD AUTO: 3 X10E3/UL (ref 1.4–7)
NEUTROPHILS NFR BLD AUTO: 43 %
PLATELET # BLD AUTO: 354 X10E3/UL (ref 150–450)
POTASSIUM SERPL-SCNC: 4 MMOL/L (ref 3.5–5.2)
PROT SERPL-MCNC: 7.1 G/DL (ref 6–8.5)
RBC # BLD AUTO: 4.81 X10E6/UL (ref 3.77–5.28)
SL AMB EGFR AFRICAN AMERICAN: 83 ML/MIN/1.73
SL AMB EGFR NON AFRICAN AMERICAN: 72 ML/MIN/1.73
SL AMB T4, FREE (DIRECT): 1.06 NG/DL (ref 0.82–1.77)
SL AMB VLDL CHOLESTEROL CALC: 60 MG/DL (ref 5–40)
SODIUM SERPL-SCNC: 140 MMOL/L (ref 134–144)
TRIGL SERPL-MCNC: 337 MG/DL (ref 0–149)
TSH SERPL DL<=0.005 MIU/L-ACNC: 8.75 UIU/ML (ref 0.45–4.5)
WBC # BLD AUTO: 7.1 X10E3/UL (ref 3.4–10.8)

## 2021-01-16 PROCEDURE — 3051F HG A1C>EQUAL 7.0%<8.0%: CPT | Performed by: INTERNAL MEDICINE

## 2021-01-16 PROCEDURE — 3060F POS MICROALBUMINURIA REV: CPT | Performed by: INTERNAL MEDICINE

## 2021-01-25 ENCOUNTER — OFFICE VISIT (OUTPATIENT)
Dept: FAMILY MEDICINE CLINIC | Facility: HOSPITAL | Age: 76
End: 2021-01-25
Payer: COMMERCIAL

## 2021-01-25 VITALS
HEIGHT: 63 IN | BODY MASS INDEX: 29.98 KG/M2 | DIASTOLIC BLOOD PRESSURE: 82 MMHG | SYSTOLIC BLOOD PRESSURE: 140 MMHG | WEIGHT: 169.2 LBS | HEART RATE: 104 BPM | TEMPERATURE: 96.1 F

## 2021-01-25 DIAGNOSIS — R80.9 CONTROLLED TYPE 2 DIABETES MELLITUS WITH MICROALBUMINURIA, WITHOUT LONG-TERM CURRENT USE OF INSULIN (HCC): Primary | ICD-10-CM

## 2021-01-25 DIAGNOSIS — E78.5 DYSLIPIDEMIA: ICD-10-CM

## 2021-01-25 DIAGNOSIS — R79.89 ELEVATED TSH: ICD-10-CM

## 2021-01-25 DIAGNOSIS — Z00.00 MEDICARE ANNUAL WELLNESS VISIT, SUBSEQUENT: ICD-10-CM

## 2021-01-25 DIAGNOSIS — I10 ESSENTIAL HYPERTENSION: ICD-10-CM

## 2021-01-25 DIAGNOSIS — E11.29 CONTROLLED TYPE 2 DIABETES MELLITUS WITH MICROALBUMINURIA, WITHOUT LONG-TERM CURRENT USE OF INSULIN (HCC): Primary | ICD-10-CM

## 2021-01-25 PROCEDURE — G0439 PPPS, SUBSEQ VISIT: HCPCS | Performed by: INTERNAL MEDICINE

## 2021-01-25 PROCEDURE — 1036F TOBACCO NON-USER: CPT | Performed by: INTERNAL MEDICINE

## 2021-01-25 PROCEDURE — 3008F BODY MASS INDEX DOCD: CPT | Performed by: INTERNAL MEDICINE

## 2021-01-25 PROCEDURE — 3079F DIAST BP 80-89 MM HG: CPT | Performed by: INTERNAL MEDICINE

## 2021-01-25 PROCEDURE — 1160F RVW MEDS BY RX/DR IN RCRD: CPT | Performed by: INTERNAL MEDICINE

## 2021-01-25 PROCEDURE — 3077F SYST BP >= 140 MM HG: CPT | Performed by: INTERNAL MEDICINE

## 2021-01-25 PROCEDURE — 1100F PTFALLS ASSESS-DOCD GE2>/YR: CPT | Performed by: INTERNAL MEDICINE

## 2021-01-25 PROCEDURE — 1125F AMNT PAIN NOTED PAIN PRSNT: CPT | Performed by: INTERNAL MEDICINE

## 2021-01-25 PROCEDURE — 1170F FXNL STATUS ASSESSED: CPT | Performed by: INTERNAL MEDICINE

## 2021-01-25 PROCEDURE — 3725F SCREEN DEPRESSION PERFORMED: CPT | Performed by: INTERNAL MEDICINE

## 2021-01-25 PROCEDURE — 99214 OFFICE O/P EST MOD 30 MIN: CPT | Performed by: INTERNAL MEDICINE

## 2021-01-25 PROCEDURE — 3288F FALL RISK ASSESSMENT DOCD: CPT | Performed by: INTERNAL MEDICINE

## 2021-01-25 PROCEDURE — 4010F ACE/ARB THERAPY RXD/TAKEN: CPT | Performed by: INTERNAL MEDICINE

## 2021-01-25 PROCEDURE — 3066F NEPHROPATHY DOC TX: CPT | Performed by: INTERNAL MEDICINE

## 2021-01-25 RX ORDER — LISINOPRIL 40 MG/1
60 TABLET ORAL DAILY
Qty: 135 TABLET | Refills: 1 | Status: SHIPPED | OUTPATIENT
Start: 2021-01-25 | End: 2021-07-16

## 2021-01-25 RX ORDER — AMLODIPINE BESYLATE 10 MG/1
10 TABLET ORAL DAILY
Qty: 90 TABLET | Refills: 1 | Status: SHIPPED | OUTPATIENT
Start: 2021-01-25 | End: 2021-12-25

## 2021-01-25 RX ORDER — METOPROLOL SUCCINATE 100 MG/1
100 TABLET, EXTENDED RELEASE ORAL DAILY
Qty: 90 TABLET | Refills: 1 | Status: SHIPPED | OUTPATIENT
Start: 2021-01-25 | End: 2021-07-16

## 2021-01-25 NOTE — PATIENT INSTRUCTIONS
Medicare Preventive Visit Patient Instructions  Thank you for completing your Welcome to Medicare Visit or Medicare Annual Wellness Visit today  Your next wellness visit will be due in one year (1/25/2022)  The screening/preventive services that you may require over the next 5-10 years are detailed below  Some tests may not apply to you based off risk factors and/or age  Screening tests ordered at today's visit but not completed yet may show as past due  Also, please note that scanned in results may not display below  Preventive Screenings:  Service Recommendations Previous Testing/Comments   Colorectal Cancer Screening  * Colonoscopy    * Fecal Occult Blood Test (FOBT)/Fecal Immunochemical Test (FIT)  * Fecal DNA/Cologuard Test  * Flexible Sigmoidoscopy Age: 54-65 years old   Colonoscopy: every 10 years (may be performed more frequently if at higher risk)  OR  FOBT/FIT: every 1 year  OR  Cologuard: every 3 years  OR  Sigmoidoscopy: every 5 years  Screening may be recommended earlier than age 48 if at higher risk for colorectal cancer  Also, an individualized decision between you and your healthcare provider will decide whether screening between the ages of 74-80 would be appropriate  Colonoscopy: 12/23/2016  FOBT/FIT: Not on file  Cologuard: Not on file  Sigmoidoscopy: Not on file    Screening Current     Breast Cancer Screening Age: 36 years old  Frequency: every 1-2 years  Not required if history of left and right mastectomy Mammogram: 06/15/2020    Screening Current   Cervical Cancer Screening Between the ages of 21-29, pap smear recommended once every 3 years  Between the ages of 33-67, can perform pap smear with HPV co-testing every 5 years     Recommendations may differ for women with a history of total hysterectomy, cervical cancer, or abnormal pap smears in past  Pap Smear: Not on file    Screening Not Indicated   Hepatitis C Screening Once for adults born between 1945 and 1965  More frequently in patients at high risk for Hepatitis C Hep C Antibody: Not on file       Diabetes Screening 1-2 times per year if you're at risk for diabetes or have pre-diabetes Fasting glucose: 128 mg/dL   A1C: 7 3 %    Screening Not Indicated  History Diabetes   Cholesterol Screening Once every 5 years if you don't have a lipid disorder  May order more often based on risk factors  Lipid panel: 01/15/2021    Screening Current     Other Preventive Screenings Covered by Medicare:  1  Abdominal Aortic Aneurysm (AAA) Screening: covered once if your at risk  You're considered to be at risk if you have a family history of AAA  2  Lung Cancer Screening: covers low dose CT scan once per year if you meet all of the following conditions: (1) Age 50-69; (2) No signs or symptoms of lung cancer; (3) Current smoker or have quit smoking within the last 15 years; (4) You have a tobacco smoking history of at least 30 pack years (packs per day multiplied by number of years you smoked); (5) You get a written order from a healthcare provider  3  Glaucoma Screening: covered annually if you're considered high risk: (1) You have diabetes OR (2) Family history of glaucoma OR (3)  aged 48 and older OR (3)  American aged 72 and older  3  Osteoporosis Screening: covered every 2 years if you meet one of the following conditions: (1) You're estrogen deficient and at risk for osteoporosis based off medical history and other findings; (2) Have a vertebral abnormality; (3) On glucocorticoid therapy for more than 3 months; (4) Have primary hyperparathyroidism; (5) On osteoporosis medications and need to assess response to drug therapy  · Last bone density test (DXA Scan): 04/15/2019   5  HIV Screening: covered annually if you're between the age of 15-65  Also covered annually if you are younger than 13 and older than 72 with risk factors for HIV infection   For pregnant patients, it is covered up to 3 times per pregnancy  Immunizations:  Immunization Recommendations   Influenza Vaccine Annual influenza vaccination during flu season is recommended for all persons aged >= 6 months who do not have contraindications   Pneumococcal Vaccine (Prevnar and Pneumovax)  * Prevnar = PCV13  * Pneumovax = PPSV23   Adults 25-60 years old: 1-3 doses may be recommended based on certain risk factors  Adults 72 years old: Prevnar (PCV13) vaccine recommended followed by Pneumovax (PPSV23) vaccine  If already received PPSV23 since turning 65, then PCV13 recommended at least one year after PPSV23 dose  Hepatitis B Vaccine 3 dose series if at intermediate or high risk (ex: diabetes, end stage renal disease, liver disease)   Tetanus (Td) Vaccine - COST NOT COVERED BY MEDICARE PART B Following completion of primary series, a booster dose should be given every 10 years to maintain immunity against tetanus  Td may also be given as tetanus wound prophylaxis  Tdap Vaccine - COST NOT COVERED BY MEDICARE PART B Recommended at least once for all adults  For pregnant patients, recommended with each pregnancy  Shingles Vaccine (Shingrix) - COST NOT COVERED BY MEDICARE PART B  2 shot series recommended in those aged 48 and above     Health Maintenance Due:      Topic Date Due    Hepatitis C Screening  1945    DXA SCAN  04/15/2021    MAMMOGRAM  06/15/2021    Colorectal Cancer Screening  12/23/2026     Immunizations Due:      Topic Date Due    DTaP,Tdap,and Td Vaccines (1 - Tdap) 06/23/1968     Advance Directives   What are advance directives? Advance directives are legal documents that state your wishes and plans for medical care  These plans are made ahead of time in case you lose your ability to make decisions for yourself  Advance directives can apply to any medical decision, such as the treatments you want, and if you want to donate organs  What are the types of advance directives?   There are many types of advance directives, and each state has rules about how to use them  You may choose a combination of any of the following:  · Living will: This is a written record of the treatment you want  You can also choose which treatments you do not want, which to limit, and which to stop at a certain time  This includes surgery, medicine, IV fluid, and tube feedings  · Durable power of  for healthcare [place] [facility]): This is a written record that states who you want to make healthcare choices for you when you are unable to make them for yourself  This person, called a proxy, is usually a family member or a friend  You may choose more than 1 proxy  · Do not resuscitate (DNR) order:  A DNR order is used in case your heart stops beating or you stop breathing  It is a request not to have certain forms of treatment, such as CPR  A DNR order may be included in other types of advance directives  · Medical directive: This covers the care that you want if you are in a coma, near death, or unable to make decisions for yourself  You can list the treatments you want for each condition  Treatment may include pain medicine, surgery, blood transfusions, dialysis, IV or tube feedings, and a ventilator (breathing machine)  · Values history: This document has questions about your views, beliefs, and how you feel and think about life  This information can help others choose the care that you would choose  Why are advance directives important? An advance directive helps you control your care  Although spoken wishes may be used, it is better to have your wishes written down  Spoken wishes can be misunderstood, or not followed  Treatments may be given even if you do not want them  An advance directive may make it easier for your family to make difficult choices about your care  Fall Prevention    Fall prevention  includes ways to make your home and other areas safer  It also includes ways you can move more carefully to prevent a fall   Health conditions that cause changes in your blood pressure, vision, or muscle strength and coordination may increase your risk for falls  Medicines may also increase your risk for falls if they make you dizzy, weak, or sleepy  Fall prevention tips:   · Stand or sit up slowly  · Use assistive devices as directed  · Wear shoes that fit well and have soles that   · Wear a personal alarm  · Stay active  · Manage your medical conditions  Home Safety Tips:  · Add items to prevent falls in the bathroom  · Keep paths clear  · Install bright lights in your home  · Keep items you use often on shelves within reach  · Paint or place reflective tape on the edges of your stairs  Weight Management   Why it is important to manage your weight:  Being overweight increases your risk of health conditions such as heart disease, high blood pressure, type 2 diabetes, and certain types of cancer  It can also increase your risk for osteoarthritis, sleep apnea, and other respiratory problems  Aim for a slow, steady weight loss  Even a small amount of weight loss can lower your risk of health problems  How to lose weight safely:  A safe and healthy way to lose weight is to eat fewer calories and get regular exercise  You can lose up about 1 pound a week by decreasing the number of calories you eat by 500 calories each day  Healthy meal plan for weight management:  A healthy meal plan includes a variety of foods, contains fewer calories, and helps you stay healthy  A healthy meal plan includes the following:  · Eat whole-grain foods more often  A healthy meal plan should contain fiber  Fiber is the part of grains, fruits, and vegetables that is not broken down by your body  Whole-grain foods are healthy and provide extra fiber in your diet  Some examples of whole-grain foods are whole-wheat breads and pastas, oatmeal, brown rice, and bulgur  · Eat a variety of vegetables every day    Include dark, leafy greens such as spinach, kale, humberto greens, and mustard greens  Eat yellow and orange vegetables such as carrots, sweet potatoes, and winter squash  · Eat a variety of fruits every day  Choose fresh or canned fruit (canned in its own juice or light syrup) instead of juice  Fruit juice has very little or no fiber  · Eat low-fat dairy foods  Drink fat-free (skim) milk or 1% milk  Eat fat-free yogurt and low-fat cottage cheese  Try low-fat cheeses such as mozzarella and other reduced-fat cheeses  · Choose meat and other protein foods that are low in fat  Choose beans or other legumes such as split peas or lentils  Choose fish, skinless poultry (chicken or turkey), or lean cuts of red meat (beef or pork)  Before you cook meat or poultry, cut off any visible fat  · Use less fat and oil  Try baking foods instead of frying them  Add less fat, such as margarine, sour cream, regular salad dressing and mayonnaise to foods  Eat fewer high-fat foods  Some examples of high-fat foods include french fries, doughnuts, ice cream, and cakes  · Eat fewer sweets  Limit foods and drinks that are high in sugar  This includes candy, cookies, regular soda, and sweetened drinks  Exercise:  Exercise at least 30 minutes per day on most days of the week  Some examples of exercise include walking, biking, dancing, and swimming  You can also fit in more physical activity by taking the stairs instead of the elevator or parking farther away from stores  Ask your healthcare provider about the best exercise plan for you  © Copyright [employer] 2018 Information is for End User's use only and may not be sold, redistributed or otherwise used for commercial purposes  All illustrations and images included in CareNotes® are the copyrighted property of [employer]  or [facility] Preventive Visit Patient Instructions  Thank you for completing your Welcome to Medicare Visit or Medicare Annual Wellness Visit today   Your next wellness visit will be due in one year (1/25/2022)  The screening/preventive services that you may require over the next 5-10 years are detailed below  Some tests may not apply to you based off risk factors and/or age  Screening tests ordered at today's visit but not completed yet may show as past due  Also, please note that scanned in results may not display below  Preventive Screenings:  Service Recommendations Previous Testing/Comments   Colorectal Cancer Screening  * Colonoscopy    * Fecal Occult Blood Test (FOBT)/Fecal Immunochemical Test (FIT)  * Fecal DNA/Cologuard Test  * Flexible Sigmoidoscopy Age: 54-65 years old   Colonoscopy: every 10 years (may be performed more frequently if at higher risk)  OR  FOBT/FIT: every 1 year  OR  Cologuard: every 3 years  OR  Sigmoidoscopy: every 5 years  Screening may be recommended earlier than age 48 if at higher risk for colorectal cancer  Also, an individualized decision between you and your healthcare provider will decide whether screening between the ages of 74-80 would be appropriate  Colonoscopy: 12/23/2016  FOBT/FIT: Not on file  Cologuard: Not on file  Sigmoidoscopy: Not on file    Screening Current     Breast Cancer Screening Age: 36 years old  Frequency: every 1-2 years  Not required if history of left and right mastectomy Mammogram: 06/15/2020    Screening Current   Cervical Cancer Screening Between the ages of 21-29, pap smear recommended once every 3 years  Between the ages of 33-67, can perform pap smear with HPV co-testing every 5 years     Recommendations may differ for women with a history of total hysterectomy, cervical cancer, or abnormal pap smears in past  Pap Smear: Not on file    Screening Not Indicated   Hepatitis C Screening Once for adults born between [facility]  More frequently in patients at high risk for Hepatitis C Hep C Antibody: Not on file       Diabetes Screening 1-2 times per year if you're at risk for diabetes or have pre-diabetes Fasting glucose: 128 mg/dL   A1C: 7 3 %    Screening Not Indicated  History Diabetes   Cholesterol Screening Once every 5 years if you don't have a lipid disorder  May order more often based on risk factors  Lipid panel: 01/15/2021    Screening Current     Other Preventive Screenings Covered by Medicare:  6  Abdominal Aortic Aneurysm (AAA) Screening: covered once if your at risk  You're considered to be at risk if you have a family history of AAA  7  Lung Cancer Screening: covers low dose CT scan once per year if you meet all of the following conditions: (1) Age 50-69; (2) No signs or symptoms of lung cancer; (3) Current smoker or have quit smoking within the last 15 years; (4) You have a tobacco smoking history of at least 30 pack years (packs per day multiplied by number of years you smoked); (5) You get a written order from a healthcare provider  8  Glaucoma Screening: covered annually if you're considered high risk: (1) You have diabetes OR (2) Family history of glaucoma OR (3)  aged 48 and older OR (3)  American aged 72 and older  5  Osteoporosis Screening: covered every 2 years if you meet one of the following conditions: (1) You're estrogen deficient and at risk for osteoporosis based off medical history and other findings; (2) Have a vertebral abnormality; (3) On glucocorticoid therapy for more than 3 months; (4) Have primary hyperparathyroidism; (5) On osteoporosis medications and need to assess response to drug therapy  · Last bone density test (DXA Scan): 04/15/2019   10  HIV Screening: covered annually if you're between the age of 15-65  Also covered annually if you are younger than 13 and older than 72 with risk factors for HIV infection  For pregnant patients, it is covered up to 3 times per pregnancy      Immunizations:  Immunization Recommendations   Influenza Vaccine Annual influenza vaccination during flu season is recommended for all persons aged >= 6 months who do not have contraindications   Pneumococcal Vaccine (Prevnar and Pneumovax)  * Prevnar = PCV13  * Pneumovax = PPSV23   Adults 25-60 years old: 1-3 doses may be recommended based on certain risk factors  Adults 72 years old: Prevnar (PCV13) vaccine recommended followed by Pneumovax (PPSV23) vaccine  If already received PPSV23 since turning 65, then PCV13 recommended at least one year after PPSV23 dose  Hepatitis B Vaccine 3 dose series if at intermediate or high risk (ex: diabetes, end stage renal disease, liver disease)   Tetanus (Td) Vaccine - COST NOT COVERED BY MEDICARE PART B Following completion of primary series, a booster dose should be given every 10 years to maintain immunity against tetanus  Td may also be given as tetanus wound prophylaxis  Tdap Vaccine - COST NOT COVERED BY MEDICARE PART B Recommended at least once for all adults  For pregnant patients, recommended with each pregnancy  Shingles Vaccine (Shingrix) - COST NOT COVERED BY MEDICARE PART B  2 shot series recommended in those aged 48 and above     Health Maintenance Due:      Topic Date Due    Hepatitis C Screening  1945    DXA SCAN  04/15/2021    MAMMOGRAM  06/15/2021    Colorectal Cancer Screening  12/23/2026     Immunizations Due:      Topic Date Due    DTaP,Tdap,and Td Vaccines (1 - Tdap) 06/23/1968     Advance Directives   What are advance directives? Advance directives are legal documents that state your wishes and plans for medical care  These plans are made ahead of time in case you lose your ability to make decisions for yourself  Advance directives can apply to any medical decision, such as the treatments you want, and if you want to donate organs  What are the types of advance directives? There are many types of advance directives, and each state has rules about how to use them  You may choose a combination of any of the following:  · Living will: This is a written record of the treatment you want   You can also choose which treatments you do not want, which to limit, and which to stop at a certain time  This includes surgery, medicine, IV fluid, and tube feedings  · Durable power of  for healthcare [place] [facility]): This is a written record that states who you want to make healthcare choices for you when you are unable to make them for yourself  This person, called a proxy, is usually a family member or a friend  You may choose more than 1 proxy  · Do not resuscitate (DNR) order:  A DNR order is used in case your heart stops beating or you stop breathing  It is a request not to have certain forms of treatment, such as CPR  A DNR order may be included in other types of advance directives  · Medical directive: This covers the care that you want if you are in a coma, near death, or unable to make decisions for yourself  You can list the treatments you want for each condition  Treatment may include pain medicine, surgery, blood transfusions, dialysis, IV or tube feedings, and a ventilator (breathing machine)  · Values history: This document has questions about your views, beliefs, and how you feel and think about life  This information can help others choose the care that you would choose  Why are advance directives important? An advance directive helps you control your care  Although spoken wishes may be used, it is better to have your wishes written down  Spoken wishes can be misunderstood, or not followed  Treatments may be given even if you do not want them  An advance directive may make it easier for your family to make difficult choices about your care  Fall Prevention    Fall prevention  includes ways to make your home and other areas safer  It also includes ways you can move more carefully to prevent a fall  Health conditions that cause changes in your blood pressure, vision, or muscle strength and coordination may increase your risk for falls   Medicines may also increase your risk for falls if they make you dizzy, weak, or sleepy  Fall prevention tips:   · Stand or sit up slowly  · Use assistive devices as directed  · Wear shoes that fit well and have soles that   · Wear a personal alarm  · Stay active  · Manage your medical conditions  Home Safety Tips:  · Add items to prevent falls in the bathroom  · Keep paths clear  · Install bright lights in your home  · Keep items you use often on shelves within reach  · Paint or place reflective tape on the edges of your stairs  Weight Management   Why it is important to manage your weight:  Being overweight increases your risk of health conditions such as heart disease, high blood pressure, type 2 diabetes, and certain types of cancer  It can also increase your risk for osteoarthritis, sleep apnea, and other respiratory problems  Aim for a slow, steady weight loss  Even a small amount of weight loss can lower your risk of health problems  How to lose weight safely:  A safe and healthy way to lose weight is to eat fewer calories and get regular exercise  You can lose up about 1 pound a week by decreasing the number of calories you eat by 500 calories each day  Healthy meal plan for weight management:  A healthy meal plan includes a variety of foods, contains fewer calories, and helps you stay healthy  A healthy meal plan includes the following:  · Eat whole-grain foods more often  A healthy meal plan should contain fiber  Fiber is the part of grains, fruits, and vegetables that is not broken down by your body  Whole-grain foods are healthy and provide extra fiber in your diet  Some examples of whole-grain foods are whole-wheat breads and pastas, oatmeal, brown rice, and bulgur  · Eat a variety of vegetables every day  Include dark, leafy greens such as spinach, kale, humberto greens, and mustard greens  Eat yellow and orange vegetables such as carrots, sweet potatoes, and winter squash  · Eat a variety of fruits every day  Choose fresh or canned fruit (canned in its own juice or light syrup) instead of juice  Fruit juice has very little or no fiber  · Eat low-fat dairy foods  Drink fat-free (skim) milk or 1% milk  Eat fat-free yogurt and low-fat cottage cheese  Try low-fat cheeses such as mozzarella and other reduced-fat cheeses  · Choose meat and other protein foods that are low in fat  Choose beans or other legumes such as split peas or lentils  Choose fish, skinless poultry (chicken or turkey), or lean cuts of red meat (beef or pork)  Before you cook meat or poultry, cut off any visible fat  · Use less fat and oil  Try baking foods instead of frying them  Add less fat, such as margarine, sour cream, regular salad dressing and mayonnaise to foods  Eat fewer high-fat foods  Some examples of high-fat foods include french fries, doughnuts, ice cream, and cakes  · Eat fewer sweets  Limit foods and drinks that are high in sugar  This includes candy, cookies, regular soda, and sweetened drinks  Exercise:  Exercise at least 30 minutes per day on most days of the week  Some examples of exercise include walking, biking, dancing, and swimming  You can also fit in more physical activity by taking the stairs instead of the elevator or parking farther away from stores  Ask your healthcare provider about the best exercise plan for you  © Copyright [employer] 2018 Information is for End User's use only and may not be sold, redistributed or otherwise used for commercial purposes   All illustrations and images included in CareNotes® are the copyrighted property of [employer]  or [address] [place] [address]

## 2021-01-25 NOTE — PROGRESS NOTES
Assessment and Plan:     Problem List Items Addressed This Visit        Endocrine    Controlled type 2 diabetes mellitus with microalbuminuria, without long-term current use of insulin (HCC)    Relevant Medications    metFORMIN (GLUCOPHAGE) 1000 MG tablet       Cardiovascular and Mediastinum    Hypertension    Relevant Medications    amLODIPine (NORVASC) 10 mg tablet    lisinopril (ZESTRIL) 40 mg tablet    metoprolol succinate (TOPROL-XL) 100 mg 24 hr tablet      Other Visit Diagnoses     Medicare annual wellness visit, subsequent        BMI 29 0-29 9,adult            BMI Counseling: Body mass index is 29 97 kg/m²  The BMI is above normal  Nutrition recommendations include decreasing portion sizes, encouraging healthy choices of fruits and vegetables, consuming healthier snacks, moderation in carbohydrate intake, increasing intake of lean protein and reducing intake of saturated and trans fat  Exercise recommendations include exercising 3-5 times per week  No pharmacotherapy was ordered  [place] Plan of Care: Home safety education provided  Preventive health issues were discussed with patient, and age appropriate screening tests were ordered as noted in patient's After Visit Summary  Personalized health advice and appropriate referrals for health education or preventive services given if needed, as noted in patient's After Visit Summary       History of Present Illness:     Patient presents for Medicare Annual Wellness visit    Patient Care Team:  Edwin Wylie DO as PCP - General  MD Olman Cedeno (Obstetrics and Gynecology)  Marshal Campuzano as [facility] Worker (Care Coordination)     Problem List:     Patient Active Problem List   Diagnosis    Allergic rhinitis    Anxiety disorder    Colon, diverticulosis    Change in bowel function    Dyslipidemia    Elevated TSH    Gastroesophageal reflux disease    Headache    Herniated nucleus pulposus, L4-5    Hypertension    Irritable bowel syndrome    Leiomyoma of uterus    Microalbuminuria    Multiple thyroid nodules    Osteopenia    Overweight    Acquired cystic kidney disease    Controlled type 2 diabetes mellitus with microalbuminuria, without long-term current use of insulin (HCC)    Right lower quadrant abdominal pain    Nephrolithiasis      Past Medical and Surgical History:     Past Medical History:   Diagnosis Date    GERD (gastroesophageal reflux disease)     Hypertension     Kidney stone     Migraine     Wrist fracture     left      Past Surgical History:   Procedure Laterality Date    APPENDECTOMY      CATARACT EXTRACTION, BILATERAL Bilateral 2020    COLONOSCOPY      complete 11/29/11- 5 years / complete 6/9/04 -10years    COLONOSCOPY      Exam in November 2011 revealed small polyp, sigmoid diverticulosis, internal and external hemorrhoids  The polyp was  inflammatory polyp so followup in 10 years recommended   FL RETROGRADE PYELOGRAM  12/18/2020    HEMORRHOID SURGERY      KIDNEY STONE SURGERY      MD CYSTO/URETERO W/LITHOTRIPSY &INDWELL STENT INSRT Right 12/18/2020    Procedure: CYSTOSCOPY URETEROSCOPY RETROGRADE PYELOGRAM AND INSERTION STENT URETERAL;  Surgeon: Deniz Burnett MD;  Location: [facility];  Service: Urology    TONSILLECTOMY      TUBAL LIGATION      UPPER GASTROINTESTINAL ENDOSCOPY      November 2011 with hiatal hernia irregular Z-line and gastritis  Biopsies show question of intestinal metaplasia verses just reflux inflammation, H pylori was negative, nonspecific duodenitis        Family History:     Family History   Problem Relation Age of Onset    Hypertension Mother     Thyroid disease Mother    Rocael Bones Breast cancer Mother 80    Coronary artery disease Father     Hypertension Father     Other Father         smoker    Diabetes Paternal Grandmother     Endometrial cancer Paternal Grandmother         age unknown    Prostate cancer Maternal Grandfather 76    Breast cancer Cousin age unknown   Bong Stark No Known Problems Sister     No Known Problems Daughter     No Known Problems Sister     No Known Problems Daughter     No Known Problems Maternal Aunt     No Known Problems Maternal Aunt     Endometrial cancer Cousin         age unknown, maybe 64      Social History:     E-Cigarette/Vaping    E-Cigarette Use Never User      E-Cigarette/Vaping Substances    Nicotine No     THC No     CBD No     Flavoring No     Other No     Unknown No      Social History     Socioeconomic History    Marital status: /Civil Union     Spouse name: None    Number of children: None    Years of education: None    Highest education level: None   Occupational History    Occupation:  full time    Occupation: retired   Social Needs    Financial resource strain: None    Food insecurity     Worry: None     Inability: None    Transportation needs     Medical: None     Non-medical: None   Tobacco Use    Smoking status: Never Smoker    Smokeless tobacco: Never Used   Substance and Sexual Activity    Alcohol use: Yes     Frequency: Monthly or less     Drinks per session: 1 or 2     Binge frequency: Never    Drug use: No    Sexual activity: None   Lifestyle    Physical activity     Days per week: None     Minutes per session: None    Stress: None   Relationships    Social connections     Talks on phone: None     Gets together: None     Attends Anabaptist service: None     Active member of club or organization: None     Attends meetings of clubs or organizations: None     Relationship status: None    Intimate partner violence     Fear of current or ex partner: None     Emotionally abused: None     Physically abused: None     Forced sexual activity: None   Other Topics Concern    None   Social History Narrative    None      Medications and Allergies:     Current Outpatient Medications   Medication Sig Dispense Refill    acetaminophen (TYLENOL) 500 mg tablet Take 500 mg by mouth every 6 (six) hours as needed for mild pain      amLODIPine (NORVASC) 10 mg tablet Take 1 tablet (10 mg total) by mouth daily AmLODIPine Besylate 5 MG Oral Tablet TAKE 1 TABLET DAILY  90 tablet 1    Cetirizine HCl (ZYRTEC ALLERGY) 10 MG CAPS daily as needed ZyrTEC Allergy 10 MG Oral Tablet TAKE 1 TABLET DAILY AS DIRECTED  Quantity: 30;  Refills: 0    Lynnette Saucedo;  Started 12-May-2014 Active      Cholecalciferol (VITAMIN D-3) 1000 UNITS CAPS Vitamin D3 1000 UNIT Oral Tablet TAKE 1 TABLET DAILY  Quantity: 30;  Refills: 0    Xochitl Tan DO;  Started 25-Sep-2012 Active      dicyclomine (BENTYL) 20 mg tablet Take 1 tablet (20 mg total) by mouth every 6 (six) hours as needed (diarrhea/crampy abdominal pain) 20 tablet 0    lisinopril (ZESTRIL) 40 mg tablet Take 1 5 tablets (60 mg total) by mouth daily 135 tablet 1    metFORMIN (GLUCOPHAGE) 1000 MG tablet Take 1 tablet (1,000 mg total) by mouth 2 (two) times a day with meals 180 tablet 1    metoprolol succinate (TOPROL-XL) 100 mg 24 hr tablet Take 1 tablet (100 mg total) by mouth daily 90 tablet 1    Multiple Vitamins-Minerals (CENTRUM ADULTS PO) Centrum Oral Tablet TAKE 1 TABLET DAILY  Refills: 0    Xochitl Tan DO;  Started 25-Sep-2012 Active       No current facility-administered medications for this visit        Allergies   Allergen Reactions    Metronidazole Anaphylaxis    Tramadol Swelling    Acetazolamide Other (See Comments)     unknown    Penicillins Hives, Itching and Rash    Statins Myalgia    Hydrochlorothiazide Rash    Sulfa Antibiotics Rash    Tetracycline Rash      Immunizations:     Immunization History   Administered Date(s) Administered    INFLUENZA 09/10/2014, 09/08/2015, 09/29/2016, 10/11/2018, 09/06/2020    Influenza Split High Dose Preservative Free IM 09/08/2015, 09/29/2016    Influenza, high dose seasonal 0 7 mL 09/19/2019    Influenza, seasonal, injectable 09/15/2017    Pneumococcal Conjugate 13-Valent 09/08/2015    Pneumococcal Polysaccharide PPV23 04/18/2017    TD (adult) Preservative Free 05/30/2006    Tetanus, adsorbed 05/30/2006      Health Maintenance:         Topic Date Due    Hepatitis C Screening  1945    DXA SCAN  04/15/2021    MAMMOGRAM  06/15/2021    Colorectal Cancer Screening  12/23/2026         Topic Date Due    DTaP,Tdap,and Td Vaccines (1 - Tdap) 06/23/1968      Medicare Health Risk Assessment:     /86   Pulse 104   Temp (!) 96 1 °F (35 6 °C) (Temporal)   Ht 5' 3" (1 6 m)   Wt 76 7 kg (169 lb 3 2 oz)   BMI 29 97 kg/m²      Antonina Coyne is here for her Subsequent Wellness visit  Last Medicare Wellness visit information reviewed, patient interviewed and updates made to the record today  Health Risk Assessment:   Patient rates overall health as good  Patient feels that their physical health rating is same  Eyesight was rated as same  Hearing was rated as same  Patient feels that their emotional and mental health rating is same  Pain experienced in the last 7 days has been none  Patient states that she has experienced no weight loss or gain in last 6 months  Depression Screening:   PHQ-2 Score: 0      Fall Risk Screening: In the past year, patient has experienced: history of falling in past year    Number of falls: 1  Injured during fall?: Yes    Feels unsteady when standing or walking?: No    Worried about falling?: No      Urinary Incontinence Screening:   Patient has not leaked urine accidently in the last six months  Home Safety:  Patient does not have trouble with stairs inside or outside of their home  Patient has working smoke alarms and has working carbon monoxide detector  Home safety hazards include: loose rugs on the floor  Nutrition:   Current diet is Diabetic  Medications:   Patient is currently taking over-the-counter supplements  OTC medications include: see medication list  Patient is able to manage medications       Activities of Daily Living (ADLs)/Instrumental Activities of Daily Living (IADLs):   Walk and transfer into and out of bed and chair?: Yes  Dress and groom yourself?: Yes    Bathe or shower yourself?: Yes    Feed yourself? Yes  Do your laundry/housekeeping?: Yes  Manage your money, pay your bills and track your expenses?: Yes  Make your own meals?: Yes    Do your own shopping?: Yes    Previous Hospitalizations:   Any hospitalizations or ED visits within the last 12 months?: Yes    How many hospitalizations have you had in the last year?: 1-2    Hospitalization Comments: 1 hospitalization, 3 ER visits    Advance Care Planning:   Living will: Yes    Durable POA for healthcare: No    Advanced directive: Yes      Cognitive Screening:   Provider or family/friend/caregiver concerned regarding cognition?: No    PREVENTIVE SCREENINGS      Cardiovascular Screening:    General: Screening Current and Risks and Benefits Discussed      Diabetes Screening:     General: History Diabetes, Risks and Benefits Discussed and Screening Current      Colorectal Cancer Screening:     General: Screening Current      Breast Cancer Screening:     General: Screening Current and Risks and Benefits Discussed      Cervical Cancer Screening:    General: Screening Not Indicated and Risks and Benefits Discussed      Osteoporosis Screening:    General: Screening Current and Risks and Benefits Discussed      Abdominal Aortic Aneurysm (AAA) Screening:        General: Risks and Benefits Discussed and Screening Not Indicated      Lung Cancer Screening:     General: Screening Not Indicated and Risks and Benefits Discussed      Hepatitis C Screening:    General: Risks and Benefits Discussed and Patient Declines    Other Counseling Topics:   Car/seat belt/driving safety, sunscreen and regular weightbearing exercise         Lugenia Skill, DO

## 2021-01-25 NOTE — PROGRESS NOTES
Assessment/Plan:    Controlled type 2 diabetes mellitus with microalbuminuria, without long-term current use of insulin (HCC)    Lab Results   Component Value Date    HGBA1C 7 3 (H) 01/15/2021   DM type 2 with microalbuminuria and hyperglycemia - uncontrolled but stable with A1c 7 3 - urged healthy diet/exercise/wgt loss, con't current Metformin, recheck BW in 3-4 mos - order given, on ACE but no statin - deferring, UTD on foot exam (7/20) and eye exam (4/20)    Dyslipidemia  LDL not at goal, 10 yr ASCVD risk score 40 9% reviewed with pt - statin recommended - pt con't to defer, diet/exercise/wgt loss encouraged    Elevated TSH  Has some fatigue and con't to alternate with C/D but is not new, FT4 con't to remain wnl, monitor off thyroid meds for now - recheck TFT's in 3 mo - order given    Hypertension  BP still above goal today -  Urged diet/exercise/wgt loss -recheck BP in 3 mos - if not better will need to adjust BP meds       Diagnoses and all orders for this visit:    Controlled type 2 diabetes mellitus with microalbuminuria, without long-term current use of insulin (HCC)  -     metFORMIN (GLUCOPHAGE) 1000 MG tablet; Take 1 tablet (1,000 mg total) by mouth 2 (two) times a day with meals  -     Glucose, fasting; Future  -     Hemoglobin A1C; Future  -     Glucose, fasting  -     Hemoglobin A1C    Dyslipidemia    Elevated TSH  -     TSH, 3rd generation with Free T4 reflex; Future  -     TSH, 3rd generation with Free T4 reflex    Essential hypertension  -     amLODIPine (NORVASC) 10 mg tablet; Take 1 tablet (10 mg total) by mouth daily AmLODIPine Besylate 5 MG Oral Tablet TAKE 1 TABLET DAILY  -     lisinopril (ZESTRIL) 40 mg tablet; Take 1 5 tablets (60 mg total) by mouth daily  -     metoprolol succinate (TOPROL-XL) 100 mg 24 hr tablet;  Take 1 tablet (100 mg total) by mouth daily    Medicare annual wellness visit, subsequent    BMI 29 0-29 9,adult      Colonoscopy 11/11- 10 yrs    Mammo 6/20    Dexa 4/19 - osteopenia    On list for COVID vaccine      Subjective:      Patient ID: Daniella Evangelista is a 70 y o  female  HPI Pt here for follow up appt/BW results and AWV    BW results were d/w pt in detail: FBS/A1C 157/7 3, rest of CMP/CBC was wnl, FLP with , , HDl 37 and  (dLDL 135), Urine microalbumin:Cr ratio was 31, TSH up at 8 750 but FT4 was wnl  Def of controlled vs uncontrolled DM was reviewed  Diet was reviewed - wgt up 2 lbs from Oct 2020  She is taking her DM meds as directed  She is UTD on foot (7/20) and eye exam (4/20)  She is on an ACE but no statin  BMI reviewed  She is not exercising  She feels her diet is good but does admit to "sometimes" not following diet  Goal FLP was d/w pt in detail  Diet/exercise reviewed as noted above  She is not on a statin daily  10 yr ASCVD risk score was reviewed at 40 9%  She notes no stroke/TIA symptoms/CP  Nml TFT's reviewed - she has never been on thyroid meds  She notes mult family members with thyroid issues  She notes no significant wgt changes/tremor/palp/hair loss/skin changes  She has some fatigue and chronic alternating btw C/D     BP above goal today and meds were reviewed and no changes have occurred  She denies missing doses of meds or SE with the meds  She does not check her BP outside the office  She notes no frequent Ha's/dizziness/double vision/CP       Colonoscopy 11/11- 10 yrs    Mammo 6/20    Dexa 4/19 - osteopenia    On list for COVID vaccine    Review of Systems      Objective:    /82   Pulse 104   Temp (!) 96 1 °F (35 6 °C) (Temporal)   Ht 5' 3" (1 6 m)   Wt 76 7 kg (169 lb 3 2 oz)   BMI 29 97 kg/m²      Physical Exam

## 2021-01-25 NOTE — ASSESSMENT & PLAN NOTE
BP still above goal today -  Urged diet/exercise/wgt loss -recheck BP in 3 mos - if not better will need to adjust BP meds

## 2021-01-25 NOTE — ASSESSMENT & PLAN NOTE
Lab Results   Component Value Date    HGBA1C 7 3 (H) 01/15/2021   DM type 2 with microalbuminuria and hyperglycemia - uncontrolled but stable with A1c 7 3 - urged healthy diet/exercise/wgt loss, con't current Metformin, recheck BW in 3-4 mos - order given, on ACE but no statin - deferring, UTD on foot exam (7/20) and eye exam (4/20)

## 2021-01-25 NOTE — ASSESSMENT & PLAN NOTE
LDL not at goal, 10 yr ASCVD risk score 40 9% reviewed with pt - statin recommended - pt con't to defer, diet/exercise/wgt loss encouraged

## 2021-01-25 NOTE — ASSESSMENT & PLAN NOTE
Has some fatigue and con't to alternate with C/D but is not new, FT4 con't to remain wnl, monitor off thyroid meds for now - recheck TFT's in 3 mo - order given

## 2021-01-27 ENCOUNTER — PATIENT OUTREACH (OUTPATIENT)
Dept: CASE MANAGEMENT | Facility: HOSPITAL | Age: 76
End: 2021-01-27

## 2021-01-27 NOTE — PROGRESS NOTES
1st Attempt:    [facility] Call:     Would the patient like to make an appointment? No  (Yes or No)     Does the patient have a smart device? Yes  (Yes or No)     Is the patient a smoker? No  (Yes or No)     Is the patient interested in smoking cessation counseling? N/A  (Yes, No, or n/a)     Is the patient Diabetic? Yes  (Yes or No)     What is the patient's A1C? 7 3  (equal or greater than 9 - refer to [address] [place] [address])     Who helps the patient manage their medications? Self     Does the patient have any issues in obtaining or affording medications? No  (Yes = Referral to [place], No, n/a)     Other barriers identified during the call:     Summary of Call: The patient stated she is doing well  She last saw her PCP on 01-, therefore, she does not need an appointment scheduled at this time  She is pre-registered for the COVID vaccine  She has a smart device and continues to not have issues obtaining or affording her medication  The patient was agreeable to additional outreaches from this CHW  This CHW will follow up in three months

## 2021-02-26 ENCOUNTER — TELEPHONE (OUTPATIENT)
Dept: UROLOGY | Facility: MEDICAL CENTER | Age: 76
End: 2021-02-26

## 2021-03-03 ENCOUNTER — HOSPITAL ENCOUNTER (OUTPATIENT)
Dept: RADIOLOGY | Facility: HOSPITAL | Age: 76
Discharge: HOME | End: 2021-03-03
Payer: COMMERCIAL

## 2021-03-03 DIAGNOSIS — Z23 ENCOUNTER FOR IMMUNIZATION: ICD-10-CM

## 2021-03-03 DIAGNOSIS — N20.0 NEPHROLITHIASIS: ICD-10-CM

## 2021-03-03 PROCEDURE — 74018 RADEX ABDOMEN 1 VIEW: CPT

## 2021-03-08 ENCOUNTER — IMMUNIZATIONS (OUTPATIENT)
Dept: FAMILY MEDICINE CLINIC | Facility: HOSPITAL | Age: 76
End: 2021-03-08

## 2021-03-08 DIAGNOSIS — Z23 ENCOUNTER FOR IMMUNIZATION: Primary | ICD-10-CM

## 2021-03-08 PROCEDURE — 0001A SARS-COV-2 / COVID-19 MRNA VACCINE (PFIZER-BIONTECH) 30 MCG: CPT

## 2021-03-08 PROCEDURE — 91300 SARS-COV-2 / COVID-19 MRNA VACCINE (PFIZER-BIONTECH) 30 MCG: CPT

## 2021-03-17 ENCOUNTER — OFFICE VISIT (OUTPATIENT)
Dept: UROLOGY | Facility: HOSPITAL | Age: 76
End: 2021-03-17
Payer: COMMERCIAL

## 2021-03-17 VITALS
WEIGHT: 172 LBS | BODY MASS INDEX: 30.48 KG/M2 | HEART RATE: 101 BPM | DIASTOLIC BLOOD PRESSURE: 72 MMHG | SYSTOLIC BLOOD PRESSURE: 138 MMHG | HEIGHT: 63 IN

## 2021-03-17 DIAGNOSIS — N20.0 NEPHROLITHIASIS: Primary | ICD-10-CM

## 2021-03-17 PROCEDURE — 99213 OFFICE O/P EST LOW 20 MIN: CPT | Performed by: NURSE PRACTITIONER

## 2021-03-17 PROCEDURE — 3075F SYST BP GE 130 - 139MM HG: CPT | Performed by: NURSE PRACTITIONER

## 2021-03-17 PROCEDURE — 3078F DIAST BP <80 MM HG: CPT | Performed by: NURSE PRACTITIONER

## 2021-03-17 PROCEDURE — 1160F RVW MEDS BY RX/DR IN RCRD: CPT | Performed by: NURSE PRACTITIONER

## 2021-03-17 PROCEDURE — 1036F TOBACCO NON-USER: CPT | Performed by: NURSE PRACTITIONER

## 2021-03-17 PROCEDURE — 3008F BODY MASS INDEX DOCD: CPT | Performed by: NURSE PRACTITIONER

## 2021-03-17 NOTE — PROGRESS NOTES
3/17/2021    Thomas Obrien  1945  [id number]        Assessment  -Nephrolithiasis s/p right sided ureteroscopy (12/2020)    Discussion/Plan  Re VALIENTE is a 70 y o  female being managed by our office    1  Nephrolithiasis s/p right sided ureteroscopy (12/2020)-   We reviewed the results of her recent KUB which identified phleboliths in left hemipelvis  Otherwise, findings unremarkable  She remains asymptomatic  Discussed dietary recommendations  Patient will return to the office in 1 year with repeat KUB  She was instructed to call sooner with any issues    -All questions answered, patients agree with plan     History of Present Illness  70 y o  female with a history of nephrolithiasis presents today for follow up  Patient previously known to Dr Jeffery York  She underwent right-sided ureteroscopy in December 2020  Intraoperatively, patient was not found to have any stone and likely passed before surgery  Patient has been asymptomatic and denies any recurrence in flank pain, lower urinary tract symptoms, gross hematuria, or dysuria  She reports a previous stone history 25 years ago  Patient also states she has a family history of kidney stones  Review of Systems  Review of Systems   Constitutional: Negative  HENT: Negative  Respiratory: Negative  Cardiovascular: Negative  Gastrointestinal: Negative  Genitourinary: Negative for decreased urine volume, difficulty urinating, dysuria, flank pain, frequency, hematuria and urgency  Musculoskeletal: Negative  Skin: Negative  Neurological: Negative  Psychiatric/Behavioral: Negative          Past Medical History  Past Medical History:   Diagnosis Date    GERD (gastroesophageal reflux disease)     Hypertension     Kidney stone     Migraine     Wrist fracture     left        Past Social History  Past Surgical History:   Procedure Laterality Date    APPENDECTOMY      CATARACT EXTRACTION, BILATERAL Bilateral 2020    COLONOSCOPY complete 11/29/11- 5 years / complete 6/9/04 -10years    COLONOSCOPY      Exam in November 2011 revealed small polyp, sigmoid diverticulosis, internal and external hemorrhoids  The polyp was  inflammatory polyp so followup in 10 years recommended   FL RETROGRADE PYELOGRAM  12/18/2020    HEMORRHOID SURGERY      KIDNEY STONE SURGERY      [place] CYSTO/URETERO W/LITHOTRIPSY &INDWELL STENT INSRT Right 12/18/2020    Procedure: CYSTOSCOPY URETEROSCOPY RETROGRADE PYELOGRAM AND INSERTION STENT URETERAL;  Surgeon: Marshall Wright MD;  Location: [facility];  Service: Urology    TONSILLECTOMY      TUBAL LIGATION      UPPER GASTROINTESTINAL ENDOSCOPY      November 2011 with hiatal hernia irregular Z-line and gastritis  Biopsies show question of intestinal metaplasia verses just reflux inflammation, H pylori was negative, nonspecific duodenitis         Past Family History  Family History   Problem Relation Age of Onset    Hypertension Mother     Thyroid disease Mother     Breast cancer Mother 80    Coronary artery disease Father     Hypertension Father     Other Father         smoker    Diabetes Paternal Grandmother     Endometrial cancer Paternal Grandmother         age unknown    Prostate cancer Maternal Grandfather 76    Breast cancer Cousin         age unknown    No Known Problems Sister     No Known Problems Daughter     No Known Problems Sister     No Known Problems Daughter     No Known Problems Maternal Aunt     No Known Problems Maternal Aunt     Endometrial cancer Cousin         age unknown, maybe 64       Past Social history  Social History     Socioeconomic History    Marital status: /Civil Union     Spouse name: Not on file    Number of children: Not on file    Years of education: Not on file    Highest education level: Not on file   Occupational History    Occupation:  full time    Occupation: retired   Social Needs    Financial resource strain: Not on file   [place] insecurity Worry: Not on file     Inability: Not on file    Transportation needs     Medical: Not on file     Non-medical: Not on file   Tobacco Use    Smoking status: Never Smoker    Smokeless tobacco: Never Used   Substance and Sexual Activity    Alcohol use: Yes     Frequency: Monthly or less     Drinks per session: 1 or 2     Binge frequency: Never    Drug use: No    Sexual activity: Not on file   Lifestyle    Physical activity     Days per week: Not on file     Minutes per session: Not on file    Stress: Not on file   Relationships    Social connections     Talks on phone: Not on file     Gets together: Not on file     Attends Evangelical service: Not on file     Active member of club or organization: Not on file     Attends meetings of clubs or organizations: Not on file     Relationship status: Not on file    Intimate partner violence     Fear of current or ex partner: Not on file     Emotionally abused: Not on file     Physically abused: Not on file     Forced sexual activity: Not on file   Other Topics Concern    Not on file   Social History Narrative    Not on file       Current Medications  Current Outpatient Medications   Medication Sig Dispense Refill    acetaminophen (TYLENOL) 500 mg tablet Take 500 mg by mouth every 6 (six) hours as needed for mild pain      amLODIPine (NORVASC) 10 mg tablet Take 1 tablet (10 mg total) by mouth daily AmLODIPine Besylate 5 MG Oral Tablet TAKE 1 TABLET DAILY  90 tablet 1    Cetirizine HCl (ZYRTEC ALLERGY) 10 MG CAPS daily as needed ZyrTEC Allergy 10 MG Oral Tablet TAKE 1 TABLET DAILY AS DIRECTED  Quantity: 30;  Refills: 0    Lynnette Saucedo;  Started 12-May-2014 Active      Cholecalciferol (VITAMIN D-3) 1000 UNITS CAPS Vitamin D3 1000 UNIT Oral Tablet TAKE 1 TABLET DAILY    Quantity: 30;  Refills: 0    Xochitl Tan DO;  Started 25-Sep-2012 Active      lisinopril (ZESTRIL) 40 mg tablet Take 1 5 tablets (60 mg total) by mouth daily 135 tablet 1    metoprolol succinate (TOPROL-XL) 100 mg 24 hr tablet Take 1 tablet (100 mg total) by mouth daily 90 tablet 1    Multiple Vitamins-Minerals (CENTRUM ADULTS PO) Centrum Oral Tablet TAKE 1 TABLET DAILY  Refills: 0    Connie Left DO;  Started 25-Sep-2012 Active      dicyclomine (BENTYL) 20 mg tablet Take 1 tablet (20 mg total) by mouth every 6 (six) hours as needed (diarrhea/crampy abdominal pain) 20 tablet 0    metFORMIN (GLUCOPHAGE) 1000 MG tablet Take 1 tablet (1,000 mg total) by mouth 2 (two) times a day with meals 180 tablet 1     No current facility-administered medications for this visit  Allergies  Allergies   Allergen Reactions    Metronidazole Anaphylaxis    Tramadol Swelling    Acetazolamide Other (See Comments)     unknown    Penicillins Hives, Itching and Rash    Statins Myalgia    Hydrochlorothiazide Rash    Sulfa Antibiotics Rash    Tetracycline Rash       Past medical history, social history, family history, medications and allergies were reviewed  Vitals  Vitals:    03/17/21 0933   BP: 138/72   BP Location: Left arm   Patient Position: Sitting   Cuff Size: Adult   Pulse: 101   Weight: 78 kg (172 lb)   Height: 5' 3" (1 6 m)       Physical Exam  Physical Exam  Constitutional:       Appearance: Normal appearance  She is well-developed  HENT:      Head: Normocephalic  Eyes:      Pupils: Pupils are equal, round, and reactive to light  Neck:      Musculoskeletal: Normal range of motion  Pulmonary:      Effort: Pulmonary effort is normal    Abdominal:      Palpations: Abdomen is soft  Musculoskeletal: Normal range of motion  Skin:     General: Skin is warm and dry  Neurological:      General: No focal deficit present  Mental Status: She is alert and oriented to person, place, and time  Psychiatric:         Mood and Affect: Mood normal          Behavior: Behavior normal          Thought Content:  Thought content normal          Judgment: Judgment normal          Results    I have personally reviewed all pertinent lab results and reviewed with patient  Lab Results   Component Value Date    GLUCOSE 120 (H) 07/28/2017    CALCIUM 9 2 10/16/2020     04/08/2017    K 4 0 01/15/2021    CO2 25 01/15/2021    CL 97 01/15/2021    BUN 15 01/15/2021    CREATININE 0 81 01/15/2021     Lab Results   Component Value Date    WBC 7 1 01/15/2021    HGB 15 1 01/15/2021    HCT 43 9 01/15/2021    MCV 91 01/15/2021     01/15/2021     No results found for this or any previous visit (from the past 1 hour(s))

## 2021-03-29 ENCOUNTER — IMMUNIZATIONS (OUTPATIENT)
Dept: FAMILY MEDICINE CLINIC | Facility: HOSPITAL | Age: 76
End: 2021-03-29

## 2021-03-29 DIAGNOSIS — Z23 ENCOUNTER FOR IMMUNIZATION: Primary | ICD-10-CM

## 2021-03-29 PROCEDURE — 0002A SARS-COV-2 / COVID-19 MRNA VACCINE (PFIZER-BIONTECH) 30 MCG: CPT

## 2021-03-29 PROCEDURE — 91300 SARS-COV-2 / COVID-19 MRNA VACCINE (PFIZER-BIONTECH) 30 MCG: CPT

## 2021-03-30 VITALS — HEIGHT: 63 IN | BODY MASS INDEX: 30.12 KG/M2 | WEIGHT: 170 LBS

## 2021-03-30 NOTE — TELEPHONE ENCOUNTER
Why does your doctor want you to have this procedure? Screening, hx of wadsworth's    Do you have kidney disease?  no  If yes, are you on dialysis :     Have you had diverticulitis within the past 2 months? no    Are you diabetic?  yes  If yes, insulin dependent: If yes, provide diabetic instructions sheet     Do take iron supplements?  no  If yes, instruct patient to hold iron supplement for 7 days prior    Are you on a blood thinner? no   Was the blood thinner sheet complete and faxed to cardiologist no  Plavix (clopidogrel), Coumadin (warfarin), Lovenox (enoxaparin), Xarelto (rivaroxaban), Pradaxa(dabigatran), Eliquis(apixaban) Savaysa/Lixiana (edoxapan)    Do you have an automatic implantable cardiac defibrillator (AICD)/pacemaker ([facility])? no  Was AICD/pacemaker sheet completed and faxed to cardiologist? no    Are you on home oxygen? no  If yes, continuous or nocturnal:     Have you been treated for MRSA, VRE or any communicable diseases? no    Heart attack, stroke, or stent within 3 months? no  Schedule at Hospital if within 3-6 months   Use nitroglycerin for chest pain in the last 6 months? no    History of organ  transplant?  no   If yes, notify Endo      History of neck/throat/tongue surgery or cancer? no  IF yes, notify Endo      Any problems with anesthesia in the past? no     Was stool C diff ordered?  no Stool specimen needs to be completed prior to procedure    Do have any facial or body piercings?no     Do you have a latex allergy? no     Do have an allergy to metals? (Bravo study only) no     If pediatric patient, was consent faxed to pediatrician no     Patient rights reviewed yes    Pt already has suprep kit and instruction sheet

## 2021-04-06 ENCOUNTER — HOSPITAL ENCOUNTER (OUTPATIENT)
Dept: GASTROENTEROLOGY | Facility: AMBULATORY SURGERY CENTER | Age: 76
Discharge: HOME | End: 2021-04-06
Payer: COMMERCIAL

## 2021-04-06 ENCOUNTER — ANESTHESIA EVENT (OUTPATIENT)
Dept: GASTROENTEROLOGY | Facility: AMBULATORY SURGERY CENTER | Age: 76
End: 2021-04-06

## 2021-04-06 ENCOUNTER — ANESTHESIA (OUTPATIENT)
Dept: GASTROENTEROLOGY | Facility: AMBULATORY SURGERY CENTER | Age: 76
End: 2021-04-06

## 2021-04-06 VITALS
SYSTOLIC BLOOD PRESSURE: 121 MMHG | DIASTOLIC BLOOD PRESSURE: 77 MMHG | TEMPERATURE: 97.8 F | RESPIRATION RATE: 24 BRPM | HEART RATE: 72 BPM | OXYGEN SATURATION: 95 %

## 2021-04-06 DIAGNOSIS — R19.8 CHANGE IN BOWEL FUNCTION: ICD-10-CM

## 2021-04-06 DIAGNOSIS — K21.9 GASTROESOPHAGEAL REFLUX DISEASE: ICD-10-CM

## 2021-04-06 DIAGNOSIS — R10.31 RIGHT LOWER QUADRANT ABDOMINAL PAIN: ICD-10-CM

## 2021-04-06 PROCEDURE — 88312 SPECIAL STAINS GROUP 1: CPT | Performed by: PATHOLOGY

## 2021-04-06 PROCEDURE — 45378 DIAGNOSTIC COLONOSCOPY: CPT | Performed by: INTERNAL MEDICINE

## 2021-04-06 PROCEDURE — 88313 SPECIAL STAINS GROUP 2: CPT | Performed by: PATHOLOGY

## 2021-04-06 PROCEDURE — 88305 TISSUE EXAM BY PATHOLOGIST: CPT | Performed by: PATHOLOGY

## 2021-04-06 PROCEDURE — 88342 IMHCHEM/IMCYTCHM 1ST ANTB: CPT | Performed by: PATHOLOGY

## 2021-04-06 PROCEDURE — 43239 EGD BIOPSY SINGLE/MULTIPLE: CPT | Performed by: INTERNAL MEDICINE

## 2021-04-06 RX ORDER — LIDOCAINE HYDROCHLORIDE 10 MG/ML
INJECTION, SOLUTION EPIDURAL; INFILTRATION; INTRACAUDAL; PERINEURAL AS NEEDED
Status: DISCONTINUED | OUTPATIENT
Start: 2021-04-06 | End: 2021-04-06

## 2021-04-06 RX ORDER — SODIUM CHLORIDE 9 MG/ML
50 INJECTION, SOLUTION INTRAVENOUS CONTINUOUS
Status: DISCONTINUED | OUTPATIENT
Start: 2021-04-06 | End: 2021-04-10 | Stop reason: HOSPADM

## 2021-04-06 RX ORDER — PROPOFOL 10 MG/ML
INJECTION, EMULSION INTRAVENOUS AS NEEDED
Status: DISCONTINUED | OUTPATIENT
Start: 2021-04-06 | End: 2021-04-06

## 2021-04-06 RX ADMIN — PROPOFOL 20 MG: 10 INJECTION, EMULSION INTRAVENOUS at 11:00

## 2021-04-06 RX ADMIN — PROPOFOL 40 MG: 10 INJECTION, EMULSION INTRAVENOUS at 10:47

## 2021-04-06 RX ADMIN — PROPOFOL 20 MG: 10 INJECTION, EMULSION INTRAVENOUS at 10:51

## 2021-04-06 RX ADMIN — PROPOFOL 20 MG: 10 INJECTION, EMULSION INTRAVENOUS at 10:53

## 2021-04-06 RX ADMIN — PROPOFOL 20 MG: 10 INJECTION, EMULSION INTRAVENOUS at 10:56

## 2021-04-06 RX ADMIN — PROPOFOL 20 MG: 10 INJECTION, EMULSION INTRAVENOUS at 10:50

## 2021-04-06 RX ADMIN — PROPOFOL 20 MG: 10 INJECTION, EMULSION INTRAVENOUS at 10:55

## 2021-04-06 RX ADMIN — PROPOFOL 20 MG: 10 INJECTION, EMULSION INTRAVENOUS at 10:48

## 2021-04-06 RX ADMIN — SODIUM CHLORIDE 50 ML/HR: 9 INJECTION, SOLUTION INTRAVENOUS at 10:00

## 2021-04-06 RX ADMIN — PROPOFOL 20 MG: 10 INJECTION, EMULSION INTRAVENOUS at 10:52

## 2021-04-06 RX ADMIN — LIDOCAINE HYDROCHLORIDE 40 MG: 10 INJECTION, SOLUTION EPIDURAL; INFILTRATION; INTRACAUDAL; PERINEURAL at 10:47

## 2021-04-06 RX ADMIN — PROPOFOL 20 MG: 10 INJECTION, EMULSION INTRAVENOUS at 11:04

## 2021-04-06 RX ADMIN — PROPOFOL 20 MG: 10 INJECTION, EMULSION INTRAVENOUS at 10:49

## 2021-04-06 RX ADMIN — PROPOFOL 20 MG: 10 INJECTION, EMULSION INTRAVENOUS at 11:08

## 2021-04-06 NOTE — H&P
History and Physical - [facility]  Areli Garcia 70 y o  female MRN: [id number]    HPI: Areli Garcia is a 70y o  year old female who presents for EGD to evaluate GERD possible Suarez's  Colonoscopy for screening  REVIEW OF SYSTEMS: Per the HPI, and otherwise unremarkable  Historical Information   Past Medical History:   Diagnosis Date    Diabetes mellitus ([address] )     GERD (gastroesophageal reflux disease)     Hypertension     Kidney stone     Migraine     Wrist fracture     left      Past Surgical History:   Procedure Laterality Date    APPENDECTOMY      CATARACT EXTRACTION, BILATERAL Bilateral 2020    COLONOSCOPY      complete 11/29/11- 5 years / complete 6/9/04 -10years    COLONOSCOPY      Exam in November 2011 revealed small polyp, sigmoid diverticulosis, internal and external hemorrhoids  The polyp was  inflammatory polyp so followup in 10 years recommended   FL RETROGRADE PYELOGRAM  12/18/2020    HEMORRHOID SURGERY      KIDNEY STONE SURGERY      [place] CYSTO/URETERO W/LITHOTRIPSY &INDWELL STENT INSRT Right 12/18/2020    Procedure: CYSTOSCOPY URETEROSCOPY RETROGRADE PYELOGRAM AND INSERTION STENT URETERAL;  Surgeon: Alexander Painter MD;  Location: [facility];  Service: Urology    TONSILLECTOMY      TUBAL LIGATION      UPPER GASTROINTESTINAL ENDOSCOPY      November 2011 with hiatal hernia irregular Z-line and gastritis  Biopsies show question of intestinal metaplasia verses just reflux inflammation, H pylori was negative, nonspecific duodenitis       Social History   Social History     Substance and Sexual Activity   Alcohol Use Yes    Frequency: Monthly or less    Drinks per session: 1 or 2    Binge frequency: Never     Social History     Substance and Sexual Activity   Drug Use No     Social History     Tobacco Use   Smoking Status Never Smoker   Smokeless Tobacco Never Used     Family History   Problem Relation Age of Onset    Hypertension Mother     Thyroid disease Mother     Breast cancer Mother 80    Coronary artery disease Father     Hypertension Father     Other Father         smoker    Diabetes Paternal Grandmother     Endometrial cancer Paternal Grandmother         age unknown    Prostate cancer Maternal Grandfather 76    Breast cancer Cousin         age unknown    No Known Problems Sister     No Known Problems Daughter     No Known Problems Sister     No Known Problems Daughter     No Known Problems Maternal Aunt     No Known Problems Maternal Aunt     Endometrial cancer Cousin         age unknown, maybe 64   Wyatt Rivera Colon polyps Neg Hx     Colon cancer Neg Hx        Meds/Allergies       Current Outpatient Medications:     acetaminophen (TYLENOL) 500 mg tablet    amLODIPine (NORVASC) 10 mg tablet    Cetirizine HCl (ZYRTEC ALLERGY) 10 MG CAPS    Cholecalciferol (VITAMIN D-3) 1000 UNITS CAPS    lisinopril (ZESTRIL) 40 mg tablet    metoprolol succinate (TOPROL-XL) 100 mg 24 hr tablet    Multiple Vitamins-Minerals (CENTRUM ADULTS PO)    dicyclomine (BENTYL) 20 mg tablet    metFORMIN (GLUCOPHAGE) 1000 MG tablet    Current Facility-Administered Medications:     sodium chloride 0 9 % infusion, 50 mL/hr, Intravenous, Continuous, Rate Change at 04/06/21 1042    Allergies   Allergen Reactions    Metronidazole Anaphylaxis    Tramadol Swelling    Acetazolamide Other (See Comments)     unknown    Penicillins Hives, Itching and Rash    Statins Myalgia    Hydrochlorothiazide Rash    Sulfa Antibiotics Rash    Tetracycline Rash       Objective     /60   Pulse 69   Temp 97 8 °F (36 6 °C) (Temporal)   Resp 14   SpO2 100%     PHYSICAL EXAM    Gen: NAD AAOx3  CV: S1S2 RRR no m/r/g  CHEST: Clear b/l no c/r/w  ABD: soft, +BS NT/ND  EXT: no edema    ASSESSMENT/PLAN:  This is a 70y o  year old female here for EGD and colonoscopy, and she is stable and optimized for her procedure

## 2021-04-06 NOTE — DISCHARGE INSTRUCTIONS
Diet for Stomach Ulcers and Gastritis   AMBULATORY CARE:   A diet for stomach ulcers and gastritis  is a meal plan that limits foods that irritate your stomach  Certain foods may worsen symptoms such as stomach pain, bloating, heartburn, or indigestion  Foods to limit or avoid:  You may need to avoid acidic, spicy, or high-fat foods  Not all foods affect everyone the same way  You will need to learn which foods worsen your symptoms and limit those foods  The following are some foods that may worsen ulcer or gastritis symptoms:  · Beverages:      ? Whole milk and chocolate milk    ? Hot cocoa and cola    ? Any beverage with caffeine    ? Regular and decaffeinated coffee    ? Peppermint and spearmint tea    ? Green and black tea, with or without caffeine    ? Orange and grapefruit juices    ? Drinks that contain alcohol    · Spices and seasonings:      ? Black and red pepper    ? Chili powder    ? Mustard seed and nutmeg    · Other foods:      ? Dairy foods made from whole milk or cream    ? Chocolate    ? Spicy or strongly flavored cheeses, such as jalapeno or black pepper    ? Highly seasoned, high-fat meats, such as sausage, salami, mendes, ham, and cold cuts    ? Hot chiles and peppers    ? Tomato products, such as tomato paste, tomato sauce, or tomato juice    Foods to include:  Eat a variety of healthy foods from all the food groups  Eat fruits, vegetables, whole grains, and fat-free or low-fat dairy foods  Whole grains include whole-wheat breads, cereals, pasta, and brown rice  Choose lean meats, poultry (chicken and turkey), fish, beans, eggs, and nuts  A healthy meal plan is low in unhealthy fats, salt, and added sugar  Healthy fats include olive oil and canola oil  Ask your dietitian for more information about a healthy diet  Other helpful guidelines:   · Do not eat right before bedtime  Stop eating at least 2 hours before bedtime  · Eat small, frequent meals    Your stomach may tolerate small, frequent meals better than large meals  © Copyright [address] Information is for End User's use only and may not be sold, redistributed or otherwise used for commercial purposes  All illustrations and images included in CareNotes® are the copyrighted property of A D A M , Inc  or Shanna Mckinnon  The above information is an  only  It is not intended as medical advice for individual conditions or treatments  Talk to your doctor, nurse or pharmacist before following any medical regimen to see if it is safe and effective for you  Gastritis   WHAT YOU NEED TO KNOW:   What is gastritis? Gastritis is inflammation or irritation of the lining of your stomach  What increases my risk for gastritis? · Infection with bacteria, a virus, or a parasite    · NSAIDs, aspirin, or steroid medicine    · Use of tobacco products or alcohol    · Trauma such as an injury to your stomach or intestine    · Autoimmune disorders such as diabetes, thyroid disease, or Crohn disease    · Stress    · Age older than 60 years    · Illegal drugs, such as cocaine    What are the signs and symptoms of gastritis? · Stomach pain, burning, or tenderness when you press on it    · Stomach fullness or tightness    · Nausea or vomiting    · Loss of appetite, or feeling full quickly when you eat    · Bad breath    · Fatigue or feeling more tired than usual    · Heartburn    How is gastritis diagnosed? Your healthcare provider will ask about your signs and symptoms and examine you  You may need any of the following:  · Blood tests  may be used to show an infection, dehydration, or anemia (low red blood cell levels)  · A bowel movement sample  may be tested for blood or the germ that may be causing your gastritis  · A breath test  may show if H pylori is causing your gastritis  You will be given a liquid to drink  Then you will breathe into a bag   Your healthcare provider will measure the amount of carbon dioxide in your breath  Extra amounts of carbon dioxide may mean you have an H pylori infection  · An endoscopy  may be used to look for irritation or bleeding in your stomach  Your healthcare provider will use an endoscope (tube with a light and camera on the end) during the procedure  He or she may take a sample from your stomach to be tested  How is gastritis treated? Your symptoms may go away without treatment  Treatment will depend on what is causing your gastritis  Your healthcare provider may recommend changes to the medicines you take  Medicines may be given to help treat a bacterial infection or decrease stomach acid  How can I manage or prevent gastritis? · Do not smoke  Nicotine and other chemicals in cigarettes and cigars can make your symptoms worse and cause lung damage  Ask your healthcare provider for information if you currently smoke and need help to quit  E-cigarettes or smokeless tobacco still contain nicotine  Talk to your healthcare provider before you use these products  · Do not drink alcohol  Alcohol can prevent healing and make your gastritis worse  Talk to your healthcare provider if you need help to stop drinking  · Do not take NSAIDs or aspirin unless directed  These and similar medicines can cause irritation of your stomach lining  If your healthcare provider says it is okay to take NSAIDs, take them with food  · Do not eat foods that cause irritation  Foods such as oranges and salsa can cause burning or pain  Eat a variety of healthy foods  Examples include fruits (not citrus), vegetables, low-fat dairy products, beans, whole-grain breads, and lean meats and fish  Try to eat small meals, and drink water with your meals  Do not eat for at least 3 hours before you go to bed  · Find ways to relax and decrease stress  Stress can increase stomach acid and make gastritis worse  Activities such as yoga, meditation, or listening to music can help you relax   Spend time with friends, or do things you enjoy  Call 911 for any of the following:   · You develop chest pain or shortness of breath  When should I seek immediate care? · You vomit blood  · You have black or bloody bowel movements  · You have severe stomach or back pain  When should I contact my healthcare provider? · You have a fever  · You have new or worsening symptoms, even after treatment  · You have questions or concerns about your condition or care  CARE AGREEMENT:   You have the right to help plan your care  Learn about your health condition and how it may be treated  Discuss treatment options with your healthcare providers to decide what care you want to receive  You always have the right to refuse treatment  The above information is an  only  It is not intended as medical advice for individual conditions or treatments  Talk to your doctor, nurse or pharmacist before following any medical regimen to see if it is safe and effective for you  © Copyright [address] Information is for End User's use only and may not be sold, redistributed or otherwise used for commercial purposes   All illustrations and images included in CareNotes® are the copyrighted property of A D A M , Inc  or [address] [place] [address] complains of pain/discomfort

## 2021-04-06 NOTE — ANESTHESIA PREPROCEDURE EVALUATION
Procedure:  COLONOSCOPY  EGD    Relevant Problems   ANESTHESIA (within normal limits)      CARDIO   (+) Hypertension   (+) Migraine      ENDO   (+) Controlled type 2 diabetes mellitus with microalbuminuria, without long-term current use of insulin (HCC)      GI/HEPATIC   (+) Gastroesophageal reflux disease      /RENAL   (+) Acquired cystic kidney disease   (+) Nephrolithiasis      NEURO/PSYCH   (+) Anxiety disorder   (+) Headache      Other   (+) Allergic rhinitis   (+) Colon, diverticulosis   (+) Dyslipidemia   (+) Elevated TSH   (+) Herniated nucleus pulposus, L4-5   (+) Irritable bowel syndrome   (+) Leiomyoma of uterus   (+) Multiple thyroid nodules   (+) Overweight        Physical Exam    Airway    Mallampati score: III  TM Distance: >3 FB  Neck ROM: full     Dental   No notable dental hx upper dentures,     Cardiovascular  Rhythm: regular, Rate: normal, Cardiovascular exam normal    Pulmonary  Pulmonary exam normal Breath sounds clear to auscultation, Decreased breath sounds,     Other Findings        Anesthesia Plan  ASA Score- 3     Anesthesia Type- IV sedation with anesthesia with ASA Monitors  Additional Monitors:   Airway Plan:           Plan Factors-Exercise tolerance (METS): >4 METS  Chart reviewed  Patient summary reviewed  Patient is not a current smoker  Induction- intravenous  Postoperative Plan-     Informed Consent- Anesthetic plan and risks discussed with patient

## 2021-05-02 LAB
EST. AVERAGE GLUCOSE BLD GHB EST-MCNC: 177 MG/DL
GLUCOSE SERPL-MCNC: 170 MG/DL (ref 65–99)
HBA1C MFR BLD: 7.8 % (ref 4.8–5.6)
SL AMB T4, FREE (DIRECT): 0.97 NG/DL (ref 0.82–1.77)
TSH SERPL DL<=0.005 MIU/L-ACNC: 13.5 UIU/ML (ref 0.45–4.5)

## 2021-05-02 PROCEDURE — 3051F HG A1C>EQUAL 7.0%<8.0%: CPT | Performed by: NURSE PRACTITIONER

## 2021-05-03 DIAGNOSIS — E03.9 HYPOTHYROIDISM, UNSPECIFIED TYPE: Primary | ICD-10-CM

## 2021-05-03 DIAGNOSIS — R79.89 ELEVATED TSH: ICD-10-CM

## 2021-05-03 RX ORDER — LEVOTHYROXINE SODIUM 0.03 MG/1
25 TABLET ORAL EVERY MORNING
Qty: 30 TABLET | Refills: 1 | Status: SHIPPED | OUTPATIENT
Start: 2021-05-03 | End: 2021-06-21

## 2021-05-05 LAB
LEFT EYE DIABETIC RETINOPATHY: NORMAL
RIGHT EYE DIABETIC RETINOPATHY: NORMAL

## 2021-05-06 ENCOUNTER — TRANSCRIBE ORDERS (OUTPATIENT)
Dept: ADMINISTRATIVE | Facility: HOSPITAL | Age: 76
End: 2021-05-06

## 2021-05-06 DIAGNOSIS — Z12.31 ENCOUNTER FOR SCREENING MAMMOGRAM FOR MALIGNANT NEOPLASM OF BREAST: Primary | ICD-10-CM

## 2021-05-10 ENCOUNTER — OFFICE VISIT (OUTPATIENT)
Dept: FAMILY MEDICINE CLINIC | Facility: HOSPITAL | Age: 76
End: 2021-05-10
Payer: COMMERCIAL

## 2021-05-10 VITALS
HEART RATE: 92 BPM | DIASTOLIC BLOOD PRESSURE: 84 MMHG | WEIGHT: 170.6 LBS | TEMPERATURE: 97.8 F | BODY MASS INDEX: 30.23 KG/M2 | HEIGHT: 63 IN | SYSTOLIC BLOOD PRESSURE: 142 MMHG

## 2021-05-10 DIAGNOSIS — E11.29 CONTROLLED TYPE 2 DIABETES MELLITUS WITH MICROALBUMINURIA, WITHOUT LONG-TERM CURRENT USE OF INSULIN (HCC): Primary | ICD-10-CM

## 2021-05-10 DIAGNOSIS — M25.512 ACUTE PAIN OF LEFT SHOULDER: ICD-10-CM

## 2021-05-10 DIAGNOSIS — R79.89 ELEVATED TSH: ICD-10-CM

## 2021-05-10 DIAGNOSIS — E28.39 MENOPAUSE OVARIAN FAILURE: ICD-10-CM

## 2021-05-10 DIAGNOSIS — I10 ESSENTIAL HYPERTENSION: ICD-10-CM

## 2021-05-10 DIAGNOSIS — E03.9 HYPOTHYROIDISM, UNSPECIFIED TYPE: ICD-10-CM

## 2021-05-10 DIAGNOSIS — R80.9 CONTROLLED TYPE 2 DIABETES MELLITUS WITH MICROALBUMINURIA, WITHOUT LONG-TERM CURRENT USE OF INSULIN (HCC): Primary | ICD-10-CM

## 2021-05-10 PROCEDURE — 3079F DIAST BP 80-89 MM HG: CPT | Performed by: INTERNAL MEDICINE

## 2021-05-10 PROCEDURE — 1160F RVW MEDS BY RX/DR IN RCRD: CPT | Performed by: INTERNAL MEDICINE

## 2021-05-10 PROCEDURE — 3077F SYST BP >= 140 MM HG: CPT | Performed by: INTERNAL MEDICINE

## 2021-05-10 PROCEDURE — 3008F BODY MASS INDEX DOCD: CPT | Performed by: INTERNAL MEDICINE

## 2021-05-10 PROCEDURE — 3066F NEPHROPATHY DOC TX: CPT | Performed by: INTERNAL MEDICINE

## 2021-05-10 PROCEDURE — 99214 OFFICE O/P EST MOD 30 MIN: CPT | Performed by: INTERNAL MEDICINE

## 2021-05-10 PROCEDURE — 1036F TOBACCO NON-USER: CPT | Performed by: INTERNAL MEDICINE

## 2021-05-10 RX ORDER — PANTOPRAZOLE SODIUM 40 MG/1
40 TABLET, DELAYED RELEASE ORAL DAILY
COMMUNITY
Start: 2021-04-30 | End: 2021-09-06

## 2021-05-10 NOTE — ASSESSMENT & PLAN NOTE
BP above goal and only mildly improved by end of visit - urged diet/exercise/wgt loss, con't current meds for now - recheck in 3 mos and if still above goal will need to adjust meds

## 2021-05-10 NOTE — PROGRESS NOTES
Assessment/Plan:    Controlled type 2 diabetes mellitus with microalbuminuria, without long-term current use of insulin ([facility])    Lab Results   Component Value Date    HGBA1C 7 8 (H) 05/01/2021   DM type 2 with hyperglycemia and microalbuminuria - uncontrolled with A1C 7 8 - urged diet/exercise/wgt loss, con't current Metformin - repeat BW in 3 mo - order given - if A1C still > 7 5 will add second agent, UTD on foot exam (7/20) and will get copy of recent eye exam, on ACE but no statin    Hypothyroidism  Levothyroxine started, order to repeat TFT's q 6 wk given, will follow    Hypertension  BP above goal and only mildly improved by end of visit - urged diet/exercise/wgt loss, con't current meds for now - recheck in 3 mos and if still above goal will need to adjust meds    Osteopenia  Due for Dexa - order given       Diagnoses and all orders for this visit:    Controlled type 2 diabetes mellitus with microalbuminuria, without long-term current use of insulin ([facility])  -     Glucose, fasting; Future  -     Hemoglobin A1C; Future  -     Glucose, fasting  -     Hemoglobin A1C    Elevated TSH  -     TSH, 3rd generation with Free T4 reflex; Future  -     TSH, 3rd generation with Free T4 reflex    Hypothyroidism, unspecified type  -     TSH, 3rd generation with Free T4 reflex; Future  -     TSH, 3rd generation with Free T4 reflex    Essential hypertension    Acute pain of left shoulder  Comments:  Likely strain or bursitis from gardening - encouraged Tylenol and gentle ROM, if not better will need PT - call if not better OR if new/worse symptoms occur    Menopause ovarian failure  -     DXA bone density spine hip and pelvis; Future    Other orders  -     pantoprazole (PROTONIX) 40 mg tablet;  Take 40 mg by mouth daily      Colonoscopy 4/21 - no further screening needed d/t age    Mammo 6/20 - has scheduled    Dexa 4/19 - osteopenia - order given    Had both COVID vaccine's        Subjective:      Patient ID: Mariana Arce is a 70 y o  female  HPI Pt here for follow up appt and BW results    BW results were d/w pt in detail: FBS/A1C 170/7 8, TSH now up at 13 500 and FT4 was wnl  Def of controlled vs uncontrolled DM was reviewed  Diet was reviewed - wgt up 1 lb from Jan 2021  She admits diet was worse over the previous few mos  She is taking her DM meds as directed  She is UTD on foot exam (7/20) and is still due for her eye exam - states she had it done and we will obtain a copy of results  She is on an ACE but no statin  Pt was notified over the phone of the jump up in her TSH and she was subsequently started on thyroid medication - levothyroxine 25 mcg 1 tab PO q am   She denies any significant wgt changes/fatigue/C/D/tremor/palp/hair loss or skin changes  She has h/o thyroid nodules and last thyroid US was 10/10/20 - multiple nodules B/L that were stable and did not meet requirement for biopsy  BP above goal today and meds were reviewed and no changes have occurred  She denies missing doses of meds or SE with the meds  She does not check her BP outside the office  She notes no frequent Ha's/dizziness/double vision/CP  Pt with L shoulder pain x 2-3 days after throwing a bag of topsoil and gardening  She has been using Tylenol w/some benefit  Colonoscopy 4/21 - no further screening needed d/t age    Mammo 6/20 - has scheduled    Dexa 4/19 - osteopenia      Review of Systems   Constitutional: Negative for chills, fever and unexpected weight change  HENT: Negative for congestion and sore throat  Eyes: Negative for pain and visual disturbance  Respiratory: Negative for cough, shortness of breath and wheezing  Cardiovascular: Negative for chest pain, palpitations and leg swelling  Gastrointestinal: Negative for abdominal pain, blood in stool, constipation, diarrhea and nausea  Endocrine: Negative for polydipsia and polyuria     Genitourinary: Negative for difficulty urinating, dysuria, hematuria and vaginal bleeding  Musculoskeletal: Positive for arthralgias  Negative for back pain and neck pain  Skin: Negative for rash and wound  Neurological: Negative for dizziness, weakness, light-headedness, numbness and headaches  Hematological: Negative for adenopathy  Psychiatric/Behavioral: Negative for behavioral problems and confusion  Objective:    /84   Pulse 92   Temp 97 8 °F (36 6 °C) (Temporal)   Ht 5' 3" (1 6 m)   Wt 77 4 kg (170 lb 9 6 oz)   BMI 30 22 kg/m²      Physical Exam  Vitals signs and nursing note reviewed  Constitutional:       General: She is not in acute distress  Appearance: She is well-developed  She is obese  She is not ill-appearing  HENT:      Head: Normocephalic and atraumatic  Eyes:      General:         Right eye: No discharge  Left eye: No discharge  Conjunctiva/sclera: Conjunctivae normal    Neck:      Musculoskeletal: Neck supple  Trachea: No tracheal deviation  Cardiovascular:      Rate and Rhythm: Normal rate and regular rhythm  Heart sounds: Normal heart sounds  No murmur  No friction rub  Pulmonary:      Effort: Pulmonary effort is normal  No respiratory distress  Breath sounds: Normal breath sounds  No wheezing, rhonchi or rales  Abdominal:      General: There is no distension  Palpations: Abdomen is soft  Tenderness: There is no abdominal tenderness  There is no guarding or rebound  Musculoskeletal:      Comments: L shoulder: + pain with abduction > 90 degrees, weakness to drop arm test d/t pain otherwise nml AROM   Skin:     General: Skin is warm  Findings: No rash  Neurological:      Mental Status: She is alert  Sensory: No sensory deficit  Motor: No abnormal muscle tone  Psychiatric:         Mood and Affect: Mood normal          Behavior: Behavior normal          Thought Content:  Thought content normal          Judgment: Judgment normal

## 2021-05-10 NOTE — ASSESSMENT & PLAN NOTE
Lab Results   Component Value Date    HGBA1C 7 8 (H) 05/01/2021   DM type 2 with hyperglycemia and microalbuminuria - uncontrolled with A1C 7 8 - urged diet/exercise/wgt loss, con't current Metformin - repeat BW in 3 mo - order given - if A1C still > 7 5 will add second agent, UTD on foot exam (7/20) and will get copy of recent eye exam, on ACE but no statin

## 2021-06-21 DIAGNOSIS — R79.89 ELEVATED TSH: ICD-10-CM

## 2021-06-21 DIAGNOSIS — E03.9 HYPOTHYROIDISM, UNSPECIFIED TYPE: ICD-10-CM

## 2021-06-21 RX ORDER — LEVOTHYROXINE SODIUM 0.03 MG/1
TABLET ORAL
Qty: 30 TABLET | Refills: 1 | Status: SHIPPED | OUTPATIENT
Start: 2021-06-21 | End: 2021-06-27 | Stop reason: SDUPTHER

## 2021-06-22 ENCOUNTER — PATIENT OUTREACH (OUTPATIENT)
Dept: CASE MANAGEMENT | Facility: OTHER | Age: 76
End: 2021-06-22

## 2021-06-25 ENCOUNTER — OFFICE VISIT (OUTPATIENT)
Dept: GASTROENTEROLOGY | Facility: CLINIC | Age: 76
End: 2021-06-25
Payer: COMMERCIAL

## 2021-06-25 VITALS
BODY MASS INDEX: 30.44 KG/M2 | WEIGHT: 171.8 LBS | HEART RATE: 78 BPM | HEIGHT: 63 IN | SYSTOLIC BLOOD PRESSURE: 148 MMHG | DIASTOLIC BLOOD PRESSURE: 82 MMHG

## 2021-06-25 DIAGNOSIS — K25.3 ACUTE GASTRIC ULCER WITHOUT HEMORRHAGE OR PERFORATION: Primary | ICD-10-CM

## 2021-06-25 DIAGNOSIS — R10.31 RIGHT LOWER QUADRANT ABDOMINAL PAIN: ICD-10-CM

## 2021-06-25 DIAGNOSIS — K21.9 GASTROESOPHAGEAL REFLUX DISEASE WITHOUT ESOPHAGITIS: ICD-10-CM

## 2021-06-25 PROCEDURE — 3079F DIAST BP 80-89 MM HG: CPT | Performed by: INTERNAL MEDICINE

## 2021-06-25 PROCEDURE — 99213 OFFICE O/P EST LOW 20 MIN: CPT | Performed by: INTERNAL MEDICINE

## 2021-06-25 PROCEDURE — 3008F BODY MASS INDEX DOCD: CPT | Performed by: INTERNAL MEDICINE

## 2021-06-25 PROCEDURE — 3077F SYST BP >= 140 MM HG: CPT | Performed by: INTERNAL MEDICINE

## 2021-06-25 PROCEDURE — 1160F RVW MEDS BY RX/DR IN RCRD: CPT | Performed by: INTERNAL MEDICINE

## 2021-06-25 PROCEDURE — 1036F TOBACCO NON-USER: CPT | Performed by: INTERNAL MEDICINE

## 2021-06-25 NOTE — PROGRESS NOTES
[address] [facility] - Outpatient Follow-up Note  Mariana Arce 78 y o  female MRN: [id number]  Encounter: [id number]    ASSESSMENT AND PLAN:      1  Acute gastric ulcer without hemorrhage or perforation  EGD 04/06/2021 revealed small gastric ulcer, irregular Z-line, biopsies negative for H pylori or malignancy  She has been treated with pantoprazole 40 mg daily and is not experiencing any further reflux symptoms  -scheduled for follow-up EGD in July 2021 at [facility]  -continue pantoprazole 40 mg daily  Can discuss continuation of treatment with PPI after  EGD evaluation    2  Gastroesophageal reflux disease without esophagitis  -improved with PPI      3  Right lower quadrant abdominal pain   -resolved, no further evaluation needed    4  Screening for colon cancer   Colonoscopy 04/06/2021 showed normal colon, diverticuli  Repeat colonoscopy not recommended due to eat    Followup Appointment:  6 months  ______________________________________________________________________    Chief Complaint   Patient presents with    Follow up Protonix  HPI:  14-year-old female seen previously right lower quadrant pain, change in bowel habits with diarrhea alternating with constipation and reflux symptoms  She subsequently underwent EGD 04/06/2021 which showed small gastric ulcer, irregular Z-line  Biopsies were negative for malignancy, H pylori, intestinal metaplasia or dysplasia  She also underwent colonoscopy on 04/06/2021 that showed diverticuli but normal colon with no polyps peer  She was started on pantoprazole 40 mg daily which she feels has significantly improved her symptoms  She denies any recent heartburn, indigestion or reflux  No dysphagia  She also denies any further right lower quadrant or abdominal pain  Her bowel movements are more regulated with formed bowel movement every 1-2 days  No further issues with constipation or diarrhea    No melena or rectal bleeding    Historical Information   Past Medical History:   Diagnosis Date    GERD (gastroesophageal reflux disease)     Hypertension     Kidney stone     Migraine     Wrist fracture     left      Past Surgical History:   Procedure Laterality Date    APPENDECTOMY      CATARACT EXTRACTION, BILATERAL Bilateral 2020    COLONOSCOPY      complete 11/29/11- 5 years / complete 6/9/04 -10years    COLONOSCOPY      Exam in November 2011 revealed small polyp, sigmoid diverticulosis, internal and external hemorrhoids  The polyp was  inflammatory polyp so followup in 10 years recommended   FL RETROGRADE PYELOGRAM  12/18/2020    HEMORRHOID SURGERY      KIDNEY STONE SURGERY      NC CYSTO/URETERO W/LITHOTRIPSY &INDWELL STENT INSRT Right 12/18/2020    Procedure: CYSTOSCOPY URETEROSCOPY RETROGRADE PYELOGRAM AND INSERTION STENT URETERAL;  Surgeon: Keyur Godoy MD;  Location: [facility];  Service: Urology    TONSILLECTOMY      TUBAL LIGATION      UPPER GASTROINTESTINAL ENDOSCOPY      November 2011 with hiatal hernia irregular Z-line and gastritis  Biopsies show question of intestinal metaplasia verses just reflux inflammation, H pylori was negative, nonspecific duodenitis       Social History     Substance and Sexual Activity   Alcohol Use Not Currently     Social History     Substance and Sexual Activity   Drug Use No     Social History     Tobacco Use   Smoking Status Never Smoker   Smokeless Tobacco Never Used     Family History   Problem Relation Age of Onset    Hypertension Mother     Thyroid disease Mother     Breast cancer Mother 80    Coronary artery disease Father     Hypertension Father     Other Father         smoker    Diabetes Paternal Grandmother     Endometrial cancer Paternal Grandmother         age unknown    Prostate cancer Maternal Grandfather 76    Breast cancer Cousin         age unknown    No Known Problems Sister     No Known Problems Daughter     No Known Problems Sister     No Known Problems Daughter     No Known Problems Maternal Aunt     No Known Problems Maternal Aunt     Endometrial cancer Cousin         age unknown, maybe 64   Wyatt Rivera Colon polyps Neg Hx     Colon cancer Neg Hx          Current Outpatient Medications:     acetaminophen (TYLENOL) 500 mg tablet    amLODIPine (NORVASC) 10 mg tablet    Cetirizine HCl (ZYRTEC ALLERGY) 10 MG CAPS    Cholecalciferol (VITAMIN D-3) 1000 UNITS CAPS    levothyroxine 25 mcg tablet    lisinopril (ZESTRIL) 40 mg tablet    metoprolol succinate (TOPROL-XL) 100 mg 24 hr tablet    Multiple Vitamins-Minerals (CENTRUM ADULTS PO)    pantoprazole (PROTONIX) 40 mg tablet    metFORMIN (GLUCOPHAGE) 1000 MG tablet  Allergies   Allergen Reactions    Metronidazole Anaphylaxis    Tramadol Swelling    Acetazolamide Other (See Comments)     unknown    Penicillins Hives, Itching and Rash    Statins Myalgia    Hydrochlorothiazide Rash    Sulfa Antibiotics Rash    Tetracycline Rash     Reviewed medications and allergies and updated as indicated    PHYSICAL EXAM:    Blood pressure 148/82, pulse 78, height 5' 3" (1 6 m), weight 77 9 kg (171 lb 12 8 oz)  Body mass index is 30 43 kg/m²  General Appearance: NAD, cooperative, alert  Eyes: Anicteric, PERRLA, EOMI  ENT:  Normocephalic, atraumatic, normal mucosa  Neck:  Supple, symmetrical, trachea midline  Resp:  Clear to auscultation bilaterally; no rales, rhonchi or wheezing; respirations unlabored   CV:  S1 S2, Regular rate and rhythm; no murmur, rub, or gallop  GI:  Soft, non-tender, non-distended; normal bowel sounds; no masses, no organomegaly   Rectal: Deferred  Musculoskeletal: No cyanosis, clubbing or edema  Normal ROM    Skin:  No jaundice, rashes, or lesions   Psych: Normal affect, good eye contact  Neuro: No gross deficits, AAOx3    Lab Results:   Lab Results   Component Value Date    WBC 7 1 01/15/2021    HGB 15 1 01/15/2021    HCT 43 9 01/15/2021    MCV 91 01/15/2021     01/15/2021     Lab Results Component Value Date     04/08/2017    K 4 0 01/15/2021    CL 97 01/15/2021    CO2 25 01/15/2021    ANIONGAP 8 11/18/2013    BUN 15 01/15/2021    CREATININE 0 81 01/15/2021    GLUCOSE 120 (H) 07/28/2017    GLUF 128 (H) 02/03/2018    CALCIUM 9 2 10/16/2020    AST 23 01/15/2021    ALT 28 01/15/2021    ALKPHOS 75 07/27/2020    PROT 7 1 04/08/2017    BILITOT 0 5 04/08/2017    EGFR 76 10/16/2020

## 2021-06-25 NOTE — H&P (VIEW-ONLY)
[address] [facility] - Outpatient Follow-up Note  Delia Mckinney 78 y o  female MRN: [id number]  Encounter: [id number]    ASSESSMENT AND PLAN:      1  Acute gastric ulcer without hemorrhage or perforation  EGD 04/06/2021 revealed small gastric ulcer, irregular Z-line, biopsies negative for H pylori or malignancy  She has been treated with pantoprazole 40 mg daily and is not experiencing any further reflux symptoms  -scheduled for follow-up EGD in July 2021 at [facility]  -continue pantoprazole 40 mg daily  Can discuss continuation of treatment with PPI after  EGD evaluation    2  Gastroesophageal reflux disease without esophagitis  -improved with PPI      3  Right lower quadrant abdominal pain   -resolved, no further evaluation needed    4  Screening for colon cancer   Colonoscopy 04/06/2021 showed normal colon, diverticuli  Repeat colonoscopy not recommended due to eat    Followup Appointment:  6 months  ______________________________________________________________________    Chief Complaint   Patient presents with    Follow up Protonix  HPI:  61-year-old female seen previously right lower quadrant pain, change in bowel habits with diarrhea alternating with constipation and reflux symptoms  She subsequently underwent EGD 04/06/2021 which showed small gastric ulcer, irregular Z-line  Biopsies were negative for malignancy, H pylori, intestinal metaplasia or dysplasia  She also underwent colonoscopy on 04/06/2021 that showed diverticuli but normal colon with no polyps peer  She was started on pantoprazole 40 mg daily which she feels has significantly improved her symptoms  She denies any recent heartburn, indigestion or reflux  No dysphagia  She also denies any further right lower quadrant or abdominal pain  Her bowel movements are more regulated with formed bowel movement every 1-2 days  No further issues with constipation or diarrhea    No melena or rectal bleeding    Historical Information   Past Medical History:   Diagnosis Date    GERD (gastroesophageal reflux disease)     Hypertension     Kidney stone     Migraine     Wrist fracture     left      Past Surgical History:   Procedure Laterality Date    APPENDECTOMY      CATARACT EXTRACTION, BILATERAL Bilateral 2020    COLONOSCOPY      complete 11/29/11- 5 years / complete 6/9/04 -10years    COLONOSCOPY      Exam in November 2011 revealed small polyp, sigmoid diverticulosis, internal and external hemorrhoids  The polyp was  inflammatory polyp so followup in 10 years recommended   FL RETROGRADE PYELOGRAM  12/18/2020    HEMORRHOID SURGERY      KIDNEY STONE SURGERY      NE CYSTO/URETERO W/LITHOTRIPSY &INDWELL STENT INSRT Right 12/18/2020    Procedure: CYSTOSCOPY URETEROSCOPY RETROGRADE PYELOGRAM AND INSERTION STENT URETERAL;  Surgeon: Shaunna Galloway MD;  Location:  MAIN OR;  Service: Urology    TONSILLECTOMY      TUBAL LIGATION      UPPER GASTROINTESTINAL ENDOSCOPY      November 2011 with hiatal hernia irregular Z-line and gastritis  Biopsies show question of intestinal metaplasia verses just reflux inflammation, H pylori was negative, nonspecific duodenitis       Social History     Substance and Sexual Activity   Alcohol Use Not Currently     Social History     Substance and Sexual Activity   Drug Use No     Social History     Tobacco Use   Smoking Status Never Smoker   Smokeless Tobacco Never Used     Family History   Problem Relation Age of Onset    Hypertension Mother     Thyroid disease Mother     Breast cancer Mother 80    Coronary artery disease Father     Hypertension Father     Other Father         smoker    Diabetes Paternal Grandmother     Endometrial cancer Paternal Grandmother         age unknown    Prostate cancer Maternal Grandfather 76    Breast cancer Cousin         age unknown    No Known Problems Sister     No Known Problems Daughter     No Known Problems Sister     No Known Problems Daughter     No Known Problems Maternal Aunt     No Known Problems Maternal Aunt     Endometrial cancer Cousin         age unknown, maybe 64   Augustin Roles Colon polyps Neg Hx     Colon cancer Neg Hx          Current Outpatient Medications:     acetaminophen (TYLENOL) 500 mg tablet    amLODIPine (NORVASC) 10 mg tablet    Cetirizine HCl (ZYRTEC ALLERGY) 10 MG CAPS    Cholecalciferol (VITAMIN D-3) 1000 UNITS CAPS    levothyroxine 25 mcg tablet    lisinopril (ZESTRIL) 40 mg tablet    metoprolol succinate (TOPROL-XL) 100 mg 24 hr tablet    Multiple Vitamins-Minerals (CENTRUM ADULTS PO)    pantoprazole (PROTONIX) 40 mg tablet    metFORMIN (GLUCOPHAGE) 1000 MG tablet  Allergies   Allergen Reactions    Metronidazole Anaphylaxis    Tramadol Swelling    Acetazolamide Other (See Comments)     unknown    Penicillins Hives, Itching and Rash    Statins Myalgia    Hydrochlorothiazide Rash    Sulfa Antibiotics Rash    Tetracycline Rash     Reviewed medications and allergies and updated as indicated    PHYSICAL EXAM:    Blood pressure 148/82, pulse 78, height 5' 3" (1 6 m), weight 77 9 kg (171 lb 12 8 oz)  Body mass index is 30 43 kg/m²  General Appearance: NAD, cooperative, alert  Eyes: Anicteric, PERRLA, EOMI  ENT:  Normocephalic, atraumatic, normal mucosa  Neck:  Supple, symmetrical, trachea midline  Resp:  Clear to auscultation bilaterally; no rales, rhonchi or wheezing; respirations unlabored   CV:  S1 S2, Regular rate and rhythm; no murmur, rub, or gallop  GI:  Soft, non-tender, non-distended; normal bowel sounds; no masses, no organomegaly   Rectal: Deferred  Musculoskeletal: No cyanosis, clubbing or edema  Normal ROM    Skin:  No jaundice, rashes, or lesions   Psych: Normal affect, good eye contact  Neuro: No gross deficits, AAOx3    Lab Results:   Lab Results   Component Value Date    WBC 7 1 01/15/2021    HGB 15 1 01/15/2021    HCT 43 9 01/15/2021    MCV 91 01/15/2021     01/15/2021     Lab Results Component Value Date     04/08/2017    K 4 0 01/15/2021    CL 97 01/15/2021    CO2 25 01/15/2021    ANIONGAP 8 11/18/2013    BUN 15 01/15/2021    CREATININE 0 81 01/15/2021    GLUCOSE 120 (H) 07/28/2017    GLUF 128 (H) 02/03/2018    CALCIUM 9 2 10/16/2020    AST 23 01/15/2021    ALT 28 01/15/2021    ALKPHOS 75 07/27/2020    PROT 7 1 04/08/2017    BILITOT 0 5 04/08/2017    EGFR 76 10/16/2020

## 2021-06-25 NOTE — LETTER
June 25, 2021     Susana Clos, [address] [facility] [address]  [address] [facility]  [address] [address] [address]    Patient: Areli Garcia   YOB: 1945   Date of Visit: 6/25/2021       Dear Dr Enrike Moreno: Thank you for referring Ruth Malone to me for evaluation  Below are my notes for this consultation  If you have questions, please do not hesitate to call me  I look forward to following your patient along with you  Sincerely,        Jakub Jarrell MD        CC: No Recipients  KLAUDIA Velasquez  6/25/2021  3:28 PM  Sign when Signing Visit  [address] [facility] - Outpatient Follow-up Note  Areli Garcia 78 y o  female MRN: [id number]  Encounter: [id number]    ASSESSMENT AND PLAN:      1  Acute gastric ulcer without hemorrhage or perforation  EGD 04/06/2021 revealed small gastric ulcer, irregular Z-line, biopsies negative for H pylori or malignancy  She has been treated with pantoprazole 40 mg daily and is not experiencing any further reflux symptoms  -scheduled for follow-up EGD in July 2021 at [facility]  -continue pantoprazole 40 mg daily  Can discuss continuation of treatment with PPI after  EGD evaluation    2  Gastroesophageal reflux disease without esophagitis  -improved with PPI      3  Right lower quadrant abdominal pain   -resolved, no further evaluation needed    4  Screening for colon cancer   Colonoscopy 04/06/2021 showed normal colon, diverticuli  Repeat colonoscopy not recommended due to eat    Followup Appointment:  6 months  ______________________________________________________________________    Chief Complaint   Patient presents with    Follow up Protonix  HPI:  53-year-old female seen previously right lower quadrant pain, change in bowel habits with diarrhea alternating with constipation and reflux symptoms  She subsequently underwent EGD 04/06/2021 which showed small gastric ulcer, irregular Z-line    Biopsies were negative for malignancy, H pylori, intestinal metaplasia or dysplasia  She also underwent colonoscopy on 04/06/2021 that showed diverticuli but normal colon with no polyps peer  She was started on pantoprazole 40 mg daily which she feels has significantly improved her symptoms  She denies any recent heartburn, indigestion or reflux  No dysphagia  She also denies any further right lower quadrant or abdominal pain  Her bowel movements are more regulated with formed bowel movement every 1-2 days  No further issues with constipation or diarrhea  No melena or rectal bleeding    Historical Information   Past Medical History:   Diagnosis Date    GERD (gastroesophageal reflux disease)     Hypertension     Kidney stone     Migraine     Wrist fracture     left      Past Surgical History:   Procedure Laterality Date    APPENDECTOMY      CATARACT EXTRACTION, BILATERAL Bilateral 2020    COLONOSCOPY      complete 11/29/11- 5 years / complete 6/9/04 -10years    COLONOSCOPY      Exam in November 2011 revealed small polyp, sigmoid diverticulosis, internal and external hemorrhoids  The polyp was  inflammatory polyp so followup in 10 years recommended   FL RETROGRADE PYELOGRAM  12/18/2020    HEMORRHOID SURGERY      KIDNEY STONE SURGERY      TN CYSTO/URETERO W/LITHOTRIPSY &INDWELL STENT INSRT Right 12/18/2020    Procedure: CYSTOSCOPY URETEROSCOPY RETROGRADE PYELOGRAM AND INSERTION STENT URETERAL;  Surgeon: Jelly Torrez MD;  Location:  MAIN OR;  Service: Urology    TONSILLECTOMY      TUBAL LIGATION      UPPER GASTROINTESTINAL ENDOSCOPY      November 2011 with hiatal hernia irregular Z-line and gastritis  Biopsies show question of intestinal metaplasia verses just reflux inflammation, H pylori was negative, nonspecific duodenitis       Social History     Substance and Sexual Activity   Alcohol Use Not Currently     Social History     Substance and Sexual Activity   Drug Use No     Social History     Tobacco Use   Smoking Status Never Smoker Smokeless Tobacco Never Used     Family History   Problem Relation Age of Onset    Hypertension Mother     Thyroid disease Mother     Breast cancer Mother 80    Coronary artery disease Father     Hypertension Father     Other Father         smoker    Diabetes Paternal Grandmother     Endometrial cancer Paternal Grandmother         age unknown    Prostate cancer Maternal Grandfather 76    Breast cancer Cousin         age unknown    No Known Problems Sister     No Known Problems Daughter     No Known Problems Sister     No Known Problems Daughter     No Known Problems Maternal Aunt     No Known Problems Maternal Aunt     Endometrial cancer Cousin         age unknown, maybe 64   Laith Robby Colon polyps Neg Hx     Colon cancer Neg Hx          Current Outpatient Medications:     acetaminophen (TYLENOL) 500 mg tablet    amLODIPine (NORVASC) 10 mg tablet    Cetirizine HCl (ZYRTEC ALLERGY) 10 MG CAPS    Cholecalciferol (VITAMIN D-3) 1000 UNITS CAPS    levothyroxine 25 mcg tablet    lisinopril (ZESTRIL) 40 mg tablet    metoprolol succinate (TOPROL-XL) 100 mg 24 hr tablet    Multiple Vitamins-Minerals (CENTRUM ADULTS PO)    pantoprazole (PROTONIX) 40 mg tablet    metFORMIN (GLUCOPHAGE) 1000 MG tablet  Allergies   Allergen Reactions    Metronidazole Anaphylaxis    Tramadol Swelling    Acetazolamide Other (See Comments)     unknown    Penicillins Hives, Itching and Rash    Statins Myalgia    Hydrochlorothiazide Rash    Sulfa Antibiotics Rash    Tetracycline Rash     Reviewed medications and allergies and updated as indicated    PHYSICAL EXAM:    Blood pressure 148/82, pulse 78, height 5' 3" (1 6 m), weight 77 9 kg (171 lb 12 8 oz)  Body mass index is 30 43 kg/m²  General Appearance: NAD, cooperative, alert  Eyes: Anicteric, PERRLA, EOMI  ENT:  Normocephalic, atraumatic, normal mucosa      Neck:  Supple, symmetrical, trachea midline  Resp:  Clear to auscultation bilaterally; no rales, rhonchi or wheezing; respirations unlabored   CV:  S1 S2, Regular rate and rhythm; no murmur, rub, or gallop  GI:  Soft, non-tender, non-distended; normal bowel sounds; no masses, no organomegaly   Rectal: Deferred  Musculoskeletal: No cyanosis, clubbing or edema  Normal ROM    Skin:  No jaundice, rashes, or lesions   Psych: Normal affect, good eye contact  Neuro: No gross deficits, AAOx3    Lab Results:   Lab Results   Component Value Date    WBC 7 1 01/15/2021    HGB 15 1 01/15/2021    HCT 43 9 01/15/2021    MCV 91 01/15/2021     01/15/2021     Lab Results   Component Value Date     04/08/2017    K 4 0 01/15/2021    CL 97 01/15/2021    CO2 25 01/15/2021    ANIONGAP 8 11/18/2013    BUN 15 01/15/2021    CREATININE 0 81 01/15/2021    GLUCOSE 120 (H) 07/28/2017    GLUF 128 (H) 02/03/2018    CALCIUM 9 2 10/16/2020    AST 23 01/15/2021    ALT 28 01/15/2021    ALKPHOS 75 07/27/2020    PROT 7 1 04/08/2017    BILITOT 0 5 04/08/2017    EGFR 76 10/16/2020

## 2021-06-25 NOTE — TELEPHONE ENCOUNTER
Why does your doctor want you to have this procedure? Ulcer check    Do you have kidney disease?  no  If yes, are you on dialysis :     Have you had diverticulitis within the past 2 months? no    Are you diabetic?  yes  If yes, insulin dependent: If yes, provide diabetic instructions sheet     Do take iron supplements?  no  If yes, instruct patient to hold iron supplement for 7 days prior    Are you on a blood thinner? no   Was the blood thinner sheet complete and faxed to cardiologist no  Plavix (clopidogrel), Coumadin (warfarin), Lovenox (enoxaparin), Xarelto (rivaroxaban), Pradaxa(dabigatran), Eliquis(apixaban) Savaysa/Lixiana (edoxapan)    Do you have an automatic implantable cardiac defibrillator (AICD)/pacemaker ([facility])? no  Was AICD/pacemaker sheet completed and faxed to cardiologist? no    Are you on home oxygen? no  If yes, continuous or nocturnal:     Have you been treated for MRSA, VRE or any communicable diseases? no    Heart attack, stroke, or stent within 3 months? no  Schedule at Hospital if within 3-6 months   Use nitroglycerin for chest pain in the last 6 months? no    History of organ  transplant?  no   If yes, notify Endo      History of neck/throat/tongue surgery or cancer? no  IF yes, notify Endo      Any problems with anesthesia in the past? no     Was stool C diff ordered?  no Stool specimen needs to be completed prior to procedure    Do have any facial or body piercings? yes ears    Do you have a latex allergy? no     Do have an allergy to metals? (Bravo study only) no     If pediatric patient, was consent faxed to pediatrician no     Patient rights reviewed yes    Instructions for EGD reviewed and emailed to patient

## 2021-06-27 LAB
SL AMB T4, FREE (DIRECT): 1.09 NG/DL (ref 0.82–1.77)
TSH SERPL DL<=0.005 MIU/L-ACNC: 9.54 UIU/ML (ref 0.45–4.5)

## 2021-06-28 ENCOUNTER — HOSPITAL ENCOUNTER (OUTPATIENT)
Dept: BONE DENSITY | Facility: IMAGING CENTER | Age: 76
Discharge: HOME | End: 2021-06-28
Payer: COMMERCIAL

## 2021-06-28 ENCOUNTER — HOSPITAL ENCOUNTER (OUTPATIENT)
Dept: MAMMOGRAPHY | Facility: IMAGING CENTER | Age: 76
Discharge: HOME | End: 2021-06-28
Payer: COMMERCIAL

## 2021-06-28 VITALS — HEIGHT: 63 IN | BODY MASS INDEX: 30.3 KG/M2 | WEIGHT: 171 LBS

## 2021-06-28 DIAGNOSIS — E28.39 MENOPAUSE OVARIAN FAILURE: ICD-10-CM

## 2021-06-28 DIAGNOSIS — Z12.31 ENCOUNTER FOR SCREENING MAMMOGRAM FOR MALIGNANT NEOPLASM OF BREAST: ICD-10-CM

## 2021-06-28 PROCEDURE — 77067 SCR MAMMO BI INCL CAD: CPT

## 2021-06-28 PROCEDURE — 77063 BREAST TOMOSYNTHESIS BI: CPT

## 2021-06-28 PROCEDURE — 77080 DXA BONE DENSITY AXIAL: CPT

## 2021-07-15 ENCOUNTER — ANESTHESIA (OUTPATIENT)
Dept: GASTROENTEROLOGY | Facility: AMBULATORY SURGERY CENTER | Age: 76
End: 2021-07-15

## 2021-07-15 ENCOUNTER — HOSPITAL ENCOUNTER (OUTPATIENT)
Dept: GASTROENTEROLOGY | Facility: AMBULATORY SURGERY CENTER | Age: 76
Discharge: HOME | End: 2021-07-15
Payer: COMMERCIAL

## 2021-07-15 ENCOUNTER — ANESTHESIA EVENT (OUTPATIENT)
Dept: GASTROENTEROLOGY | Facility: AMBULATORY SURGERY CENTER | Age: 76
End: 2021-07-15

## 2021-07-15 VITALS
DIASTOLIC BLOOD PRESSURE: 70 MMHG | HEART RATE: 78 BPM | OXYGEN SATURATION: 94 % | RESPIRATION RATE: 25 BRPM | SYSTOLIC BLOOD PRESSURE: 149 MMHG | TEMPERATURE: 98.3 F

## 2021-07-15 DIAGNOSIS — K25.3 ACUTE GASTRIC ULCER WITHOUT HEMORRHAGE OR PERFORATION: ICD-10-CM

## 2021-07-15 DIAGNOSIS — K21.9 GASTROESOPHAGEAL REFLUX DISEASE WITHOUT ESOPHAGITIS: ICD-10-CM

## 2021-07-15 DIAGNOSIS — R10.31 RIGHT LOWER QUADRANT ABDOMINAL PAIN: ICD-10-CM

## 2021-07-15 PROCEDURE — 88305 TISSUE EXAM BY PATHOLOGIST: CPT | Performed by: PATHOLOGY

## 2021-07-15 PROCEDURE — 88341 IMHCHEM/IMCYTCHM EA ADD ANTB: CPT | Performed by: PATHOLOGY

## 2021-07-15 PROCEDURE — 88313 SPECIAL STAINS GROUP 2: CPT | Performed by: PATHOLOGY

## 2021-07-15 PROCEDURE — 88342 IMHCHEM/IMCYTCHM 1ST ANTB: CPT | Performed by: PATHOLOGY

## 2021-07-15 PROCEDURE — 43239 EGD BIOPSY SINGLE/MULTIPLE: CPT | Performed by: INTERNAL MEDICINE

## 2021-07-15 RX ORDER — PROPOFOL 10 MG/ML
INJECTION, EMULSION INTRAVENOUS AS NEEDED
Status: DISCONTINUED | OUTPATIENT
Start: 2021-07-15 | End: 2021-07-15

## 2021-07-15 RX ORDER — SODIUM CHLORIDE, SODIUM LACTATE, POTASSIUM CHLORIDE, CALCIUM CHLORIDE 600; 310; 30; 20 MG/100ML; MG/100ML; MG/100ML; MG/100ML
50 INJECTION, SOLUTION INTRAVENOUS CONTINUOUS
Status: DISCONTINUED | OUTPATIENT
Start: 2021-07-15 | End: 2021-07-19 | Stop reason: HOSPADM

## 2021-07-15 RX ORDER — LIDOCAINE HYDROCHLORIDE 10 MG/ML
INJECTION, SOLUTION EPIDURAL; INFILTRATION; INTRACAUDAL; PERINEURAL AS NEEDED
Status: DISCONTINUED | OUTPATIENT
Start: 2021-07-15 | End: 2021-07-15

## 2021-07-15 RX ADMIN — PROPOFOL 150 MG: 10 INJECTION, EMULSION INTRAVENOUS at 11:31

## 2021-07-15 RX ADMIN — LIDOCAINE HYDROCHLORIDE 100 MG: 10 INJECTION, SOLUTION EPIDURAL; INFILTRATION; INTRACAUDAL; PERINEURAL at 11:31

## 2021-07-15 RX ADMIN — PROPOFOL 50 MG: 10 INJECTION, EMULSION INTRAVENOUS at 11:32

## 2021-07-15 RX ADMIN — SODIUM CHLORIDE, SODIUM LACTATE, POTASSIUM CHLORIDE, CALCIUM CHLORIDE 50 ML/HR: 600; 310; 30; 20 INJECTION, SOLUTION INTRAVENOUS at 11:08

## 2021-07-15 NOTE — INTERVAL H&P NOTE
H&P reviewed  After examining the patient I find no changes in the patients condition since the H&P had been written      Vitals:    07/15/21 1101   BP: 142/72   Pulse: 92   Resp: 18   Temp: 98 3 °F (36 8 °C)   SpO2: 94%

## 2021-07-15 NOTE — DISCHARGE INSTRUCTIONS
Upper Endoscopy   WHAT YOU NEED TO KNOW:   An upper endoscopy is also called an upper gastrointestinal (GI) endoscopy, or an esophagogastroduodenoscopy (EGD)  It is a procedure to examine the inside of your esophagus, stomach, and duodenum (first part of the small intestine) with a scope  You may feel bloated, gassy, or have some abdominal discomfort after your procedure  Your throat may be sore for 24 to 36 hours  You may burp or pass gas from air that is still inside your body  DISCHARGE INSTRUCTIONS:   Seek care immediately if:    You have sudden, severe abdominal pain   You have problems swallowing   You have a large amount of black, sticky bowel movements or blood in your bowel movements   You have sudden trouble breathing   You feel weak, lightheaded, or faint or your heart beats faster than normal for you  Contact your healthcare provider if:    You have a fever and chills   You have nausea or are vomiting   Your abdomen is bloated or feels full and hard   You have abdominal pain   You have black, sticky bowel movements or blood in your bowel movements   You have not had a bowel movement for 3 days after your procedure   You have rash or hives   You have questions or concerns about your procedure  Activity:    Do not lift, strain, or run for 24 hours after your procedure   Rest after your procedure  You have been given medicine to relax you  Do not drive or make important decisions until the day after your procedure  Return to your normal activity as directed   Relieve gas and discomfort from bloating by lying on your right side with a heating pad on your abdomen  You may need to take short walks to help the gas move out  Eat small meals until bloating is relieved  Follow up with your healthcare provider as directed: Write down your questions so you remember to ask them during your visits       If you take a blood thinner, please review the specific instructions from your endoscopist about when you should resume it  These can be found in the Recommendation and Your Medication list sections of this After Visit Summary  Gastritis   WHAT YOU NEED TO KNOW:   What is gastritis? Gastritis is inflammation or irritation of the lining of your stomach  What increases my risk for gastritis? · Infection with bacteria, a virus, or a parasite    · NSAIDs, aspirin, or steroid medicine    · Use of tobacco products or alcohol    · Trauma such as an injury to your stomach or intestine    · Autoimmune disorders such as diabetes, thyroid disease, or Crohn disease    · Stress    · Age older than 60 years    · Illegal drugs, such as cocaine    What are the signs and symptoms of gastritis? · Stomach pain, burning, or tenderness when you press on it    · Stomach fullness or tightness    · Nausea or vomiting    · Loss of appetite, or feeling full quickly when you eat    · Bad breath    · Fatigue or feeling more tired than usual    · Heartburn    How is gastritis diagnosed? Your healthcare provider will ask about your signs and symptoms and examine you  You may need any of the following:  · Blood tests  may be used to show an infection, dehydration, or anemia (low red blood cell levels)  · A bowel movement sample  may be tested for blood or the germ that may be causing your gastritis  · A breath test  may show if H pylori is causing your gastritis  You will be given a liquid to drink  Then you will breathe into a bag  Your healthcare provider will measure the amount of carbon dioxide in your breath  Extra amounts of carbon dioxide may mean you have an H pylori infection  · An endoscopy  may be used to look for irritation or bleeding in your stomach  Your healthcare provider will use an endoscope (tube with a light and camera on the end) during the procedure  He or she may take a sample from your stomach to be tested      How is gastritis treated? Your symptoms may go away without treatment  Treatment will depend on what is causing your gastritis  Your healthcare provider may recommend changes to the medicines you take  Medicines may be given to help treat a bacterial infection or decrease stomach acid  How can I manage or prevent gastritis? · Do not smoke  Nicotine and other chemicals in cigarettes and cigars can make your symptoms worse and cause lung damage  Ask your healthcare provider for information if you currently smoke and need help to quit  E-cigarettes or smokeless tobacco still contain nicotine  Talk to your healthcare provider before you use these products  · Do not drink alcohol  Alcohol can prevent healing and make your gastritis worse  Talk to your healthcare provider if you need help to stop drinking  · Do not take NSAIDs or aspirin unless directed  These and similar medicines can cause irritation of your stomach lining  If your healthcare provider says it is okay to take NSAIDs, take them with food  · Do not eat foods that cause irritation  Foods such as oranges and salsa can cause burning or pain  Eat a variety of healthy foods  Examples include fruits (not citrus), vegetables, low-fat dairy products, beans, whole-grain breads, and lean meats and fish  Try to eat small meals, and drink water with your meals  Do not eat for at least 3 hours before you go to bed  · Find ways to relax and decrease stress  Stress can increase stomach acid and make gastritis worse  Activities such as yoga, meditation, or listening to music can help you relax  Spend time with friends, or do things you enjoy  Call 911 for any of the following:   · You develop chest pain or shortness of breath  When should I seek immediate care? · You vomit blood  · You have black or bloody bowel movements  · You have severe stomach or back pain  When should I contact my healthcare provider? · You have a fever      · You have new or worsening symptoms, even after treatment  · You have questions or concerns about your condition or care  CARE AGREEMENT:   You have the right to help plan your care  Learn about your health condition and how it may be treated  Discuss treatment options with your healthcare providers to decide what care you want to receive  You always have the right to refuse treatment  The above information is an  only  It is not intended as medical advice for individual conditions or treatments  Talk to your doctor, nurse or pharmacist before following any medical regimen to see if it is safe and effective for you  © Copyright [address] Information is for End User's use only and may not be sold, redistributed or otherwise used for commercial purposes   All illustrations and images included in CareNotes® are the copyrighted property of A D A M , [employer]  or [address] [place] [address]

## 2021-07-15 NOTE — ANESTHESIA PREPROCEDURE EVALUATION
Procedure:  EGD    Relevant Problems   CARDIO   (+) Hypertension   (+) Migraine      ENDO   (+) Controlled type 2 diabetes mellitus with microalbuminuria, without long-term current use of insulin (HCC)   (+) Hypothyroidism      GI/HEPATIC   (+) Acute gastric ulcer without hemorrhage or perforation   (+) Gastroesophageal reflux disease      /RENAL   (+) Acquired cystic kidney disease   (+) Nephrolithiasis      NEURO/PSYCH   (+) Anxiety disorder   (+) Headache      Other   (+) Irritable bowel syndrome        Physical Exam    Airway    Mallampati score: I         Dental   upper dentures,     Cardiovascular  Cardiovascular exam normal    Pulmonary  Pulmonary exam normal     Other Findings        Anesthesia Plan  ASA Score- 2     Anesthesia Type- IV sedation with anesthesia with ASA Monitors  Additional Monitors:   Airway Plan:     Comment: I discussed risks (reviewed with patient on the anesthesia consent form), benefits and alternatives of monitored sedation including the possibility under sedation to have recall or mild discomfort          Plan Factors-    Chart reviewed  Patient summary reviewed  Induction- intravenous  Postoperative Plan-     Informed Consent- Anesthetic plan and risks discussed with patient  I personally reviewed this patient with the CRNA  Discussed and agreed on the Anesthesia Plan with the CRNA  Jesus Dixon

## 2021-07-15 NOTE — ANESTHESIA POSTPROCEDURE EVALUATION
Post-Op Assessment Note    CV Status:  Stable    Pain management: adequate     Mental Status:  Alert and awake   Hydration Status:  Euvolemic   PONV Controlled:  Controlled   Airway Patency:  Patent      Post Op Vitals Reviewed: Yes      Staff: Anesthesiologist         No complications documented      /78 (07/15/21 1149)    Temp      Pulse 68 (07/15/21 1149)   Resp 18 (07/15/21 1149)    SpO2 95 % (07/15/21 1149)

## 2021-07-16 ENCOUNTER — TELEPHONE (OUTPATIENT)
Dept: FAMILY MEDICINE CLINIC | Facility: HOSPITAL | Age: 76
End: 2021-07-16

## 2021-07-16 DIAGNOSIS — R80.9 CONTROLLED TYPE 2 DIABETES MELLITUS WITH MICROALBUMINURIA, WITHOUT LONG-TERM CURRENT USE OF INSULIN (HCC): ICD-10-CM

## 2021-07-16 DIAGNOSIS — E11.29 CONTROLLED TYPE 2 DIABETES MELLITUS WITH MICROALBUMINURIA, WITHOUT LONG-TERM CURRENT USE OF INSULIN (HCC): ICD-10-CM

## 2021-07-16 DIAGNOSIS — I10 ESSENTIAL HYPERTENSION: ICD-10-CM

## 2021-07-16 PROCEDURE — 3066F NEPHROPATHY DOC TX: CPT | Performed by: INTERNAL MEDICINE

## 2021-07-16 PROCEDURE — 4010F ACE/ARB THERAPY RXD/TAKEN: CPT | Performed by: INTERNAL MEDICINE

## 2021-07-16 RX ORDER — LISINOPRIL 40 MG/1
TABLET ORAL
Qty: 135 TABLET | Refills: 1 | Status: SHIPPED | OUTPATIENT
Start: 2021-07-16 | End: 2022-01-07

## 2021-07-16 RX ORDER — METOPROLOL SUCCINATE 100 MG/1
TABLET, EXTENDED RELEASE ORAL
Qty: 90 TABLET | Refills: 1 | Status: SHIPPED | OUTPATIENT
Start: 2021-07-16 | End: 2021-08-16 | Stop reason: SDUPTHER

## 2021-08-06 LAB
EST. AVERAGE GLUCOSE BLD GHB EST-MCNC: 171 MG/DL
GLUCOSE SERPL-MCNC: 150 MG/DL (ref 65–99)
HBA1C MFR BLD: 7.6 % (ref 4.8–5.6)

## 2021-08-06 PROCEDURE — 3051F HG A1C>EQUAL 7.0%<8.0%: CPT | Performed by: INTERNAL MEDICINE

## 2021-08-16 ENCOUNTER — OFFICE VISIT (OUTPATIENT)
Dept: FAMILY MEDICINE CLINIC | Facility: HOSPITAL | Age: 76
End: 2021-08-16
Payer: COMMERCIAL

## 2021-08-16 VITALS
DIASTOLIC BLOOD PRESSURE: 78 MMHG | TEMPERATURE: 97.1 F | HEIGHT: 63 IN | HEART RATE: 88 BPM | BODY MASS INDEX: 29.77 KG/M2 | SYSTOLIC BLOOD PRESSURE: 152 MMHG | WEIGHT: 168 LBS

## 2021-08-16 DIAGNOSIS — M85.852 OSTEOPENIA OF NECK OF LEFT FEMUR: ICD-10-CM

## 2021-08-16 DIAGNOSIS — R80.9 CONTROLLED TYPE 2 DIABETES MELLITUS WITH MICROALBUMINURIA, WITHOUT LONG-TERM CURRENT USE OF INSULIN (HCC): Primary | ICD-10-CM

## 2021-08-16 DIAGNOSIS — E11.29 CONTROLLED TYPE 2 DIABETES MELLITUS WITH MICROALBUMINURIA, WITHOUT LONG-TERM CURRENT USE OF INSULIN (HCC): Primary | ICD-10-CM

## 2021-08-16 DIAGNOSIS — K25.3 ACUTE GASTRIC ULCER WITHOUT HEMORRHAGE OR PERFORATION: ICD-10-CM

## 2021-08-16 DIAGNOSIS — I10 ESSENTIAL HYPERTENSION: ICD-10-CM

## 2021-08-16 PROCEDURE — 99214 OFFICE O/P EST MOD 30 MIN: CPT | Performed by: INTERNAL MEDICINE

## 2021-08-16 PROCEDURE — 3066F NEPHROPATHY DOC TX: CPT | Performed by: INTERNAL MEDICINE

## 2021-08-16 PROCEDURE — 1036F TOBACCO NON-USER: CPT | Performed by: INTERNAL MEDICINE

## 2021-08-16 PROCEDURE — 1160F RVW MEDS BY RX/DR IN RCRD: CPT | Performed by: INTERNAL MEDICINE

## 2021-08-16 PROCEDURE — 3288F FALL RISK ASSESSMENT DOCD: CPT | Performed by: INTERNAL MEDICINE

## 2021-08-16 PROCEDURE — 3008F BODY MASS INDEX DOCD: CPT | Performed by: INTERNAL MEDICINE

## 2021-08-16 PROCEDURE — 3078F DIAST BP <80 MM HG: CPT | Performed by: INTERNAL MEDICINE

## 2021-08-16 PROCEDURE — 3077F SYST BP >= 140 MM HG: CPT | Performed by: INTERNAL MEDICINE

## 2021-08-16 PROCEDURE — 1101F PT FALLS ASSESS-DOCD LE1/YR: CPT | Performed by: INTERNAL MEDICINE

## 2021-08-16 RX ORDER — METOPROLOL SUCCINATE 100 MG/1
150 TABLET, EXTENDED RELEASE ORAL DAILY
Qty: 135 TABLET | Refills: 1 | Status: SHIPPED | OUTPATIENT
Start: 2021-08-16 | End: 2022-02-11

## 2021-08-16 NOTE — ASSESSMENT & PLAN NOTE
Bp elevated above goal again, increase Metoprolol 100 mg to 100 mg 1 5 tab daily, con't all other meds, recheck in 3 mos

## 2021-08-16 NOTE — ASSESSMENT & PLAN NOTE
Lab Results   Component Value Date    HGBA1C 7 6 (H) 08/05/2021   DM type 2 with hyperglycemia and microalbuminuria - uncontrolled but improved with a1C 7 6 - urged low sugar/carb diet and exercise, con't current Metformin for now, recheck BW in 3-4 mos - order given, foot exam done today, eye exam 5/21, on ACE but no statin

## 2021-08-16 NOTE — ASSESSMENT & PLAN NOTE
Noted on EGD 4/21 - on PPI, repeat EGD 7/21 showed ulcer had healed, con't PPI as per GI, call with any red flag GI symptoms

## 2021-08-16 NOTE — PROGRESS NOTES
Assessment/Plan:    Controlled type 2 diabetes mellitus with microalbuminuria, without long-term current use of insulin (Formerly [facility])    Lab Results   Component Value Date    HGBA1C 7 6 (H) 08/05/2021   DM type 2 with hyperglycemia and microalbuminuria - uncontrolled but improved with a1C 7 6 - urged low sugar/carb diet and exercise, con't current Metformin for now, recheck BW in 3-4 mos - order given, foot exam done today, eye exam 5/21, on ACE but no statin    Hypertension  Bp elevated above goal again, increase Metoprolol 100 mg to 100 mg 1 5 tab daily, con't all other meds, recheck in 3 mos    Acute gastric ulcer without hemorrhage or perforation  Noted on EGD 4/21 - on PPI, repeat EGD 7/21 showed ulcer had healed, con't PPI as per GI, call with any red flag GI symptoms    Osteopenia  Not on PO calcium d/t h/o kidney stones, cont high calcium diet and Vit D, recheck Dexa in 2 yrs       Diagnoses and all orders for this visit:    Controlled type 2 diabetes mellitus with microalbuminuria, without long-term current use of insulin (Formerly [facility])  -     Glucose, fasting; Future  -     Hemoglobin A1C; Future  -     Glucose, fasting  -     Hemoglobin A1C    Essential hypertension  -     metoprolol succinate (TOPROL-XL) 100 mg 24 hr tablet; Take 1 5 tablets (150 mg total) by mouth daily    Acute gastric ulcer without hemorrhage or perforation    Osteopenia of neck of left femur      Colonoscopy 4/21 - 10 yrs    Mammo 6/21    Dexa 6/21- osteopenia         Subjective:      Patient ID: Amaris Craig is a 78 y o  female  HPI Pt here for follow up appt and BW results    BW results were d/w pt in detail: FBS/A1C 150/7 6     Def of controlled vs uncontrolled DM was reviewed  Diet was reviewed - wgt down 2 lbs from May 2021  She is taking her DM meds as directed  She is UTD on her eye exam (5/21 - 2 yrs) but is due for her foot exam now (7/20)  She notes no numbness/tingling/burning/sores or pain in the feet    She is on an ACE but no statin  BP above goal today and meds were reviewed and no changes have occurred  She denies missing doses of meds or SE with the meds  She does not check her BP outside the office  She notes no frequent HA's/dizziness/double vision/CP  Pt had her colonoscopy and EGD in April  No polyps or masses on the colonoscopy and was told no further colonoscopy is needed d/t age  Her EGD showed a gastric ulcer  They repeated EGD in July and ulcer had healed with Protonix  She is taking Protonix still and notes no current pyrosis/regurgitation of food/abd pain/blood in stool or black stools/N/V  Colonoscopy 4/21 - 10 yrs    Mammo 6/21    Dexa 6/21- osteopenia - no oral calcium d/t h/o kidney stones, is on Vit D and trying to increase high calcium foods, regular wgt bearing exercise also encouraged    Review of Systems   Constitutional: Negative for chills and fever  HENT: Negative for congestion and sore throat  Eyes: Negative for pain and visual disturbance  Respiratory: Negative for cough and shortness of breath  Cardiovascular: Negative for chest pain, palpitations and leg swelling  Gastrointestinal: Negative for abdominal pain, blood in stool, constipation, diarrhea, nausea and vomiting  Endocrine: Negative for polydipsia and polyuria  Genitourinary: Negative for difficulty urinating and dysuria  Musculoskeletal: Negative for back pain and neck pain  Skin: Negative for rash and wound  Neurological: Negative for dizziness, light-headedness, numbness and headaches  Hematological: Negative for adenopathy  Psychiatric/Behavioral: Negative for behavioral problems and confusion  Objective:    /78   Pulse 88   Temp (!) 97 1 °F (36 2 °C) (Tympanic)   Ht 5' 3" (1 6 m)   Wt 76 2 kg (168 lb)   BMI 29 76 kg/m²      Physical Exam  Vitals and nursing note reviewed  Constitutional:       General: She is not in acute distress  Appearance: She is well-developed   She is not ill-appearing  HENT:      Head: Normocephalic and atraumatic  Eyes:      General:         Right eye: No discharge  Left eye: No discharge  Conjunctiva/sclera: Conjunctivae normal    Neck:      Trachea: No tracheal deviation  Cardiovascular:      Rate and Rhythm: Normal rate and regular rhythm  Pulses: no weak pulses          Dorsalis pedis pulses are 2+ on the right side and 2+ on the left side  Heart sounds: Normal heart sounds  No murmur heard  No friction rub  Pulmonary:      Effort: Pulmonary effort is normal  No respiratory distress  Breath sounds: Normal breath sounds  No wheezing, rhonchi or rales  Abdominal:      General: Abdomen is flat  There is no distension  Palpations: Abdomen is soft  Tenderness: There is no abdominal tenderness  There is no guarding or rebound  Musculoskeletal:         General: No deformity or signs of injury  Cervical back: Neck supple  Feet:      Right foot:      Skin integrity: Dry skin present  No ulcer, skin breakdown, erythema, warmth or callus  Left foot:      Skin integrity: Dry skin present  No ulcer, skin breakdown, erythema, warmth or callus  Skin:     General: Skin is warm  Coloration: Skin is not pale  Findings: No rash  Neurological:      General: No focal deficit present  Mental Status: She is alert  Motor: No abnormal muscle tone  Gait: Gait normal    Psychiatric:         Mood and Affect: Mood normal          Behavior: Behavior normal          Thought Content: Thought content normal          Judgment: Judgment normal        Patient's shoes and socks removed  Right Foot/Ankle   Right Foot Inspection  Skin Exam: skin normal, skin intact and dry skin no warmth, no callus, no erythema, no maceration, no abnormal color, no pre-ulcer, no ulcer and no callus                          Toe Exam: ROM and strength within normal limits  Sensory   Vibration: intact    Monofilament testing: intact  Vascular    The right DP pulse is 2+  Left Foot/Ankle  Left Foot Inspection  Skin Exam: skin normal, skin intact and dry skinno warmth, no erythema, no maceration, normal color, no pre-ulcer, no ulcer and no callus                         Toe Exam: ROM and strength within normal limits                   Sensory   Vibration: intact    Monofilament: intact  Vascular    The left DP pulse is 2+  Assign Risk Category:  No deformity present; No loss of protective sensation;  No weak pulses       Risk: 0

## 2021-08-17 ENCOUNTER — TELEPHONE (OUTPATIENT)
Dept: GASTROENTEROLOGY | Facility: CLINIC | Age: 76
End: 2021-08-17

## 2021-08-17 NOTE — TELEPHONE ENCOUNTER
Patient called questioning if she is to continue on PPI, last visit note states discuss continuation with PPI after EGD charu  SONALI performed EGD and normal findings but she was not provided with directions if she is to continue on medication  Her next follow up appt is in December  Please advise  Pt phone [phone] (okay to leave msg)

## 2021-08-17 NOTE — TELEPHONE ENCOUNTER
Since ulcer was healed on follow-up endoscopy, if she is feeling well Okay to try to wean down dose  Would take PPI every other day for a month and if her symptoms are still well controlled decrease to twice a week (Monday Friday) from month  After that if still okay can try to discontinue and just control symptoms by diet

## 2021-09-06 DIAGNOSIS — K21.9 GASTROESOPHAGEAL REFLUX DISEASE, UNSPECIFIED WHETHER ESOPHAGITIS PRESENT: Primary | ICD-10-CM

## 2021-09-06 RX ORDER — PANTOPRAZOLE SODIUM 40 MG/1
TABLET, DELAYED RELEASE ORAL
Qty: 30 TABLET | Refills: 12 | Status: SHIPPED | OUTPATIENT
Start: 2021-09-06 | End: 2022-07-11

## 2021-09-21 DIAGNOSIS — R79.89 ELEVATED TSH: ICD-10-CM

## 2021-09-21 DIAGNOSIS — E03.9 HYPOTHYROIDISM, UNSPECIFIED TYPE: ICD-10-CM

## 2021-09-21 RX ORDER — LEVOTHYROXINE SODIUM 0.05 MG/1
TABLET ORAL
Qty: 90 TABLET | Refills: 0 | Status: SHIPPED | OUTPATIENT
Start: 2021-09-21 | End: 2021-12-16

## 2021-10-26 ENCOUNTER — TELEPHONE (OUTPATIENT)
Dept: FAMILY MEDICINE CLINIC | Facility: HOSPITAL | Age: 76
End: 2021-10-26

## 2021-11-01 ENCOUNTER — PATIENT OUTREACH (OUTPATIENT)
Dept: CASE MANAGEMENT | Facility: OTHER | Age: 76
End: 2021-11-01

## 2021-12-03 ENCOUNTER — TELEPHONE (OUTPATIENT)
Dept: FAMILY MEDICINE CLINIC | Facility: HOSPITAL | Age: 76
End: 2021-12-03

## 2021-12-03 LAB
EST. AVERAGE GLUCOSE BLD GHB EST-MCNC: 174 MG/DL
GLUCOSE SERPL-MCNC: 153 MG/DL (ref 65–99)
HBA1C MFR BLD: 7.7 % (ref 4.8–5.6)

## 2021-12-03 PROCEDURE — 3066F NEPHROPATHY DOC TX: CPT | Performed by: INTERNAL MEDICINE

## 2021-12-03 PROCEDURE — 3051F HG A1C>EQUAL 7.0%<8.0%: CPT | Performed by: INTERNAL MEDICINE

## 2021-12-06 ENCOUNTER — OFFICE VISIT (OUTPATIENT)
Dept: FAMILY MEDICINE CLINIC | Facility: HOSPITAL | Age: 76
End: 2021-12-06
Payer: COMMERCIAL

## 2021-12-06 VITALS
BODY MASS INDEX: 30.12 KG/M2 | DIASTOLIC BLOOD PRESSURE: 80 MMHG | TEMPERATURE: 97.5 F | SYSTOLIC BLOOD PRESSURE: 124 MMHG | HEART RATE: 86 BPM | HEIGHT: 63 IN | WEIGHT: 170 LBS

## 2021-12-06 DIAGNOSIS — E11.29 CONTROLLED TYPE 2 DIABETES MELLITUS WITH MICROALBUMINURIA, WITHOUT LONG-TERM CURRENT USE OF INSULIN (HCC): Primary | ICD-10-CM

## 2021-12-06 DIAGNOSIS — K21.9 GASTROESOPHAGEAL REFLUX DISEASE WITHOUT ESOPHAGITIS: ICD-10-CM

## 2021-12-06 DIAGNOSIS — E03.9 HYPOTHYROIDISM, UNSPECIFIED TYPE: ICD-10-CM

## 2021-12-06 DIAGNOSIS — R80.9 CONTROLLED TYPE 2 DIABETES MELLITUS WITH MICROALBUMINURIA, WITHOUT LONG-TERM CURRENT USE OF INSULIN (HCC): Primary | ICD-10-CM

## 2021-12-06 DIAGNOSIS — E78.5 DYSLIPIDEMIA: ICD-10-CM

## 2021-12-06 DIAGNOSIS — I10 PRIMARY HYPERTENSION: ICD-10-CM

## 2021-12-06 PROCEDURE — 99214 OFFICE O/P EST MOD 30 MIN: CPT | Performed by: INTERNAL MEDICINE

## 2021-12-06 PROCEDURE — 1101F PT FALLS ASSESS-DOCD LE1/YR: CPT | Performed by: INTERNAL MEDICINE

## 2021-12-06 RX ORDER — LANCETS
EACH MISCELLANEOUS
COMMUNITY
Start: 2021-12-05

## 2021-12-06 RX ORDER — BLOOD SUGAR DIAGNOSTIC
STRIP MISCELLANEOUS
COMMUNITY
Start: 2021-12-05

## 2021-12-06 RX ORDER — BLOOD-GLUCOSE METER
EACH MISCELLANEOUS
COMMUNITY
Start: 2021-12-05

## 2021-12-08 ENCOUNTER — HOSPITAL ENCOUNTER (EMERGENCY)
Facility: HOSPITAL | Age: 76
Discharge: HOME | End: 2021-12-09
Attending: EMERGENCY MEDICINE | Admitting: EMERGENCY MEDICINE
Payer: COMMERCIAL

## 2021-12-08 ENCOUNTER — APPOINTMENT (EMERGENCY)
Dept: RADIOLOGY | Facility: HOSPITAL | Age: 76
End: 2021-12-08
Payer: COMMERCIAL

## 2021-12-08 DIAGNOSIS — S43.005A SHOULDER DISLOCATION, LEFT, INITIAL ENCOUNTER: ICD-10-CM

## 2021-12-08 DIAGNOSIS — M25.519 SHOULDER PAIN: Primary | ICD-10-CM

## 2021-12-08 DIAGNOSIS — S43.102A AC SEPARATION, LEFT, INITIAL ENCOUNTER: ICD-10-CM

## 2021-12-08 PROCEDURE — 73030 X-RAY EXAM OF SHOULDER: CPT

## 2021-12-08 PROCEDURE — 99283 EMERGENCY DEPT VISIT LOW MDM: CPT

## 2021-12-08 PROCEDURE — 99284 EMERGENCY DEPT VISIT MOD MDM: CPT | Performed by: EMERGENCY MEDICINE

## 2021-12-08 PROCEDURE — 96374 THER/PROPH/DIAG INJ IV PUSH: CPT

## 2021-12-08 PROCEDURE — 96375 TX/PRO/DX INJ NEW DRUG ADDON: CPT

## 2021-12-08 RX ORDER — KETOROLAC TROMETHAMINE 30 MG/ML
15 INJECTION, SOLUTION INTRAMUSCULAR; INTRAVENOUS ONCE
Status: COMPLETED | OUTPATIENT
Start: 2021-12-09 | End: 2021-12-08

## 2021-12-08 RX ORDER — FENTANYL CITRATE 50 UG/ML
50 INJECTION, SOLUTION INTRAMUSCULAR; INTRAVENOUS ONCE
Status: COMPLETED | OUTPATIENT
Start: 2021-12-09 | End: 2021-12-08

## 2021-12-08 RX ORDER — ONDANSETRON 2 MG/ML
4 INJECTION INTRAMUSCULAR; INTRAVENOUS ONCE
Status: COMPLETED | OUTPATIENT
Start: 2021-12-09 | End: 2021-12-08

## 2021-12-08 RX ADMIN — ONDANSETRON 4 MG: 2 INJECTION INTRAMUSCULAR; INTRAVENOUS at 23:57

## 2021-12-08 RX ADMIN — FENTANYL CITRATE 50 MCG: 50 INJECTION, SOLUTION INTRAMUSCULAR; INTRAVENOUS at 23:57

## 2021-12-08 RX ADMIN — KETOROLAC TROMETHAMINE 15 MG: 30 INJECTION, SOLUTION INTRAMUSCULAR; INTRAVENOUS at 23:57

## 2021-12-09 ENCOUNTER — APPOINTMENT (EMERGENCY)
Dept: RADIOLOGY | Facility: HOSPITAL | Age: 76
End: 2021-12-09
Payer: COMMERCIAL

## 2021-12-09 ENCOUNTER — OFFICE VISIT (OUTPATIENT)
Dept: OBGYN CLINIC | Facility: MEDICAL CENTER | Age: 76
End: 2021-12-09
Payer: COMMERCIAL

## 2021-12-09 VITALS
WEIGHT: 171.8 LBS | HEIGHT: 63 IN | HEART RATE: 94 BPM | BODY MASS INDEX: 30.44 KG/M2 | DIASTOLIC BLOOD PRESSURE: 83 MMHG | SYSTOLIC BLOOD PRESSURE: 151 MMHG

## 2021-12-09 VITALS
SYSTOLIC BLOOD PRESSURE: 161 MMHG | BODY MASS INDEX: 30.12 KG/M2 | HEART RATE: 85 BPM | RESPIRATION RATE: 17 BRPM | TEMPERATURE: 97.1 F | WEIGHT: 170 LBS | HEIGHT: 63 IN | DIASTOLIC BLOOD PRESSURE: 73 MMHG | OXYGEN SATURATION: 95 %

## 2021-12-09 DIAGNOSIS — S43.005A SHOULDER DISLOCATION, LEFT, INITIAL ENCOUNTER: ICD-10-CM

## 2021-12-09 DIAGNOSIS — S43.102A AC SEPARATION, LEFT, INITIAL ENCOUNTER: Primary | ICD-10-CM

## 2021-12-09 PROCEDURE — 99203 OFFICE O/P NEW LOW 30 MIN: CPT | Performed by: ORTHOPAEDIC SURGERY

## 2021-12-09 PROCEDURE — 73030 X-RAY EXAM OF SHOULDER: CPT

## 2021-12-09 PROCEDURE — 3077F SYST BP >= 140 MM HG: CPT | Performed by: ORTHOPAEDIC SURGERY

## 2021-12-09 PROCEDURE — 3079F DIAST BP 80-89 MM HG: CPT | Performed by: ORTHOPAEDIC SURGERY

## 2021-12-16 ENCOUNTER — EVALUATION (OUTPATIENT)
Dept: PHYSICAL THERAPY | Facility: CLINIC | Age: 76
End: 2021-12-16
Payer: COMMERCIAL

## 2021-12-16 ENCOUNTER — PATIENT OUTREACH (OUTPATIENT)
Dept: CASE MANAGEMENT | Facility: OTHER | Age: 76
End: 2021-12-16

## 2021-12-16 DIAGNOSIS — R79.89 ELEVATED TSH: ICD-10-CM

## 2021-12-16 DIAGNOSIS — S43.102D DISLOCATION OF ACROMIOCLAVICULAR JOINT, LEFT, CLOSED, SUBSEQUENT ENCOUNTER: ICD-10-CM

## 2021-12-16 DIAGNOSIS — E03.9 HYPOTHYROIDISM, UNSPECIFIED TYPE: ICD-10-CM

## 2021-12-16 PROCEDURE — 97162 PT EVAL MOD COMPLEX 30 MIN: CPT | Performed by: PHYSICAL THERAPIST

## 2021-12-16 RX ORDER — LEVOTHYROXINE SODIUM 0.05 MG/1
TABLET ORAL
Qty: 90 TABLET | Refills: 0 | Status: SHIPPED | OUTPATIENT
Start: 2021-12-16 | End: 2022-03-12

## 2021-12-20 ENCOUNTER — OFFICE VISIT (OUTPATIENT)
Dept: GASTROENTEROLOGY | Facility: CLINIC | Age: 76
End: 2021-12-20
Payer: COMMERCIAL

## 2021-12-20 VITALS
HEIGHT: 63 IN | DIASTOLIC BLOOD PRESSURE: 68 MMHG | BODY MASS INDEX: 30.02 KG/M2 | WEIGHT: 169.4 LBS | SYSTOLIC BLOOD PRESSURE: 124 MMHG

## 2021-12-20 DIAGNOSIS — K21.9 GASTROESOPHAGEAL REFLUX DISEASE WITHOUT ESOPHAGITIS: Primary | ICD-10-CM

## 2021-12-20 DIAGNOSIS — K25.3 ACUTE GASTRIC ULCER WITHOUT HEMORRHAGE OR PERFORATION: ICD-10-CM

## 2021-12-20 PROCEDURE — 99213 OFFICE O/P EST LOW 20 MIN: CPT | Performed by: INTERNAL MEDICINE

## 2021-12-20 PROCEDURE — 3008F BODY MASS INDEX DOCD: CPT | Performed by: INTERNAL MEDICINE

## 2021-12-20 PROCEDURE — 1160F RVW MEDS BY RX/DR IN RCRD: CPT | Performed by: INTERNAL MEDICINE

## 2021-12-20 PROCEDURE — 1036F TOBACCO NON-USER: CPT | Performed by: INTERNAL MEDICINE

## 2021-12-22 ENCOUNTER — OFFICE VISIT (OUTPATIENT)
Dept: PHYSICAL THERAPY | Facility: CLINIC | Age: 76
End: 2021-12-22
Payer: COMMERCIAL

## 2021-12-22 DIAGNOSIS — S43.102D DISLOCATION OF ACROMIOCLAVICULAR JOINT, LEFT, CLOSED, SUBSEQUENT ENCOUNTER: Primary | ICD-10-CM

## 2021-12-22 PROCEDURE — 97112 NEUROMUSCULAR REEDUCATION: CPT | Performed by: PHYSICAL THERAPIST

## 2021-12-22 PROCEDURE — 97110 THERAPEUTIC EXERCISES: CPT | Performed by: PHYSICAL THERAPIST

## 2021-12-22 NOTE — ASSESSMENT & PLAN NOTE
Advised to increase fluids and high fiber foods in diet, advised to try OTC MiraLax and if still not better Colace, call with abd pain/blood in stool/fever or if not better Yes

## 2021-12-24 DIAGNOSIS — I10 ESSENTIAL HYPERTENSION: ICD-10-CM

## 2021-12-25 RX ORDER — AMLODIPINE BESYLATE 10 MG/1
TABLET ORAL
Qty: 90 TABLET | Refills: 1 | Status: SHIPPED | OUTPATIENT
Start: 2021-12-25 | End: 2022-06-19

## 2021-12-27 ENCOUNTER — OFFICE VISIT (OUTPATIENT)
Dept: PHYSICAL THERAPY | Facility: CLINIC | Age: 76
End: 2021-12-27
Payer: COMMERCIAL

## 2021-12-27 DIAGNOSIS — S43.102D DISLOCATION OF ACROMIOCLAVICULAR JOINT, LEFT, CLOSED, SUBSEQUENT ENCOUNTER: Primary | ICD-10-CM

## 2021-12-27 PROCEDURE — 97110 THERAPEUTIC EXERCISES: CPT | Performed by: PHYSICAL THERAPIST

## 2021-12-27 PROCEDURE — 97112 NEUROMUSCULAR REEDUCATION: CPT | Performed by: PHYSICAL THERAPIST

## 2021-12-28 ENCOUNTER — TELEPHONE (OUTPATIENT)
Dept: DIABETES SERVICES | Facility: CLINIC | Age: 76
End: 2021-12-28

## 2021-12-29 ENCOUNTER — OFFICE VISIT (OUTPATIENT)
Dept: PHYSICAL THERAPY | Facility: CLINIC | Age: 76
End: 2021-12-29
Payer: COMMERCIAL

## 2021-12-29 DIAGNOSIS — S43.102D DISLOCATION OF ACROMIOCLAVICULAR JOINT, LEFT, CLOSED, SUBSEQUENT ENCOUNTER: Primary | ICD-10-CM

## 2021-12-29 PROCEDURE — 97112 NEUROMUSCULAR REEDUCATION: CPT

## 2021-12-29 PROCEDURE — 97530 THERAPEUTIC ACTIVITIES: CPT

## 2021-12-29 PROCEDURE — 97110 THERAPEUTIC EXERCISES: CPT

## 2022-01-03 ENCOUNTER — APPOINTMENT (OUTPATIENT)
Dept: PHYSICAL THERAPY | Facility: CLINIC | Age: 77
End: 2022-01-03
Payer: COMMERCIAL

## 2022-01-05 ENCOUNTER — OFFICE VISIT (OUTPATIENT)
Dept: PHYSICAL THERAPY | Facility: CLINIC | Age: 77
End: 2022-01-05
Payer: COMMERCIAL

## 2022-01-05 DIAGNOSIS — S43.102D DISLOCATION OF ACROMIOCLAVICULAR JOINT, LEFT, CLOSED, SUBSEQUENT ENCOUNTER: Primary | ICD-10-CM

## 2022-01-05 PROCEDURE — 97110 THERAPEUTIC EXERCISES: CPT | Performed by: PHYSICAL THERAPIST

## 2022-01-05 PROCEDURE — 97164 PT RE-EVAL EST PLAN CARE: CPT | Performed by: PHYSICAL THERAPIST

## 2022-01-05 NOTE — PROGRESS NOTES
Assessment  Assessment details: Gulshan Rodriguez is a 78 y o  female treated in outpatient physical therapy at [facility] with complaints of left shoulder pain, stiffness and weakness s/p AC separation from fall at home   She has been treated for left shoulder decreased range of motion, decreased strength, limited flexibility, poor postural awareness, decreased tolerance to activity and decreased functional mobility due to [facility] separation, left, initial encounter   Re-evaluation was performed today to assess if she would benefit from skilled PT services in order to address these deficits and reach maximum level of function   Thank you for the referral!    Impairments: activity intolerance, impaired physical strength and pain with function    Symptom irritability: highUnderstanding of Dx/Px/POC: excellent   Prognosis: good    Goals          22 Re-evaluation  STG   1  Independent with HEP in 2 weeks      Goal Met  2  Decrease pain at worst by 50% in 2 weeks    Goal Met    LTG  1  Increase left shoulder strength in all planes to 5/5 in 4 weeks    Good progress  2  Return to full, unrestricted household chores in 4 weeks     Good progress      Plan  Patient would benefit from: continued skilled physical therapy  Planned modality interventions: thermotherapy: hydrocollator packs  Planned therapy interventions: manual therapy, patient education, therapeutic activities, neuromuscular re-education and home exercise program  Frequency: 2x week  Duration in weeks: 2  Plan of Care beginning date: 2021  Plan of Care expiration date: 2022  Treatment plan discussed with: patient        Subjective Evaluation    History of Present Illness  Date of onset: 2021  Mechanism of injury: S/p L AC separation    Pt reports fall at home, [address] injury   X-ray negative for fracture, relocation performed at ER          Not a recurrent problem   Quality of life: good    Pain  Current pain ratin        0/10  At best pain ratin        0/10  At worst pain ratin        1/10      Treatments  Previous treatment: medication  Current treatment: medication  Patient Goals  Patient goals for therapy: decreased pain, increased motion, independence with ADLs/IADLs and increased strength          Objective     Postural Observations  Seated posture: fair      good  Standing posture: fair      good        Cervical/Thoracic Screen   Cervical range of motion within normal limits with the following exceptions: LUT tight vs R from excessive shrugging due to pain    Active Range of Motion   Left Shoulder   Flexion: 55 degrees with pain     140 degrees no pain  Extension: 35 degrees with pain    55 degrees no pain  Abduction: 55 degrees with pain    140 degrees no pain  External rotation 0°: 55 degrees with pain   85 degrees no pain    Right Shoulder   Normal active range of motion    Additional Active Range of Motion Details  L IR = L1        TT7  R IR = T7    Strength/Myotome Testing     Left Shoulder     Planes of Motion   Flexion: 4-       4  Extension: 4-       4  Abduction: 4-       4  Adduction: 4-       5  External rotation at 0°: 4-     4  External rotation BTH: 4-     4    Right Shoulder   Normal muscle strength    Tests     Left Shoulder   Positive empty can, Hawkin's and Speed's  Negative             Precautions: s/p L shoulder dislocation 21    Daily Note     Today's date: 2022  Patient name: Guadalupe Campo  : 1945  MRN: [id number]  Referring provider: Luli Irving,*  Dx:   Encounter Diagnosis     ICD-10-CM    1  Dislocation of acromioclavicular joint, left, closed, subsequent encounter  S43 102D        Start Time: 80        Pt is making good progress in PT, meeting and approaching all goal set at IE, however pt's full, pain-free left shoulder mobility & stability remain limited at this time, [facility], contributing to functional deficits        Plan: Progress treatment as tolerated  Continued skilled PT 2x/week for 2 weeks in order to reach all LTGs        Precautions: s/p L shoulder dislocation 12/8/21      Manuals 12/22 12/27 12/29 1/5         Shoulder PROM all ranges                                                     Neuro Re-Ed             Scapular retraction iso 2x10 5 sec iso            Wall RTC iso (ER,IR,EXT,FLX, ABD) 15x ea direction 3 sec hold                                                                             Ther Ex             R cervical SB (L UT stretch) 2x10 10 sec  2x10 10 sec 2x10 10 sec 2x10 10 sec          Pulleys 4 min 4 min 4 min 4 min         TERT: L shoulder at 90 deg w/heat  10 min 10 min 10 min 10 min         Seated pendulums 2 min (CW, CCW)            Supine wand FLX, ER  x20 ea x20 ea x20 ea         Standing wand EXT, IR  x20 ea x20 ea x20 ea         Mid Row    2x10 OTB                      Ther Activity                                       Gait Training                                       Modalities

## 2022-01-07 ENCOUNTER — OFFICE VISIT (OUTPATIENT)
Dept: PHYSICAL THERAPY | Facility: CLINIC | Age: 77
End: 2022-01-07
Payer: COMMERCIAL

## 2022-01-07 DIAGNOSIS — S43.102D DISLOCATION OF ACROMIOCLAVICULAR JOINT, LEFT, CLOSED, SUBSEQUENT ENCOUNTER: Primary | ICD-10-CM

## 2022-01-07 PROCEDURE — 4010F ACE/ARB THERAPY RXD/TAKEN: CPT | Performed by: ORTHOPAEDIC SURGERY

## 2022-01-07 PROCEDURE — 97112 NEUROMUSCULAR REEDUCATION: CPT | Performed by: PHYSICAL THERAPIST

## 2022-01-07 PROCEDURE — 97110 THERAPEUTIC EXERCISES: CPT | Performed by: PHYSICAL THERAPIST

## 2022-01-07 PROCEDURE — 3066F NEPHROPATHY DOC TX: CPT | Performed by: ORTHOPAEDIC SURGERY

## 2022-01-07 NOTE — PROGRESS NOTES
Assessment  Assessment details: Conner Mcnair is a 78 y o  female treated in outpatient physical therapy at [facility] with complaints of left shoulder pain, stiffness and weakness s/p AC separation from fall at home   She has been treated for left shoulder decreased range of motion, decreased strength, limited flexibility, poor postural awareness, decreased tolerance to activity and decreased functional mobility due to [facility] [place] separation, left, initial encounter   Re-evaluation was performed today to assess if she would benefit from skilled PT services in order to address these deficits and reach maximum level of function   Thank you for the referral!    Impairments: activity intolerance, impaired physical strength and pain with function    Symptom irritability: highUnderstanding of Dx/Px/POC: excellent   Prognosis: good    Goals          22 Re-evaluation  STG   1  Independent with HEP in 2 weeks      Goal Met  2  Decrease pain at worst by 50% in 2 weeks    Goal Met    LTG  1  Increase left shoulder strength in all planes to 5/5 in 4 weeks    Good progress  2  Return to full, unrestricted household chores in 4 weeks     Good progress      Plan  Patient would benefit from: continued skilled physical therapy  Planned modality interventions: thermotherapy: hydrocollator packs  Planned therapy interventions: manual therapy, patient education, therapeutic activities, neuromuscular re-education and home exercise program  Frequency: 2x week  Duration in weeks: 2  Plan of Care beginning date: 2021  Plan of Care expiration date: 2022  Treatment plan discussed with: patient        Subjective Evaluation    History of Present Illness  Date of onset: 2021  Mechanism of injury: S/p L AC separation    Pt reports fall at home, [address] injury   X-ray negative for fracture, relocation performed at ER          Not a recurrent problem   Quality of life: good    Pain  Current pain ratin        0/10  At best pain ratin        0/10  At worst pain ratin        1/10      Treatments  Previous treatment: medication  Current treatment: medication  Patient Goals  Patient goals for therapy: decreased pain, increased motion, independence with ADLs/IADLs and increased strength          Objective     Postural Observations  Seated posture: fair      good  Standing posture: fair      good        Cervical/Thoracic Screen   Cervical range of motion within normal limits with the following exceptions: LUT tight vs R from excessive shrugging due to pain    Active Range of Motion   Left Shoulder   Flexion: 55 degrees with pain     140 degrees no pain  Extension: 35 degrees with pain    55 degrees no pain  Abduction: 55 degrees with pain    140 degrees no pain  External rotation 0°: 55 degrees with pain   85 degrees no pain    Right Shoulder   Normal active range of motion    Additional Active Range of Motion Details  L IR = L1        TT7  R IR = T7    Strength/Myotome Testing     Left Shoulder     Planes of Motion   Flexion: 4-       4  Extension: 4-       4  Abduction: 4-       4  Adduction: 4-       5  External rotation at 0°: 4-     4  External rotation BTH: 4-     4    Right Shoulder   Normal muscle strength    Tests     Left Shoulder   Positive empty can, Hawkin's and Speed's  Negative             Precautions: s/p L shoulder dislocation 21    Daily Note     Today's date: 2022  Patient name: Malik Estrada  : 1945  MRN: [id number]  Referring provider: Yousuf Salas,*  Dx:   Encounter Diagnosis     ICD-10-CM    1  Dislocation of acromioclavicular joint, left, closed, subsequent encounter  S43 102D          Compliant with HEP, no questions regarding POC, motivated to continue PT       P's full, pain-free left shoulder mobility & stability remain limited at this time, secondary to [email or web address], contributing to functional deficits  Pt will benefit from skilled PT in order to reach all LTGs   Added b/l ER to HEP, see handout  Plan: Progress treatment as tolerated    Assess HEP technique and effects of implementation     Precautions: s/p L shoulder dislocation 12/8/21      Manuals 12/22 12/27 12/29 1/5 1/7        Shoulder PROM all ranges                                                     Neuro Re-Ed             Scapular retraction iso 2x10 5 sec iso            Wall RTC iso (ER,IR,EXT,FLX, ABD) 15x ea direction 3 sec hold            B/l ER with Tband     2x10 Peach TB                                                            Ther Ex             R cervical SB (L UT stretch) 2x10 10 sec  2x10 10 sec 2x10 10 sec 2x10 10 sec  2x10 10 sec        Pulleys 4 min 4 min 4 min 4 min 4 min        TERT: L shoulder at 90 deg w/heat  10 min 10 min 10 min 10 min 10 min        Seated pendulums 2 min (CW, CCW)            Supine wand FLX, ER  x20 ea x20 ea x20 ea x20         Standing wand EXT, IR  x20 ea x20 ea x20 ea x20 ea        Mid Row    2x10 OTB 3x10 OTB                     Ther Activity                                       Gait Training                                       Modalities

## 2022-01-10 ENCOUNTER — OFFICE VISIT (OUTPATIENT)
Dept: PHYSICAL THERAPY | Facility: CLINIC | Age: 77
End: 2022-01-10
Payer: COMMERCIAL

## 2022-01-10 DIAGNOSIS — S43.102D DISLOCATION OF ACROMIOCLAVICULAR JOINT, LEFT, CLOSED, SUBSEQUENT ENCOUNTER: Primary | ICD-10-CM

## 2022-01-10 PROCEDURE — 97110 THERAPEUTIC EXERCISES: CPT | Performed by: PHYSICAL THERAPIST

## 2022-01-10 PROCEDURE — 97112 NEUROMUSCULAR REEDUCATION: CPT | Performed by: PHYSICAL THERAPIST

## 2022-01-10 NOTE — PROGRESS NOTES
Assessment  Assessment details: Ronny Duenas is a 78 y o  female treated in outpatient physical therapy at [facility] November with complaints of left shoulder pain, stiffness and weakness s/p AC separation from fall at home   She has been treated for left shoulder decreased range of motion, decreased strength, limited flexibility, poor postural awareness, decreased tolerance to activity and decreased functional mobility due to [facility] separation, left, initial encounter   Re-evaluation was performed today to assess if she would benefit from skilled PT services in order to address these deficits and reach maximum level of function   Thank you for the referral!    Impairments: activity intolerance, impaired physical strength and pain with function    Symptom irritability: highUnderstanding of Dx/Px/POC: excellent   Prognosis: good    Goals          22 Re-evaluation  STG   1  Independent with HEP in 2 weeks      Goal Met  2  Decrease pain at worst by 50% in 2 weeks    Goal Met    LTG  1  Increase left shoulder strength in all planes to 5/5 in 4 weeks    Good progress  2  Return to full, unrestricted household chores in 4 weeks     Good progress      Plan  Patient would benefit from: continued skilled physical therapy  Planned modality interventions: thermotherapy: hydrocollator packs  Planned therapy interventions: manual therapy, patient education, therapeutic activities, neuromuscular re-education and home exercise program  Frequency: 2x week  Duration in weeks: 2  Plan of Care beginning date: 2021  Plan of Care expiration date: 2022  Treatment plan discussed with: patient        Subjective Evaluation    History of Present Illness  Date of onset: 2021  Mechanism of injury: S/p L AC separation    Pt reports fall at home, [address] injury   X-ray negative for fracture, relocation performed at ER          Not a recurrent problem   Quality of life: good    Pain  Current pain ratin        0/10  At best pain ratin        0/10  At worst pain ratin        1/10      Treatments  Previous treatment: medication  Current treatment: medication  Patient Goals  Patient goals for therapy: decreased pain, increased motion, independence with ADLs/IADLs and increased strength          Objective     Postural Observations  Seated posture: fair      good  Standing posture: fair      good        Cervical/Thoracic Screen   Cervical range of motion within normal limits with the following exceptions: LUT tight vs R from excessive shrugging due to pain    Active Range of Motion   Left Shoulder   Flexion: 55 degrees with pain     140 degrees no pain  Extension: 35 degrees with pain    55 degrees no pain  Abduction: 55 degrees with pain    140 degrees no pain  External rotation 0°: 55 degrees with pain   85 degrees no pain    Right Shoulder   Normal active range of motion    Additional Active Range of Motion Details  L IR = L1        TT7  R IR = T7    Strength/Myotome Testing     Left Shoulder     Planes of Motion   Flexion: 4-       4  Extension: 4-       4  Abduction: 4-       4  Adduction: 4-       5  External rotation at 0°: 4-     4  External rotation BTH: 4-     4    Right Shoulder   Normal muscle strength    Tests     Left Shoulder   Positive empty can, Hawkin's and Speed's  Negative             Precautions: s/p L shoulder dislocation 21    Daily Note     Today's date: 1/10/2022  Patient name: Neena Elam  : 1945  MRN: [id number]  Referring provider: Bouchra Shaffer,*  Dx:   Encounter Diagnosis     ICD-10-CM    1  Dislocation of acromioclavicular joint, left, closed, subsequent encounter  S43 102D        Shoulder pain improving however still limiting daily activities such as dressing/grooming       Pt is making good progress in PT however her full, pain-free left shoulder mobility & stability remain limited at this time, [facility], contributing to functional deficits   Pt will benefit from skilled PT in order to reach all LTGs  Plan: Progress treatment as tolerated     Perform RE NV     Precautions: s/p L shoulder dislocation 12/8/21  EPOC: 1/19/22      Manuals 12/22 12/27 12/29 1/5 1/7 1/10       Shoulder PROM all ranges                                                     Neuro Re-Ed             Scapular retraction iso 2x10 5 sec iso            Wall RTC iso (ER,IR,EXT,FLX, ABD) 15x ea direction 3 sec hold            B/l ER with Tband     2x10 Peach TB 3x10 Peach Tb                                                           Ther Ex             R cervical SB (L UT stretch) 2x10 10 sec  2x10 10 sec 2x10 10 sec 2x10 10 sec  2x10 10 sec 2x10 10 sec       Pulleys 4 min 4 min 4 min 4 min 4 min 4 min       TERT: L shoulder at 90 deg w/heat  10 min 10 min 10 min 10 min 10 min 10 min       Seated pendulums 2 min (CW, CCW)            Supine wand FLX, ER  x20 ea x20 ea x20 ea x20 ea x20 ea       Standing wand EXT, IR  x20 ea x20 ea x20 ea x20 ea x20 ea       Mid Row    2x10 OTB 3x10 OTB 3x10 GTB                    Ther Activity                                       Gait Training                                       Modalities

## 2022-01-14 ENCOUNTER — EVALUATION (OUTPATIENT)
Dept: PHYSICAL THERAPY | Facility: CLINIC | Age: 77
End: 2022-01-14
Payer: COMMERCIAL

## 2022-01-14 DIAGNOSIS — S43.102D DISLOCATION OF ACROMIOCLAVICULAR JOINT, LEFT, CLOSED, SUBSEQUENT ENCOUNTER: Primary | ICD-10-CM

## 2022-01-14 PROCEDURE — 97112 NEUROMUSCULAR REEDUCATION: CPT | Performed by: PHYSICAL THERAPIST

## 2022-01-14 PROCEDURE — 97110 THERAPEUTIC EXERCISES: CPT | Performed by: PHYSICAL THERAPIST

## 2022-01-14 PROCEDURE — 97164 PT RE-EVAL EST PLAN CARE: CPT | Performed by: PHYSICAL THERAPIST

## 2022-01-14 NOTE — PROGRESS NOTES
Assessment  Assessment details: Areli Garcia is a 78 y o  female treated in outpatient physical therapy at [facility] with complaints of left shoulder pain, stiffness and weakness s/p AC separation from fall at home   She has been treated for left shoulder decreased range of motion, decreased strength, limited flexibility, poor postural awareness, decreased tolerance to activity and decreased functional mobility due to [facility] separation, left, initial encounter   Re-evaluation was performed today to assess if she would benefit from skilled PT services in order to address these deficits and reach maximum level of function   Thank you for the referral!    Impairments: activity intolerance, impaired physical strength and pain with function    Symptom irritability: highUnderstanding of Dx/Px/POC: excellent   Prognosis: good    Goals          22 Re-evaluation  STG   1  Independent with HEP in 2 weeks      Goal Met  2  Decrease pain at worst by 50% in 2 weeks    Goal Met    LTG  1  Increase left shoulder strength in all planes to 5/5 in 4 weeks    Goal met  2  Return to full, unrestricted household chores in 4 weeks     Goal met      Plan  Patient will be DC'd to HEP today as all LTGs have been met  Treatment plan discussed with: patient          Planes of Motion   Flexion: 4-       5  Extension: 4-       5  Abduction: 4-       5  Adduction: 4-       5  External rotation at 0°: 4-     5  External rotation BTH: 4-     5    Right Shoulder   Normal muscle strength    Tests     Left Shoulder   Positive empty can, Hawkin's and Speed's  Negative             Precautions: s/p L shoulder dislocation 21    Daily Note     Today's date: 2022  Patient name: Areli Garcia  : 1945  MRN: [id number]  Referring provider: Hugh Moreno,*  Dx:   Encounter Diagnosis     ICD-10-CM    1   Dislocation of acromioclavicular joint, left, closed, subsequent encounter  S43 102D             Manuals  12/29 1/5 1/7 1/10 1/14      Shoulder PROM all ranges                                                     Neuro Re-Ed             Scapular retraction iso 2x10 5 sec iso            Wall RTC iso (ER,IR,EXT,FLX, ABD) 15x ea direction 3 sec hold            B/l ER with Tband     2x10 Peach TB 3x10 Peach Tb 3x10 Peach TB      Shoulder flexion iso       3x10 Peach TB                                             Ther Ex             R cervical SB (L UT stretch) 2x10 10 sec  2x10 10 sec 2x10 10 sec 2x10 10 sec  2x10 10 sec 2x10 10 sec 2x10 10sec      Pulleys 4 min 4 min 4 min 4 min 4 min 4 min 4 min      TERT: L shoulder at 90 deg w/heat  10 min 10 min 10 min 10 min 10 min 10 min       Seated pendulums 2 min (CW, CCW)            Supine wand FLX, ER  x20 ea x20 ea x20 ea x20 ea x20 ea       Standing wand EXT, IR  x20 ea x20 ea x20 ea x20 ea x20 ea x20 ea      Mid Row    2x10 OTB 3x10 OTB 3x10 GTB 3x10 GTB      Shoulder EXt       3x10 GTB      Ther Activity                                       Gait Training                                       Modalities

## 2022-01-21 ENCOUNTER — OFFICE VISIT (OUTPATIENT)
Dept: OBGYN CLINIC | Facility: CLINIC | Age: 77
End: 2022-01-21
Payer: COMMERCIAL

## 2022-01-21 VITALS
SYSTOLIC BLOOD PRESSURE: 124 MMHG | WEIGHT: 170 LBS | HEIGHT: 63 IN | DIASTOLIC BLOOD PRESSURE: 80 MMHG | BODY MASS INDEX: 30.12 KG/M2

## 2022-01-21 DIAGNOSIS — S43.102D AC SEPARATION, TYPE 5, LEFT, SUBSEQUENT ENCOUNTER: Primary | ICD-10-CM

## 2022-01-21 PROCEDURE — 1160F RVW MEDS BY RX/DR IN RCRD: CPT | Performed by: ORTHOPAEDIC SURGERY

## 2022-01-21 PROCEDURE — 3074F SYST BP LT 130 MM HG: CPT | Performed by: ORTHOPAEDIC SURGERY

## 2022-01-21 PROCEDURE — 3079F DIAST BP 80-89 MM HG: CPT | Performed by: ORTHOPAEDIC SURGERY

## 2022-01-21 PROCEDURE — 1036F TOBACCO NON-USER: CPT | Performed by: ORTHOPAEDIC SURGERY

## 2022-01-21 PROCEDURE — 3008F BODY MASS INDEX DOCD: CPT | Performed by: ORTHOPAEDIC SURGERY

## 2022-01-21 PROCEDURE — 99213 OFFICE O/P EST LOW 20 MIN: CPT | Performed by: ORTHOPAEDIC SURGERY

## 2022-01-21 NOTE — PROGRESS NOTES
Ortho Sports Medicine Shoulder Visit     Assesment:   left shoulder grade 5 AC joint separation     Plan:    Conservative treatment:    Ice to shoulder 1-2 times daily, for 20 minutes at a time  Home exercise program for shoulder, including ROM and strenthening  Instructions given to patient of what exercises to perform  Let pain guide return to activities  Discussed with patient if symptoms persist could try potential AC joint injection  See back in 2 months      Imaging:    No imaging was available for review today  Injection:    No Injection planned at this time  Surgery:     No surgery is recommended at this point, continue with conservative treatment plan as noted  History of Present Illness: The patient is returns for follow up of her left shoulder  Treating for grade V AC joint sprain  Since the prior visit, She reports  improvement  Injury 12/8/21 tripped over coffee table sustaining injury  Still has mild tenderness over [facility] joint    She has finished physical therapy and transitioned to [facility]  Pain is improved by rest, ice, NSAIDS and physical therapy  Pain is aggravated by overhead activity, rotation and lifting   Symptoms include clicking  The patient denies weakness but shoulder feels fatigued     The patient has tried rest, ice, NSAIDS and physical therapy  I have reviewed the past medical, surgical, social and family history, medications and allergies as documented in the EMR  Review of systems: ROS is negative other than that noted in the HPI  Constitutional: Negative for fatigue and fever     Cardiovascular: Negative for chest pain  Pulmonary: negative for shortness of breath    PMH/PSH:  Past Medical History:   Diagnosis Date    GERD (gastroesophageal reflux disease)     Hypertension     Kidney stone     Migraine     Wrist fracture     left      Past Surgical History:   Procedure Laterality Date    APPENDECTOMY      CATARACT EXTRACTION, BILATERAL Bilateral 2020    COLONOSCOPY      complete 11/29/11- 5 years / complete 6/9/04 -10years    COLONOSCOPY      Exam in November 2011 revealed small polyp, sigmoid diverticulosis, internal and external hemorrhoids  The polyp was  inflammatory polyp so followup in 10 years recommended   FL RETROGRADE PYELOGRAM  12/18/2020    HEMORRHOID SURGERY      KIDNEY STONE SURGERY      HI CYSTO/URETERO W/LITHOTRIPSY &INDWELL STENT INSRT Right 12/18/2020    Procedure: CYSTOSCOPY URETEROSCOPY RETROGRADE PYELOGRAM AND INSERTION STENT URETERAL;  Surgeon: Lemuel Abernathy MD;  Location: [facility];  Service: Urology    TONSILLECTOMY      TUBAL LIGATION      UPPER GASTROINTESTINAL ENDOSCOPY      April and July 2021: Antral ulcer on first examination biopsies negative for H pylori, repeat exam with healed ulcer just gastritis  November 2011 with hiatal hernia irregular Z-line and gastritis  Biopsies show question of intestinal metaplasia verses just reflux inflammation, H pylori was negative, nonspecific duodenitis  Physical Exam:    Blood pressure 124/80, height 5' 3" (1 6 m), weight 77 1 kg (170 lb)  General/Constitutional: NAD, well developed, well nourished  HENT: Normocephalic, atraumatic  CV: Intact distal pulses, regular rate  Resp: No respiratory distress or labored breathing  Lymphatic: No lymphadenopathy palpated  Neuro: Alert and Oriented x 3, no focal deficits  Psych: Normal mood, normal affect, normal judgement, normal behavior  Skin: Warm, dry, no rashes, no erythema     Shoulder Exam (focused):     Shoulder focused exam:       RIGHT LEFT    Scapula Atrophy Negative Negative     Winging Negative Negative     Protraction Negative Negative    Rotator cuff SS 5/5 5/5     IS 5/5 5/5     SubS 5/5 5/5    ROM  165     ER0 60 60     ER90 90 80     IR90 40 40     IRb T6 T12    TTP: AC Negative Positive     Biceps Negative Negative     Coracoid Negative Negative    Special Tests: O'Briens Negative Negative     Chamberlain-shear Negative Negative     Cross body Adduction Negative Negative     Speeds  Negative Negative     Saumya's Negative Negative     Whipple Negative Negative       Neer Negative Negative     Bhatia Negative Negative    Instability: Apprehension & relocation not tested not tested     Load & shift not tested not tested    Other: Crank Negative Negative             Bone prominence of left AC joint    UE NV Exam: +2 Radial pulses bilaterally  Sensation intact to light touch C5-T1 bilaterally, Radial/median/ulnar nerve motor intact    Cervical ROM is full without pain, numbness or tingling      Shoulder Imaging    No new imaging to review     Scribe Attestation    I,:  Giorgio Terry am acting as a scribe while in the presence of the attending physician :       I,:  Coy Oakes, DO personally performed the services described in this documentation    as scribed in my presence :

## 2022-02-11 DIAGNOSIS — I10 ESSENTIAL HYPERTENSION: ICD-10-CM

## 2022-02-11 RX ORDER — METOPROLOL SUCCINATE 100 MG/1
TABLET, EXTENDED RELEASE ORAL
Qty: 135 TABLET | Refills: 1 | Status: SHIPPED | OUTPATIENT
Start: 2022-02-11 | End: 2022-08-03

## 2022-03-12 DIAGNOSIS — R79.89 ELEVATED TSH: ICD-10-CM

## 2022-03-12 DIAGNOSIS — E03.9 HYPOTHYROIDISM, UNSPECIFIED TYPE: ICD-10-CM

## 2022-03-12 RX ORDER — LEVOTHYROXINE SODIUM 0.05 MG/1
TABLET ORAL
Qty: 90 TABLET | Refills: 0 | Status: SHIPPED | OUTPATIENT
Start: 2022-03-12 | End: 2022-03-28 | Stop reason: SDUPTHER

## 2022-03-17 ENCOUNTER — HOSPITAL ENCOUNTER (OUTPATIENT)
Dept: RADIOLOGY | Facility: HOSPITAL | Age: 77
Discharge: HOME | End: 2022-03-17
Payer: COMMERCIAL

## 2022-03-17 DIAGNOSIS — N20.0 NEPHROLITHIASIS: ICD-10-CM

## 2022-03-17 PROCEDURE — 74018 RADEX ABDOMEN 1 VIEW: CPT

## 2022-03-23 LAB
ALBUMIN SERPL-MCNC: 4.4 G/DL (ref 3.7–4.7)
ALBUMIN/CREAT UR: 54 MG/G CREAT (ref 0–29)
ALBUMIN/GLOB SERPL: 1.8 {RATIO} (ref 1.2–2.2)
ALP SERPL-CCNC: 80 IU/L (ref 44–121)
ALT SERPL-CCNC: 33 IU/L (ref 0–32)
AST SERPL-CCNC: 21 IU/L (ref 0–40)
BASOPHILS # BLD AUTO: 0.1 X10E3/UL (ref 0–0.2)
BASOPHILS NFR BLD AUTO: 1 %
BILIRUB SERPL-MCNC: 0.3 MG/DL (ref 0–1.2)
BUN SERPL-MCNC: 13 MG/DL (ref 8–27)
BUN/CREAT SERPL: 16 (ref 12–28)
CALCIUM SERPL-MCNC: 9.7 MG/DL (ref 8.7–10.3)
CHLORIDE SERPL-SCNC: 97 MMOL/L (ref 96–106)
CHOLEST SERPL-MCNC: 204 MG/DL (ref 100–199)
CHOLEST/HDLC SERPL: 6 RATIO (ref 0–4.4)
CO2 SERPL-SCNC: 24 MMOL/L (ref 20–29)
CREAT SERPL-MCNC: 0.82 MG/DL (ref 0.57–1)
CREAT UR-MCNC: 27.6 MG/DL
EGFR: 75 ML/MIN/1.73
EOSINOPHIL # BLD AUTO: 0.3 X10E3/UL (ref 0–0.4)
EOSINOPHIL NFR BLD AUTO: 4 %
ERYTHROCYTE [DISTWIDTH] IN BLOOD BY AUTOMATED COUNT: 12.4 % (ref 11.7–15.4)
EST. AVERAGE GLUCOSE BLD GHB EST-MCNC: 192 MG/DL
GLOBULIN SER-MCNC: 2.4 G/DL (ref 1.5–4.5)
GLUCOSE SERPL-MCNC: 176 MG/DL (ref 65–99)
HBA1C MFR BLD: 8.3 % (ref 4.8–5.6)
HCT VFR BLD AUTO: 42.1 % (ref 34–46.6)
HDLC SERPL-MCNC: 34 MG/DL
HGB BLD-MCNC: 14.6 G/DL (ref 11.1–15.9)
IMM GRANULOCYTES # BLD: 0 X10E3/UL (ref 0–0.1)
IMM GRANULOCYTES NFR BLD: 0 %
LDLC SERPL CALC-MCNC: 109 MG/DL (ref 0–99)
LDLC SERPL DIRECT ASSAY-MCNC: 111 MG/DL (ref 0–99)
LYMPHOCYTES # BLD AUTO: 3 X10E3/UL (ref 0.7–3.1)
LYMPHOCYTES NFR BLD AUTO: 44 %
MCH RBC QN AUTO: 31.5 PG (ref 26.6–33)
MCHC RBC AUTO-ENTMCNC: 34.7 G/DL (ref 31.5–35.7)
MCV RBC AUTO: 91 FL (ref 79–97)
MICROALBUMIN UR-MCNC: 15 UG/ML
MONOCYTES # BLD AUTO: 0.6 X10E3/UL (ref 0.1–0.9)
MONOCYTES NFR BLD AUTO: 9 %
NEUTROPHILS # BLD AUTO: 2.9 X10E3/UL (ref 1.4–7)
NEUTROPHILS NFR BLD AUTO: 42 %
PLATELET # BLD AUTO: 343 X10E3/UL (ref 150–450)
POTASSIUM SERPL-SCNC: 4.3 MMOL/L (ref 3.5–5.2)
PROT SERPL-MCNC: 6.8 G/DL (ref 6–8.5)
RBC # BLD AUTO: 4.63 X10E6/UL (ref 3.77–5.28)
SL AMB T4, FREE (DIRECT): 1.18 NG/DL (ref 0.82–1.77)
SL AMB VLDL CHOLESTEROL CALC: 61 MG/DL (ref 5–40)
SODIUM SERPL-SCNC: 139 MMOL/L (ref 134–144)
TRIGL SERPL-MCNC: 353 MG/DL (ref 0–149)
TSH SERPL DL<=0.005 MIU/L-ACNC: 7.53 UIU/ML (ref 0.45–4.5)
WBC # BLD AUTO: 6.8 X10E3/UL (ref 3.4–10.8)

## 2022-03-23 PROCEDURE — 3052F HG A1C>EQUAL 8.0%<EQUAL 9.0%: CPT | Performed by: INTERNAL MEDICINE

## 2022-03-23 PROCEDURE — 3061F NEG MICROALBUMINURIA REV: CPT | Performed by: INTERNAL MEDICINE

## 2022-03-28 ENCOUNTER — OFFICE VISIT (OUTPATIENT)
Dept: FAMILY MEDICINE CLINIC | Facility: HOSPITAL | Age: 77
End: 2022-03-28
Payer: COMMERCIAL

## 2022-03-28 VITALS
HEIGHT: 63 IN | DIASTOLIC BLOOD PRESSURE: 86 MMHG | HEART RATE: 90 BPM | BODY MASS INDEX: 29.98 KG/M2 | SYSTOLIC BLOOD PRESSURE: 134 MMHG | TEMPERATURE: 97.5 F | WEIGHT: 169.2 LBS

## 2022-03-28 DIAGNOSIS — E03.9 HYPOTHYROIDISM, UNSPECIFIED TYPE: ICD-10-CM

## 2022-03-28 DIAGNOSIS — Z00.00 MEDICARE ANNUAL WELLNESS VISIT, SUBSEQUENT: ICD-10-CM

## 2022-03-28 DIAGNOSIS — E11.29 CONTROLLED TYPE 2 DIABETES MELLITUS WITH MICROALBUMINURIA, WITHOUT LONG-TERM CURRENT USE OF INSULIN (HCC): ICD-10-CM

## 2022-03-28 DIAGNOSIS — R79.89 ELEVATED TSH: ICD-10-CM

## 2022-03-28 DIAGNOSIS — E66.3 OVERWEIGHT: ICD-10-CM

## 2022-03-28 DIAGNOSIS — E11.65 UNCONTROLLED TYPE 2 DIABETES MELLITUS WITH HYPERGLYCEMIA (HCC): Primary | ICD-10-CM

## 2022-03-28 DIAGNOSIS — Z12.31 ENCOUNTER FOR SCREENING MAMMOGRAM FOR MALIGNANT NEOPLASM OF BREAST: ICD-10-CM

## 2022-03-28 DIAGNOSIS — R80.9 MICROALBUMINURIA: ICD-10-CM

## 2022-03-28 DIAGNOSIS — R80.9 CONTROLLED TYPE 2 DIABETES MELLITUS WITH MICROALBUMINURIA, WITHOUT LONG-TERM CURRENT USE OF INSULIN (HCC): ICD-10-CM

## 2022-03-28 DIAGNOSIS — E78.5 DYSLIPIDEMIA: ICD-10-CM

## 2022-03-28 DIAGNOSIS — I10 PRIMARY HYPERTENSION: ICD-10-CM

## 2022-03-28 PROBLEM — R10.31 RIGHT LOWER QUADRANT ABDOMINAL PAIN: Status: RESOLVED | Noted: 2020-07-13 | Resolved: 2022-03-28

## 2022-03-28 PROCEDURE — 1036F TOBACCO NON-USER: CPT | Performed by: INTERNAL MEDICINE

## 2022-03-28 PROCEDURE — 3008F BODY MASS INDEX DOCD: CPT | Performed by: INTERNAL MEDICINE

## 2022-03-28 PROCEDURE — 1125F AMNT PAIN NOTED PAIN PRSNT: CPT | Performed by: INTERNAL MEDICINE

## 2022-03-28 PROCEDURE — 3079F DIAST BP 80-89 MM HG: CPT | Performed by: INTERNAL MEDICINE

## 2022-03-28 PROCEDURE — 3725F SCREEN DEPRESSION PERFORMED: CPT | Performed by: INTERNAL MEDICINE

## 2022-03-28 PROCEDURE — 3288F FALL RISK ASSESSMENT DOCD: CPT | Performed by: INTERNAL MEDICINE

## 2022-03-28 PROCEDURE — G0439 PPPS, SUBSEQ VISIT: HCPCS | Performed by: INTERNAL MEDICINE

## 2022-03-28 PROCEDURE — 3066F NEPHROPATHY DOC TX: CPT | Performed by: INTERNAL MEDICINE

## 2022-03-28 PROCEDURE — 99214 OFFICE O/P EST MOD 30 MIN: CPT | Performed by: INTERNAL MEDICINE

## 2022-03-28 PROCEDURE — 1100F PTFALLS ASSESS-DOCD GE2>/YR: CPT | Performed by: INTERNAL MEDICINE

## 2022-03-28 PROCEDURE — 1160F RVW MEDS BY RX/DR IN RCRD: CPT | Performed by: INTERNAL MEDICINE

## 2022-03-28 PROCEDURE — 1170F FXNL STATUS ASSESSED: CPT | Performed by: INTERNAL MEDICINE

## 2022-03-28 RX ORDER — LEVOTHYROXINE SODIUM 0.07 MG/1
75 TABLET ORAL EVERY MORNING
Qty: 90 TABLET | Refills: 1 | Status: SHIPPED | OUTPATIENT
Start: 2022-03-28

## 2022-03-28 NOTE — PATIENT INSTRUCTIONS
Medicare Preventive Visit Patient Instructions  Thank you for completing your Welcome to Medicare Visit or Medicare Annual Wellness Visit today  Your next wellness visit will be due in one year (3/29/2023)  The screening/preventive services that you may require over the next 5-10 years are detailed below  Some tests may not apply to you based off risk factors and/or age  Screening tests ordered at today's visit but not completed yet may show as past due  Also, please note that scanned in results may not display below  Preventive Screenings:  Service Recommendations Previous Testing/Comments   Colorectal Cancer Screening  * Colonoscopy    * Fecal Occult Blood Test (FOBT)/Fecal Immunochemical Test (FIT)  * Fecal DNA/Cologuard Test  * Flexible Sigmoidoscopy Age: 54-65 years old   Colonoscopy: every 10 years (may be performed more frequently if at higher risk)  OR  FOBT/FIT: every 1 year  OR  Cologuard: every 3 years  OR  Sigmoidoscopy: every 5 years  Screening may be recommended earlier than age 48 if at higher risk for colorectal cancer  Also, an individualized decision between you and your healthcare provider will decide whether screening between the ages of 74-80 would be appropriate  Colonoscopy: 04/06/2021  FOBT/FIT: Not on file  Cologuard: Not on file  Sigmoidoscopy: Not on file    Screening Current     Breast Cancer Screening Age: 36 years old  Frequency: every 1-2 years  Not required if history of left and right mastectomy Mammogram: 06/28/2021    Screening Current   Cervical Cancer Screening Between the ages of 21-29, pap smear recommended once every 3 years  Between the ages of 33-67, can perform pap smear with HPV co-testing every 5 years     Recommendations may differ for women with a history of total hysterectomy, cervical cancer, or abnormal pap smears in past  Pap Smear: Not on file    Screening Not Indicated   Hepatitis C Screening Once for adults born between 1945 and 1965  More frequently in patients at high risk for Hepatitis C Hep C Antibody: Not on file        Diabetes Screening 1-2 times per year if you're at risk for diabetes or have pre-diabetes Fasting glucose: 128 mg/dL   A1C: 8 3 %    Screening Not Indicated  History Diabetes   Cholesterol Screening Once every 5 years if you don't have a lipid disorder  May order more often based on risk factors  Lipid panel: 03/23/2022    Screening Current     Other Preventive Screenings Covered by Medicare:  1  Abdominal Aortic Aneurysm (AAA) Screening: covered once if your at risk  You're considered to be at risk if you have a family history of AAA  2  Lung Cancer Screening: covers low dose CT scan once per year if you meet all of the following conditions: (1) Age 50-69; (2) No signs or symptoms of lung cancer; (3) Current smoker or have quit smoking within the last 15 years; (4) You have a tobacco smoking history of at least 30 pack years (packs per day multiplied by number of years you smoked); (5) You get a written order from a healthcare provider  3  Glaucoma Screening: covered annually if you're considered high risk: (1) You have diabetes OR (2) Family history of glaucoma OR (3)  aged 48 and older OR (3)  American aged 72 and older  3  Osteoporosis Screening: covered every 2 years if you meet one of the following conditions: (1) You're estrogen deficient and at risk for osteoporosis based off medical history and other findings; (2) Have a vertebral abnormality; (3) On glucocorticoid therapy for more than 3 months; (4) Have primary hyperparathyroidism; (5) On osteoporosis medications and need to assess response to drug therapy  · Last bone density test (DXA Scan): 06/28/2021   5  HIV Screening: covered annually if you're between the age of 15-65  Also covered annually if you are younger than 13 and older than 72 with risk factors for HIV infection   For pregnant patients, it is covered up to 3 times per pregnancy  Immunizations:  Immunization Recommendations   Influenza Vaccine Annual influenza vaccination during flu season is recommended for all persons aged >= 6 months who do not have contraindications   Pneumococcal Vaccine (Prevnar and Pneumovax)  * Prevnar = PCV13  * Pneumovax = PPSV23   Adults 25-60 years old: 1-3 doses may be recommended based on certain risk factors  Adults 72 years old: Prevnar (PCV13) vaccine recommended followed by Pneumovax (PPSV23) vaccine  If already received PPSV23 since turning 65, then PCV13 recommended at least one year after PPSV23 dose  Hepatitis B Vaccine 3 dose series if at intermediate or high risk (ex: diabetes, end stage renal disease, liver disease)   Tetanus (Td) Vaccine - COST NOT COVERED BY MEDICARE PART B Following completion of primary series, a booster dose should be given every 10 years to maintain immunity against tetanus  Td may also be given as tetanus wound prophylaxis  Tdap Vaccine - COST NOT COVERED BY MEDICARE PART B Recommended at least once for all adults  For pregnant patients, recommended with each pregnancy  Shingles Vaccine (Shingrix) - COST NOT COVERED BY MEDICARE PART B  2 shot series recommended in those aged 48 and above     Health Maintenance Due:      Topic Date Due    Hepatitis C Screening  Never done    Breast Cancer Screening: Mammogram  06/28/2022    DXA SCAN  06/28/2023    Colorectal Cancer Screening  04/06/2031     Immunizations Due:      Topic Date Due    DTaP,Tdap,and Td Vaccines (1 - Tdap) 05/31/2006     Advance Directives   What are advance directives? Advance directives are legal documents that state your wishes and plans for medical care  These plans are made ahead of time in case you lose your ability to make decisions for yourself  Advance directives can apply to any medical decision, such as the treatments you want, and if you want to donate organs  What are the types of advance directives?   There are many types of advance directives, and each state has rules about how to use them  You may choose a combination of any of the following:  · Living will: This is a written record of the treatment you want  You can also choose which treatments you do not want, which to limit, and which to stop at a certain time  This includes surgery, medicine, IV fluid, and tube feedings  · Durable power of  for healthcare [facility]): This is a written record that states who you want to make healthcare choices for you when you are unable to make them for yourself  This person, called a proxy, is usually a family member or a friend  You may choose more than 1 proxy  · Do not resuscitate (DNR) order:  A DNR order is used in case your heart stops beating or you stop breathing  It is a request not to have certain forms of treatment, such as CPR  A DNR order may be included in other types of advance directives  · Medical directive: This covers the care that you want if you are in a coma, near death, or unable to make decisions for yourself  You can list the treatments you want for each condition  Treatment may include pain medicine, surgery, blood transfusions, dialysis, IV or tube feedings, and a ventilator (breathing machine)  · Values history: This document has questions about your views, beliefs, and how you feel and think about life  This information can help others choose the care that you would choose  Why are advance directives important? An advance directive helps you control your care  Although spoken wishes may be used, it is better to have your wishes written down  Spoken wishes can be misunderstood, or not followed  Treatments may be given even if you do not want them  An advance directive may make it easier for your family to make difficult choices about your care  Fall Prevention    Fall prevention  includes ways to make your home and other areas safer  It also includes ways you can move more carefully to prevent a fall  Health conditions that cause changes in your blood pressure, vision, or muscle strength and coordination may increase your risk for falls  Medicines may also increase your risk for falls if they make you dizzy, weak, or sleepy  Fall prevention tips:   · Stand or sit up slowly  · Use assistive devices as directed  · Wear shoes that fit well and have soles that   · Wear a personal alarm  · Stay active  · Manage your medical conditions  Home Safety Tips:  · Add items to prevent falls in the bathroom  · Keep paths clear  · Install bright lights in your home  · Keep items you use often on shelves within reach  · Paint or place reflective tape on the edges of your stairs  Weight Management   Why it is important to manage your weight:  Being overweight increases your risk of health conditions such as heart disease, high blood pressure, type 2 diabetes, and certain types of cancer  It can also increase your risk for osteoarthritis, sleep apnea, and other respiratory problems  Aim for a slow, steady weight loss  Even a small amount of weight loss can lower your risk of health problems  How to lose weight safely:  A safe and healthy way to lose weight is to eat fewer calories and get regular exercise  You can lose up about 1 pound a week by decreasing the number of calories you eat by 500 calories each day  Healthy meal plan for weight management:  A healthy meal plan includes a variety of foods, contains fewer calories, and helps you stay healthy  A healthy meal plan includes the following:  · Eat whole-grain foods more often  A healthy meal plan should contain fiber  Fiber is the part of grains, fruits, and vegetables that is not broken down by your body  Whole-grain foods are healthy and provide extra fiber in your diet  Some examples of whole-grain foods are whole-wheat breads and pastas, oatmeal, brown rice, and bulgur  · Eat a variety of vegetables every day    Include dark, leafy greens such as spinach, kale, humberto greens, and mustard greens  Eat yellow and orange vegetables such as carrots, sweet potatoes, and winter squash  · Eat a variety of fruits every day  Choose fresh or canned fruit (canned in its own juice or light syrup) instead of juice  Fruit juice has very little or no fiber  · Eat low-fat dairy foods  Drink fat-free (skim) milk or 1% milk  Eat fat-free yogurt and low-fat cottage cheese  Try low-fat cheeses such as mozzarella and other reduced-fat cheeses  · Choose meat and other protein foods that are low in fat  Choose beans or other legumes such as split peas or lentils  Choose fish, skinless poultry (chicken or turkey), or lean cuts of red meat (beef or pork)  Before you cook meat or poultry, cut off any visible fat  · Use less fat and oil  Try baking foods instead of frying them  Add less fat, such as margarine, sour cream, regular salad dressing and mayonnaise to foods  Eat fewer high-fat foods  Some examples of high-fat foods include french fries, doughnuts, ice cream, and cakes  · Eat fewer sweets  Limit foods and drinks that are high in sugar  This includes candy, cookies, regular soda, and sweetened drinks  Exercise:  Exercise at least 30 minutes per day on most days of the week  Some examples of exercise include walking, biking, dancing, and swimming  You can also fit in more physical activity by taking the stairs instead of the elevator or parking farther away from stores  Ask your healthcare provider about the best exercise plan for you  © Copyright [employer] 2018 Information is for End User's use only and may not be sold, redistributed or otherwise used for commercial purposes   All illustrations and images included in CareNotes® are the copyrighted property of A D A M , Inc  or Shanna Mckinnon    10% - bad control"> 10% - bad control,Hemoglobin A1c (HbA1c) greater than 10% indicating poor diabetic control,Haemoglobin A1c greater than 10% indicating poor diabetic control">   Diabetes Mellitus Type 2 in Adults, Ambulatory Care   GENERAL INFORMATION:   Diabetes mellitus type 2  is a disease that affects how your body uses glucose (sugar)  Insulin helps move sugar out of the blood so it can be used for energy  Normally, when the blood sugar level increases, the pancreas makes more insulin  Type 2 diabetes develops because either the body cannot make enough insulin, or it cannot use the insulin correctly  After many years, your pancreas may stop making insulin  Common symptoms include the following:   · More hunger or thirst than usual     · Frequent urination     · Weight loss without trying     · Blurred vision  Seek immediate care for the following symptoms:   · Severe abdominal pain, or pain that spreads to your back  You may also be vomiting  · Trouble staying awake or focusing    · Shaking or sweating    · Blurred or double vision    · Breath has a fruity, sweet smell    · Breathing is deep and labored, or rapid and shallow    · Heartbeat is fast and weak  Treatment for diabetes mellitus type 2  includes keeping your blood sugar at a normal level  You must eat the right foods, and exercise regularly  You may also need medicine if you cannot control your blood sugar level with nutrition and exercise  Manage diabetes mellitus type 2:   · Check your blood sugar level  You will be taught how to check a small drop of blood in a glucose monitor  Ask your healthcare provider when and how often to check during the day  Ask your healthcare provider what your blood sugar levels should be when you check them  · Keep track of carbohydrates (sugar and starchy foods)  Your blood sugar level can get too high if you eat too many carbohydrates  Your dietitian will help you plan meals and snacks that have the right amount of carbohydrates  · Eat low-fat foods  Some examples are skinless chicken and low-fat milk  · Eat less sodium (salt)  Some examples of high-sodium foods to limit are soy sauce, potato chips, and soup  Do not add salt to food you cook  Limit your use of table salt  · Eat high-fiber foods  Foods that are a good source of fiber include vegetables, whole grain bread, and beans  · Limit alcohol  Alcohol affects your blood sugar level and can make it harder to manage your diabetes  Women should limit alcohol to 1 drink a day  Men should limit alcohol to 2 drinks a day  A drink of alcohol is 12 ounces of beer, 5 ounces of wine, or 1½ ounces of liquor  · Get regular exercise  Exercise can help keep your blood sugar level steady, decrease your risk of heart disease, and help you lose weight  Exercise for at least 30 minutes, 5 days a week  Include muscle strengthening activities 2 days each week  Work with your healthcare provider to create an exercise plan  · Check your feet each day  for injuries or open sores  Ask your healthcare provider for activities you can do if you have an open sore  · Quit smoking  If you smoke, it is never too late to quit  Smoking can worsen the problems that may occur with diabetes  Ask your healthcare provider for information about how to stop smoking if you are having trouble quitting  · Ask about your weight:  Ask healthcare providers if you need to lose weight, and how much to lose  Ask them to help you with a weight loss program  Even a 10 to 15 pound weight loss can help you manage your blood sugar level  · Carry medical alert identification  Wear medical alert jewelry or carry a card that says you have diabetes  Ask your healthcare provider where to get these items  · Ask about vaccines  Diabetes puts you at risk of serious illness if you get the flu, pneumonia, or hepatitis  Ask your healthcare provider if you should get a flu, pneumonia, or hepatitis B vaccine, and when to get the vaccine    Follow up with your healthcare provider as directed:  Write down your questions so you remember to ask them during your visits  CARE AGREEMENT:   You have the right to help plan your care  Learn about your health condition and how it may be treated  Discuss treatment options with your caregivers to decide what care you want to receive  You always have the right to refuse treatment  The above information is an  only  It is not intended as medical advice for individual conditions or treatments  Talk to your doctor, nurse or pharmacist before following any medical regimen to see if it is safe and effective for you  © 2014 [address] is for End User's use only and may not be sold, redistributed or otherwise used for commercial purposes  All illustrations and images included in CareNotes® are the copyrighted property of A D A M , Inc  or Adonis Cade  What to Do if Your Blood Sugar is Low   AMBULATORY CARE:   Low blood sugar levels  (hypoglycemia) can happen with Type 1 and Type 2 diabetes  Low levels are more likely to happen if you use insulin  Hypoglycemia can cause you to have falls, accidents, and injuries  A blood sugar level that gets too low can lead to seizures, coma, and death  Learn to recognize the symptoms early so you can get treatment quickly  When your blood sugar is low you may feel:  · Sweaty    · Nervous or shaky    · Anxious or irritable    · Confused    · A fast, pounding heartbeat    · Extremely hungry    Have someone call your local emergency number (911 in the [address] [place] [address],[address]) if:   · You cannot be woken  · You have a seizure  Call your doctor if:   · You have symptoms of a low blood sugar level, such as trouble thinking, sweating, or a pounding heartbeat  · Your blood sugar level is lower than normal and it does not improve with treatment  · You often have lower blood sugar levels than your target goals  · You have trouble coping with your illness, or you feel anxious or depressed       · You have questions or concerns about your condition or care  What to do if you have symptoms of low blood sugar:   · Check your blood sugar level, if possible  Your blood sugar level is too low if it is at or below 70 mg/dL  · Eat or drink 15 grams of fast-acting carbohydrate  Fast-acting carbohydrates will raise your blood sugar level quickly  Examples of 15 grams of fast-acting carbohydrates:     ? 4 ounces (½ cup) of fruit juice     ? 4 ounces of regular soda    ? 2 tablespoons of raisins     ? 1 tube of glucose gel or 3 to 4 glucose tablets       · Check your blood sugar level 15 minutes later  If the level is still low (less than 100 mg/dL), eat another 15 grams of carbohydrate  When the level returns to 100 mg/dL, eat a snack or meal that contains carbohydrates  This will help prevent another drop in blood sugar  · Teach people close to you how to use your glucagon kit  Your blood sugar may be too low for you to be awake  People need to know when and how to use your kit  Prevent low blood sugar levels:  Prevent low blood sugar by knowing what increases your risk  Ask your healthcare provider for ways to prevent low blood sugar levels  Any of the following can increase your risk of low blood sugar:  · Fasting for tests or procedures    · During or after intense exercise    · Late or postponed meals    · Sleeping (you may need a bedtime snack)     · Drinking alcohol if you use insulin or insulin releasing pills    Follow up with your doctor as directed:  Write down your questions so you remember to ask them during your visits  © Copyright [employer] 2022 Information is for End User's use only and may not be sold, redistributed or otherwise used for commercial purposes  All illustrations and images included in CareNotes® are the copyrighted property of A [employer] A M [employer] Inc  or Shanna Atkins   The above information is an  only   It is not intended as medical advice for individual conditions or treatments  Talk to your doctor, nurse or pharmacist before following any medical regimen to see if it is safe and effective for you  10% - bad control"> 10% - bad control,Hemoglobin A1c (HbA1c) greater than 10% indicating poor diabetic control,Haemoglobin A1c greater than 10% indicating poor diabetic control">   Diabetes Mellitus Type 2 in Adults, Ambulatory Care   GENERAL INFORMATION:   Diabetes mellitus type 2  is a disease that affects how your body uses glucose (sugar)  Insulin helps move sugar out of the blood so it can be used for energy  Normally, when the blood sugar level increases, the pancreas makes more insulin  Type 2 diabetes develops because either the body cannot make enough insulin, or it cannot use the insulin correctly  After many years, your pancreas may stop making insulin  Common symptoms include the following:   · More hunger or thirst than usual     · Frequent urination     · Weight loss without trying     · Blurred vision  Seek immediate care for the following symptoms:   · Severe abdominal pain, or pain that spreads to your back  You may also be vomiting  · Trouble staying awake or focusing    · Shaking or sweating    · Blurred or double vision    · Breath has a fruity, sweet smell    · Breathing is deep and labored, or rapid and shallow    · Heartbeat is fast and weak  Treatment for diabetes mellitus type 2  includes keeping your blood sugar at a normal level  You must eat the right foods, and exercise regularly  You may also need medicine if you cannot control your blood sugar level with nutrition and exercise  Manage diabetes mellitus type 2:   · Check your blood sugar level  You will be taught how to check a small drop of blood in a glucose monitor  Ask your healthcare provider when and how often to check during the day  Ask your healthcare provider what your blood sugar levels should be when you check them  · Keep track of carbohydrates (sugar and starchy foods)    Your blood sugar level can get too high if you eat too many carbohydrates  Your dietitian will help you plan meals and snacks that have the right amount of carbohydrates  · Eat low-fat foods  Some examples are skinless chicken and low-fat milk  · Eat less sodium (salt)  Some examples of high-sodium foods to limit are soy sauce, potato chips, and soup  Do not add salt to food you cook  Limit your use of table salt  · Eat high-fiber foods  Foods that are a good source of fiber include vegetables, whole grain bread, and beans  · Limit alcohol  Alcohol affects your blood sugar level and can make it harder to manage your diabetes  Women should limit alcohol to 1 drink a day  Men should limit alcohol to 2 drinks a day  A drink of alcohol is 12 ounces of beer, 5 ounces of wine, or 1½ ounces of liquor  · Get regular exercise  Exercise can help keep your blood sugar level steady, decrease your risk of heart disease, and help you lose weight  Exercise for at least 30 minutes, 5 days a week  Include muscle strengthening activities 2 days each week  Work with your healthcare provider to create an exercise plan  · Check your feet each day  for injuries or open sores  Ask your healthcare provider for activities you can do if you have an open sore  · Quit smoking  If you smoke, it is never too late to quit  Smoking can worsen the problems that may occur with diabetes  Ask your healthcare provider for information about how to stop smoking if you are having trouble quitting  · Ask about your weight:  Ask healthcare providers if you need to lose weight, and how much to lose  Ask them to help you with a weight loss program  Even a 10 to 15 pound weight loss can help you manage your blood sugar level  · Carry medical alert identification  Wear medical alert jewelry or carry a card that says you have diabetes  Ask your healthcare provider where to get these items  · Ask about vaccines    Diabetes puts you at risk of serious illness if you get the flu, pneumonia, or hepatitis  Ask your healthcare provider if you should get a flu, pneumonia, or hepatitis B vaccine, and when to get the vaccine  Follow up with your healthcare provider as directed:  Write down your questions so you remember to ask them during your visits  CARE AGREEMENT:   You have the right to help plan your care  Learn about your health condition and how it may be treated  Discuss treatment options with your caregivers to decide what care you want to receive  You always have the right to refuse treatment  The above information is an  only  It is not intended as medical advice for individual conditions or treatments  Talk to your doctor, nurse or pharmacist before following any medical regimen to see if it is safe and effective for you  © 2014 [address] is for End User's use only and may not be sold, redistributed or otherwise used for commercial purposes  All illustrations and images included in CareNotes® are the copyrighted property of A [employer] M [employer]  or Adonis Cade  What to Do if Your Blood Sugar is Low   AMBULATORY CARE:   Low blood sugar levels  (hypoglycemia) can happen with Type 1 and Type 2 diabetes  Low levels are more likely to happen if you use insulin  Hypoglycemia can cause you to have falls, accidents, and injuries  A blood sugar level that gets too low can lead to seizures, coma, and death  Learn to recognize the symptoms early so you can get treatment quickly  When your blood sugar is low you may feel:  · Sweaty    · Nervous or shaky    · Anxious or irritable    · Confused    · A fast, pounding heartbeat    · Extremely hungry    Have someone call your local emergency number (911 in the [address] [place] [address],[address]) if:   · You cannot be woken  · You have a seizure      Call your doctor if:   · You have symptoms of a low blood sugar level, such as trouble thinking, sweating, or a pounding heartbeat  · Your blood sugar level is lower than normal and it does not improve with treatment  · You often have lower blood sugar levels than your target goals  · You have trouble coping with your illness, or you feel anxious or depressed  · You have questions or concerns about your condition or care  What to do if you have symptoms of low blood sugar:   · Check your blood sugar level, if possible  Your blood sugar level is too low if it is at or below 70 mg/dL  · Eat or drink 15 grams of fast-acting carbohydrate  Fast-acting carbohydrates will raise your blood sugar level quickly  Examples of 15 grams of fast-acting carbohydrates:     ? 4 ounces (½ cup) of fruit juice     ? 4 ounces of regular soda    ? 2 tablespoons of raisins     ? 1 tube of glucose gel or 3 to 4 glucose tablets       · Check your blood sugar level 15 minutes later  If the level is still low (less than 100 mg/dL), eat another 15 grams of carbohydrate  When the level returns to 100 mg/dL, eat a snack or meal that contains carbohydrates  This will help prevent another drop in blood sugar  · Teach people close to you how to use your glucagon kit  Your blood sugar may be too low for you to be awake  People need to know when and how to use your kit  Prevent low blood sugar levels:  Prevent low blood sugar by knowing what increases your risk  Ask your healthcare provider for ways to prevent low blood sugar levels  Any of the following can increase your risk of low blood sugar:  · Fasting for tests or procedures    · During or after intense exercise    · Late or postponed meals    · Sleeping (you may need a bedtime snack)     · Drinking alcohol if you use insulin or insulin releasing pills    Follow up with your doctor as directed:  Write down your questions so you remember to ask them during your visits     © Copyright [employer] 2022 Information is for End User's use only and may not be sold, redistributed or otherwise used for commercial purposes  All illustrations and images included in CareNotes® are the copyrighted property of [employer]  or Shanna Atkins   The above information is an  only  It is not intended as medical advice for individual conditions or treatments  Talk to your doctor, nurse or pharmacist before following any medical regimen to see if it is safe and effective for you  Medicare Preventive Visit Patient Instructions  Thank you for completing your Welcome to Medicare Visit or Medicare Annual Wellness Visit today  Your next wellness visit will be due in one year (3/29/2023)  The screening/preventive services that you may require over the next 5-10 years are detailed below  Some tests may not apply to you based off risk factors and/or age  Screening tests ordered at today's visit but not completed yet may show as past due  Also, please note that scanned in results may not display below  Preventive Screenings:  Service Recommendations Previous Testing/Comments   Colorectal Cancer Screening  * Colonoscopy    * Fecal Occult Blood Test (FOBT)/Fecal Immunochemical Test (FIT)  * Fecal DNA/Cologuard Test  * Flexible Sigmoidoscopy Age: 54-65 years old   Colonoscopy: every 10 years (may be performed more frequently if at higher risk)  OR  FOBT/FIT: every 1 year  OR  Cologuard: every 3 years  OR  Sigmoidoscopy: every 5 years  Screening may be recommended earlier than age 48 if at higher risk for colorectal cancer  Also, an individualized decision between you and your healthcare provider will decide whether screening between the ages of 74-80 would be appropriate   Colonoscopy: 04/06/2021  FOBT/FIT: Not on file  Cologuard: Not on file  Sigmoidoscopy: Not on file    Screening Current     Breast Cancer Screening Age: 36 years old  Frequency: every 1-2 years  Not required if history of left and right mastectomy Mammogram: 06/28/2021    Screening Current   Cervical Cancer Screening Between the ages of 21-29, pap smear recommended once every 3 years  Between the ages of 33-67, can perform pap smear with HPV co-testing every 5 years  Recommendations may differ for women with a history of total hysterectomy, cervical cancer, or abnormal pap smears in past  Pap Smear: Not on file    Screening Not Indicated   Hepatitis C Screening Once for adults born between 1945 and 1965  More frequently in patients at high risk for Hepatitis C Hep C Antibody: Not on file        Diabetes Screening 1-2 times per year if you're at risk for diabetes or have pre-diabetes Fasting glucose: 128 mg/dL   A1C: 8 3 %    Screening Not Indicated  History Diabetes   Cholesterol Screening Once every 5 years if you don't have a lipid disorder  May order more often based on risk factors  Lipid panel: 03/23/2022    Screening Current     Other Preventive Screenings Covered by Medicare:  6  Abdominal Aortic Aneurysm (AAA) Screening: covered once if your at risk  You're considered to be at risk if you have a family history of AAA  7  Lung Cancer Screening: covers low dose CT scan once per year if you meet all of the following conditions: (1) Age 50-69; (2) No signs or symptoms of lung cancer; (3) Current smoker or have quit smoking within the last 15 years; (4) You have a tobacco smoking history of at least 30 pack years (packs per day multiplied by number of years you smoked); (5) You get a written order from a healthcare provider  8  Glaucoma Screening: covered annually if you're considered high risk: (1) You have diabetes OR (2) Family history of glaucoma OR (3)  aged 48 and older OR (3)  American aged 72 and older  5   Osteoporosis Screening: covered every 2 years if you meet one of the following conditions: (1) You're estrogen deficient and at risk for osteoporosis based off medical history and other findings; (2) Have a vertebral abnormality; (3) On glucocorticoid therapy for more than 3 months; (4) Have primary hyperparathyroidism; (5) On osteoporosis medications and need to assess response to drug therapy  · Last bone density test (DXA Scan): 06/28/2021  10  HIV Screening: covered annually if you're between the age of 12-76  Also covered annually if you are younger than 13 and older than 72 with risk factors for HIV infection  For pregnant patients, it is covered up to 3 times per pregnancy  Immunizations:  Immunization Recommendations   Influenza Vaccine Annual influenza vaccination during flu season is recommended for all persons aged >= 6 months who do not have contraindications   Pneumococcal Vaccine (Prevnar and Pneumovax)  * Prevnar = PCV13  * Pneumovax = PPSV23   Adults 25-60 years old: 1-3 doses may be recommended based on certain risk factors  Adults 72 years old: Prevnar (PCV13) vaccine recommended followed by Pneumovax (PPSV23) vaccine  If already received PPSV23 since turning 65, then PCV13 recommended at least one year after PPSV23 dose  Hepatitis B Vaccine 3 dose series if at intermediate or high risk (ex: diabetes, end stage renal disease, liver disease)   Tetanus (Td) Vaccine - COST NOT COVERED BY MEDICARE PART B Following completion of primary series, a booster dose should be given every 10 years to maintain immunity against tetanus  Td may also be given as tetanus wound prophylaxis  Tdap Vaccine - COST NOT COVERED BY MEDICARE PART B Recommended at least once for all adults  For pregnant patients, recommended with each pregnancy     Shingles Vaccine (Shingrix) - COST NOT COVERED BY MEDICARE PART B  2 shot series recommended in those aged 48 and above     Health Maintenance Due:      Topic Date Due    Hepatitis C Screening  Never done    Breast Cancer Screening: Mammogram  06/28/2022    DXA SCAN  06/28/2023    Colorectal Cancer Screening  04/06/2031     Immunizations Due:      Topic Date Due    DTaP,Tdap,and Td Vaccines (1 - Tdap) 05/31/2006     Advance Directives   What are advance directives? Advance directives are legal documents that state your wishes and plans for medical care  These plans are made ahead of time in case you lose your ability to make decisions for yourself  Advance directives can apply to any medical decision, such as the treatments you want, and if you want to donate organs  What are the types of advance directives? There are many types of advance directives, and each state has rules about how to use them  You may choose a combination of any of the following:  · Living will: This is a written record of the treatment you want  You can also choose which treatments you do not want, which to limit, and which to stop at a certain time  This includes surgery, medicine, IV fluid, and tube feedings  · Durable power of  for healthcare [facility]): This is a written record that states who you want to make healthcare choices for you when you are unable to make them for yourself  This person, called a proxy, is usually a family member or a friend  You may choose more than 1 proxy  · Do not resuscitate (DNR) order:  A DNR order is used in case your heart stops beating or you stop breathing  It is a request not to have certain forms of treatment, such as CPR  A DNR order may be included in other types of advance directives  · Medical directive: This covers the care that you want if you are in a coma, near death, or unable to make decisions for yourself  You can list the treatments you want for each condition  Treatment may include pain medicine, surgery, blood transfusions, dialysis, IV or tube feedings, and a ventilator (breathing machine)  · Values history: This document has questions about your views, beliefs, and how you feel and think about life  This information can help others choose the care that you would choose  Why are advance directives important? An advance directive helps you control your care   Although spoken wishes may be used, it is better to have your wishes written down  Spoken wishes can be misunderstood, or not followed  Treatments may be given even if you do not want them  An advance directive may make it easier for your family to make difficult choices about your care  Fall Prevention    Fall prevention  includes ways to make your home and other areas safer  It also includes ways you can move more carefully to prevent a fall  Health conditions that cause changes in your blood pressure, vision, or muscle strength and coordination may increase your risk for falls  Medicines may also increase your risk for falls if they make you dizzy, weak, or sleepy  Fall prevention tips:   · Stand or sit up slowly  · Use assistive devices as directed  · Wear shoes that fit well and have soles that   · Wear a personal alarm  · Stay active  · Manage your medical conditions  Home Safety Tips:  · Add items to prevent falls in the bathroom  · Keep paths clear  · Install bright lights in your home  · Keep items you use often on shelves within reach  · Paint or place reflective tape on the edges of your stairs  Weight Management   Why it is important to manage your weight:  Being overweight increases your risk of health conditions such as heart disease, high blood pressure, type 2 diabetes, and certain types of cancer  It can also increase your risk for osteoarthritis, sleep apnea, and other respiratory problems  Aim for a slow, steady weight loss  Even a small amount of weight loss can lower your risk of health problems  How to lose weight safely:  A safe and healthy way to lose weight is to eat fewer calories and get regular exercise  You can lose up about 1 pound a week by decreasing the number of calories you eat by 500 calories each day  Healthy meal plan for weight management:  A healthy meal plan includes a variety of foods, contains fewer calories, and helps you stay healthy   A healthy meal plan includes the following:  · Eat whole-grain foods more often  A healthy meal plan should contain fiber  Fiber is the part of grains, fruits, and vegetables that is not broken down by your body  Whole-grain foods are healthy and provide extra fiber in your diet  Some examples of whole-grain foods are whole-wheat breads and pastas, oatmeal, brown rice, and bulgur  · Eat a variety of vegetables every day  Include dark, leafy greens such as spinach, kale, humberto greens, and mustard greens  Eat yellow and orange vegetables such as carrots, sweet potatoes, and winter squash  · Eat a variety of fruits every day  Choose fresh or canned fruit (canned in its own juice or light syrup) instead of juice  Fruit juice has very little or no fiber  · Eat low-fat dairy foods  Drink fat-free (skim) milk or 1% milk  Eat fat-free yogurt and low-fat cottage cheese  Try low-fat cheeses such as mozzarella and other reduced-fat cheeses  · Choose meat and other protein foods that are low in fat  Choose beans or other legumes such as split peas or lentils  Choose fish, skinless poultry (chicken or turkey), or lean cuts of red meat (beef or pork)  Before you cook meat or poultry, cut off any visible fat  · Use less fat and oil  Try baking foods instead of frying them  Add less fat, such as margarine, sour cream, regular salad dressing and mayonnaise to foods  Eat fewer high-fat foods  Some examples of high-fat foods include french fries, doughnuts, ice cream, and cakes  · Eat fewer sweets  Limit foods and drinks that are high in sugar  This includes candy, cookies, regular soda, and sweetened drinks  Exercise:  Exercise at least 30 minutes per day on most days of the week  Some examples of exercise include walking, biking, dancing, and swimming  You can also fit in more physical activity by taking the stairs instead of the elevator or parking farther away from stores  Ask your healthcare provider about the best exercise plan for you        © Copyright [employer] 2018 Information is for [employer] use only and may not be sold, redistributed or otherwise used for commercial purposes   All illustrations and images included in CareNotes® are the copyrighted property of A D A M [employer]  or [address] [place], [place] [address]josesito rodríguez

## 2022-03-28 NOTE — ASSESSMENT & PLAN NOTE
TSH elevated - increase levothyroxine to 75 mcg daily, recheck TFT's in 6 wks - order given, will follow

## 2022-03-28 NOTE — ASSESSMENT & PLAN NOTE
FLP not at goal - 10 yr ASCVD risk score reviewed - pt deferring statin, urged to call if she changes her mind, diet/exercise/wgt loss encouraged, will follow

## 2022-03-28 NOTE — ASSESSMENT & PLAN NOTE
Adjusting levothyroxine as noted above, importance of taking thyroid med BY ITSELF w/o other meds/supplements reviewed, will follow

## 2022-03-28 NOTE — PROGRESS NOTES
Assessment and Plan:     Problem List Items Addressed This Visit        Endocrine    Uncontrolled type 2 diabetes mellitus with hyperglycemia ([address] ) - Primary    Relevant Medications    Empagliflozin (Jardiance) 10 MG TABS    metFORMIN (GLUCOPHAGE) 1000 MG tablet    Other Relevant Orders    Hemoglobin F8N    Basic metabolic panel    Hypothyroidism    Relevant Medications    levothyroxine 75 mcg tablet    Other Relevant Orders    TSH, 3rd generation with Free T4 reflex       Cardiovascular and Mediastinum    Hypertension       Other    Dyslipidemia    Elevated TSH    Relevant Medications    levothyroxine 75 mcg tablet    Other Relevant Orders    TSH, 3rd generation with Free T4 reflex    Microalbuminuria      Other Visit Diagnoses     Controlled type 2 diabetes mellitus with microalbuminuria, without long-term current use of insulin (HCC)        Relevant Medications    Empagliflozin (Jardiance) 10 MG TABS    metFORMIN (GLUCOPHAGE) 1000 MG tablet    Encounter for screening mammogram for malignant neoplasm of breast        Relevant Orders    Mammo screening bilateral w 3d & cad    Medicare annual wellness visit, subsequent        BMI 29 0-29 9,adult            BMI Counseling: Body mass index is 29 97 kg/m²  The BMI is above normal  Nutrition recommendations include decreasing portion sizes, encouraging healthy choices of fruits and vegetables, consuming healthier snacks, moderation in carbohydrate intake, increasing intake of lean protein and reducing intake of saturated and trans fat  Exercise recommendations include exercising 3-5 times per week  No pharmacotherapy was ordered  Rationale for BMI follow-up plan is due to patient being overweight or obese  Depression Screening and Follow-up Plan: Patient was screened for depression during today's encounter  They screened negative with a PHQ-2 score of 0  Falls Plan of Care: balance, strength, and gait training instructions were provided   Home safety education provided  Preventive health issues were discussed with patient, and age appropriate screening tests were ordered as noted in patient's After Visit Summary  Personalized health advice and appropriate referrals for health education or preventive services given if needed, as noted in patient's After Visit Summary  History of Present Illness:     Patient presents for Medicare Annual Wellness visit    Patient Care Team:  Piter Joyce DO as PCP - General  MD Nahomi Spicer (Obstetrics and Gynecology)  Karely Kaplan MD (Gastroenterology)  Martínez Becker as Nurse Practitioner (Urology)     Problem List:     Patient Active Problem List   Diagnosis    Allergic rhinitis    Anxiety disorder    Colon, diverticulosis    Change in bowel function    Dyslipidemia    Elevated TSH    Gastroesophageal reflux disease    Headache    Herniated nucleus pulposus, L4-5    Hypertension    Irritable bowel syndrome    Leiomyoma of uterus    Microalbuminuria    Multiple thyroid nodules    Osteopenia    Overweight    Acquired cystic kidney disease    Uncontrolled type 2 diabetes mellitus with hyperglycemia ([facility] [address] )    Right lower quadrant abdominal pain    Nephrolithiasis    Migraine    Hypothyroidism    Acute gastric ulcer without hemorrhage or perforation      Past Medical and Surgical History:     Past Medical History:   Diagnosis Date    GERD (gastroesophageal reflux disease)     Hypertension     Kidney stone     Migraine     Wrist fracture     left      Past Surgical History:   Procedure Laterality Date    APPENDECTOMY      CATARACT EXTRACTION, BILATERAL Bilateral 2020    COLONOSCOPY      complete 11/29/11- 5 years / complete 6/9/04 -10years    COLONOSCOPY      Exam in November 2011 revealed small polyp, sigmoid diverticulosis, internal and external hemorrhoids  The polyp was  inflammatory polyp so followup in 10 years recommended      FL RETROGRADE PYELOGRAM  12/18/2020  HEMORRHOID SURGERY      KIDNEY STONE SURGERY      AK CYSTO/URETERO W/LITHOTRIPSY &INDWELL STENT INSRT Right 12/18/2020    Procedure: CYSTOSCOPY URETEROSCOPY RETROGRADE PYELOGRAM AND INSERTION STENT URETERAL;  Surgeon: Mau Tse MD;  Location:  MAIN OR;  Service: Urology    TONSILLECTOMY      TUBAL LIGATION      UPPER GASTROINTESTINAL ENDOSCOPY      April and July 2021: Antral ulcer on first examination biopsies negative for H pylori, repeat exam with healed ulcer just gastritis  November 2011 with hiatal hernia irregular Z-line and gastritis  Biopsies show question of intestinal metaplasia verses just reflux inflammation, H pylori was negative, nonspecific duodenitis        Family History:     Family History   Problem Relation Age of Onset    Hypertension Mother     Thyroid disease Mother     Breast cancer Mother 80    Coronary artery disease Father     Hypertension Father     Other Father         smoker    Diabetes Paternal Grandmother     Endometrial cancer Paternal Grandmother         age unknown    Prostate cancer Maternal Grandfather 76    Breast cancer Cousin         age unknown    No Known Problems Sister     No Known Problems Daughter     No Known Problems Sister     No Known Problems Daughter     No Known Problems Maternal Aunt     No Known Problems Maternal Aunt     Endometrial cancer Cousin         age unknown, maybe 64   Jullie Liming Colon polyps Neg Hx     Colon cancer Neg Hx       Social History:     Social History     Socioeconomic History    Marital status: /Civil Union     Spouse name: None    Number of children: None    Years of education: None    Highest education level: None   Occupational History    Occupation:  full time    Occupation: retired   Tobacco Use    Smoking status: Never Smoker    Smokeless tobacco: Never Used   Vaping Use    Vaping Use: Never used   Substance and Sexual Activity    Alcohol use: Not Currently    Drug use: No    Sexual activity: None   Other Topics Concern    None   Social History Narrative    None     Social Determinants of Health     Financial Resource Strain: Not on file   Food Insecurity: Not on file   Transportation Needs: Not on file   Physical Activity: Not on file   Stress: Not on file   Social Connections: Not on file   Intimate Partner Violence: Not on file   Housing Stability: Not on file      Medications and Allergies:     Current Outpatient Medications   Medication Sig Dispense Refill    Accu-Chek Guide test strip Test twice daily      Accu-Chek Softclix Lancets lancets Test twice daily      acetaminophen (TYLENOL) 500 mg tablet Take 500 mg by mouth every 6 (six) hours as needed for mild pain      amLODIPine (NORVASC) 10 mg tablet take 1 tablet by mouth once daily 90 tablet 1    Blood Glucose Monitoring Suppl (Accu-Chek Guide Me) w/Device KIT Test twice daily      Cetirizine HCl (ZYRTEC ALLERGY) 10 MG CAPS daily as needed ZyrTEC Allergy 10 MG Oral Tablet TAKE 1 TABLET DAILY AS DIRECTED  Quantity: 30;  Refills: 0    Lynnette Saucedo;  Started 12-May-2014 Active      Cholecalciferol (VITAMIN D-3) 1000 UNITS CAPS Vitamin D3 1000 UNIT Oral Tablet TAKE 1 TABLET DAILY  Quantity: 30;  Refills: 0    Xochitl Tan DO;  Started 25-Sep-2012 Active      Empagliflozin (Jardiance) 10 MG TABS Take 1 tablet (10 mg total) by mouth in the morning 90 tablet 1    levothyroxine 75 mcg tablet Take 1 tablet (75 mcg total) by mouth every morning 90 tablet 1    lisinopril (ZESTRIL) 40 mg tablet take 1 AND 1/2 tablets by mouth daily 135 tablet 1    metFORMIN (GLUCOPHAGE) 1000 MG tablet Take 1 tablet (1,000 mg total) by mouth 2 (two) times a day with meals 180 tablet 2    metoprolol succinate (TOPROL-XL) 100 mg 24 hr tablet TAKE 1 5 TABLETS DAILY 135 tablet 1    Multiple Vitamins-Minerals (CENTRUM ADULTS PO) Centrum Oral Tablet TAKE 1 TABLET DAILY    Refills: 0    Anne Stanley DO;  Started 25-Sep-2012 Active      pantoprazole (PROTONIX) 40 mg tablet take 1 tablet by mouth once daily 30 tablet 12     No current facility-administered medications for this visit  Allergies   Allergen Reactions    Metronidazole Anaphylaxis    Tramadol Swelling    Acetazolamide Other (See Comments)     unknown    Penicillins Hives, Itching and Rash    Statins Myalgia    Hydrochlorothiazide Rash    Sulfa Antibiotics Rash    Tetracycline Rash      Immunizations:     Immunization History   Administered Date(s) Administered    COVID-19 PFIZER VACCINE 0 3 ML IM 03/08/2021, 03/29/2021    INFLUENZA 09/10/2014, 09/08/2015, 09/29/2016, 10/11/2018, 09/06/2020, 09/30/2021    Influenza Split High Dose Preservative Free IM 09/08/2015, 09/29/2016    Influenza, high dose seasonal 0 7 mL 09/19/2019    Influenza, seasonal, injectable 09/15/2017    Pneumococcal Conjugate 13-Valent 09/08/2015    Pneumococcal Polysaccharide PPV23 04/18/2017    TD (adult) Preservative Free 05/30/2006    Tetanus, adsorbed 05/30/2006      Health Maintenance:         Topic Date Due    Hepatitis C Screening  Never done    Breast Cancer Screening: Mammogram  06/28/2022    DXA SCAN  06/28/2023    Colorectal Cancer Screening  04/06/2031         Topic Date Due    DTaP,Tdap,and Td Vaccines (1 - Tdap) 05/31/2006      Medicare Health Risk Assessment:     /90   Pulse 90   Temp 97 5 °F (36 4 °C) (Tympanic)   Ht 5' 3" (1 6 m)   Wt 76 7 kg (169 lb 3 2 oz)   BMI 29 97 kg/m²      Rosy Saldivar is here for her Subsequent Wellness visit  Last Medicare Wellness visit information reviewed, patient interviewed and updates made to the record today  Health Risk Assessment:   Patient rates overall health as good  Patient feels that their physical health rating is same  Patient is very satisfied with their life  Eyesight was rated as same  Hearing was rated as slightly worse  Patient feels that their emotional and mental health rating is same   Patients states they are never, rarely angry  Patient states they are never, rarely unusually tired/fatigued  Pain experienced in the last 7 days has been none  Patient states that she has experienced no weight loss or gain in last 6 months  Hearing worsened - does not feel she is ready for hearing aid discussion yet    Depression Screening:   PHQ-2 Score: 0      Fall Risk Screening: In the past year, patient has experienced: history of falling in past year    Number of falls: 1  Injured during fall?: Yes    Feels unsteady when standing or walking?: No    Worried about falling?: No      Urinary Incontinence Screening:   Patient has not leaked urine accidently in the last six months  Home Safety:  Patient does not have trouble with stairs inside or outside of their home  Patient has working smoke alarms and has working carbon monoxide detector  Home safety hazards include: none  Nutrition:   Current diet is Regular  Medications:   Patient is currently taking over-the-counter supplements  OTC medications include: see medication list  Patient is able to manage medications  Activities of Daily Living (ADLs)/Instrumental Activities of Daily Living (IADLs):   Walk and transfer into and out of bed and chair?: Yes  Dress and groom yourself?: Yes    Bathe or shower yourself?: Yes    Feed yourself?  Yes  Do your laundry/housekeeping?: Yes  Manage your money, pay your bills and track your expenses?: Yes  Make your own meals?: Yes    Do your own shopping?: Yes    Previous Hospitalizations:   Any hospitalizations or ED visits within the last 12 months?: Yes    How many hospitalizations have you had in the last year?: 1-2    Hospitalization Comments: 1 ER visit, dislocated shoulder    Advance Care Planning:   Living will: Yes    Durable POA for healthcare: No    Advanced directive: Yes      Cognitive Screening:   Provider or family/friend/caregiver concerned regarding cognition?: No    PREVENTIVE SCREENINGS      Cardiovascular Screening: General: Screening Current and Risks and Benefits Discussed      Diabetes Screening:     General: History Diabetes, Risks and Benefits Discussed and Screening Current      Colorectal Cancer Screening:     General: Screening Current      Breast Cancer Screening:     General: Screening Current and Risks and Benefits Discussed      Cervical Cancer Screening:    General: Screening Not Indicated and Risks and Benefits Discussed      Osteoporosis Screening:    General: Screening Current and Risks and Benefits Discussed      Abdominal Aortic Aneurysm (AAA) Screening:        General: Risks and Benefits Discussed and Screening Not Indicated      Lung Cancer Screening:     General: Screening Not Indicated and Risks and Benefits Discussed      Hepatitis C Screening:    General: Risks and Benefits Discussed and Patient Declines    Hep C Screening Accepted: No     Screening, Brief Intervention, and Referral to Treatment (SBIRT)    Screening  Typical number of drinks in a day: 0  Typical number of drinks in a week: 0  Interpretation: Low risk drinking behavior  Single Item Drug Screening:  How often have you used an illegal drug (including marijuana) or a prescription medication for non-medical reasons in the past year? never    Single Item Drug Screen Score: 0  Interpretation: Negative screen for possible drug use disorder    Other Counseling Topics:   Car/seat belt/driving safety and regular weightbearing exercise         Noemí Batista DO

## 2022-04-12 ENCOUNTER — OFFICE VISIT (OUTPATIENT)
Dept: FAMILY MEDICINE CLINIC | Facility: HOSPITAL | Age: 77
End: 2022-04-12
Payer: COMMERCIAL

## 2022-04-12 ENCOUNTER — HOSPITAL ENCOUNTER (OUTPATIENT)
Dept: NON INVASIVE DIAGNOSTICS | Facility: CLINIC | Age: 77
Discharge: HOME | End: 2022-04-12
Payer: COMMERCIAL

## 2022-04-12 VITALS
DIASTOLIC BLOOD PRESSURE: 68 MMHG | TEMPERATURE: 97.4 F | HEART RATE: 91 BPM | WEIGHT: 167.2 LBS | SYSTOLIC BLOOD PRESSURE: 138 MMHG | HEIGHT: 63 IN | BODY MASS INDEX: 29.62 KG/M2

## 2022-04-12 DIAGNOSIS — M79.661 RIGHT CALF PAIN: ICD-10-CM

## 2022-04-12 DIAGNOSIS — M79.661 RIGHT CALF PAIN: Primary | ICD-10-CM

## 2022-04-12 PROCEDURE — 93971 EXTREMITY STUDY: CPT | Performed by: SURGERY

## 2022-04-12 PROCEDURE — 3075F SYST BP GE 130 - 139MM HG: CPT | Performed by: NURSE PRACTITIONER

## 2022-04-12 PROCEDURE — 93971 EXTREMITY STUDY: CPT

## 2022-04-12 PROCEDURE — 99214 OFFICE O/P EST MOD 30 MIN: CPT | Performed by: NURSE PRACTITIONER

## 2022-04-12 PROCEDURE — 3078F DIAST BP <80 MM HG: CPT | Performed by: NURSE PRACTITIONER

## 2022-04-12 NOTE — PROGRESS NOTES
Assessment/Plan:     Check stat venous doppler to r/o DVT/phlebitis  Discussed with pt that this could also be varicose vein pain vs diabetic neuropathy  Diagnoses and all orders for this visit:    Right calf pain  -     VAS lower limb venous duplex study, unilateral/limited; Future          Subjective:     Patient ID: Julian Beltran is a 78 y o  female  Right calf pain  Last night felt warm on the outside  Pain started last week  Elevated and iced it and felt better  Pain located inside right calf area  Describes as burning  Does report leg cramping in the morning  Denies any injury  No new activity  No leg swelling  Pt is diabetic  No pain with walking  Able to bear weight  No back pain  Last week had pain with going up and down stairs  Denies h/o blood clots  Denies any prolonged immobilization  Review of Systems   Respiratory: Negative for shortness of breath  Cardiovascular: Negative for chest pain and leg swelling  Musculoskeletal: Positive for myalgias (right lower leg)  The following portions of the patient's history were reviewed and updated as appropriate: allergies, current medications, past family history, past medical history, past social history, past surgical history and problem list     Objective:  Vitals:    04/12/22 0927   BP: 138/68   Pulse: 91   Temp: (!) 97 4 °F (36 3 °C)      Physical Exam  Vitals reviewed  Constitutional:       Appearance: Normal appearance  Cardiovascular:      Rate and Rhythm: Normal rate and regular rhythm  Heart sounds: Normal heart sounds  No murmur heard  Comments: Palpable cord tenderness noted posterior medial right calf area  No redness or swelling  Pulmonary:      Effort: Pulmonary effort is normal       Breath sounds: Normal breath sounds  Musculoskeletal:      Right lower leg: No edema  Left lower leg: No edema  Skin:     General: Skin is warm and dry     Neurological:      Mental Status: She is alert and oriented to person, place, and time  Psychiatric:         Mood and Affect: Mood normal          Behavior: Behavior normal          Thought Content:  Thought content normal          Judgment: Judgment normal

## 2022-04-12 NOTE — PROGRESS NOTES
4/13/2022    Cieloradha Benson  1945  [id number]        Assessment  -Nephrolithiasis s/p right ureteroscopy (12/2020)    Discussion/Plan  Adele Olson is a 78 y o  female being managed by our office    1  Nephrolithiasis s/p right ureteroscopy (12/2020)- reviewed results of her recent KUB which identified bilateral punctate renal calculi  She remains asymptomatic  We discussed monitoring with annual or every other year KUB  However, she wishes to follow-up on as-needed basis and will call our office with any issues  Reviewed dietary recommendations     -All questions answered, patients agree with plan     History of Present Illness  78 y o  female with a history of nephrolithiasis presents today for follow up  Patient last seen in the office in March 2021  She has a prior history of right-sided ureteroscopy performed in December 2020  Last KUB identified phleboliths in left hemipelvis  Patient remains asymptomatic  She denies any recent stone episodes, flank pain, lower urinary tract symptoms, or gross hematuria  Patient denies any changes to her overall health  Review of Systems  Review of Systems   Constitutional: Negative  HENT: Negative  Respiratory: Negative  Cardiovascular: Negative  Gastrointestinal: Negative  Genitourinary: Negative for decreased urine volume, difficulty urinating, dysuria, flank pain, frequency, hematuria and urgency  Musculoskeletal: Negative  Skin: Negative  Neurological: Negative  Psychiatric/Behavioral: Negative          Past Medical History  Past Medical History:   Diagnosis Date    GERD (gastroesophageal reflux disease)     Hypertension     Kidney stone     Migraine     Wrist fracture     left        Past Social History  Past Surgical History:   Procedure Laterality Date    APPENDECTOMY      CATARACT EXTRACTION, BILATERAL Bilateral 2020    COLONOSCOPY      complete 11/29/11- 5 years / complete 6/9/04 -10years    COLONOSCOPY      Exam in November 2011 revealed small polyp, sigmoid diverticulosis, internal and external hemorrhoids  The polyp was  inflammatory polyp so followup in 10 years recommended   FL RETROGRADE PYELOGRAM  12/18/2020    HEMORRHOID SURGERY      KIDNEY STONE SURGERY      FL CYSTO/URETERO W/LITHOTRIPSY &INDWELL STENT INSRT Right 12/18/2020    Procedure: CYSTOSCOPY URETEROSCOPY RETROGRADE PYELOGRAM AND INSERTION STENT URETERAL;  Surgeon: Federico Ruiz MD;  Location: [facility];  Service: Urology    TONSILLECTOMY      TUBAL LIGATION      UPPER GASTROINTESTINAL ENDOSCOPY      April and July 2021: Antral ulcer on first examination biopsies negative for H pylori, repeat exam with healed ulcer just gastritis  November 2011 with hiatal hernia irregular Z-line and gastritis  Biopsies show question of intestinal metaplasia verses just reflux inflammation, H pylori was negative, nonspecific duodenitis         Past Family History  Family History   Problem Relation Age of Onset    Hypertension Mother     Thyroid disease Mother     Breast cancer Mother 80    Coronary artery disease Father     Hypertension Father     Other Father         smoker    Diabetes Paternal Grandmother     Endometrial cancer Paternal Grandmother         age unknown    Prostate cancer Maternal Grandfather 76    Breast cancer Cousin         age unknown    No Known Problems Sister     No Known Problems Daughter     No Known Problems Sister     No Known Problems Daughter     No Known Problems Maternal Aunt     No Known Problems Maternal Aunt     Endometrial cancer Cousin         age unknown, maybe 64   Jeral Hose Colon polyps Neg Hx     Colon cancer Neg Hx        Past Social history  Social History     Socioeconomic History    Marital status: /Civil Union     Spouse name: Not on file    Number of children: Not on file    Years of education: Not on file    Highest education level: Not on file   Occupational History    Occupation:  full time  Occupation: retired   Tobacco Use    Smoking status: Never Smoker    Smokeless tobacco: Never Used   Vaping Use    Vaping Use: Never used   Substance and Sexual Activity    Alcohol use: Not Currently    Drug use: No    Sexual activity: Not on file   Other Topics Concern    Not on file   Social History Narrative    Not on file     Social Determinants of Health     Financial Resource Strain: Not on file   Food Insecurity: Not on file   Transportation Needs: Not on file   Physical Activity: Not on file   Stress: Not on file   Social Connections: Not on file   Intimate Partner Violence: Not on file   Housing Stability: Not on file       Current Medications  Current Outpatient Medications   Medication Sig Dispense Refill    Accu-Chek Guide test strip Test twice daily      Accu-Chek Softclix Lancets lancets Test twice daily      acetaminophen (TYLENOL) 500 mg tablet Take 500 mg by mouth every 6 (six) hours as needed for mild pain      amLODIPine (NORVASC) 10 mg tablet take 1 tablet by mouth once daily 90 tablet 1    Blood Glucose Monitoring Suppl (Accu-Chek Guide Me) w/Device KIT Test twice daily      Cetirizine HCl (ZYRTEC ALLERGY) 10 MG CAPS daily as needed ZyrTEC Allergy 10 MG Oral Tablet TAKE 1 TABLET DAILY AS DIRECTED  Quantity: 30;  Refills: 0    Lynnette Saucedo;  Started 12-May-2014 Active      Cholecalciferol (VITAMIN D-3) 1000 UNITS CAPS Vitamin D3 1000 UNIT Oral Tablet TAKE 1 TABLET DAILY    Quantity: 30;  Refills: 0    Marcus Rockwell DO;  Started 25-Sep-2012 Active      Elastic Bandages & Supports (Medical Compression Stockings) MISC Use during the day until evening 1 each 0    Empagliflozin (Jardiance) 10 MG TABS Take 1 tablet (10 mg total) by mouth in the morning 90 tablet 1    levothyroxine 75 mcg tablet Take 1 tablet (75 mcg total) by mouth every morning 90 tablet 1    lisinopril (ZESTRIL) 40 mg tablet take 1 AND 1/2 tablets by mouth daily 135 tablet 1    metFORMIN (GLUCOPHAGE) 1000 MG tablet Take 1 tablet (1,000 mg total) by mouth 2 (two) times a day with meals 180 tablet 2    metoprolol succinate (TOPROL-XL) 100 mg 24 hr tablet TAKE 1 5 TABLETS DAILY 135 tablet 1    Multiple Vitamins-Minerals (CENTRUM ADULTS PO) Centrum Oral Tablet TAKE 1 TABLET DAILY  Refills: 0    oXchitl Tan DO;  Started 25-Sep-2012 Active      pantoprazole (PROTONIX) 40 mg tablet take 1 tablet by mouth once daily 30 tablet 12     No current facility-administered medications for this visit  Allergies  Allergies   Allergen Reactions    Metronidazole Anaphylaxis    Tramadol Swelling    Acetazolamide Other (See Comments)     unknown    Penicillins Hives, Itching and Rash    Statins Myalgia    Hydrochlorothiazide Rash    Sulfa Antibiotics Rash    Tetracycline Rash       Past medical history, social history, family history, medications and allergies were reviewed  Vitals  Vitals:    04/13/22 1023   BP: 112/70   BP Location: Left arm   Patient Position: Sitting   Cuff Size: Adult   Pulse: 80   SpO2: 97%   Weight: 75 8 kg (167 lb)   Height: 5' 3" (1 6 m)       Physical Exam  Physical Exam  Constitutional:       Appearance: Normal appearance  She is well-developed  HENT:      Head: Normocephalic  Eyes:      Pupils: Pupils are equal, round, and reactive to light  Pulmonary:      Effort: Pulmonary effort is normal    Abdominal:      Palpations: Abdomen is soft  Tenderness: There is no right CVA tenderness or left CVA tenderness  Musculoskeletal:         General: Normal range of motion  Cervical back: Normal range of motion  Skin:     General: Skin is warm and dry  Neurological:      General: No focal deficit present  Mental Status: She is alert and oriented to person, place, and time  Psychiatric:         Mood and Affect: Mood normal          Behavior: Behavior normal          Thought Content:  Thought content normal          Judgment: Judgment normal          Results    I have personally reviewed all pertinent lab results and reviewed with patient  Lab Results   Component Value Date    GLUCOSE 120 (H) 07/28/2017    CALCIUM 9 2 10/16/2020     04/08/2017    K 4 3 03/23/2022    CO2 24 03/23/2022    CL 97 03/23/2022    BUN 13 03/23/2022    CREATININE 0 82 03/23/2022     Lab Results   Component Value Date    WBC 6 8 03/23/2022    HGB 14 6 03/23/2022    HCT 42 1 03/23/2022    MCV 91 03/23/2022     03/23/2022     No results found for this or any previous visit (from the past 1 hour(s))

## 2022-04-13 ENCOUNTER — OFFICE VISIT (OUTPATIENT)
Dept: UROLOGY | Facility: HOSPITAL | Age: 77
End: 2022-04-13
Payer: COMMERCIAL

## 2022-04-13 VITALS
BODY MASS INDEX: 29.59 KG/M2 | WEIGHT: 167 LBS | HEART RATE: 80 BPM | OXYGEN SATURATION: 97 % | SYSTOLIC BLOOD PRESSURE: 112 MMHG | DIASTOLIC BLOOD PRESSURE: 70 MMHG | HEIGHT: 63 IN

## 2022-04-13 DIAGNOSIS — N20.0 NEPHROLITHIASIS: Primary | ICD-10-CM

## 2022-04-13 PROCEDURE — 99213 OFFICE O/P EST LOW 20 MIN: CPT | Performed by: NURSE PRACTITIONER

## 2022-04-22 ENCOUNTER — OFFICE VISIT (OUTPATIENT)
Dept: OBGYN CLINIC | Facility: CLINIC | Age: 77
End: 2022-04-22
Payer: COMMERCIAL

## 2022-04-22 VITALS
DIASTOLIC BLOOD PRESSURE: 70 MMHG | SYSTOLIC BLOOD PRESSURE: 120 MMHG | HEIGHT: 63 IN | BODY MASS INDEX: 29.59 KG/M2 | WEIGHT: 167 LBS

## 2022-04-22 DIAGNOSIS — S43.102D AC SEPARATION, TYPE 5, LEFT, SUBSEQUENT ENCOUNTER: Primary | ICD-10-CM

## 2022-04-22 PROCEDURE — 1036F TOBACCO NON-USER: CPT | Performed by: ORTHOPAEDIC SURGERY

## 2022-04-22 PROCEDURE — 1160F RVW MEDS BY RX/DR IN RCRD: CPT | Performed by: ORTHOPAEDIC SURGERY

## 2022-04-22 PROCEDURE — 99213 OFFICE O/P EST LOW 20 MIN: CPT | Performed by: ORTHOPAEDIC SURGERY

## 2022-04-22 PROCEDURE — 3008F BODY MASS INDEX DOCD: CPT | Performed by: ORTHOPAEDIC SURGERY

## 2022-04-22 NOTE — PROGRESS NOTES
Ortho Sports Medicine Shoulder Visit     Assesment:   left shoulder grade 5 AC joint separation, doing well     Plan:    Conservative treatment:    Ice to shoulder 1-2 times daily, for 20 minutes at a time  Home exercise program for shoulder, including ROM and strenthening  Instructions given to patient of what exercises to perform  Let pain guide return to activities  Discussed with patient if she were to have symptoms we could try an injection  follow up as needed  Imaging:    No imaging was available for review today  Injection:    No Injection planned at this time  Surgery:     No surgery is recommended at this point, continue with conservative treatment plan as noted  History of Present Illness: The patient is returns for follow up of her left shoulder  Treating for grade V AC joint sprain  Since the prior visit, She reports  improvement  She has no significant pain  Injury 12/8/21 tripped over coffee table sustaining injury  Still has no tenderness over [facility] joint    She has finished physical therapy and transitioned to [facility]  Pain is improved by rest, ice, NSAIDS and physical therapy  The patient denies weakness and continues to work on her strengthening      The patient has tried rest, ice, NSAIDS and physical therapy  I have reviewed the past medical, surgical, social and family history, medications and allergies as documented in the EMR  Review of systems: ROS is negative other than that noted in the HPI  Constitutional: Negative for fatigue and fever     Cardiovascular: Negative for chest pain  Pulmonary: negative for shortness of breath    PMH/PSH:  Past Medical History:   Diagnosis Date    GERD (gastroesophageal reflux disease)     Hypertension     Kidney stone     Migraine     Wrist fracture     left      Past Surgical History:   Procedure Laterality Date    APPENDECTOMY      CATARACT EXTRACTION, BILATERAL Bilateral 2020    COLONOSCOPY complete 11/29/11- 5 years / complete 6/9/04 -10years    COLONOSCOPY      Exam in November 2011 revealed small polyp, sigmoid diverticulosis, internal and external hemorrhoids  The polyp was  inflammatory polyp so followup in 10 years recommended   FL RETROGRADE PYELOGRAM  12/18/2020    HEMORRHOID SURGERY      KIDNEY STONE SURGERY      VA CYSTO/URETERO W/LITHOTRIPSY &INDWELL STENT INSRT Right 12/18/2020    Procedure: CYSTOSCOPY URETEROSCOPY RETROGRADE PYELOGRAM AND INSERTION STENT URETERAL;  Surgeon: Rafat Heath MD;  Location: [facility]IN [facility];  Service: Urology    TONSILLECTOMY      TUBAL LIGATION      UPPER GASTROINTESTINAL ENDOSCOPY      April and July 2021: Antral ulcer on first examination biopsies negative for H pylori, repeat exam with healed ulcer just gastritis  November 2011 with hiatal hernia irregular Z-line and gastritis  Biopsies show question of intestinal metaplasia verses just reflux inflammation, H pylori was negative, nonspecific duodenitis  Physical Exam:    Blood pressure 120/70, height 5' 3" (1 6 m), weight 75 8 kg (167 lb)  General/Constitutional: NAD, well developed, well nourished  HENT: Normocephalic, atraumatic  CV: Intact distal pulses, regular rate  Resp: No respiratory distress or labored breathing  Lymphatic: No lymphadenopathy palpated  Neuro: Alert and Oriented x 3, no focal deficits  Psych: Normal mood, normal affect, normal judgement, normal behavior  Skin: Warm, dry, no rashes, no erythema     Shoulder Exam (focused):     Shoulder focused exam:       RIGHT LEFT    Scapula Atrophy Negative Negative     Winging Negative Negative     Protraction Negative Negative    Rotator cuff SS 5/5 5/5     IS 5/5 5/5     SubS 5/5 5/5    ROM  180     ER0 60 60     ER90 90 80     IR90 40 40     IRb T6 T12    TTP: AC Negative minimal tenderness     Biceps Negative Negative     Coracoid Negative Negative    Special Tests: O'Briens Negative Negative     Chamberlain-shear Negative Negative     Cross body Adduction Negative Negative     Speeds  Negative Negative     Sauyma's Negative Negative     Whipple Negative Negative       Neer Negative Negative     Bhatia Negative Negative    Instability: Apprehension & relocation not tested not tested     Load & shift not tested not tested    Other: Crank Negative Negative             Bone prominence of left AC joint    UE NV Exam: +2 Radial pulses bilaterally  Sensation intact to light touch C5-T1 bilaterally, Radial/median/ulnar nerve motor intact    Cervical ROM is full without pain, numbness or tingling      Shoulder Imaging    No new imaging to review     Scribe Attestation    I,:  Marylou Garrido PA-C am acting as a scribe while in the presence of the attending physician :       I,:  Iliana Santa, DO personally performed the services described in this documentation    as scribed in my presence :

## 2022-05-24 LAB — TSH SERPL DL<=0.005 MIU/L-ACNC: 1.28 UIU/ML (ref 0.45–4.5)

## 2022-06-09 ENCOUNTER — OFFICE VISIT (OUTPATIENT)
Dept: FAMILY MEDICINE CLINIC | Facility: HOSPITAL | Age: 77
End: 2022-06-09
Payer: COMMERCIAL

## 2022-06-09 VITALS
TEMPERATURE: 99 F | OXYGEN SATURATION: 98 % | SYSTOLIC BLOOD PRESSURE: 138 MMHG | WEIGHT: 166 LBS | BODY MASS INDEX: 29.41 KG/M2 | DIASTOLIC BLOOD PRESSURE: 82 MMHG | HEIGHT: 63 IN | HEART RATE: 88 BPM

## 2022-06-09 DIAGNOSIS — R10.2 VAGINAL PAIN: Primary | ICD-10-CM

## 2022-06-09 DIAGNOSIS — R35.0 URINARY FREQUENCY: ICD-10-CM

## 2022-06-09 DIAGNOSIS — E11.65 UNCONTROLLED TYPE 2 DIABETES MELLITUS WITH HYPERGLYCEMIA (HCC): ICD-10-CM

## 2022-06-09 DIAGNOSIS — N95.2 VAGINAL ATROPHY: ICD-10-CM

## 2022-06-09 LAB
SL AMB  POCT GLUCOSE, UA: NORMAL
SL AMB LEUKOCYTE ESTERASE,UA: NORMAL
SL AMB POCT BILIRUBIN,UA: NORMAL
SL AMB POCT BLOOD,UA: NORMAL
SL AMB POCT CLARITY,UA: CLEAR
SL AMB POCT COLOR,UA: YELLOW
SL AMB POCT KETONES,UA: NORMAL
SL AMB POCT NITRITE,UA: NORMAL
SL AMB POCT PH,UA: 6.5
SL AMB POCT SPECIFIC GRAVITY,UA: 1.01
SL AMB POCT URINE PROTEIN: NORMAL
SL AMB POCT UROBILINOGEN: NORMAL

## 2022-06-09 PROCEDURE — 81002 URINALYSIS NONAUTO W/O SCOPE: CPT | Performed by: NURSE PRACTITIONER

## 2022-06-09 PROCEDURE — 3061F NEG MICROALBUMINURIA REV: CPT | Performed by: INTERNAL MEDICINE

## 2022-06-09 PROCEDURE — 99214 OFFICE O/P EST MOD 30 MIN: CPT | Performed by: NURSE PRACTITIONER

## 2022-06-09 NOTE — PROGRESS NOTES
Assessment/Plan:    Her pelvic exam was normal except for vaginal atrophy expected in this postmenopausal woman  Send swab to r/o yeast/BV  If yeast consider switch to different diabetic med which she can discuss with Dr Marissa Damon  Urine dip was normal  Send urine culture  Consider atrophic vaginitis as cause of symptoms  Diagnoses and all orders for this visit:    Vaginal pain  -     NuSwab Vaginitis (VG)    Vaginal atrophy    Urinary frequency  -     Urine culture    Uncontrolled type 2 diabetes mellitus with hyperglycemia (HCC)          Subjective:      Patient ID: Coleman Hodge is a 78 y o  female  Pt presents with a perceived vaginal infection  Pt reports that both labia are red and swollen, and that is also sore around her anus  Reports that it began a few days ago  Pt reports that it is more sore than itchy  Denies vaginal discharge  Denies any malodor  Believes it is related to jardiance which she began taking 3/29  Pt denies any change in soaps or detergents  Pt denies fever/ chills  Denies urinary/GI symptoms  Reports that jardiance is the only change to her routine  Repots that she is , and that her and her  have not engaged in sexual activity for months  Pt reports that she had pelvic pressure within the last week or so, but it has since gone away  Reports that she has ocassional urgency and frequency  Denies burning, blood in her urine, malodor  Reports mild on and off flank pain  Does not know if this is acute  Patient has been taking tylenol, possibly takes the edge off her symptoms  The following portions of the patient's history were reviewed and updated as appropriate: allergies, current medications, past family history, past medical history, past social history, past surgical history and problem list     Review of Systems   Constitutional: Negative for chills and fever     Genitourinary: Positive for flank pain, frequency, pelvic pain, urgency and vaginal pain  Negative for difficulty urinating, dysuria, vaginal bleeding and vaginal discharge  Objective:  Vitals:    06/09/22 1348   BP: 138/82   Pulse: 88   Temp: 99 °F (37 2 °C)   SpO2: 98%      Physical Exam  Vitals reviewed  Constitutional:       Appearance: Normal appearance  Cardiovascular:      Rate and Rhythm: Normal rate  Heart sounds: Normal heart sounds  No murmur heard  Pulmonary:      Effort: Pulmonary effort is normal       Breath sounds: Normal breath sounds  Abdominal:      General: Abdomen is flat  Bowel sounds are normal       Palpations: Abdomen is soft  There is no hepatomegaly or splenomegaly  Tenderness: There is no abdominal tenderness  Genitourinary:     Exam position: Lithotomy position  Pubic Area: No rash  Labia:         Right: Tenderness present  No rash, lesion or injury  Left: Tenderness present  No rash, lesion or injury  Urethra: No urethral swelling or urethral lesion  Vagina: No vaginal discharge, erythema, tenderness or bleeding  Cervix: Normal       Comments: Vaginal atrophy noted  Skin:     General: Skin is warm and dry  Neurological:      Mental Status: She is alert and oriented to person, place, and time  Psychiatric:         Mood and Affect: Mood normal          Behavior: Behavior normal          Thought Content:  Thought content normal          Judgment: Judgment normal

## 2022-06-11 LAB
BACTERIA UR CULT: NORMAL
Lab: NO GROWTH

## 2022-06-13 LAB
A VAGINAE DNA VAG QL NAA+PROBE: NORMAL SCORE
BVAB2 DNA VAG QL NAA+PROBE: NORMAL SCORE
C ALBICANS DNA VAG QL NAA+PROBE: NEGATIVE
C GLABRATA DNA VAG QL NAA+PROBE: NEGATIVE
MEGA1 DNA VAG QL NAA+PROBE: NORMAL SCORE
T VAGINALIS RRNA SPEC QL NAA+PROBE: NEGATIVE

## 2022-06-19 DIAGNOSIS — I10 ESSENTIAL HYPERTENSION: ICD-10-CM

## 2022-06-19 RX ORDER — AMLODIPINE BESYLATE 10 MG/1
TABLET ORAL
Qty: 90 TABLET | Refills: 1 | Status: SHIPPED | OUTPATIENT
Start: 2022-06-19

## 2022-06-27 ENCOUNTER — OFFICE VISIT (OUTPATIENT)
Dept: FAMILY MEDICINE CLINIC | Facility: HOSPITAL | Age: 77
End: 2022-06-27
Payer: COMMERCIAL

## 2022-06-27 VITALS
WEIGHT: 162.6 LBS | SYSTOLIC BLOOD PRESSURE: 146 MMHG | TEMPERATURE: 97.5 F | DIASTOLIC BLOOD PRESSURE: 90 MMHG | BODY MASS INDEX: 28.81 KG/M2 | HEART RATE: 82 BPM | HEIGHT: 63 IN

## 2022-06-27 DIAGNOSIS — N89.8 VAGINAL IRRITATION: Primary | ICD-10-CM

## 2022-06-27 DIAGNOSIS — E11.65 UNCONTROLLED TYPE 2 DIABETES MELLITUS WITH HYPERGLYCEMIA (HCC): ICD-10-CM

## 2022-06-27 PROCEDURE — 99214 OFFICE O/P EST MOD 30 MIN: CPT | Performed by: INTERNAL MEDICINE

## 2022-06-27 PROCEDURE — 3008F BODY MASS INDEX DOCD: CPT | Performed by: INTERNAL MEDICINE

## 2022-06-27 PROCEDURE — 3077F SYST BP >= 140 MM HG: CPT | Performed by: INTERNAL MEDICINE

## 2022-06-27 PROCEDURE — 1036F TOBACCO NON-USER: CPT | Performed by: INTERNAL MEDICINE

## 2022-06-27 PROCEDURE — 3080F DIAST BP >= 90 MM HG: CPT | Performed by: INTERNAL MEDICINE

## 2022-06-27 PROCEDURE — 1160F RVW MEDS BY RX/DR IN RCRD: CPT | Performed by: INTERNAL MEDICINE

## 2022-06-27 RX ORDER — LINAGLIPTIN 5 MG/1
5 TABLET, FILM COATED ORAL DAILY
Qty: 30 TABLET | Refills: 0 | Status: SHIPPED | OUTPATIENT
Start: 2022-06-27 | End: 2022-07-12

## 2022-06-27 NOTE — PROGRESS NOTES
Assessment/Plan:    Uncontrolled type 2 diabetes mellitus with hyperglycemia ([facility] [address] )    Lab Results   Component Value Date    HGBA1C 8 3 (H) 03/23/2022   Poss vaginal irritation - poss d/t Jardiance - stop rx and change to Tradjenta 5 mg 1 tab PO q day, con't metformin, do BW and keep routine f/u as previously directed, call SE - reviewed with pt       Diagnoses and all orders for this visit:    Vaginal irritation  Comments:  Saw Karrie Norton and vaginal swab neg, discussed increased risk for yeast infections/UTI's with Jardiance-poss cause of symptoms but could also be d/t vaginal atropy/atrophic vaginitis as was noted on exam as well - stop rx and change to Tradjenta 5 mg for now, urged to make appt with GYN and to try vaginal lubrication prn, call with any bleeding/worse symptoms/urinary symptoms    Uncontrolled type 2 diabetes mellitus with hyperglycemia (HCC)  -     linaGLIPtin (Tradjenta) 5 MG TABS; Take 5 mg by mouth daily          Subjective:      Patient ID: Emma Akbar is a 78 y o  female  HPI Pt here to discuss poss need for changing medications    She started approx a month ago with mild vaginal itching  She has not had any discharge or bleeding  She states symptoms are "on the outside"  She states it feels like "a ring of fire"  It is worsened the past week and she is having issues with sitting/sleeping and can no longer stand it  She notes the only new med was Jardiance 10 mg one tab daily which was started in March  She is frustrated as her sugars and wgt have come down and she is happy with the results of the medication  She is around 140-150 on average fasting - sometimes she is down into 130's even  Review of Systems   Constitutional: Negative for chills and fever  Respiratory: Negative for cough and shortness of breath  Cardiovascular: Negative for chest pain and palpitations  Gastrointestinal: Negative for diarrhea, nausea and vomiting     Genitourinary: Negative for difficulty urinating, dysuria, genital sores, vaginal bleeding and vaginal pain  Skin: Negative for rash and wound  Neurological: Negative for dizziness, syncope and headaches  Psychiatric/Behavioral: Negative for behavioral problems and confusion  Objective:    /90   Pulse 82   Temp 97 5 °F (36 4 °C) (Temporal)   Ht 5' 3" (1 6 m)   Wt 73 8 kg (162 lb 9 6 oz)   BMI 28 80 kg/m²      Physical Exam  Vitals and nursing note reviewed  Constitutional:       General: She is not in acute distress  Appearance: She is not ill-appearing  HENT:      Head: Normocephalic and atraumatic  Eyes:      General:         Right eye: No discharge  Left eye: No discharge  Conjunctiva/sclera: Conjunctivae normal    Pulmonary:      Effort: Pulmonary effort is normal  No respiratory distress  Skin:     Coloration: Skin is not pale  Findings: No rash  Neurological:      General: No focal deficit present  Gait: Gait normal    Psychiatric:         Mood and Affect: Mood normal          Behavior: Behavior normal          Thought Content:  Thought content normal          Judgment: Judgment normal

## 2022-06-27 NOTE — ASSESSMENT & PLAN NOTE
Lab Results   Component Value Date    HGBA1C 8 3 (H) 03/23/2022   Poss vaginal irritation - poss d/t Jardiance - stop rx and change to Tradjenta 5 mg 1 tab PO q day, con't metformin, do BW and keep routine f/u as previously directed, call SE - reviewed with pt

## 2022-06-30 ENCOUNTER — HOSPITAL ENCOUNTER (OUTPATIENT)
Dept: MAMMOGRAPHY | Facility: IMAGING CENTER | Age: 77
Discharge: HOME | End: 2022-06-30
Payer: COMMERCIAL

## 2022-06-30 VITALS — BODY MASS INDEX: 29.06 KG/M2 | WEIGHT: 164 LBS | HEIGHT: 63 IN

## 2022-06-30 DIAGNOSIS — I10 ESSENTIAL HYPERTENSION: ICD-10-CM

## 2022-06-30 DIAGNOSIS — Z12.31 ENCOUNTER FOR SCREENING MAMMOGRAM FOR MALIGNANT NEOPLASM OF BREAST: ICD-10-CM

## 2022-06-30 PROCEDURE — 77067 SCR MAMMO BI INCL CAD: CPT

## 2022-06-30 PROCEDURE — 77063 BREAST TOMOSYNTHESIS BI: CPT

## 2022-06-30 PROCEDURE — 4010F ACE/ARB THERAPY RXD/TAKEN: CPT | Performed by: INTERNAL MEDICINE

## 2022-06-30 RX ORDER — LISINOPRIL 40 MG/1
TABLET ORAL
Qty: 135 TABLET | Refills: 1 | Status: SHIPPED | OUTPATIENT
Start: 2022-06-30

## 2022-07-03 LAB
BUN SERPL-MCNC: 12 MG/DL (ref 8–27)
BUN/CREAT SERPL: 17 (ref 12–28)
CALCIUM SERPL-MCNC: 9.5 MG/DL (ref 8.7–10.3)
CHLORIDE SERPL-SCNC: 101 MMOL/L (ref 96–106)
CO2 SERPL-SCNC: 25 MMOL/L (ref 20–29)
CREAT SERPL-MCNC: 0.7 MG/DL (ref 0.57–1)
EGFR: 90 ML/MIN/1.73
EST. AVERAGE GLUCOSE BLD GHB EST-MCNC: 171 MG/DL
GLUCOSE SERPL-MCNC: 141 MG/DL (ref 65–99)
HBA1C MFR BLD: 7.6 % (ref 4.8–5.6)
POTASSIUM SERPL-SCNC: 4.2 MMOL/L (ref 3.5–5.2)
SODIUM SERPL-SCNC: 140 MMOL/L (ref 134–144)

## 2022-07-03 PROCEDURE — 3051F HG A1C>EQUAL 7.0%<8.0%: CPT | Performed by: INTERNAL MEDICINE

## 2022-07-11 ENCOUNTER — OFFICE VISIT (OUTPATIENT)
Dept: FAMILY MEDICINE CLINIC | Facility: HOSPITAL | Age: 77
End: 2022-07-11
Payer: COMMERCIAL

## 2022-07-11 VITALS
HEIGHT: 63 IN | TEMPERATURE: 98.4 F | WEIGHT: 164 LBS | SYSTOLIC BLOOD PRESSURE: 130 MMHG | BODY MASS INDEX: 29.06 KG/M2 | DIASTOLIC BLOOD PRESSURE: 82 MMHG | HEART RATE: 78 BPM

## 2022-07-11 DIAGNOSIS — K21.9 GASTROESOPHAGEAL REFLUX DISEASE, UNSPECIFIED WHETHER ESOPHAGITIS PRESENT: ICD-10-CM

## 2022-07-11 DIAGNOSIS — K21.9 GASTROESOPHAGEAL REFLUX DISEASE WITHOUT ESOPHAGITIS: ICD-10-CM

## 2022-07-11 DIAGNOSIS — I10 PRIMARY HYPERTENSION: ICD-10-CM

## 2022-07-11 DIAGNOSIS — N76.2 ACUTE VULVITIS: ICD-10-CM

## 2022-07-11 DIAGNOSIS — E11.65 UNCONTROLLED TYPE 2 DIABETES MELLITUS WITH HYPERGLYCEMIA (HCC): Primary | ICD-10-CM

## 2022-07-11 PROCEDURE — 3075F SYST BP GE 130 - 139MM HG: CPT | Performed by: INTERNAL MEDICINE

## 2022-07-11 PROCEDURE — 1160F RVW MEDS BY RX/DR IN RCRD: CPT | Performed by: INTERNAL MEDICINE

## 2022-07-11 PROCEDURE — 3079F DIAST BP 80-89 MM HG: CPT | Performed by: INTERNAL MEDICINE

## 2022-07-11 PROCEDURE — 99214 OFFICE O/P EST MOD 30 MIN: CPT | Performed by: INTERNAL MEDICINE

## 2022-07-11 RX ORDER — PANTOPRAZOLE SODIUM 40 MG/1
40 TABLET, DELAYED RELEASE ORAL DAILY PRN
Qty: 30 TABLET | Refills: 12
Start: 2022-07-11

## 2022-07-11 RX ORDER — CLOBETASOL PROPIONATE 0.5 MG/G
OINTMENT TOPICAL
COMMUNITY
Start: 2022-06-29

## 2022-07-11 NOTE — ASSESSMENT & PLAN NOTE
Lab Results   Component Value Date    HGBA1C 7 6 (H) 07/02/2022   DM type 2 with hyperglycemia - uncontrolled but improved with A1C 7 6 - con't healthy diet/exercise/wgt loss, will resend Tradjenta and see if we can get this started, recheck BW in 3 mos - order given, foot exam done today, eye exam (5/22) UTD, on ACE but no statin, will follow

## 2022-07-11 NOTE — PATIENT INSTRUCTIONS
10% - bad control"> 10% - bad control,Hemoglobin A1c (HbA1c) greater than 10% indicating poor diabetic control,Haemoglobin A1c greater than 10% indicating poor diabetic control">   Diabetes Mellitus Type 2 in Adults, Ambulatory Care   GENERAL INFORMATION:   Diabetes mellitus type 2  is a disease that affects how your body uses glucose (sugar)  Insulin helps move sugar out of the blood so it can be used for energy  Normally, when the blood sugar level increases, the pancreas makes more insulin  Type 2 diabetes develops because either the body cannot make enough insulin, or it cannot use the insulin correctly  After many years, your pancreas may stop making insulin  Common symptoms include the following:   · More hunger or thirst than usual     · Frequent urination     · Weight loss without trying     · Blurred vision  Seek immediate care for the following symptoms:   · Severe abdominal pain, or pain that spreads to your back  You may also be vomiting  · Trouble staying awake or focusing    · Shaking or sweating    · Blurred or double vision    · Breath has a fruity, sweet smell    · Breathing is deep and labored, or rapid and shallow    · Heartbeat is fast and weak  Treatment for diabetes mellitus type 2  includes keeping your blood sugar at a normal level  You must eat the right foods, and exercise regularly  You may also need medicine if you cannot control your blood sugar level with nutrition and exercise  Manage diabetes mellitus type 2:   · Check your blood sugar level  You will be taught how to check a small drop of blood in a glucose monitor  Ask your healthcare provider when and how often to check during the day  Ask your healthcare provider what your blood sugar levels should be when you check them  · Keep track of carbohydrates (sugar and starchy foods)  Your blood sugar level can get too high if you eat too many carbohydrates   Your dietitian will help you plan meals and snacks that have the right amount of carbohydrates  · Eat low-fat foods  Some examples are skinless chicken and low-fat milk  · Eat less sodium (salt)  Some examples of high-sodium foods to limit are soy sauce, potato chips, and soup  Do not add salt to food you cook  Limit your use of table salt  · Eat high-fiber foods  Foods that are a good source of fiber include vegetables, whole grain bread, and beans  · Limit alcohol  Alcohol affects your blood sugar level and can make it harder to manage your diabetes  Women should limit alcohol to 1 drink a day  Men should limit alcohol to 2 drinks a day  A drink of alcohol is 12 ounces of beer, 5 ounces of wine, or 1½ ounces of liquor  · Get regular exercise  Exercise can help keep your blood sugar level steady, decrease your risk of heart disease, and help you lose weight  Exercise for at least 30 minutes, 5 days a week  Include muscle strengthening activities 2 days each week  Work with your healthcare provider to create an exercise plan  · Check your feet each day  for injuries or open sores  Ask your healthcare provider for activities you can do if you have an open sore  · Quit smoking  If you smoke, it is never too late to quit  Smoking can worsen the problems that may occur with diabetes  Ask your healthcare provider for information about how to stop smoking if you are having trouble quitting  · Ask about your weight:  Ask healthcare providers if you need to lose weight, and how much to lose  Ask them to help you with a weight loss program  Even a 10 to 15 pound weight loss can help you manage your blood sugar level  · Carry medical alert identification  Wear medical alert jewelry or carry a card that says you have diabetes  Ask your healthcare provider where to get these items  · Ask about vaccines  Diabetes puts you at risk of serious illness if you get the flu, pneumonia, or hepatitis   Ask your healthcare provider if you should get a flu, pneumonia, or hepatitis B vaccine, and when to get the vaccine  Follow up with your healthcare provider as directed:  Write down your questions so you remember to ask them during your visits  CARE AGREEMENT:   You have the right to help plan your care  Learn about your health condition and how it may be treated  Discuss treatment options with your caregivers to decide what care you want to receive  You always have the right to refuse treatment  The above information is an  only  It is not intended as medical advice for individual conditions or treatments  Talk to your doctor, nurse or pharmacist before following any medical regimen to see if it is safe and effective for you  © 2014 [address] is for End User's use only and may not be sold, redistributed or otherwise used for commercial purposes  All illustrations and images included in CareNotes® are the copyrighted property of [employer] D [employer]  or [employer]  10% - bad control"> 10% - bad control,Hemoglobin A1c (HbA1c) greater than 10% indicating poor diabetic control,Haemoglobin A1c greater than 10% indicating poor diabetic control">   Diabetes Mellitus Type 2 in Adults, Ambulatory Care   GENERAL INFORMATION:   Diabetes mellitus type 2  is a disease that affects how your body uses glucose (sugar)  Insulin helps move sugar out of the blood so it can be used for energy  Normally, when the blood sugar level increases, the pancreas makes more insulin  Type 2 diabetes develops because either the body cannot make enough insulin, or it cannot use the insulin correctly  After many years, your pancreas may stop making insulin  Common symptoms include the following:   · More hunger or thirst than usual     · Frequent urination     · Weight loss without trying     · Blurred vision  Seek immediate care for the following symptoms:   · Severe abdominal pain, or pain that spreads to your back   You may also be vomiting  · Trouble staying awake or focusing    · Shaking or sweating    · Blurred or double vision    · Breath has a fruity, sweet smell    · Breathing is deep and labored, or rapid and shallow    · Heartbeat is fast and weak  Treatment for diabetes mellitus type 2  includes keeping your blood sugar at a normal level  You must eat the right foods, and exercise regularly  You may also need medicine if you cannot control your blood sugar level with nutrition and exercise  Manage diabetes mellitus type 2:   · Check your blood sugar level  You will be taught how to check a small drop of blood in a glucose monitor  Ask your healthcare provider when and how often to check during the day  Ask your healthcare provider what your blood sugar levels should be when you check them  · Keep track of carbohydrates (sugar and starchy foods)  Your blood sugar level can get too high if you eat too many carbohydrates  Your dietitian will help you plan meals and snacks that have the right amount of carbohydrates  · Eat low-fat foods  Some examples are skinless chicken and low-fat milk  · Eat less sodium (salt)  Some examples of high-sodium foods to limit are soy sauce, potato chips, and soup  Do not add salt to food you cook  Limit your use of table salt  · Eat high-fiber foods  Foods that are a good source of fiber include vegetables, whole grain bread, and beans  · Limit alcohol  Alcohol affects your blood sugar level and can make it harder to manage your diabetes  Women should limit alcohol to 1 drink a day  Men should limit alcohol to 2 drinks a day  A drink of alcohol is 12 ounces of beer, 5 ounces of wine, or 1½ ounces of liquor  · Get regular exercise  Exercise can help keep your blood sugar level steady, decrease your risk of heart disease, and help you lose weight  Exercise for at least 30 minutes, 5 days a week  Include muscle strengthening activities 2 days each week   Work with your healthcare provider to create an exercise plan  · Check your feet each day  for injuries or open sores  Ask your healthcare provider for activities you can do if you have an open sore  · Quit smoking  If you smoke, it is never too late to quit  Smoking can worsen the problems that may occur with diabetes  Ask your healthcare provider for information about how to stop smoking if you are having trouble quitting  · Ask about your weight:  Ask healthcare providers if you need to lose weight, and how much to lose  Ask them to help you with a weight loss program  Even a 10 to 15 pound weight loss can help you manage your blood sugar level  · Carry medical alert identification  Wear medical alert jewelry or carry a card that says you have diabetes  Ask your healthcare provider where to get these items  · Ask about vaccines  Diabetes puts you at risk of serious illness if you get the flu, pneumonia, or hepatitis  Ask your healthcare provider if you should get a flu, pneumonia, or hepatitis B vaccine, and when to get the vaccine  Follow up with your healthcare provider as directed:  Write down your questions so you remember to ask them during your visits  CARE AGREEMENT:   You have the right to help plan your care  Learn about your health condition and how it may be treated  Discuss treatment options with your caregivers to decide what care you want to receive  You always have the right to refuse treatment  The above information is an  only  It is not intended as medical advice for individual conditions or treatments  Talk to your doctor, nurse or pharmacist before following any medical regimen to see if it is safe and effective for you  © 2014 [address] is for End User's use only and may not be sold, redistributed or otherwise used for commercial purposes   All illustrations and images included in CareNotes® are the copyrighted property of [employer]  or [facility]

## 2022-07-11 NOTE — PROGRESS NOTES
Assessment/Plan:    Uncontrolled type 2 diabetes mellitus with hyperglycemia ([facility])    Lab Results   Component Value Date    HGBA1C 7 6 (H) 07/02/2022   DM type 2 with hyperglycemia - uncontrolled but improved with A1C 7 6 - con't healthy diet/exercise/wgt loss, will resend Tradjenta and see if we can get this started, recheck BW in 3 mos - order given, foot exam done today, eye exam (5/22) UTD, on ACE but no statin, will follow    Gastroesophageal reflux disease  No current symptoms, using Protonix prn with benefit, call with relapse/new/worse symptoms    Hypertension  BP at goal, con't current meds       Diagnoses and all orders for this visit:    Uncontrolled type 2 diabetes mellitus with hyperglycemia ([facility] [address] )  -     Hemoglobin A1C; Future  -     Glucose, fasting; Future  -     Hemoglobin A1C  -     Glucose, fasting    Acute vulvitis  Comments:  Much better - whether d/t D/C Jardiance or Clobetasol cream not entirely clear but pt is feeling much better, has f/u with GYN, call with relapse in symptoms    Primary hypertension    Gastroesophageal reflux disease, unspecified whether esophagitis present  -     pantoprazole (PROTONIX) 40 mg tablet; Take 1 tablet (40 mg total) by mouth daily as needed (reflux)    Gastroesophageal reflux disease without esophagitis    Other orders  -     clobetasol (TEMOVATE) 0 05 % ointment; apply to affected area sparingly twice a day for 4 weeks then onc     (REFER TO PRESCRIPTION NOTES)  Colonoscopy 4/21 - 10 yrs    Mammo 6/22    Dexa 6/21 - osteopenia      Subjective:      Patient ID: Verlee Hatchet is a 78 y o  female  HPI Pt here for follow up appt and BW results    BW results were d/w pt in detail: FBS/A1C 141/7 6 (176/8 3), rest of BMP was wnl  Def of controlled vs uncontrolled DM was reviewed  Diet was reviewed - wgt down 6 lbs from Dec 21    She is taking her DM meds as directed - last visit we stopped her Jardiance d/t vaginal symptoms and started her on Tradjenta 5 mg 1 tab PO q day  She states she never got the [place] ([facility]) - went to drug store and an issue with insurance and she never got the rx  She is checking BS at home every am and readings reviewed - average home BS reading is about mid 140's  She is UTD on DM foot exam (8/21) and eye exam (5/22 - 2 yrs as no retinopathy noted)  She notes no numbness/pain/sores/ulcers with her feet  She is on an ACE but no statin  Pt did see GYN Rosiland Carrel) in the end of June for f/u vaginal symptoms - OV note reviewed  She was given a rx for Clobetasol cream for the diagnosis of vulvitis  She was told to keep f/u appt with Dr Nuria King she already had scheduled  She has appt next week  BP at goal today and meds were reviewed and no changes have occurred  She denies missing doses of meds or SE with the meds  She does not check her BP outside the office  She notes no frequent HA's/dizziness/double vision/CP  She is not taking Protonix at all  She has it just in case  She notes no recent GERD symptoms and denies frequent pyrosis/regurgiation of food/abd pain/blood in stool/black stools/unintentional weight loss  Review of Systems   Constitutional: Negative for chills, fever and unexpected weight change  HENT: Negative for congestion and trouble swallowing  Eyes: Negative for pain and visual disturbance  Respiratory: Negative for cough, shortness of breath and wheezing  Cardiovascular: Negative for chest pain, palpitations and leg swelling  Gastrointestinal: Negative for abdominal pain, blood in stool, constipation, diarrhea, nausea and vomiting  Endocrine: Negative for polydipsia and polyuria  Genitourinary: Negative for difficulty urinating and dysuria  Musculoskeletal: Negative for back pain and neck pain  Skin: Negative for rash and wound  Neurological: Negative for dizziness, light-headedness and headaches  Hematological: Does not bruise/bleed easily     Psychiatric/Behavioral: Negative for behavioral problems and confusion  Objective:    /82   Pulse 78   Temp 98 4 °F (36 9 °C) (Tympanic)   Ht 5' 3" (1 6 m)   Wt 74 4 kg (164 lb)   BMI 29 05 kg/m²      Physical Exam  Vitals and nursing note reviewed  Constitutional:       General: She is not in acute distress  Appearance: She is well-developed  She is not ill-appearing  HENT:      Head: Normocephalic and atraumatic  Eyes:      General:         Right eye: No discharge  Left eye: No discharge  Conjunctiva/sclera: Conjunctivae normal    Neck:      Trachea: No tracheal deviation  Cardiovascular:      Rate and Rhythm: Normal rate and regular rhythm  Pulses: no weak pulses          Dorsalis pedis pulses are 2+ on the right side and 2+ on the left side  Heart sounds: Normal heart sounds  No murmur heard  No friction rub  Pulmonary:      Effort: Pulmonary effort is normal  No respiratory distress  Breath sounds: Normal breath sounds  No wheezing, rhonchi or rales  Abdominal:      General: There is no distension  Palpations: Abdomen is soft  Tenderness: There is no abdominal tenderness  There is no guarding or rebound  Musculoskeletal:         General: No deformity or signs of injury  Cervical back: Neck supple  Feet:      Right foot:      Skin integrity: Callus present  No ulcer, skin breakdown, erythema, warmth or dry skin  Left foot:      Skin integrity: Callus present  No ulcer, skin breakdown, erythema, warmth or dry skin  Skin:     General: Skin is warm  Coloration: Skin is not pale  Findings: No rash  Neurological:      General: No focal deficit present  Mental Status: She is alert  Motor: No abnormal muscle tone  Gait: Gait normal    Psychiatric:         Mood and Affect: Mood normal          Behavior: Behavior normal          Thought Content:  Thought content normal          Judgment: Judgment normal        Patient's shoes and socks removed  Right Foot/Ankle   Right Foot Inspection  Skin Exam: skin normal, skin intact, callus and callus  No dry skin, no warmth, no erythema, no maceration, no abnormal color, no pre-ulcer and no ulcer  Toe Exam: ROM and strength within normal limits  Sensory   Vibration: intact  Monofilament testing: intact    Vascular  The right DP pulse is 2+  Left Foot/Ankle  Left Foot Inspection  Skin Exam: skin normal, skin intact and callus  No dry skin, no warmth, no erythema, no maceration, normal color, no pre-ulcer and no ulcer  Toe Exam: ROM and strength within normal limits  Sensory   Vibration: intact  Monofilament testing: intact    Vascular  The left DP pulse is 2+       Assign Risk Category  No deformity present  No loss of protective sensation  No weak pulses  Risk: 0

## 2022-07-12 ENCOUNTER — TELEPHONE (OUTPATIENT)
Dept: FAMILY MEDICINE CLINIC | Facility: HOSPITAL | Age: 77
End: 2022-07-12

## 2022-07-12 DIAGNOSIS — E11.65 UNCONTROLLED TYPE 2 DIABETES MELLITUS WITH HYPERGLYCEMIA (HCC): Primary | ICD-10-CM

## 2022-07-12 NOTE — TELEPHONE ENCOUNTER
Can we call pt and ensure the Januvia at $47 a month is acceptable - I will then send a rx for the Januvia and cancel the Tradjenta    TY

## 2022-07-12 NOTE — TELEPHONE ENCOUNTER
Can we call and see with other DM meds/DPP-4 inhibitors ARE covered? Doug Segura are 2 other options in that class - either of them approved?

## 2022-07-12 NOTE — TELEPHONE ENCOUNTER
Gerri Mail is not a preferred medication on her insurance  It will be too expensive for her  What does Dr Fatoumata Nunn suggest instead?  PCB

## 2022-07-12 NOTE — TELEPHONE ENCOUNTER
Spoke to insurance, they report Callie Marycarmen is approved, does not require prior auth, pt would have a co payment of $47  The other med that is approved is alogliptin for 100$ co-payment

## 2022-08-03 DIAGNOSIS — I10 ESSENTIAL HYPERTENSION: ICD-10-CM

## 2022-08-03 RX ORDER — METOPROLOL SUCCINATE 100 MG/1
TABLET, EXTENDED RELEASE ORAL
Qty: 135 TABLET | Refills: 1 | Status: SHIPPED | OUTPATIENT
Start: 2022-08-03

## 2022-09-09 DIAGNOSIS — R79.89 ELEVATED TSH: ICD-10-CM

## 2022-09-09 DIAGNOSIS — E03.9 HYPOTHYROIDISM, UNSPECIFIED TYPE: ICD-10-CM

## 2022-09-09 RX ORDER — LEVOTHYROXINE SODIUM 0.07 MG/1
TABLET ORAL
Qty: 90 TABLET | Refills: 1 | Status: SHIPPED | OUTPATIENT
Start: 2022-09-09

## 2022-10-06 LAB
EST. AVERAGE GLUCOSE BLD GHB EST-MCNC: 151 MG/DL
GLUCOSE SERPL-MCNC: 136 MG/DL (ref 70–99)
HBA1C MFR BLD: 6.9 % (ref 4.8–5.6)

## 2022-10-11 ENCOUNTER — OFFICE VISIT (OUTPATIENT)
Dept: FAMILY MEDICINE CLINIC | Facility: HOSPITAL | Age: 77
End: 2022-10-11
Payer: COMMERCIAL

## 2022-10-11 VITALS
BODY MASS INDEX: 29.95 KG/M2 | HEIGHT: 63 IN | TEMPERATURE: 97.9 F | WEIGHT: 169 LBS | SYSTOLIC BLOOD PRESSURE: 136 MMHG | DIASTOLIC BLOOD PRESSURE: 84 MMHG | HEART RATE: 72 BPM

## 2022-10-11 DIAGNOSIS — I10 PRIMARY HYPERTENSION: ICD-10-CM

## 2022-10-11 DIAGNOSIS — E11.65 UNCONTROLLED TYPE 2 DIABETES MELLITUS WITH HYPERGLYCEMIA (HCC): ICD-10-CM

## 2022-10-11 DIAGNOSIS — E78.5 DYSLIPIDEMIA: ICD-10-CM

## 2022-10-11 DIAGNOSIS — J30.9 ALLERGIC RHINITIS, UNSPECIFIED SEASONALITY, UNSPECIFIED TRIGGER: ICD-10-CM

## 2022-10-11 DIAGNOSIS — E11.65 UNCONTROLLED TYPE 2 DIABETES MELLITUS WITH HYPERGLYCEMIA (HCC): Primary | ICD-10-CM

## 2022-10-11 DIAGNOSIS — E03.9 HYPOTHYROIDISM, UNSPECIFIED TYPE: ICD-10-CM

## 2022-10-11 PROCEDURE — 99214 OFFICE O/P EST MOD 30 MIN: CPT | Performed by: INTERNAL MEDICINE

## 2022-10-11 RX ORDER — SITAGLIPTIN 100 MG/1
TABLET, FILM COATED ORAL
Qty: 30 TABLET | Refills: 3 | Status: SHIPPED | OUTPATIENT
Start: 2022-10-11

## 2022-10-11 NOTE — ASSESSMENT & PLAN NOTE
Lab Results   Component Value Date    HGBA1C 6 9 (H) 10/06/2022   DM type 2 with microalbumiinuria - controlled with A1C 6 9 - congratulated on improvement in A1C, urged to watch sugars/carbs and increase activity and get wgt down, con't current meds, recheck BW in 6 mo - BW order given, on Ace but no statin, UTD on DM foot exam (7/22) and eye exam (5/22)

## 2022-10-11 NOTE — ASSESSMENT & PLAN NOTE
FLP due in the spring - BW order given, diet/exercise/wgt loss encouraged, not on a statin but would benefit as 10 yr ASCVD risk score 40 2% - pt deferring

## 2022-10-11 NOTE — PROGRESS NOTES
Name: Mariana Arce      : 1945      MRN: [id number]  Encounter Provider: Jose Morrow DO  Encounter Date: 10/11/2022   Encounter department: [facility]      1  Uncontrolled type 2 diabetes mellitus with hyperglycemia (HCC)  Assessment & Plan:    Lab Results   Component Value Date    HGBA1C 6 9 (H) 10/06/2022   DM type 2 with microalbumiinuria - controlled with A1C 6 9 - congratulated on improvement in A1C, urged to watch sugars/carbs and increase activity and get wgt down, con't current meds, recheck BW in 6 mo - BW order given, on Ace but no statin, UTD on DM foot exam () and eye exam ()    Orders:  -     CBC and differential; Future; Expected date: 2023  -     Comprehensive metabolic panel; Future; Expected date: 2023  -     Hemoglobin A1C; Future; Expected date: 2023  -     Lipid panel; Future; Expected date: 2023  -     Microalbumin / creatinine urine ratio; Future; Expected date: 2023  -     CBC and differential  -     Comprehensive metabolic panel  -     Hemoglobin A1C  -     Lipid panel  -     Microalbumin / creatinine urine ratio    2  Primary hypertension  Assessment & Plan:  BP at goal by end of appt, con't current meds, recheck in 6 mos    Orders:  -     CBC and differential; Future; Expected date: 2023  -     Comprehensive metabolic panel; Future; Expected date: 2023  -     Hemoglobin A1C; Future; Expected date: 2023  -     Lipid panel; Future; Expected date: 2023  -     Microalbumin / creatinine urine ratio; Future; Expected date: 2023  -     TSH, 3rd generation with Free T4 reflex; Future; Expected date: 2023  -     CBC and differential  -     Comprehensive metabolic panel  -     Hemoglobin A1C  -     Lipid panel  -     Microalbumin / creatinine urine ratio  -     TSH, 3rd generation with Free T4 reflex    3   Allergic rhinitis, unspecified seasonality, unspecified trigger  Assessment & Plan: With current allergy symptoms urged to start OTC antihistamine, call with persistent/new/worse symptoms/F/C/SOB    Orders:  -     CBC and differential; Future; Expected date: 04/06/2023  -     CBC and differential    4  Hypothyroidism, unspecified type  Assessment & Plan:  Thyroid labs due in the spring - BW order given, con't current meds for now    Orders:  -     CBC and differential; Future; Expected date: 04/06/2023  -     TSH, 3rd generation with Free T4 reflex; Future; Expected date: 04/06/2023  -     CBC and differential  -     TSH, 3rd generation with Free T4 reflex    5  Dyslipidemia  Assessment & Plan:  FLP due in the spring - BW order given, diet/exercise/wgt loss encouraged, not on a statin but would benefit as 10 yr ASCVD risk score 40 2% - pt deferring    Orders:  -     CBC and differential; Future; Expected date: 04/06/2023  -     Lipid panel; Future; Expected date: 04/06/2023  -     CBC and differential  -     Lipid panel      Colonoscopy 4/21- 10 yrs    Mammo 6/22    Dexa 6/22 - osteopenia    Pt had her flu vaccine at the pharmacy this year      Subjective      HPI Pt here for follow up appt and BW results    BW results were d/w pt in detail: FBS/A1C improved at 136/6 9  Def of controlled vs uncontrolled DM was reviewed  Diet was reviewed - wgt up 5 lbs from July 22  She admits her diet has not been as good with baked goods in the fall  She is taking her DM meds as directed  Januvia was started after last visit  She notes no SE with the DM meds  She is taking her sugars once a day and BS readings were reviewed and average around 130's to 140's  She is UTD on DM foot exam (7/22) and eye exam (5/22)  She is on an ACE but no statin  BP a bit above goal today and meds were reviewed and no changes have occurred  She denies missing doses of meds or SE with the meds  She does not check her BP outside the office    She notes no frequent Krishnamurthy's/dizziness/double vision/CP  She has had a lot of allergy symptoms and postnasal drip  She notes a bit of nausea d/t the allergies  She is not using anything for it  Review of Systems   Constitutional: Negative for chills, fatigue, fever and unexpected weight change  HENT: Positive for congestion, postnasal drip, rhinorrhea and sore throat  Eyes: Negative for pain and visual disturbance  Respiratory: Negative for cough and shortness of breath  Cardiovascular: Negative for chest pain, palpitations and leg swelling  Gastrointestinal: Negative for abdominal pain, blood in stool, constipation, diarrhea, nausea and vomiting  Genitourinary: Negative for difficulty urinating and dysuria  Musculoskeletal: Negative for back pain and neck pain  Skin: Negative for rash and wound  Neurological: Negative for dizziness, light-headedness and headaches  Hematological: Does not bruise/bleed easily  Psychiatric/Behavioral: Negative for confusion and dysphoric mood  Current Outpatient Medications on File Prior to Visit   Medication Sig   • Accu-Chek Guide test strip Test twice daily   • Accu-Chek Softclix Lancets lancets Test twice daily   • acetaminophen (TYLENOL) 500 mg tablet Take 500 mg by mouth every 6 (six) hours as needed for mild pain   • amLODIPine (NORVASC) 10 mg tablet take 1 tablet by mouth once daily   • Blood Glucose Monitoring Suppl (Accu-Chek Guide Me) w/Device KIT Test twice daily   • Cetirizine HCl 10 MG CAPS daily as needed ZyrTEC Allergy 10 MG Oral Tablet TAKE 1 TABLET DAILY AS DIRECTED  Quantity: 30;  Refills: 0    Lynnette Saucedo;  Started 12-May-2014 Active   • Cholecalciferol (VITAMIN D-3) 1000 UNITS CAPS Vitamin D3 1000 UNIT Oral Tablet TAKE 1 TABLET DAILY  Quantity: 30;  Refills: 0    Xochitl Tan DO;  Started 25-Sep-2012 Active   • clobetasol (TEMOVATE) 0 05 % ointment apply to affected area sparingly twice a day for 4 weeks then onc     (REFER TO PRESCRIPTION NOTES)  • levothyroxine 75 mcg tablet take 1 tablet by mouth every morning   • lisinopril (ZESTRIL) 40 mg tablet take 1 and 1/2 tablets by mouth once daily   • metFORMIN (GLUCOPHAGE) 1000 MG tablet Take 1 tablet (1,000 mg total) by mouth 2 (two) times a day with meals   • metoprolol succinate (TOPROL-XL) 100 mg 24 hr tablet TAKE 1 5 TABLETS DAILY   • Multiple Vitamins-Minerals (CENTRUM ADULTS PO) Centrum Oral Tablet TAKE 1 TABLET DAILY  Refills: 0    Tristin Berry DO;  Started 25-Sep-2012 Active   • pantoprazole (PROTONIX) 40 mg tablet Take 1 tablet (40 mg total) by mouth daily as needed (reflux)   • sitaGLIPtin (JANUVIA) 100 mg tablet Take 1 tablet (100 mg total) by mouth daily       Objective     /84   Pulse 72   Temp 97 9 °F (36 6 °C) (Tympanic)   Ht 5' 3" (1 6 m)   Wt 76 7 kg (169 lb)   BMI 29 94 kg/m²     Physical Exam  Vitals and nursing note reviewed  Constitutional:       General: She is not in acute distress  Appearance: She is well-developed  She is not ill-appearing  HENT:      Head: Normocephalic and atraumatic  Eyes:      General:         Right eye: No discharge  Left eye: No discharge  Conjunctiva/sclera: Conjunctivae normal    Neck:      Trachea: No tracheal deviation  Cardiovascular:      Rate and Rhythm: Normal rate and regular rhythm  Heart sounds: Normal heart sounds  No murmur heard  No friction rub  Pulmonary:      Effort: Pulmonary effort is normal  No respiratory distress  Breath sounds: Normal breath sounds  No wheezing, rhonchi or rales  Musculoskeletal:         General: No deformity or signs of injury  Cervical back: Neck supple  Skin:     General: Skin is warm  Coloration: Skin is not pale  Findings: No rash  Neurological:      General: No focal deficit present  Mental Status: She is alert  Motor: No abnormal muscle tone        Gait: Gait normal    Psychiatric:         Mood and Affect: Mood normal          Behavior: Behavior normal          Judgment: Judgment normal        Doug Mce, DO

## 2022-10-11 NOTE — ASSESSMENT & PLAN NOTE
With current allergy symptoms urged to start OTC antihistamine, call with persistent/new/worse symptoms/F/C/SOB

## 2022-11-07 DIAGNOSIS — E11.65 UNCONTROLLED TYPE 2 DIABETES MELLITUS WITH HYPERGLYCEMIA (HCC): ICD-10-CM

## 2022-12-05 ENCOUNTER — OFFICE VISIT (OUTPATIENT)
Dept: FAMILY MEDICINE CLINIC | Facility: HOSPITAL | Age: 77
End: 2022-12-05

## 2022-12-05 VITALS
DIASTOLIC BLOOD PRESSURE: 74 MMHG | HEART RATE: 76 BPM | WEIGHT: 174 LBS | SYSTOLIC BLOOD PRESSURE: 136 MMHG | TEMPERATURE: 97.5 F | BODY MASS INDEX: 30.83 KG/M2 | HEIGHT: 63 IN

## 2022-12-05 DIAGNOSIS — I10 PRIMARY HYPERTENSION: ICD-10-CM

## 2022-12-05 DIAGNOSIS — R60.9 DEPENDENT EDEMA: Primary | ICD-10-CM

## 2022-12-05 NOTE — PROGRESS NOTES
Name: Shant White      : 1945      MRN: [id number]  Encounter Provider: Abhilash Cleaning DO  Encounter Date: 2022   Encounter department: [facility] [address]     1  Dependent edema  Comments:  Reassured pt no other s/sx of HF, likely d/t dietary indescretions, did review CCB and edema, will try low sodium diet and elevation of LE, if no better T/C compression stockings, if worsens or if any other CV symptoms develop then will check Echo - red flag symptoms reviewed, will follow    2  Primary hypertension  Assessment & Plan:  BP improved and acceptable by end of appt, con't current meds for now, did review CCB and SE of edema - may need to change if persists/worsens        Colonoscopy - 10 yrs    Mammo     Dexa  - osteopenia    BW 10/22 (6 9)  DM eye exam   DM foot exam       Subjective      HPI Pt here for an acute visit    Pt noting LE edema for about 2 wks  She notes no new meds other then Januvia which was started in July  She is on Amlodipine  She denies adding salt to her food but does have a lot of higher sodium food intake  She denies daily NSAID  She denies SOB/orthopnea/PND/CP/palp  She is up 10 lbs from   BP up a bit on presentation today  She is taking her BP meds as directed  She denies HA's/dizziness/double vision/CP  Review of Systems   Constitutional: Positive for unexpected weight change  Negative for chills and fever  HENT: Negative for congestion and trouble swallowing  Eyes: Negative for pain, discharge and visual disturbance  Respiratory: Negative for cough, shortness of breath and wheezing  Cardiovascular: Positive for leg swelling  Negative for chest pain and palpitations  Gastrointestinal: Negative for abdominal pain, diarrhea, nausea and vomiting  Skin: Negative for rash and wound  Neurological: Negative for dizziness and headaches     Hematological: Does not bruise/bleed easily  Psychiatric/Behavioral: Negative for confusion and dysphoric mood  Current Outpatient Medications on File Prior to Visit   Medication Sig   • Accu-Chek Guide test strip Test twice daily   • Accu-Chek Softclix Lancets lancets Test twice daily   • acetaminophen (TYLENOL) 500 mg tablet Take 500 mg by mouth every 6 (six) hours as needed for mild pain   • amLODIPine (NORVASC) 10 mg tablet take 1 tablet by mouth once daily   • Blood Glucose Monitoring Suppl (Accu-Chek Guide Me) w/Device KIT Test twice daily   • Cetirizine HCl 10 MG CAPS daily as needed ZyrTEC Allergy 10 MG Oral Tablet TAKE 1 TABLET DAILY AS DIRECTED  Quantity: 30;  Refills: 0    Lynnette Saucedo;  Started 12-May-2014 Active   • Cholecalciferol (VITAMIN D-3) 1000 UNITS CAPS Vitamin D3 1000 UNIT Oral Tablet TAKE 1 TABLET DAILY  Quantity: 30;  Refills: 0    Xochitl Tan DO;  Started 25-Sep-2012 Active   • clobetasol (TEMOVATE) 0 05 % ointment apply to affected area sparingly twice a day for 4 weeks then onc     (REFER TO PRESCRIPTION NOTES)  • levothyroxine 75 mcg tablet take 1 tablet by mouth every morning   • lisinopril (ZESTRIL) 40 mg tablet take 1 and 1/2 tablets by mouth once daily   • metFORMIN (GLUCOPHAGE) 1000 MG tablet Take 1 tablet (1,000 mg total) by mouth 2 (two) times a day with meals   • metoprolol succinate (TOPROL-XL) 100 mg 24 hr tablet TAKE 1 5 TABLETS DAILY   • Multiple Vitamins-Minerals (CENTRUM ADULTS PO) Centrum Oral Tablet TAKE 1 TABLET DAILY  Refills: 0    Marlene Body DO;  Started 25-Sep-2012 Active   • pantoprazole (PROTONIX) 40 mg tablet Take 1 tablet (40 mg total) by mouth daily as needed (reflux)   • sitaGLIPtin (Januvia) 100 mg tablet Take 1 tablet (100 mg total) by mouth daily       Objective     /74   Pulse 76   Temp 97 5 °F (36 4 °C) (Tympanic)   Ht 5' 3" (1 6 m)   Wt 78 9 kg (174 lb)   BMI 30 82 kg/m²     Physical Exam  Vitals and nursing note reviewed  Constitutional:       General: She is not in acute distress  Appearance: She is well-developed and well-nourished  She is not ill-appearing  HENT:      Head: Normocephalic and atraumatic  Eyes:      General:         Right eye: No discharge  Left eye: No discharge  Conjunctiva/sclera: Conjunctivae normal    Neck:      Trachea: No tracheal deviation  Comments: No JVD noted  Cardiovascular:      Rate and Rhythm: Normal rate and regular rhythm  Heart sounds: Normal heart sounds  No murmur heard  No friction rub  Comments: Trace B/L LE edema  Pulmonary:      Effort: Pulmonary effort is normal  No respiratory distress  Breath sounds: Normal breath sounds  No wheezing, rhonchi or rales  Musculoskeletal:         General: No deformity, signs of injury or edema  Cervical back: Neck supple  Skin:     General: Skin is warm  Coloration: Skin is not pale  Findings: No rash  Neurological:      General: No focal deficit present  Mental Status: She is alert  Mental status is at baseline  Motor: No abnormal muscle tone  Gait: Gait normal    Psychiatric:         Mood and Affect: Mood and affect and mood normal          Behavior: Behavior normal          Thought Content:  Thought content normal          Judgment: Judgment normal        Felipe Menjivar DO

## 2022-12-05 NOTE — ASSESSMENT & PLAN NOTE
BP improved and acceptable by end of appt, con't current meds for now, did review CCB and SE of edema - may need to change if persists/worsens

## 2022-12-07 DIAGNOSIS — E11.65 UNCONTROLLED TYPE 2 DIABETES MELLITUS WITH HYPERGLYCEMIA (HCC): ICD-10-CM

## 2022-12-14 DIAGNOSIS — I10 ESSENTIAL HYPERTENSION: ICD-10-CM

## 2022-12-14 RX ORDER — AMLODIPINE BESYLATE 10 MG/1
TABLET ORAL
Qty: 90 TABLET | Refills: 1 | Status: SHIPPED | OUTPATIENT
Start: 2022-12-14

## 2022-12-20 DIAGNOSIS — R79.89 ELEVATED TSH: ICD-10-CM

## 2022-12-20 DIAGNOSIS — E03.9 HYPOTHYROIDISM, UNSPECIFIED TYPE: ICD-10-CM

## 2022-12-20 RX ORDER — LEVOTHYROXINE SODIUM 0.07 MG/1
TABLET ORAL
Qty: 90 TABLET | Refills: 1 | Status: SHIPPED | OUTPATIENT
Start: 2022-12-20

## 2023-01-03 DIAGNOSIS — I10 ESSENTIAL HYPERTENSION: ICD-10-CM

## 2023-01-03 RX ORDER — LISINOPRIL 40 MG/1
TABLET ORAL
Qty: 135 TABLET | Refills: 1 | Status: SHIPPED | OUTPATIENT
Start: 2023-01-03

## 2023-01-16 ENCOUNTER — OFFICE VISIT (OUTPATIENT)
Dept: FAMILY MEDICINE CLINIC | Facility: HOSPITAL | Age: 78
End: 2023-01-16

## 2023-01-16 VITALS
DIASTOLIC BLOOD PRESSURE: 72 MMHG | TEMPERATURE: 98.4 F | HEIGHT: 63 IN | HEART RATE: 74 BPM | SYSTOLIC BLOOD PRESSURE: 134 MMHG | WEIGHT: 175.2 LBS | BODY MASS INDEX: 31.04 KG/M2

## 2023-01-16 DIAGNOSIS — R39.9 UTI SYMPTOMS: ICD-10-CM

## 2023-01-16 DIAGNOSIS — R35.0 FREQUENCY OF URINATION: Primary | ICD-10-CM

## 2023-01-16 LAB
SL AMB  POCT GLUCOSE, UA: ABNORMAL
SL AMB LEUKOCYTE ESTERASE,UA: 15
SL AMB POCT BILIRUBIN,UA: ABNORMAL
SL AMB POCT BLOOD,UA: ABNORMAL
SL AMB POCT CLARITY,UA: CLEAR
SL AMB POCT COLOR,UA: YELLOW
SL AMB POCT KETONES,UA: ABNORMAL
SL AMB POCT NITRITE,UA: ABNORMAL
SL AMB POCT PH,UA: 6
SL AMB POCT SPECIFIC GRAVITY,UA: 1.01
SL AMB POCT URINE PROTEIN: ABNORMAL
SL AMB POCT UROBILINOGEN: 0.2

## 2023-01-16 RX ORDER — NITROFURANTOIN 25; 75 MG/1; MG/1
100 CAPSULE ORAL 2 TIMES DAILY
Qty: 10 CAPSULE | Refills: 0 | Status: SHIPPED | OUTPATIENT
Start: 2023-01-16 | End: 2023-01-21

## 2023-01-16 NOTE — PROGRESS NOTES
Name: Km Fischer      : 1945      MRN: [id number]  Encounter Provider: KLAUDIA Diallo  Encounter Date: 2023   Encounter department: [facility]      1  Frequency of urination  -     POCT urine dip  -     Urine culture; Future  -     Urine culture    2  UTI symptoms  -     nitrofurantoin (MACROBID) 100 mg capsule; Take 1 capsule (100 mg total) by mouth 2 (two) times a day for 5 days      In office urine dip sl abnormal - will send cx to confirm possible UTI  Start antibiotic as rx'd and increase hydration       Subjective      UTI symptoms started 1 week ago  Had been on/off initially but hasn't improved and frequency getting worse  Review of Systems   Constitutional: Negative for chills and fever  Genitourinary: Positive for dysuria, frequency and pelvic pain ("uncomfortable")  Negative for flank pain  Current Outpatient Medications on File Prior to Visit   Medication Sig   • Accu-Chek Guide test strip Test twice daily   • Accu-Chek Softclix Lancets lancets Test twice daily   • acetaminophen (TYLENOL) 500 mg tablet Take 500 mg by mouth every 6 (six) hours as needed for mild pain   • amLODIPine (NORVASC) 10 mg tablet take 1 tablet by mouth once daily   • Blood Glucose Monitoring Suppl (Accu-Chek Guide Me) w/Device KIT Test twice daily   • Cetirizine HCl 10 MG CAPS daily as needed ZyrTEC Allergy 10 MG Oral Tablet TAKE 1 TABLET DAILY AS DIRECTED  Quantity: 30;  Refills: 0    Lynnette Saucedo;  Started 12-May-2014 Active   • Cholecalciferol (VITAMIN D-3) 1000 UNITS CAPS Vitamin D3 1000 UNIT Oral Tablet TAKE 1 TABLET DAILY    Quantity: 30;  Refills: 0    Xochitl Tan DO;  Started 25-Sep-2012 Active   • levothyroxine 75 mcg tablet take 1 tablet by mouth every morning   • lisinopril (ZESTRIL) 40 mg tablet take 1 and 1/2 tablets by mouth once daily   • metFORMIN (GLUCOPHAGE) 1000 MG tablet take 1 tablet by mouth twice a day with meals   • metoprolol succinate (TOPROL-XL) 100 mg 24 hr tablet TAKE 1 5 TABLETS DAILY   • Multiple Vitamins-Minerals (CENTRUM ADULTS PO) Centrum Oral Tablet TAKE 1 TABLET DAILY  Refills: 0    Tee North DO;  Started 25-Sep-2012 Active   • sitaGLIPtin (Januvia) 100 mg tablet Take 1 tablet (100 mg total) by mouth daily   • clobetasol (TEMOVATE) 0 05 % ointment apply to affected area sparingly twice a day for 4 weeks then onc     (REFER TO PRESCRIPTION NOTES)  (Patient not taking: Reported on 1/16/2023)   • pantoprazole (PROTONIX) 40 mg tablet Take 1 tablet (40 mg total) by mouth daily as needed (reflux) (Patient not taking: Reported on 1/16/2023)       Objective     /72   Pulse 74   Temp 98 4 °F (36 9 °C)   Ht 5' 3" (1 6 m)   Wt 79 5 kg (175 lb 3 2 oz)   BMI 31 04 kg/m²       Physical Exam  Vitals reviewed  Constitutional:       General: She is not in acute distress  Appearance: Normal appearance  HENT:      Head: Normocephalic  Eyes:      General: No scleral icterus  Cardiovascular:      Rate and Rhythm: Normal rate and regular rhythm  Pulmonary:      Effort: Pulmonary effort is normal  No respiratory distress  Breath sounds: Normal breath sounds  Abdominal:      General: Bowel sounds are normal       Palpations: Abdomen is soft  Tenderness: There is abdominal tenderness in the suprapubic area  There is no right CVA tenderness, left CVA tenderness, guarding or rebound  Skin:     General: Skin is warm and dry  Neurological:      General: No focal deficit present  Mental Status: She is alert and oriented to person, place, and time            KLAUDIA Clark

## 2023-01-18 LAB
BACTERIA UR CULT: NORMAL
Lab: NO GROWTH

## 2023-02-01 DIAGNOSIS — I10 ESSENTIAL HYPERTENSION: ICD-10-CM

## 2023-02-01 RX ORDER — METOPROLOL SUCCINATE 100 MG/1
TABLET, EXTENDED RELEASE ORAL
Qty: 135 TABLET | Refills: 1 | Status: SHIPPED | OUTPATIENT
Start: 2023-02-01

## 2023-02-27 DIAGNOSIS — E11.65 UNCONTROLLED TYPE 2 DIABETES MELLITUS WITH HYPERGLYCEMIA (HCC): ICD-10-CM

## 2023-02-27 RX ORDER — SITAGLIPTIN 100 MG/1
TABLET, FILM COATED ORAL
Qty: 30 TABLET | Refills: 3 | Status: SHIPPED | OUTPATIENT
Start: 2023-02-27

## 2023-03-14 DIAGNOSIS — R79.89 ELEVATED TSH: ICD-10-CM

## 2023-03-14 DIAGNOSIS — E03.9 HYPOTHYROIDISM, UNSPECIFIED TYPE: ICD-10-CM

## 2023-03-14 RX ORDER — LEVOTHYROXINE SODIUM 0.07 MG/1
75 TABLET ORAL EVERY MORNING
Qty: 90 TABLET | Refills: 1 | Status: SHIPPED | OUTPATIENT
Start: 2023-03-14

## 2023-05-08 DIAGNOSIS — I10 ESSENTIAL HYPERTENSION: ICD-10-CM

## 2023-05-08 RX ORDER — LISINOPRIL 40 MG/1
60 TABLET ORAL DAILY
Qty: 135 TABLET | Refills: 1 | Status: SHIPPED | OUTPATIENT
Start: 2023-05-08

## 2023-05-24 LAB
LEFT EYE DIABETIC RETINOPATHY: NORMAL
RIGHT EYE DIABETIC RETINOPATHY: NORMAL

## 2023-05-24 PROCEDURE — 2023F DILAT RTA XM W/O RTNOPTHY: CPT | Performed by: INTERNAL MEDICINE

## 2023-07-03 DIAGNOSIS — E11.65 UNCONTROLLED TYPE 2 DIABETES MELLITUS WITH HYPERGLYCEMIA (HCC): ICD-10-CM

## 2023-07-24 ENCOUNTER — OFFICE VISIT (OUTPATIENT)
Dept: FAMILY MEDICINE CLINIC | Facility: HOSPITAL | Age: 78
End: 2023-07-24
Payer: COMMERCIAL

## 2023-07-24 VITALS
WEIGHT: 171.8 LBS | BODY MASS INDEX: 30.44 KG/M2 | DIASTOLIC BLOOD PRESSURE: 82 MMHG | HEIGHT: 63 IN | TEMPERATURE: 98.1 F | HEART RATE: 78 BPM | SYSTOLIC BLOOD PRESSURE: 130 MMHG

## 2023-07-24 DIAGNOSIS — I10 ESSENTIAL HYPERTENSION: ICD-10-CM

## 2023-07-24 DIAGNOSIS — E04.2 MULTIPLE THYROID NODULES: ICD-10-CM

## 2023-07-24 DIAGNOSIS — R05.9 COUGH, UNSPECIFIED TYPE: ICD-10-CM

## 2023-07-24 DIAGNOSIS — E11.65 UNCONTROLLED TYPE 2 DIABETES MELLITUS WITH HYPERGLYCEMIA (HCC): Primary | ICD-10-CM

## 2023-07-24 PROCEDURE — 99214 OFFICE O/P EST MOD 30 MIN: CPT | Performed by: INTERNAL MEDICINE

## 2023-07-24 NOTE — PATIENT INSTRUCTIONS
Type 2 Diabetes Management for Adults   AMBULATORY CARE:   Type 2 diabetes  is a disease that affects how your body uses glucose (sugar). Either your body cannot make enough insulin, or it cannot use the insulin correctly. It is important to keep diabetes controlled to prevent damage to your heart, blood vessels, and other organs. Management will help you feel well and enjoy your daily activities. Your diabetes care team providers can help you make a plan to fit diabetes care into your schedule. Your plan can change over time to fit your needs and your family's needs. Have someone call your local emergency number (911 in the [address]) if:   • You cannot be woken. • You have signs of diabetic ketoacidosis:     ? confusion, fatigue    ? vomiting    ? rapid heartbeat    ? fruity smelling breath    ? extreme thirst    ? dry mouth and skin    • You have any of the following signs of a heart attack:      ? Squeezing, pressure, or pain in your chest    ? You may  also have any of the following:     - Discomfort or pain in your back, neck, jaw, stomach, or arm    - Shortness of breath    - Nausea or vomiting    - Lightheadedness or a sudden cold sweat    • You have any of the following signs of a stroke:      ? Numbness or drooping on one side of your face     ? Weakness in an arm or leg    ? Confusion or difficulty speaking    ? Dizziness, a severe headache, or vision loss    Call your doctor or diabetes care team provider if:   • You have a sore or wound that will not heal.    • You have a change in the amount you urinate. • Your blood sugar levels are higher than your target goals. • You often have lower blood sugar levels than your target goals. • Your skin is red, dry, warm, or swollen. • You have trouble coping with diabetes, or you feel anxious or depressed. • You have questions or concerns about your condition or care.     What you need to know about high blood sugar levels:  High blood sugar levels may not cause any symptoms. You may feel more thirsty or urinate more often than usual. Over time, high blood sugar levels can damage your nerves, blood vessels, tissues, and organs. The following can increase your blood sugar levels:  • Large meals or large amounts of carbohydrates at one time    • Less physical activity    • Stress    • Illness    • A lower dose of diabetes medicine or insulin, or a late dose    What you need to know about low blood sugar levels:  Symptoms include feeling shaky, dizzy, irritable, or confused. You can prevent symptoms by keeping your blood sugar levels from going too low. • Treat a low blood sugar level right away:      ? Drink 4 ounces of juice or have 1 tube of glucose gel. ? Check your blood sugar level again 10 to 15 minutes later. ? When the level goes back to normal, eat a meal or snack to prevent another decrease. • Keep glucose gel, raisins, or hard candy with you at all times to treat a low blood sugar level. • Your blood sugar level can get too low if you take diabetes medicine or insulin and do not eat enough food. • If you use insulin, check your blood sugar level before you exercise. ? If your blood sugar level is below 100 mg/dL, eat 4 crackers or 2 ounces of raisins, or drink 4 ounces of juice. ? Check your level every 30 minutes if you exercise longer than 1 hour. ? You may need a snack during or after exercise. What you can do to manage your blood sugar levels:   • Check your blood sugar levels as directed and as needed. Several items are available to use to check your levels. You may need to check by testing a drop of blood in a glucose monitor. You may instead be given a continuous glucose monitoring (CGM) device. The device is worn at all times. The CGM checks your blood sugar level every 5 minutes. It sends results to an electronic device such as a smart phone. A CGM can be used with or without an insulin pump.  You and your diabetes care team providers will decide on the best method for you. The goal for blood sugar levels before meals  is between 80 and 130 mg/dL and 2 hours after eating  is lower than 180 mg/dL. • Make healthy food choices. Work with a dietitian to develop a meal plan that works for you and your schedule. A dietitian can help you learn how to eat the right amount of carbohydrates during your meals and snacks. Carbohydrates can raise your blood sugar level if you eat too many at one time. Examples of foods that contain carbohydrates are breads, cereals, rice, pasta, and sweets. • Eat high-fiber foods as directed. Fiber helps improve blood sugar levels. Fiber also lowers your risk for heart disease and other problems diabetes can cause. Examples of high-fiber foods include vegetables, whole-grain bread, and beans such as martin beans. Your dietitian can tell you how much fiber to have each day. • Get regular physical activity. Physical activity can help you get to your target blood sugar level goal and manage your weight. Get at least 150 minutes of moderate to vigorous aerobic physical activity each week. Do not miss more than 2 days in a row. Do not sit longer than 30 minutes at a time. Your healthcare provider can help you create an activity plan. The plan can include the best activities for you and can help you build your strength and endurance. • Maintain a healthy weight. Ask your team what a healthy weight is for you. A healthy weight can help you control diabetes and prevent heart disease. Ask your team to help you create a weight loss plan, if needed. Weight loss can help make a difference in managing diabetes. Your team will help you set a weight-loss goal, such as 10 to 15 pounds, or 5% of your extra weight. Together you and your team can set manageable weight loss goals. • Take your diabetes medicine or insulin as directed.   You may need diabetes medicine, insulin, or both to help control your blood sugar levels. Your healthcare provider will teach you how and when to take your diabetes medicine or insulin. You will also be taught about side effects oral diabetes medicine can cause. Insulin may be injected or given through a pump or pen. You and your providers will decide on the best method for you:    ? An insulin pump  is an implanted device that gives your insulin 24 hours a day. An insulin pump prevents the need for multiple insulin injections in a day. ? An insulin pen  is a device prefilled with the right amount of insulin. ? You and your family members will be taught how to draw up and give insulin  if this is the best method for you. Your providers will also teach you how to dispose of needles and syringes. ? You will learn how much insulin you need  and when to give it. You will be taught when not to give insulin. You will also be taught what to do if your blood sugar level drops too low. This may happen if you take insulin and do not eat the right amount of carbohydrates. More ways to manage type 2 diabetes:   • Wear medical alert identification. Wear medical alert jewelry or carry a card that says you have diabetes. Ask your provider where to get these items. • Do not smoke. Nicotine and other chemicals in cigarettes and cigars can cause lung and blood vessel damage. It also makes it more difficult to manage your diabetes. Ask your provider for information if you currently smoke and need help to quit. Do not use e-cigarettes or smokeless tobacco in place of cigarettes or to help you quit. They still contain nicotine. • Check your feet each day for cuts, scratches, calluses, or other wounds. Look for redness and swelling, and feel for warmth. Wear shoes that fit well. Check your shoes for rocks or other objects that can hurt your feet. Do not walk barefoot or wear shoes without socks.  Wear cotton socks to help keep your feet dry. • Ask about vaccines you may need. You have a higher risk for serious illness if you get the flu, pneumonia, COVID-19, or hepatitis. Ask your provider if you should get vaccines to prevent these or other diseases, and when to get the vaccines. • Talk to your provider if you become stressed about diabetes care. Sometimes being able to fit diabetes care into your life can cause increased stress. The stress can cause you not to take care of yourself properly. Your care team providers can help by offering tips about self-care. Your providers may suggest you talk to a mental health provider who can listen and offer help with self-care issues. • Have your A1c checked as directed. Your provider may check your A1c every 3 months, or 2 times each year if your diabetes is controlled. An A1c test shows the average amount of sugar in your blood over the past 2 to 3 months. Your provider will tell you what your A1c level should be. • Have screening tests as directed. Your provider may recommend screening for complications of diabetes and other conditions that may develop. Some screenings may begin right away and some may happen within the first 5 years of diagnosis:    ? Examples of diabetes complications  include kidney problems, high cholesterol, high blood pressure, blood vessel problems, eye problems, and sleep apnea. ? You may be screened for a low vitamin B level  if you take oral diabetes medicine for a long time. ? Women of childbearing years may be screened  for polycystic ovarian syndrome (PCOS). Follow up with your doctor or diabetes care team providers as directed: You may need to have blood tests done before your follow-up visit. The test results will show if changes need to be made in your treatment or self-care. Talk to your provider if you cannot afford your medicine. Write down your questions so you remember to ask them during your visits.   © Copyright [employer] 2022 Information is for End User's use only and may not be sold, redistributed or otherwise used for commercial purposes. The above information is an  only. It is not intended as medical advice for individual conditions or treatments. Talk to your doctor, nurse or pharmacist before following any medical regimen to see if it is safe and effective for you. Type 2 Diabetes Management for Adults   AMBULATORY CARE:   Type 2 diabetes  is a disease that affects how your body uses glucose (sugar). Either your body cannot make enough insulin, or it cannot use the insulin correctly. It is important to keep diabetes controlled to prevent damage to your heart, blood vessels, and other organs. Management will help you feel well and enjoy your daily activities. Your diabetes care team providers can help you make a plan to fit diabetes care into your schedule. Your plan can change over time to fit your needs and your family's needs. Have someone call your local emergency number (911 in the [address]) if:   • You cannot be woken. • You have signs of diabetic ketoacidosis:     ? confusion, fatigue    ? vomiting    ? rapid heartbeat    ? fruity smelling breath    ? extreme thirst    ? dry mouth and skin    • You have any of the following signs of a heart attack:      ? Squeezing, pressure, or pain in your chest    ? You may  also have any of the following:     - Discomfort or pain in your back, neck, jaw, stomach, or arm    - Shortness of breath    - Nausea or vomiting    - Lightheadedness or a sudden cold sweat    • You have any of the following signs of a stroke:      ? Numbness or drooping on one side of your face     ? Weakness in an arm or leg    ? Confusion or difficulty speaking    ? Dizziness, a severe headache, or vision loss    Call your doctor or diabetes care team provider if:   • You have a sore or wound that will not heal.    • You have a change in the amount you urinate.     • Your blood sugar levels are higher than your target goals. • You often have lower blood sugar levels than your target goals. • Your skin is red, dry, warm, or swollen. • You have trouble coping with diabetes, or you feel anxious or depressed. • You have questions or concerns about your condition or care. What you need to know about high blood sugar levels:  High blood sugar levels may not cause any symptoms. You may feel more thirsty or urinate more often than usual. Over time, high blood sugar levels can damage your nerves, blood vessels, tissues, and organs. The following can increase your blood sugar levels:  • Large meals or large amounts of carbohydrates at one time    • Less physical activity    • Stress    • Illness    • A lower dose of diabetes medicine or insulin, or a late dose    What you need to know about low blood sugar levels:  Symptoms include feeling shaky, dizzy, irritable, or confused. You can prevent symptoms by keeping your blood sugar levels from going too low. • Treat a low blood sugar level right away:      ? Drink 4 ounces of juice or have 1 tube of glucose gel. ? Check your blood sugar level again 10 to 15 minutes later. ? When the level goes back to normal, eat a meal or snack to prevent another decrease. • Keep glucose gel, raisins, or hard candy with you at all times to treat a low blood sugar level. • Your blood sugar level can get too low if you take diabetes medicine or insulin and do not eat enough food. • If you use insulin, check your blood sugar level before you exercise. ? If your blood sugar level is below 100 mg/dL, eat 4 crackers or 2 ounces of raisins, or drink 4 ounces of juice. ? Check your level every 30 minutes if you exercise longer than 1 hour. ? You may need a snack during or after exercise. What you can do to manage your blood sugar levels:   • Check your blood sugar levels as directed and as needed.   Several items are available to use to check your levels. You may need to check by testing a drop of blood in a glucose monitor. You may instead be given a continuous glucose monitoring (CGM) device. The device is worn at all times. The CGM checks your blood sugar level every 5 minutes. It sends results to an electronic device such as a smart phone. A CGM can be used with or without an insulin pump. You and your diabetes care team providers will decide on the best method for you. The goal for blood sugar levels before meals  is between 80 and 130 mg/dL and 2 hours after eating  is lower than 180 mg/dL. • Make healthy food choices. Work with a dietitian to develop a meal plan that works for you and your schedule. A dietitian can help you learn how to eat the right amount of carbohydrates during your meals and snacks. Carbohydrates can raise your blood sugar level if you eat too many at one time. Examples of foods that contain carbohydrates are breads, cereals, rice, pasta, and sweets. • Eat high-fiber foods as directed. Fiber helps improve blood sugar levels. Fiber also lowers your risk for heart disease and other problems diabetes can cause. Examples of high-fiber foods include vegetables, whole-grain bread, and beans such as martin beans. Your dietitian can tell you how much fiber to have each day. • Get regular physical activity. Physical activity can help you get to your target blood sugar level goal and manage your weight. Get at least 150 minutes of moderate to vigorous aerobic physical activity each week. Do not miss more than 2 days in a row. Do not sit longer than 30 minutes at a time. Your healthcare provider can help you create an activity plan. The plan can include the best activities for you and can help you build your strength and endurance. • Maintain a healthy weight. Ask your team what a healthy weight is for you. A healthy weight can help you control diabetes and prevent heart disease.  Ask your team to help you create a weight loss plan, if needed. Weight loss can help make a difference in managing diabetes. Your team will help you set a weight-loss goal, such as 10 to 15 pounds, or 5% of your extra weight. Together you and your team can set manageable weight loss goals. • Take your diabetes medicine or insulin as directed. You may need diabetes medicine, insulin, or both to help control your blood sugar levels. Your healthcare provider will teach you how and when to take your diabetes medicine or insulin. You will also be taught about side effects oral diabetes medicine can cause. Insulin may be injected or given through a pump or pen. You and your providers will decide on the best method for you:    ? An insulin pump  is an implanted device that gives your insulin 24 hours a day. An insulin pump prevents the need for multiple insulin injections in a day. ? An insulin pen  is a device prefilled with the right amount of insulin. ? You and your family members will be taught how to draw up and give insulin  if this is the best method for you. Your providers will also teach you how to dispose of needles and syringes. ? You will learn how much insulin you need  and when to give it. You will be taught when not to give insulin. You will also be taught what to do if your blood sugar level drops too low. This may happen if you take insulin and do not eat the right amount of carbohydrates. More ways to manage type 2 diabetes:   • Wear medical alert identification. Wear medical alert jewelry or carry a card that says you have diabetes. Ask your provider where to get these items. • Do not smoke. Nicotine and other chemicals in cigarettes and cigars can cause lung and blood vessel damage. It also makes it more difficult to manage your diabetes. Ask your provider for information if you currently smoke and need help to quit.  Do not use e-cigarettes or smokeless tobacco in place of cigarettes or to help you quit. They still contain nicotine. • Check your feet each day for cuts, scratches, calluses, or other wounds. Look for redness and swelling, and feel for warmth. Wear shoes that fit well. Check your shoes for rocks or other objects that can hurt your feet. Do not walk barefoot or wear shoes without socks. Wear cotton socks to help keep your feet dry. • Ask about vaccines you may need. You have a higher risk for serious illness if you get the flu, pneumonia, COVID-19, or hepatitis. Ask your provider if you should get vaccines to prevent these or other diseases, and when to get the vaccines. • Talk to your provider if you become stressed about diabetes care. Sometimes being able to fit diabetes care into your life can cause increased stress. The stress can cause you not to take care of yourself properly. Your care team providers can help by offering tips about self-care. Your providers may suggest you talk to a mental health provider who can listen and offer help with self-care issues. • Have your A1c checked as directed. Your provider may check your A1c every 3 months, or 2 times each year if your diabetes is controlled. An A1c test shows the average amount of sugar in your blood over the past 2 to 3 months. Your provider will tell you what your A1c level should be. • Have screening tests as directed. Your provider may recommend screening for complications of diabetes and other conditions that may develop. Some screenings may begin right away and some may happen within the first 5 years of diagnosis:    ? Examples of diabetes complications  include kidney problems, high cholesterol, high blood pressure, blood vessel problems, eye problems, and sleep apnea. ? You may be screened for a low vitamin B level  if you take oral diabetes medicine for a long time. ? Women of childbearing years may be screened  for polycystic ovarian syndrome (PCOS).     Follow up with your doctor or diabetes care team providers as directed: You may need to have blood tests done before your follow-up visit. The test results will show if changes need to be made in your treatment or self-care. Talk to your provider if you cannot afford your medicine. Write down your questions so you remember to ask them during your visits. © Copyright Ophelia Padmini 2022 Information is for End User's use only and may not be sold, redistributed or otherwise used for commercial purposes. The above information is an  only. It is not intended as medical advice for individual conditions or treatments. Talk to your doctor, nurse or pharmacist before following any medical regimen to see if it is safe and effective for you.

## 2023-07-24 NOTE — PROGRESS NOTES
Name: Zak Trevizo      : 1945      MRN: [id number]  Encounter Provider: Gia Moreno DO  Encounter Date: 2023   Encounter department: [address]     1. Uncontrolled type 2 diabetes mellitus with hyperglycemia ([address])  Assessment & Plan:    Lab Results   Component Value Date    HGBA1C 7.8 (H) 07/15/2023   DM type 2 with hyperglycemia - uncontrolled but improved with A1c 7.8 - congratulated on improvement in A1C, again discussed adding 3rd agent (Rybelsus as it doesn't have the urinary tract SE that Filiberto Tinajero has) pt deferring and wishing to con't with diet/exercise/wgt loss, con't current regimen as per her request, recheck BW in 3 mos - BW order given, UTD on DM foot exam () and eye exam (), on an ACE but no statin, will follow    Orders:  -     Hemoglobin A1C; Future; Expected date: 10/24/2023  -     Basic metabolic panel; Future; Expected date: 10/24/2023  -     Hemoglobin A1C  -     Basic metabolic panel    2. Essential hypertension  Assessment & Plan:  BP at goal, con't current meds, healthy diet and regular exercise and healthy wgt encouraged      3. Cough, unspecified type  Comments:  has s/sx of PND so will start Zyretec daily and monitor, has intermittent reflux - if occurs will use PPI prn, discussed cough as SE of lisinopril as well, will treat as PND first and if no better may need to D/C ACE, will follow - call with new/worse symptoms/F/C/SOB    4. Multiple thyroid nodules  Assessment & Plan:  Thyroid US due - order given, nml TSH , will follow    Orders:  -     US thyroid;  Future; Expected date: 2023      Colonoscopy  - 10 yrs     Mammo  -  scheduled    Dexa  -osteopenia -   scheduled    Thyroid US - 10/20 - order given for      BW       Subjective      HPI Pt here for follow up appt and BW results    BW results were d/w pt in detail: FBS/A1C 168/7.8, rest of BMP and Hep C Ab were nml/negative     Def of controlled vs uncontrolled DM was reviewed. Diet was reviewed - wgt down 3 lbs from April 23. She is more active with summer months and admits she could do better with diet but is working on it. She is taking her DM meds as directed. She is UTD on DM foot exam (4/23) and eye exam (5/23). She is on an ACE but defers a statin. BP at goal today and meds were reviewed and no changes have occurred. She denies missing doses of meds or SE with the meds. She does not check her BP outside the office. She notes no frequent HA's/dizziness/double vision/CP. She has a bit of a dry cough. She has some associated post nasal drip. Thyroid US last done 10/20. TSH nml 4/23. Review of Systems   Constitutional: Negative for chills, fever and unexpected weight change. HENT: Positive for postnasal drip. Negative for congestion and trouble swallowing. Eyes: Negative for pain and visual disturbance. Respiratory: Positive for cough. Negative for shortness of breath and wheezing. Cardiovascular: Negative for chest pain and palpitations. Gastrointestinal: Negative for abdominal pain, constipation, diarrhea, nausea and vomiting. Genitourinary: Negative for difficulty urinating and dysuria. Musculoskeletal: Negative for back pain and neck pain. Skin: Negative for rash and wound. Neurological: Negative for dizziness, light-headedness and headaches. Hematological: Does not bruise/bleed easily. Psychiatric/Behavioral: Negative for confusion and dysphoric mood.        Current Outpatient Medications on File Prior to Visit   Medication Sig   • pantoprazole (PROTONIX) 40 mg tablet Take 1 tablet (40 mg total) by mouth daily as needed (reflux)   • Accu-Chek Guide test strip Test twice daily   • Accu-Chek Softclix Lancets lancets Test twice daily   • acetaminophen (TYLENOL) 500 mg tablet Take 500 mg by mouth every 6 (six) hours as needed for mild pain   • amLODIPine (NORVASC) 10 mg tablet Take 1 tablet (10 mg total) by mouth daily   • Blood Glucose Monitoring Suppl (Accu-Chek Guide Me) w/Device KIT Test twice daily   • Cetirizine HCl 10 MG CAPS daily as needed ZyrTEC Allergy 10 MG Oral Tablet TAKE 1 TABLET DAILY AS DIRECTED. Quantity: 30;  Refills: 0    Lynnette SaucedoNP;  Started 12-May-2014 Active   • Cholecalciferol (VITAMIN D-3) 1000 UNITS CAPS Vitamin D3 1000 UNIT Oral Tablet TAKE 1 TABLET DAILY. Quantity: 30;  Refills: 0    Xochitl Tan DO;  Started 25-Sep-2012 Active   • clobetasol (TEMOVATE) 0.05 % ointment apply to affected area sparingly twice a day for 4 weeks then onc. ..  (REFER TO PRESCRIPTION NOTES). (Patient not taking: Reported on 1/16/2023)   • glucose blood (OneTouch Verio) test strip Check blood sugars once daily. Please substitute with appropriate alternative as covered by patient's insurance. Dx: E11.65   • levothyroxine 75 mcg tablet Take 1 tablet (75 mcg total) by mouth every morning   • lisinopril (ZESTRIL) 40 mg tablet Take 1.5 tablets (60 mg total) by mouth daily   • metFORMIN (GLUCOPHAGE) 1000 MG tablet Take 1 tablet (1,000 mg total) by mouth 2 (two) times a day with meals   • metoprolol succinate (TOPROL-XL) 100 mg 24 hr tablet Take 1.5 tablets (150 mg total) by mouth daily   • Multiple Vitamins-Minerals (CENTRUM ADULTS PO) Centrum Oral Tablet TAKE 1 TABLET DAILY. Refills: 0    Xochitl Tan DO;  Started 25-Sep-2012 Active   • OneTouch Delica Lancets 49L MISC Check blood sugars once daily. Please substitute with appropriate alternative as covered by patient's insurance. Dx: E11.65   • sitaGLIPtin (Januvia) 100 mg tablet Take 1 tablet (100 mg total) by mouth daily       Objective     /82   Pulse 78   Temp 98.1 °F (36.7 °C)   Ht 5' 3" (1.6 m)   Wt 77.9 kg (171 lb 12.8 oz)   BMI 30.43 kg/m²     Physical Exam  Vitals and nursing note reviewed. Constitutional:       General: She is not in acute distress. Appearance: She is well-developed.  She is not ill-appearing. HENT:      Head: Normocephalic and atraumatic. Eyes:      General:         Right eye: No discharge. Left eye: No discharge. Conjunctiva/sclera: Conjunctivae normal.   Neck:      Trachea: No tracheal deviation. Cardiovascular:      Rate and Rhythm: Normal rate and regular rhythm. Heart sounds: Normal heart sounds. No murmur heard. No friction rub. Pulmonary:      Effort: Pulmonary effort is normal. No respiratory distress. Breath sounds: Normal breath sounds. No wheezing, rhonchi or rales. Abdominal:      General: There is no distension. Palpations: Abdomen is soft. Tenderness: There is no abdominal tenderness. There is no guarding or rebound. Musculoskeletal:      Cervical back: Neck supple. Right lower leg: No edema. Left lower leg: No edema. Skin:     General: Skin is warm. Coloration: Skin is not pale. Findings: No rash. Neurological:      General: No focal deficit present. Mental Status: She is alert. Mental status is at baseline. Motor: No abnormal muscle tone. Gait: Gait normal.   Psychiatric:         Mood and Affect: Mood normal.         Behavior: Behavior normal.         Thought Content:  Thought content normal.         Judgment: Judgment normal.       Cheryl Lewis DO

## 2023-07-24 NOTE — ASSESSMENT & PLAN NOTE
Lab Results   Component Value Date    HGBA1C 7.8 (H) 07/15/2023   DM type 2 with hyperglycemia - uncontrolled but improved with A1c 7.8 - congratulated on improvement in A1C, again discussed adding 3rd agent (Rybelsus as it doesn't have the urinary tract SE that Pamela Cool has) pt deferring and wishing to con't with diet/exercise/wgt loss, con't current regimen as per her request, recheck BW in 3 mos - BW order given, UTD on DM foot exam (4/23) and eye exam (5/23), on an ACE but no statin, will follow

## 2023-08-25 ENCOUNTER — TELEPHONE (OUTPATIENT)
Dept: CARDIAC SURGERY | Facility: CLINIC | Age: 78
End: 2023-08-25

## 2023-08-31 ENCOUNTER — HOSPITAL ENCOUNTER (OUTPATIENT)
Dept: MAMMOGRAPHY | Facility: IMAGING CENTER | Age: 78
Discharge: HOME | End: 2023-08-31
Payer: COMMERCIAL

## 2023-08-31 ENCOUNTER — HOSPITAL ENCOUNTER (OUTPATIENT)
Dept: BONE DENSITY | Facility: IMAGING CENTER | Age: 78
Discharge: HOME | End: 2023-08-31
Payer: COMMERCIAL

## 2023-08-31 VITALS — BODY MASS INDEX: 29.77 KG/M2 | HEIGHT: 63 IN | WEIGHT: 168 LBS

## 2023-08-31 VITALS — WEIGHT: 166.2 LBS | HEIGHT: 63 IN | BODY MASS INDEX: 29.45 KG/M2

## 2023-08-31 DIAGNOSIS — Z12.31 SCREENING MAMMOGRAM, ENCOUNTER FOR: ICD-10-CM

## 2023-08-31 DIAGNOSIS — E28.39 MENOPAUSE OVARIAN FAILURE: ICD-10-CM

## 2023-08-31 PROCEDURE — 77080 DXA BONE DENSITY AXIAL: CPT

## 2023-08-31 PROCEDURE — 77067 SCR MAMMO BI INCL CAD: CPT

## 2023-08-31 PROCEDURE — 77063 BREAST TOMOSYNTHESIS BI: CPT

## 2023-10-14 ENCOUNTER — HOSPITAL ENCOUNTER (OUTPATIENT)
Dept: ULTRASOUND IMAGING | Facility: HOSPITAL | Age: 78
Discharge: HOME | End: 2023-10-14
Payer: COMMERCIAL

## 2023-10-14 DIAGNOSIS — E04.2 MULTIPLE THYROID NODULES: ICD-10-CM

## 2023-10-14 PROCEDURE — 76536 US EXAM OF HEAD AND NECK: CPT

## 2023-11-01 DIAGNOSIS — E11.65 UNCONTROLLED TYPE 2 DIABETES MELLITUS WITH HYPERGLYCEMIA (HCC): ICD-10-CM

## 2023-11-04 LAB
BUN SERPL-MCNC: 14 MG/DL (ref 8–27)
BUN/CREAT SERPL: 18 (ref 12–28)
CALCIUM SERPL-MCNC: 9.6 MG/DL (ref 8.7–10.3)
CHLORIDE SERPL-SCNC: 98 MMOL/L (ref 96–106)
CO2 SERPL-SCNC: 25 MMOL/L (ref 20–29)
CREAT SERPL-MCNC: 0.8 MG/DL (ref 0.57–1)
EGFR: 76 ML/MIN/1.73
EST. AVERAGE GLUCOSE BLD GHB EST-MCNC: 171 MG/DL
GLUCOSE SERPL-MCNC: 158 MG/DL (ref 70–99)
HBA1C MFR BLD: 7.6 % (ref 4.8–5.6)
POTASSIUM SERPL-SCNC: 4.2 MMOL/L (ref 3.5–5.2)
SODIUM SERPL-SCNC: 140 MMOL/L (ref 134–144)

## 2023-11-07 ENCOUNTER — OFFICE VISIT (OUTPATIENT)
Dept: FAMILY MEDICINE CLINIC | Facility: HOSPITAL | Age: 78
End: 2023-11-07
Payer: COMMERCIAL

## 2023-11-07 VITALS
HEART RATE: 72 BPM | SYSTOLIC BLOOD PRESSURE: 132 MMHG | WEIGHT: 169.4 LBS | HEIGHT: 63 IN | DIASTOLIC BLOOD PRESSURE: 70 MMHG | BODY MASS INDEX: 30.02 KG/M2 | TEMPERATURE: 98.1 F

## 2023-11-07 DIAGNOSIS — E04.2 MULTIPLE THYROID NODULES: ICD-10-CM

## 2023-11-07 DIAGNOSIS — E11.65 UNCONTROLLED TYPE 2 DIABETES MELLITUS WITH HYPERGLYCEMIA (HCC): Primary | ICD-10-CM

## 2023-11-07 DIAGNOSIS — E78.5 DYSLIPIDEMIA: ICD-10-CM

## 2023-11-07 DIAGNOSIS — E03.9 HYPOTHYROIDISM, UNSPECIFIED TYPE: ICD-10-CM

## 2023-11-07 DIAGNOSIS — R79.89 ELEVATED TSH: ICD-10-CM

## 2023-11-07 DIAGNOSIS — M85.852 OSTEOPENIA OF NECK OF LEFT FEMUR: ICD-10-CM

## 2023-11-07 DIAGNOSIS — I10 ESSENTIAL HYPERTENSION: ICD-10-CM

## 2023-11-07 PROCEDURE — 99214 OFFICE O/P EST MOD 30 MIN: CPT | Performed by: INTERNAL MEDICINE

## 2023-11-07 RX ORDER — LISINOPRIL 40 MG/1
60 TABLET ORAL DAILY
Qty: 135 TABLET | Refills: 2 | Status: SHIPPED | OUTPATIENT
Start: 2023-11-07

## 2023-11-07 RX ORDER — LEVOTHYROXINE SODIUM 0.07 MG/1
75 TABLET ORAL EVERY MORNING
Qty: 90 TABLET | Refills: 2 | Status: SHIPPED | OUTPATIENT
Start: 2023-11-07

## 2023-11-07 RX ORDER — AMLODIPINE BESYLATE 10 MG/1
10 TABLET ORAL DAILY
Qty: 90 TABLET | Refills: 2 | Status: SHIPPED | OUTPATIENT
Start: 2023-11-07

## 2023-11-07 RX ORDER — METOPROLOL SUCCINATE 100 MG/1
150 TABLET, EXTENDED RELEASE ORAL DAILY
Qty: 135 TABLET | Refills: 2 | Status: SHIPPED | OUTPATIENT
Start: 2023-11-07

## 2023-11-07 NOTE — ASSESSMENT & PLAN NOTE
Slight improvement on recent Dexa, con't Ca/Vit D as well as high calcium diet and regular wgt bearing exercise

## 2023-11-07 NOTE — PATIENT INSTRUCTIONS

## 2023-11-07 NOTE — ASSESSMENT & PLAN NOTE
Lab Results   Component Value Date    HGBA1C 7.6 (H) 11/03/2023   DM type 2 with hyperglycemia- improved but still uncontrolled with A1c 7.6 - encouraged to con't low sugar/carb diet and keep active, con't current DM meds, recheck BW in 4-5 mos - BW order given, UTD on DM foot exam (4/23) and eye exam (5/23), on an ACE but no statin, will follow

## 2023-11-07 NOTE — PROGRESS NOTES
Name: Abdirizak Gallagher      : 1945      MRN: [id number]  Encounter Provider: Margaret Bay DO  Encounter Date: 2023   Encounter department: [address]     1. Uncontrolled type 2 diabetes mellitus with hyperglycemia ([address])  Assessment & Plan:    Lab Results   Component Value Date    HGBA1C 7.6 (H) 2023   DM type 2 with hyperglycemia- improved but still uncontrolled with A1c 7.6 - encouraged to con't low sugar/carb diet and keep active, con't current DM meds, recheck BW in 4-5 mos - BW order given, UTD on DM foot exam () and eye exam (), on an ACE but no statin, will follow    Orders:  -     metFORMIN (GLUCOPHAGE) 1000 MG tablet; Take 1 tablet (1,000 mg total) by mouth 2 (two) times a day with meals  -     CBC and Platelet; Future; Expected date: 2024  -     Comprehensive metabolic panel; Future; Expected date: 2024  -     Hemoglobin A1C; Future; Expected date: 2024  -     Lipid Panel with Direct LDL reflex; Future; Expected date: 2024  -     TSH, 3rd generation with Free T4 reflex; Future; Expected date: 2024  -     Albumin / creatinine urine ratio; Future; Expected date: 2024  -     CBC and Platelet  -     Comprehensive metabolic panel  -     Hemoglobin A1C  -     Lipid Panel with Direct LDL reflex  -     TSH, 3rd generation with Free T4 reflex  -     Albumin / creatinine urine ratio    2. Elevated TSH  Assessment & Plan:  Recheck TFT's annually - order given for spring 2024    Orders:  -     levothyroxine 75 mcg tablet; Take 1 tablet (75 mcg total) by mouth every morning    3. Hypothyroidism, unspecified type  Assessment & Plan:  Levothyroxine refilled today upon request, con't annual TFT's - order given for spring 2024, will follow    Orders:  -     levothyroxine 75 mcg tablet; Take 1 tablet (75 mcg total) by mouth every morning  -     TSH, 3rd generation with Free T4 reflex;  Future; Expected date: 03/06/2024  -     TSH, 3rd generation with Free T4 reflex    4. Essential hypertension  Assessment & Plan:  BP at goal, con't current meds, recheck in 4-5 mos    Orders:  -     amLODIPine (NORVASC) 10 mg tablet; Take 1 tablet (10 mg total) by mouth daily  -     lisinopril (ZESTRIL) 40 mg tablet; Take 1.5 tablets (60 mg total) by mouth daily  -     metoprolol succinate (TOPROL-XL) 100 mg 24 hr tablet; Take 1.5 tablets (150 mg total) by mouth daily  -     CBC and Platelet; Future; Expected date: 03/06/2024  -     Comprehensive metabolic panel; Future; Expected date: 03/06/2024  -     Hemoglobin A1C; Future; Expected date: 03/06/2024  -     Lipid Panel with Direct LDL reflex; Future; Expected date: 03/06/2024  -     TSH, 3rd generation with Free T4 reflex; Future; Expected date: 03/06/2024  -     Albumin / creatinine urine ratio; Future; Expected date: 03/06/2024  -     CBC and Platelet  -     Comprehensive metabolic panel  -     Hemoglobin A1C  -     Lipid Panel with Direct LDL reflex  -     TSH, 3rd generation with Free T4 reflex  -     Albumin / creatinine urine ratio    5. Dyslipidemia  Assessment & Plan:  FLP due in spring - BW order given, pt refusing statin despite elevated 10 yr ASCVD risk score of 39.7%, will follow    Orders:  -     Lipid Panel with Direct LDL reflex; Future; Expected date: 03/06/2024  -     Lipid Panel with Direct LDL reflex    6. Multiple thyroid nodules  Assessment & Plan:  Thyroid US reviewed - no further f/u or biopsy needed      7. Osteopenia of neck of left femur  Assessment & Plan:  Slight improvement on recent Dexa, con't Ca/Vit D as well as high calcium diet and regular wgt bearing exercise      Colonoscopy 4/21 - 10 yrs    Mammo 8/23    Dexa 8/23 - osteopenia    Thyroid US 10/23       Subjective      HPI pt here for follow up appt and BWw results    BW results were d/w pt in detail: FBS/A1C 158/7.6, rest of BMP was wnl. Def of controlled vs uncontrolled DM was reviewed. Diet was reviewed - wgt down 2 lbs from July 23. She is taking her DM meds as directed. She is UTD on DM foot exam (4/23) and eye exam (5/23). She is on an ACE but no statin. BP at goal today and meds were reviewed and no changes have occurred. She denies missing doses of meds or SE with the meds. She does not check her BP outside the office. She notes no frequent Ha's/dizziness/double vision/CP. Pt requesting refill on thyroid meds. She is taking levothyroxine daily w/o any other meds/vitamins and prior to eating. Dexa results still c/w osteopenia but has improved at lumbar spine. She is taking Ca/Vit D supplements. Thyroid US results reviewed: stable nodules and no bx or f/u US needed. Review of Systems   Constitutional:  Negative for chills and fever. HENT:  Negative for congestion and sore throat. Eyes:  Negative for pain and visual disturbance. Respiratory:  Negative for cough and shortness of breath. Cardiovascular:  Negative for chest pain and palpitations. Gastrointestinal:  Negative for abdominal pain, diarrhea and nausea. Genitourinary:  Negative for difficulty urinating and dysuria. Musculoskeletal:  Negative for back pain and neck pain. Skin:  Negative for rash and wound. Neurological:  Negative for dizziness, light-headedness and headaches. Psychiatric/Behavioral:  Negative for confusion and dysphoric mood. Current Outpatient Medications on File Prior to Visit   Medication Sig    Accu-Chek Guide test strip Test twice daily    Accu-Chek Softclix Lancets lancets Test twice daily    acetaminophen (TYLENOL) 500 mg tablet Take 500 mg by mouth every 6 (six) hours as needed for mild pain    Blood Glucose Monitoring Suppl (Accu-Chek Guide Me) w/Device KIT Test twice daily    Cetirizine HCl 10 MG CAPS daily as needed ZyrTEC Allergy 10 MG Oral Tablet TAKE 1 TABLET DAILY AS DIRECTED.   Quantity: 30;  Refills: 0    Manjit RUDOLPH;  Started 12-May-2014 Active    Cholecalciferol (VITAMIN D-3) 1000 UNITS CAPS Vitamin D3 1000 UNIT Oral Tablet TAKE 1 TABLET DAILY. Quantity: 30;  Refills: 0    Xochitl Tan DO;  Started 25-Sep-2012 Active    clobetasol (TEMOVATE) 0.05 % ointment apply to affected area sparingly twice a day for 4 weeks then onc. ..  (REFER TO PRESCRIPTION NOTES). (Patient not taking: Reported on 1/16/2023)    glucose blood (OneTouch Verio) test strip Check blood sugars once daily. Please substitute with appropriate alternative as covered by patient's insurance. Dx: E11.65    Multiple Vitamins-Minerals (CENTRUM ADULTS PO) Centrum Oral Tablet TAKE 1 TABLET DAILY. Refills: 0    Xochitl Tan DO;  Started 25-Sep-2012 Active    OneTouch Delica Lancets 76M MISC Check blood sugars once daily. Please substitute with appropriate alternative as covered by patient's insurance. Dx: E11.65    pantoprazole (PROTONIX) 40 mg tablet Take 1 tablet (40 mg total) by mouth daily as needed (reflux)    sitaGLIPtin (Januvia) 100 mg tablet Take 1 tablet (100 mg total) by mouth daily    [DISCONTINUED] amLODIPine (NORVASC) 10 mg tablet Take 1 tablet (10 mg total) by mouth daily    [DISCONTINUED] levothyroxine 75 mcg tablet Take 1 tablet (75 mcg total) by mouth every morning    [DISCONTINUED] lisinopril (ZESTRIL) 40 mg tablet Take 1.5 tablets (60 mg total) by mouth daily    [DISCONTINUED] metFORMIN (GLUCOPHAGE) 1000 MG tablet Take 1 tablet (1,000 mg total) by mouth 2 (two) times a day with meals    [DISCONTINUED] metoprolol succinate (TOPROL-XL) 100 mg 24 hr tablet Take 1.5 tablets (150 mg total) by mouth daily       Objective     /70   Pulse 72   Temp 98.1 °F (36.7 °C) (Tympanic)   Ht 5' 3" (1.6 m)   Wt 76.8 kg (169 lb 6.4 oz)   BMI 30.01 kg/m²     Physical Exam  Vitals and nursing note reviewed. Constitutional:       General: She is not in acute distress. Appearance: She is not ill-appearing. HENT:      Head: Normocephalic and atraumatic.    Eyes: General:         Right eye: No discharge. Left eye: No discharge. Conjunctiva/sclera: Conjunctivae normal.   Pulmonary:      Effort: Pulmonary effort is normal. No respiratory distress. Skin:     Coloration: Skin is not pale. Findings: No rash. Psychiatric:         Mood and Affect: Mood normal.         Behavior: Behavior normal.         Thought Content:  Thought content normal.         Judgment: Judgment normal.       Mely Ho,

## 2023-11-07 NOTE — ASSESSMENT & PLAN NOTE
Levothyroxine refilled today upon request, con't annual TFT's - order given for spring 2024, will follow

## 2023-11-07 NOTE — ASSESSMENT & PLAN NOTE
FLP due in spring - BW order given, pt refusing statin despite elevated 10 yr ASCVD risk score of 39.7%, will follow

## 2024-02-21 ENCOUNTER — OFFICE VISIT (OUTPATIENT)
Dept: URGENT CARE | Facility: CLINIC | Age: 79
End: 2024-02-21
Payer: COMMERCIAL

## 2024-02-21 VITALS
HEART RATE: 83 BPM | DIASTOLIC BLOOD PRESSURE: 76 MMHG | RESPIRATION RATE: 18 BRPM | WEIGHT: 172.2 LBS | TEMPERATURE: 97.8 F | BODY MASS INDEX: 30.5 KG/M2 | SYSTOLIC BLOOD PRESSURE: 132 MMHG | OXYGEN SATURATION: 97 %

## 2024-02-21 DIAGNOSIS — M54.6 ACUTE RIGHT-SIDED THORACIC BACK PAIN: Primary | ICD-10-CM

## 2024-02-21 PROCEDURE — 99213 OFFICE O/P EST LOW 20 MIN: CPT | Performed by: FAMILY MEDICINE

## 2024-02-21 RX ORDER — MELOXICAM 7.5 MG/1
7.5 TABLET ORAL DAILY
Qty: 60 TABLET | Refills: 0 | Status: SHIPPED | OUTPATIENT
Start: 2024-02-21

## 2024-02-21 NOTE — PROGRESS NOTES
[facility]        NAME: Madeleine Jiménez is a 78 y.o. female  : 1945    MRN: [id number]  DATE: 2024  TIME: 6:25 PM    Assessment and Plan   Acute right-sided thoracic back pain [M54.6]  1. Acute right-sided thoracic back pain  meloxicam (MOBIC) 7.5 mg tablet            Patient Instructions       Follow up with PCP in 3-5 days.  Proceed to  ER if symptoms worsen.    Chief Complaint     Chief Complaint   Patient presents with    Back Pain     Patient reports right side back pain, starting over a week ago. She says its not all the time, she says that she had a cough start 2 weeks ago, gets frequent spasms.          History of Present Illness       78-year-old female with back pain right side of her low back for the past 1 week.  She does not recall any injuries or traumas but does state that it began after a few days of hard coughing.  Denies any radiation pain in the lower extremities.  Denies any weakness numbness or tingling.    Back Pain        Review of Systems   Review of Systems   Constitutional: Negative.    HENT: Negative.     Eyes: Negative.    Respiratory: Negative.     Cardiovascular: Negative.    Gastrointestinal: Negative.    Genitourinary: Negative.    Musculoskeletal:  Positive for arthralgias, back pain and myalgias.   Skin: Negative.    Allergic/Immunologic: Negative.    Neurological: Negative.    Hematological: Negative.    Psychiatric/Behavioral: Negative.           Current Medications       Current Outpatient Medications:     meloxicam (MOBIC) 7.5 mg tablet, Take 1 tablet (7.5 mg total) by mouth daily, Disp: 60 tablet, Rfl: 0    Accu-Chek Guide test strip, Test twice daily, Disp: , Rfl:     Accu-Chek Softclix Lancets lancets, Test twice daily, Disp: , Rfl:     acetaminophen (TYLENOL) 500 mg tablet, Take 500 mg by mouth every 6 (six) hours as needed for mild pain, Disp: , Rfl:     amLODIPine (NORVASC) 10 mg tablet, Take 1 tablet (10 mg total) by mouth daily, Disp: 90  tablet, Rfl: 2    Blood Glucose Monitoring Suppl (Accu-Chek Guide Me) w/Device KIT, Test twice daily, Disp: , Rfl:     Cetirizine HCl 10 MG CAPS, daily as needed ZyrTEC Allergy 10 MG Oral Tablet TAKE 1 TABLET DAILY AS DIRECTED.  Quantity: 30;  Refills: 0    Lynnette Saucedo;  Started 12-May-2014 Active, Disp: , Rfl:     Cholecalciferol (VITAMIN D-3) 1000 UNITS CAPS, Vitamin D3 1000 UNIT Oral Tablet TAKE 1 TABLET DAILY.  Quantity: 30;  Refills: 0    Xochitl Tan DO;  Started 25-Sep-2012 Active, Disp: , Rfl:     clobetasol (TEMOVATE) 0.05 % ointment, apply to affected area sparingly twice a day for 4 weeks then onc...  (REFER TO PRESCRIPTION NOTES). (Patient not taking: Reported on 1/16/2023), Disp: , Rfl:     glucose blood (OneTouch Verio) test strip, Check blood sugars once daily. Please substitute with appropriate alternative as covered by patient's insurance. Dx: E11.65, Disp: 100 each, Rfl: 3    levothyroxine 75 mcg tablet, Take 1 tablet (75 mcg total) by mouth every morning, Disp: 90 tablet, Rfl: 2    lisinopril (ZESTRIL) 40 mg tablet, Take 1.5 tablets (60 mg total) by mouth daily, Disp: 135 tablet, Rfl: 2    metFORMIN (GLUCOPHAGE) 1000 MG tablet, Take 1 tablet (1,000 mg total) by mouth 2 (two) times a day with meals, Disp: 180 tablet, Rfl: 2    metoprolol succinate (TOPROL-XL) 100 mg 24 hr tablet, Take 1.5 tablets (150 mg total) by mouth daily, Disp: 135 tablet, Rfl: 2    Multiple Vitamins-Minerals (CENTRUM ADULTS PO), Centrum Oral Tablet TAKE 1 TABLET DAILY.  Refills: 0    Grazynabreonnaanjana Xochitl CORREA;  Started 25-Sep-2012 Active, Disp: , Rfl:     OneTouch Delica Lancets 33G MISC, Check blood sugars once daily. Please substitute with appropriate alternative as covered by patient's insurance. Dx: E11.65, Disp: 100 each, Rfl: 3    pantoprazole (PROTONIX) 40 mg tablet, Take 1 tablet (40 mg total) by mouth daily as needed (reflux), Disp: 30 tablet, Rfl: 12    sitaGLIPtin (Januvia) 100 mg tablet, Take 1 tablet (100  mg total) by mouth daily, Disp: 30 tablet, Rfl: 5    Current Allergies     Allergies as of 02/21/2024 - Reviewed 02/21/2024   Allergen Reaction Noted    Metronidazole Anaphylaxis 04/23/2016    Tramadol Swelling 04/23/2016    Acetazolamide Other (See Comments) 04/23/2016    Penicillins Hives, Itching, and Rash 04/23/2016    Statins Myalgia 04/23/2016    Hydrochlorothiazide Rash 04/23/2016    Sulfa antibiotics Rash 10/06/2020    Tetracycline Rash 04/23/2016            The following portions of the patient's history were reviewed and updated as appropriate: allergies, current medications, past family history, past medical history, past social history, past surgical history and problem list.     Past Medical History:   Diagnosis Date    Kidney stone     Migraine     Wrist fracture     left        Past Surgical History:   Procedure Laterality Date    APPENDECTOMY      CATARACT EXTRACTION, BILATERAL Bilateral 2020    COLONOSCOPY      complete 11/29/11- 5 years / complete 6/9/04 -10years    COLONOSCOPY      Exam in November 2011 revealed small polyp, sigmoid diverticulosis, internal and external hemorrhoids.  The polyp was  inflammatory polyp so followup in 10 years recommended.    FL RETROGRADE PYELOGRAM  12/18/2020    HEMORRHOID SURGERY      KIDNEY STONE SURGERY      VA CYSTO/URETERO W/LITHOTRIPSY &INDWELL STENT INSRT Right 12/18/2020    Procedure: CYSTOSCOPY URETEROSCOPY RETROGRADE PYELOGRAM AND INSERTION STENT URETERAL;  Surgeon: Hayder Molina MD;  Location:  MAIN OR;  Service: Urology    TONSILLECTOMY      TUBAL LIGATION      UPPER GASTROINTESTINAL ENDOSCOPY      April and July 2021:  Antral ulcer on first examination biopsies negative for H pylori, repeat exam with healed ulcer just gastritis.  November 2011 with hiatal hernia irregular Z-line and gastritis.  Biopsies show question of intestinal metaplasia verses just reflux inflammation, H pylori was negative, nonspecific duodenitis.       Family History    Problem Relation Age of Onset    Hypertension Mother     Thyroid disease Mother     Breast cancer Mother 89    Coronary artery disease Father     Hypertension Father     Other Father         smoker    No Known Problems Sister     No Known Problems Sister     No Known Problems Daughter     No Known Problems Daughter     No Known Problems Maternal Grandmother     Prostate cancer Maternal Grandfather 75    Diabetes Paternal Grandmother     Endometrial cancer Paternal Grandmother         age unknown    No Known Problems Paternal Grandfather     No Known Problems Maternal Aunt     No Known Problems Maternal Aunt     Breast cancer Cousin         age unknown    Endometrial cancer Cousin         age unknown, maybe 61    Colon polyps Neg Hx     Colon cancer Neg Hx          Medications have been verified.        Objective   /76   Pulse 83   Temp 97.8 °F (36.6 °C) (Tympanic)   Resp 18   Wt 78.1 kg (172 lb 3.2 oz)   SpO2 97%   BMI 30.50 kg/m²   No LMP recorded. Patient is postmenopausal.       Physical Exam     Physical Exam  Vitals and nursing note reviewed.   Constitutional:       Appearance: She is well-developed.   HENT:      Head: Normocephalic.      Nose: Nose normal.   Eyes:      Pupils: Pupils are equal, round, and reactive to light.   Cardiovascular:      Rate and Rhythm: Normal rate.   Pulmonary:      Effort: Pulmonary effort is normal.   Abdominal:      General: Abdomen is flat.   Musculoskeletal:         General: Tenderness present. No signs of injury. Normal range of motion.      Cervical back: Normal range of motion.   Skin:     General: Skin is warm and dry.   Neurological:      Mental Status: She is alert and oriented to person, place, and time.

## 2024-02-26 ENCOUNTER — OFFICE VISIT (OUTPATIENT)
Dept: FAMILY MEDICINE CLINIC | Facility: HOSPITAL | Age: 79
End: 2024-02-26
Payer: COMMERCIAL

## 2024-02-26 VITALS
HEART RATE: 74 BPM | HEIGHT: 63 IN | WEIGHT: 174 LBS | TEMPERATURE: 98.8 F | BODY MASS INDEX: 30.83 KG/M2 | SYSTOLIC BLOOD PRESSURE: 136 MMHG | DIASTOLIC BLOOD PRESSURE: 76 MMHG

## 2024-02-26 DIAGNOSIS — M54.6 ACUTE RIGHT-SIDED THORACIC BACK PAIN: Primary | ICD-10-CM

## 2024-02-26 DIAGNOSIS — E11.29 CONTROLLED TYPE 2 DIABETES MELLITUS WITH MICROALBUMINURIA, WITHOUT LONG-TERM CURRENT USE OF INSULIN: ICD-10-CM

## 2024-02-26 DIAGNOSIS — R80.9 CONTROLLED TYPE 2 DIABETES MELLITUS WITH MICROALBUMINURIA, WITHOUT LONG-TERM CURRENT USE OF INSULIN: ICD-10-CM

## 2024-02-26 PROCEDURE — 99214 OFFICE O/P EST MOD 30 MIN: CPT | Performed by: NURSE PRACTITIONER

## 2024-02-26 RX ORDER — CYCLOBENZAPRINE HCL 10 MG
10 TABLET ORAL 3 TIMES DAILY PRN
Qty: 30 TABLET | Refills: 0 | Status: SHIPPED | OUTPATIENT
Start: 2024-02-26

## 2024-02-26 NOTE — PROGRESS NOTES
Assessment/Plan:     Right thoracic back pain likely muscular in nature.   Will trial muscle relaxant.   Continue with heat.   Declining PT for now but aware if not improving that would be next step.   Call with no improvement or worsening.      Diagnoses and all orders for this visit:    Acute right-sided thoracic back pain  -     cyclobenzaprine (FLEXERIL) 10 mg tablet; Take 1 tablet (10 mg total) by mouth 3 (three) times a day as needed for muscle spasms    Controlled type 2 diabetes mellitus with microalbuminuria, without long-term current use of insulin   Comments:  Managed by PCP          Subjective:     Patient ID: Madeleine Jiménez is a 78 y.o. female.    Low back paihn started about 1 1/2 weeks ago. Had bad cough at that time. Otherwise she denies any precipitating evet. Located right flank. Pain worsens as day goes on. Has some pain at rest. Worse with moving. Hot showers and heating pad help. Taking Tylenol which gives mild relief. Went to urgent care. Given Meloxicam which caused her BP to go up and caused stomach distress so she stopped it and went back to Tylenol. She denies any N/T/weakness. Pain will radiate to right side of low back but nothing into legs.         Review of Systems   Constitutional:  Negative for chills and fever.   Musculoskeletal:  Positive for back pain.   Skin:  Negative for rash.   Neurological:  Negative for weakness and numbness.         The following portions of the patient's history were reviewed and updated as appropriate: allergies, current medications, past family history, past medical history, past social history, past surgical history and problem list.    Objective:  Vitals:    02/26/24 1423   BP: 136/76   Pulse: 74   Temp: 98.8 °F (37.1 °C)      Physical Exam  Vitals reviewed.   Constitutional:       Appearance: Normal appearance.   Cardiovascular:      Rate and Rhythm: Normal rate and regular rhythm.      Heart sounds: Normal heart sounds. No murmur heard.  Pulmonary:       Effort: Pulmonary effort is normal.      Breath sounds: Normal breath sounds.   Musculoskeletal:      Thoracic back: Tenderness (paravertebral) present. No bony tenderness. Decreased range of motion (pain with forward flexion and extension).      Lumbar back: No tenderness or bony tenderness. Normal range of motion.   Skin:     General: Skin is warm and dry.      Findings: No rash.   Neurological:      Mental Status: She is alert and oriented to person, place, and time.   Psychiatric:         Mood and Affect: Mood normal.         Behavior: Behavior normal.         Thought Content: Thought content normal.         Judgment: Judgment normal.

## 2024-03-10 LAB
ALBUMIN SERPL-MCNC: 4.5 G/DL (ref 3.8–4.8)
ALBUMIN/CREAT UR: <18 MG/G CREAT (ref 0–29)
ALBUMIN/GLOB SERPL: 1.6 {RATIO} (ref 1.2–2.2)
ALP SERPL-CCNC: 75 IU/L (ref 44–121)
ALT SERPL-CCNC: 33 IU/L (ref 0–32)
AST SERPL-CCNC: 28 IU/L (ref 0–40)
BILIRUB SERPL-MCNC: 0.2 MG/DL (ref 0–1.2)
BUN SERPL-MCNC: 14 MG/DL (ref 8–27)
BUN/CREAT SERPL: 19 (ref 12–28)
CALCIUM SERPL-MCNC: 9.9 MG/DL (ref 8.7–10.3)
CHLORIDE SERPL-SCNC: 96 MMOL/L (ref 96–106)
CHOLEST SERPL-MCNC: 210 MG/DL (ref 100–199)
CO2 SERPL-SCNC: 23 MMOL/L (ref 20–29)
CREAT SERPL-MCNC: 0.73 MG/DL (ref 0.57–1)
CREAT UR-MCNC: 17.1 MG/DL
EGFR: 85 ML/MIN/1.73
ERYTHROCYTE [DISTWIDTH] IN BLOOD BY AUTOMATED COUNT: 12.8 % (ref 11.7–15.4)
EST. AVERAGE GLUCOSE BLD GHB EST-MCNC: 194 MG/DL
GLOBULIN SER-MCNC: 2.8 G/DL (ref 1.5–4.5)
GLUCOSE SERPL-MCNC: 174 MG/DL (ref 70–99)
HBA1C MFR BLD: 8.4 % (ref 4.8–5.6)
HCT VFR BLD AUTO: 42.9 % (ref 34–46.6)
HDLC SERPL-MCNC: 33 MG/DL
HGB BLD-MCNC: 14.7 G/DL (ref 11.1–15.9)
LDLC SERPL CALC-MCNC: 113 MG/DL (ref 0–99)
LDLC/HDLC SERPL: 3.4 RATIO (ref 0–3.2)
MCH RBC QN AUTO: 31.7 PG (ref 26.6–33)
MCHC RBC AUTO-ENTMCNC: 34.3 G/DL (ref 31.5–35.7)
MCV RBC AUTO: 93 FL (ref 79–97)
MICROALBUMIN UR-MCNC: <3 UG/ML
PLATELET # BLD AUTO: 340 X10E3/UL (ref 150–450)
POTASSIUM SERPL-SCNC: 4.4 MMOL/L (ref 3.5–5.2)
PROT SERPL-MCNC: 7.3 G/DL (ref 6–8.5)
RBC # BLD AUTO: 4.63 X10E6/UL (ref 3.77–5.28)
SL AMB VLDL CHOLESTEROL CALC: 64 MG/DL (ref 5–40)
SODIUM SERPL-SCNC: 138 MMOL/L (ref 134–144)
TRIGL SERPL-MCNC: 366 MG/DL (ref 0–149)
TSH SERPL DL<=0.005 MIU/L-ACNC: 4.49 UIU/ML (ref 0.45–4.5)
WBC # BLD AUTO: 7.6 X10E3/UL (ref 3.4–10.8)

## 2024-03-13 ENCOUNTER — OFFICE VISIT (OUTPATIENT)
Dept: FAMILY MEDICINE CLINIC | Facility: HOSPITAL | Age: 79
End: 2024-03-13
Payer: COMMERCIAL

## 2024-03-13 VITALS
WEIGHT: 172 LBS | HEIGHT: 63 IN | DIASTOLIC BLOOD PRESSURE: 70 MMHG | BODY MASS INDEX: 30.48 KG/M2 | SYSTOLIC BLOOD PRESSURE: 132 MMHG | OXYGEN SATURATION: 96 % | HEART RATE: 84 BPM

## 2024-03-13 DIAGNOSIS — M25.541 ARTHRALGIA OF BOTH HANDS: ICD-10-CM

## 2024-03-13 DIAGNOSIS — M79.641 PAIN IN BOTH HANDS: ICD-10-CM

## 2024-03-13 DIAGNOSIS — M25.542 ARTHRALGIA OF BOTH HANDS: ICD-10-CM

## 2024-03-13 DIAGNOSIS — E11.65 TYPE 2 DIABETES MELLITUS WITH HYPERGLYCEMIA, WITHOUT LONG-TERM CURRENT USE OF INSULIN (HCC): ICD-10-CM

## 2024-03-13 DIAGNOSIS — M85.80 OSTEOPENIA, UNSPECIFIED LOCATION: ICD-10-CM

## 2024-03-13 DIAGNOSIS — M79.642 PAIN IN BOTH HANDS: ICD-10-CM

## 2024-03-13 DIAGNOSIS — M25.552 BILATERAL HIP PAIN: Primary | ICD-10-CM

## 2024-03-13 DIAGNOSIS — M25.551 BILATERAL HIP PAIN: Primary | ICD-10-CM

## 2024-03-13 PROCEDURE — 99214 OFFICE O/P EST MOD 30 MIN: CPT | Performed by: INTERNAL MEDICINE

## 2024-03-13 PROCEDURE — G2211 COMPLEX E/M VISIT ADD ON: HCPCS | Performed by: INTERNAL MEDICINE

## 2024-03-13 NOTE — ASSESSMENT & PLAN NOTE
I have asked her to check 3 x week in adm and 2 x week in pm to bring to her upcoming visit with Dr. Tan  Lab Results   Component Value Date    HGBA1C 8.4 (H) 03/09/2024

## 2024-03-13 NOTE — PROGRESS NOTES
Assessment/Plan:     Diagnosis ICD-10-CM Associated Orders   1. Bilateral hip pain  M25.551 XR hips bilateral 3-4 vw w pelvis if performed    M25.552 CBC and differential     Comprehensive metabolic panel     RF Screen w/ Reflex to Titer     C-reactive protein     ULISES w/Reflex     CBC and differential     Comprehensive metabolic panel     C-reactive protein     ULISES w/Reflex     Ambulatory referral to Physical Therapy     Diclofenac Sodium (VOLTAREN) 1 %      2. Osteopenia, unspecified location  M85.80       3. Pain in both hands  M79.641     M79.642       4. Arthralgia of both hands  M25.541 CBC and differential    M25.542 Comprehensive metabolic panel     RF Screen w/ Reflex to Titer     C-reactive protein     ULISES w/Reflex     CBC and differential     Comprehensive metabolic panel     C-reactive protein     ULISES w/Reflex     Ambulatory referral to Physical Therapy      5. Type 2 diabetes mellitus with hyperglycemia, without long-term current use of insulin (HCC)  E11.65           Problem List Items Addressed This Visit        Endocrine    Type 2 diabetes mellitus with hyperglycemia, without long-term current use of insulin (HCC)     I have asked her to check 3 x week in adm and 2 x week in pm to bring to her upcoming visit with Dr. Tan  Lab Results   Component Value Date    HGBA1C 8.4 (H) 03/09/2024             Musculoskeletal and Integument    Osteopenia   Other Visit Diagnoses     Bilateral hip pain    -  Primary    Relevant Medications    Diclofenac Sodium (VOLTAREN) 1 %    Other Relevant Orders    XR hips bilateral 3-4 vw w pelvis if performed    CBC and differential    Comprehensive metabolic panel    RF Screen w/ Reflex to Titer    C-reactive protein    ULISES w/Reflex    Ambulatory referral to Physical Therapy    Pain in both hands        Arthralgia of both hands        Relevant Orders    CBC and differential    Comprehensive metabolic panel    RF Screen w/ Reflex to Titer    C-reactive protein    ULISES  w/Reflex    Ambulatory referral to Physical Therapy            Return keep appt with Dr. shay next month.      Subjective:    Patient ID: Madeleine Jiménez is a 78 y.o. female    Having increased pain in  both hips in the past week- has had ongoing hand pain and some cervical stiffness-    No new activity or triggers   Paternal grandfather had  severe arthritis at younger age-? rheumatoid   Using cane for support  this week.   Has tried muscle relaxants but no help- tried tylenol- avoids nsaids due to gi sensitivity and renal issues        Hip Pain         The following portions of the patient's history were reviewed and updated as appropriate: allergies, current medications and problem list.     Review of Systems   Constitutional:  Negative for fatigue.   Musculoskeletal:  Positive for arthralgias and joint swelling.        Hand swelling distally   All other systems reviewed and are negative.        Objective:      Current Outpatient Medications:   •  Accu-Chek Guide test strip, Test twice daily, Disp: , Rfl:   •  Accu-Chek Softclix Lancets lancets, Test twice daily, Disp: , Rfl:   •  acetaminophen (TYLENOL) 500 mg tablet, Take 500 mg by mouth every 6 (six) hours as needed for mild pain, Disp: , Rfl:   •  amLODIPine (NORVASC) 10 mg tablet, Take 1 tablet (10 mg total) by mouth daily, Disp: 90 tablet, Rfl: 2  •  Blood Glucose Monitoring Suppl (Accu-Chek Guide Me) w/Device KIT, Test twice daily, Disp: , Rfl:   •  Cetirizine HCl 10 MG CAPS, daily as needed ZyrTEC Allergy 10 MG Oral Tablet TAKE 1 TABLET DAILY AS DIRECTED.  Quantity: 30;  Refills: 0    Lynnette Saucedo;  Started 12-May-2014 Active, Disp: , Rfl:   •  Cholecalciferol (VITAMIN D-3) 1000 UNITS CAPS, Vitamin D3 1000 UNIT Oral Tablet TAKE 1 TABLET DAILY.  Quantity: 30;  Refills: 0    Xochitl Shay DO;  Started 25-Sep-2012 Active, Disp: , Rfl:   •  cyclobenzaprine (FLEXERIL) 10 mg tablet, Take 1 tablet (10 mg total) by mouth 3 (three) times a day as  "needed for muscle spasms, Disp: 30 tablet, Rfl: 0  •  Diclofenac Sodium (VOLTAREN) 1 %, Apply 2 g topically 4 (four) times a day, Disp: 100 g, Rfl: 2  •  glucose blood (OneTouch Verio) test strip, Check blood sugars once daily. Please substitute with appropriate alternative as covered by patient's insurance. Dx: E11.65, Disp: 100 each, Rfl: 3  •  levothyroxine 75 mcg tablet, Take 1 tablet (75 mcg total) by mouth every morning, Disp: 90 tablet, Rfl: 2  •  lisinopril (ZESTRIL) 40 mg tablet, Take 1.5 tablets (60 mg total) by mouth daily, Disp: 135 tablet, Rfl: 2  •  metFORMIN (GLUCOPHAGE) 1000 MG tablet, Take 1 tablet (1,000 mg total) by mouth 2 (two) times a day with meals, Disp: 180 tablet, Rfl: 2  •  metoprolol succinate (TOPROL-XL) 100 mg 24 hr tablet, Take 1.5 tablets (150 mg total) by mouth daily, Disp: 135 tablet, Rfl: 2  •  Multiple Vitamins-Minerals (CENTRUM ADULTS PO), Centrum Oral Tablet TAKE 1 TABLET DAILY.  Refills: 0    Xochitl Tan DO;  Started 25-Sep-2012 Active, Disp: , Rfl:   •  OneTouch Delica Lancets 33G MISC, Check blood sugars once daily. Please substitute with appropriate alternative as covered by patient's insurance. Dx: E11.65, Disp: 100 each, Rfl: 3  •  sitaGLIPtin (Januvia) 100 mg tablet, Take 1 tablet (100 mg total) by mouth daily, Disp: 30 tablet, Rfl: 5  •  clobetasol (TEMOVATE) 0.05 % ointment, apply to affected area sparingly twice a day for 4 weeks then onc...  (REFER TO PRESCRIPTION NOTES). (Patient not taking: Reported on 1/16/2023), Disp: , Rfl:   •  meloxicam (MOBIC) 7.5 mg tablet, Take 1 tablet (7.5 mg total) by mouth daily (Patient not taking: Reported on 2/26/2024), Disp: 60 tablet, Rfl: 0  •  pantoprazole (PROTONIX) 40 mg tablet, Take 1 tablet (40 mg total) by mouth daily as needed (reflux) (Patient not taking: Reported on 2/26/2024), Disp: 30 tablet, Rfl: 12    Blood pressure 132/70, pulse 84, height 5' 3\" (1.6 m), weight 78 kg (172 lb), SpO2 96%.     Physical Exam  Vitals " and nursing note reviewed.   Constitutional:       General: She is not in acute distress.  HENT:      Head: Normocephalic.      Mouth/Throat:      Pharynx: No oropharyngeal exudate or posterior oropharyngeal erythema.   Eyes:      General:         Right eye: No discharge.         Left eye: No discharge.   Cardiovascular:      Rate and Rhythm: Normal rate and regular rhythm.      Heart sounds: No murmur heard.  Pulmonary:      Breath sounds: No wheezing or rhonchi.   Abdominal:      Tenderness: There is no abdominal tenderness.   Musculoskeletal:         General: Tenderness present.      Cervical back: No tenderness.      Comments: Right lateral hip tenderness   Hip tenderness with some crepitance with lateral  movement   Skin:     Findings: No erythema.   Psychiatric:         Mood and Affect: Mood normal.         Thought Content: Thought content normal.         Judgment: Judgment normal.

## 2024-03-14 ENCOUNTER — HOSPITAL ENCOUNTER (OUTPATIENT)
Dept: RADIOLOGY | Facility: HOSPITAL | Age: 79
End: 2024-03-14
Payer: COMMERCIAL

## 2024-03-14 DIAGNOSIS — M25.552 BILATERAL HIP PAIN: ICD-10-CM

## 2024-03-14 DIAGNOSIS — M25.551 BILATERAL HIP PAIN: ICD-10-CM

## 2024-03-14 PROCEDURE — 73522 X-RAY EXAM HIPS BI 3-4 VIEWS: CPT

## 2024-03-15 LAB
ALBUMIN SERPL-MCNC: 4.6 G/DL (ref 3.8–4.8)
ALBUMIN/GLOB SERPL: 1.8 {RATIO} (ref 1.2–2.2)
ALP SERPL-CCNC: 74 IU/L (ref 44–121)
ALT SERPL-CCNC: 42 IU/L (ref 0–32)
ANA SER QL: NEGATIVE
AST SERPL-CCNC: 33 IU/L (ref 0–40)
BASOPHILS # BLD AUTO: 0.1 X10E3/UL (ref 0–0.2)
BASOPHILS NFR BLD AUTO: 1 %
BILIRUB SERPL-MCNC: 0.4 MG/DL (ref 0–1.2)
BUN SERPL-MCNC: 10 MG/DL (ref 8–27)
BUN/CREAT SERPL: 13 (ref 12–28)
CALCIUM SERPL-MCNC: 10.1 MG/DL (ref 8.7–10.3)
CHLORIDE SERPL-SCNC: 95 MMOL/L (ref 96–106)
CO2 SERPL-SCNC: 25 MMOL/L (ref 20–29)
CREAT SERPL-MCNC: 0.8 MG/DL (ref 0.57–1)
CRP SERPL-MCNC: 5 MG/L (ref 0–10)
EGFR: 76 ML/MIN/1.73
EOSINOPHIL # BLD AUTO: 0.6 X10E3/UL (ref 0–0.4)
EOSINOPHIL NFR BLD AUTO: 8 %
ERYTHROCYTE [DISTWIDTH] IN BLOOD BY AUTOMATED COUNT: 12.3 % (ref 11.7–15.4)
GLOBULIN SER-MCNC: 2.6 G/DL (ref 1.5–4.5)
GLUCOSE SERPL-MCNC: 195 MG/DL (ref 70–99)
HCT VFR BLD AUTO: 44.1 % (ref 34–46.6)
HGB BLD-MCNC: 14.8 G/DL (ref 11.1–15.9)
IMM GRANULOCYTES # BLD: 0 X10E3/UL (ref 0–0.1)
IMM GRANULOCYTES NFR BLD: 0 %
LYMPHOCYTES # BLD AUTO: 3 X10E3/UL (ref 0.7–3.1)
LYMPHOCYTES NFR BLD AUTO: 37 %
MCH RBC QN AUTO: 30.9 PG (ref 26.6–33)
MCHC RBC AUTO-ENTMCNC: 33.6 G/DL (ref 31.5–35.7)
MCV RBC AUTO: 92 FL (ref 79–97)
MONOCYTES # BLD AUTO: 0.6 X10E3/UL (ref 0.1–0.9)
MONOCYTES NFR BLD AUTO: 7 %
NEUTROPHILS # BLD AUTO: 3.8 X10E3/UL (ref 1.4–7)
NEUTROPHILS NFR BLD AUTO: 47 %
PLATELET # BLD AUTO: 374 X10E3/UL (ref 150–450)
POTASSIUM SERPL-SCNC: 4.7 MMOL/L (ref 3.5–5.2)
PROT SERPL-MCNC: 7.2 G/DL (ref 6–8.5)
RBC # BLD AUTO: 4.79 X10E6/UL (ref 3.77–5.28)
RHEUMATOID FACT SERPL-ACNC: <10 IU/ML
SODIUM SERPL-SCNC: 136 MMOL/L (ref 134–144)
WBC # BLD AUTO: 8.1 X10E3/UL (ref 3.4–10.8)

## 2024-03-25 ENCOUNTER — EVALUATION (OUTPATIENT)
Dept: PHYSICAL THERAPY | Facility: CLINIC | Age: 79
End: 2024-03-25
Payer: COMMERCIAL

## 2024-03-25 DIAGNOSIS — M54.16 LUMBAR RADICULOPATHY: Primary | ICD-10-CM

## 2024-03-25 DIAGNOSIS — M25.541 ARTHRALGIA OF BOTH HANDS: ICD-10-CM

## 2024-03-25 DIAGNOSIS — M25.551 BILATERAL HIP PAIN: ICD-10-CM

## 2024-03-25 DIAGNOSIS — M25.542 ARTHRALGIA OF BOTH HANDS: ICD-10-CM

## 2024-03-25 DIAGNOSIS — M25.552 BILATERAL HIP PAIN: ICD-10-CM

## 2024-03-25 PROCEDURE — 97161 PT EVAL LOW COMPLEX 20 MIN: CPT | Performed by: PHYSICAL THERAPIST

## 2024-03-25 PROCEDURE — 97140 MANUAL THERAPY 1/> REGIONS: CPT | Performed by: PHYSICAL THERAPIST

## 2024-03-25 NOTE — PROGRESS NOTES
PT Evaluation     Today's date: 3/25/2024  Patient name: Madeleine Jiménez  : 1945  MRN: [id number]  Referring provider: Reina Chang DO  Dx:   Encounter Diagnosis     ICD-10-CM    1. Lumbar radiculopathy  M54.16       2. Bilateral hip pain  M25.551 Ambulatory referral to Physical Therapy    M25.552       3. Arthralgia of both hands  M25.541 Ambulatory referral to Physical Therapy    M25.542           Start Time: 0900  Stop Time: 09  Total time in clinic (min): 45 minutes    Assessment  Assessment details: Problem List:  1) lumbar ext mobility deficits  2) neurodynamic mobility deficits  3) bilateral hip weakness all planes  4) bilateral knee weakness all planes    Madeleine Jiménez is a pleasant 78 y.o. female who presents with R hip pain with radiation down the lateral leg to her ankle of onset 2 months ago after having a cold with aggressive coughing.  she has lumbar ext mobility deficits, neurodynamic mobility deficits, and bilateral hip and knee weakness all planes resulting in decreased walking tolerance and less participation in recreational activities. Radiographic evidence shows good joint space within both hips. Her pain is likely a component of lumbar radiculopathy.  No further referral appears necessary at this time based upon examination results.  I expect she will improve moderately within 6-8 weeks of physical therapy and home program.        Comparable signs:  1) walking >20ft  2) floor transfers  Impairments: abnormal muscle firing, abnormal or restricted ROM, activity intolerance, impaired balance, impaired physical strength, lacks appropriate home exercise program, pain with function and poor body mechanics  Barriers to therapy: n/a  Understanding of Dx/Px/POC: excellent  Goals  ST.  Independent with HEP in 2 weeks  2. Decrease pain by 50% in 3 wks  3. Able to ambulate for household distances without pain in 3 weeks    LT. Achieve FOTO score of 64/100 in 6 weeks   2.  Able to ascend  "stairs with alternate gait confidently in 6 weeks  3.  Strength = 5/5 hip flexion bilat in 6 weeks      Plan  Plan details: RE in 6 weeks.  Patient would benefit from: skilled physical therapy  Planned modality interventions: cryotherapy, electrical stimulation/Russian stimulation and thermotherapy: hydrocollator packs  Planned therapy interventions: abdominal trunk stabilization, manual therapy, neuromuscular re-education, therapeutic activities, therapeutic exercise, body mechanics training and home exercise program  Frequency: 2x week  Duration in visits: 12  Duration in weeks: 6  Plan of Care beginning date: 3/25/2024  Plan of Care expiration date: 5/10/2024  Treatment plan discussed with: patient        Subjective Evaluation    History of Present Illness  Mechanism of injury: Pt reports to PT with bilateral hip pain. A month ago, pt reports she had a very bad cold with an intense cough. The coughing aggravated her low back (midline, then traveled left and right). The back symptoms improved, but then both hips began to bother her. The left hip pain has resolved, but the R hip pain persists. She has tried heat pad, hot shower, rest and flexeril. Symptoms are slowly improving since onset.    Recent hip XR shows \"No acute osseous abnormality of either hip, considering patient's age.  Degenerative changes of the visualized lower lumbar spine.\"    Pain is located R buttock with radiating pain down the lateral leg to the lateral malleolus. She is also having new cramping to the medial calf. Described as sharp and aching. Worse in the morning with stiffness and pain, eases around noon, starts to re-aggravate by the evening. Rated 1/10 currently, 9/10 at worst, 0/10 at best. Agg with ascending and descending the stairs (ascending is worse; unilat railing use, step-to pattern), walking >20ft, sitting >60 mins on a couch. She is able to sit in a stiff rocking chair for >60 mins without pain. Pain is eased with tylenol, " heat, sitting upright with a pillow behind her back. Denies N/T, weakness, balance or gait dysfunction, saddle anesthesia, recent falls or unsteadiness, sleep disturbance (sleeps sidelying). She has had some constipation within the past 2 weeks, may be secondary to the flexeril. She's been using a SPC recently for balance assist.     She lives at home in a multi-level home with her . She spends her time doing small housework, reading, watching TV. She also enjoys gardening and is worried that her hip pain will affect this when the weather gets nicer. Independent with ADLs. She does not drive much anymore.      Main concern: she might fall   Main goal: kneeling to garden, better going up and down the stairs    Patient Goals  Patient goals for therapy: decreased edema, decreased pain, improved balance, increased motion, return to work, return to sport/leisure activities, independence with ADLs/IADLs and increased strength          Objective     Tenderness     Additional Tenderness Details  TTP R glute min, med, max, piri, ITB, QL, paraspinals, multifidus    (-) directional pref  (+) bilateral slump R>L  (+) R active SLR (45 deg)  (-) L active SLR (80 deg)  (+) bilat PAULY  (+) bilat FADDIR    Neurological Testing     Sensation     Lumbar   Left   Intact: light touch    Right   Intact: light touch    Reflexes   Left   Patellar (L4): normal (2+)  Achilles (S1): normal (2+)    Right   Patellar (L4): normal (2+)  Achilles (S1): normal (2+)    Active Range of Motion     Lumbar   Flexion:  WFL and with pain  Extension:  with pain Restriction level: maximal    Joint Play     Hypomobile: T8, T9, T10, T11, T12, L1, L2, L3, L4, L5 and S1     Pain: L3, L4 and L5   Mechanical Assessment    Cervical      Thoracic      Lumbar    Standing flexion: repeated movements   Pain location:peripheralized  Pain intensity: worse  Pain level: produced  Standing extension: repeated movements  Pain location: no change  Pain intensity:  worse  Pain level: produced  Lying extension: sustained positions  Pain intensity: better    Strength/Myotome Testing     Left Hip   Planes of Motion   Flexion: 4+  Extension: 4+  Abduction: 4+  Adduction: 4+  External rotation: 4+  Internal rotation: 4+    Right Hip   Planes of Motion   Flexion: 4+  Extension: 4+  Abduction: 4+  Adduction: 4+  External rotation: 4+  Internal rotation: 4+    Left Knee   Flexion: 4+  Extension: 4+    Right Knee   Flexion: 4+  Extension: 4+    Left Ankle/Foot   Dorsiflexion: 5    Right Ankle/Foot   Dorsiflexion: 5    Functional Assessment        Comments  Gait: gait pattern WFL, R lateral hip pain within 20 feet walking  Standing to floor transfer: bilateral assist with table onto knees  Floor to standing transfer: 1/2 kneeling then bilateral assist with table to standing      Flowsheet Rows      Flowsheet Row Most Recent Value   PT/OT G-Codes    Current Score 47   Projected Score 64               Dx: low back pain with mobility deficits; R sided leg pain  EPOC: 5/10  F/u with referrin/16 with Casae  Precautions: n/a  Comorbidities: h/o DM2, thyroid disease, HTN  Main concerns: she might fall   Main goals: kneeling to garden, better going up and down the stairs  FOTO goal: 64 in 13  FOTO progress: 47 on 3/25      HEP:  Access Code: [email or web address]  URL: [email or web address]  Date: 2024  Prepared by: Lauro Tejeda    Exercises  - Lying Prone  - 2 min hold        Manuals 3/25            R glute, lumbar STM In L sidelying            Lumbar central PA mob L3-5 grade 1                                      Neuro Re-Ed                                                                                                        Ther Ex             Prone lying  2'            Prone press up             Sciatic glide             3way hip             Step up fwd             Knee ext machine             HSC machine             Floor transfer training             Stair training                                        Ther Activity                                       Gait Training                                       Modalities

## 2024-04-01 ENCOUNTER — OFFICE VISIT (OUTPATIENT)
Dept: PHYSICAL THERAPY | Facility: CLINIC | Age: 79
End: 2024-04-01
Payer: COMMERCIAL

## 2024-04-01 DIAGNOSIS — M54.16 LUMBAR RADICULOPATHY: ICD-10-CM

## 2024-04-01 DIAGNOSIS — M25.551 BILATERAL HIP PAIN: Primary | ICD-10-CM

## 2024-04-01 DIAGNOSIS — M25.552 BILATERAL HIP PAIN: Primary | ICD-10-CM

## 2024-04-01 PROCEDURE — 97110 THERAPEUTIC EXERCISES: CPT | Performed by: PHYSICAL THERAPIST

## 2024-04-01 PROCEDURE — 97140 MANUAL THERAPY 1/> REGIONS: CPT | Performed by: PHYSICAL THERAPIST

## 2024-04-01 NOTE — PROGRESS NOTES
Daily Note     Today's date: 2024  Patient name: Madeleine Jiménez  : 1945  MRN: [id number]  Referring provider: Reina Chang DO  Dx:   Encounter Diagnosis     ICD-10-CM    1. Bilateral hip pain  M25.551     M25.552       2. Lumbar radiculopathy  M54.16           Start Time: 1403          Subjective: 6/10 L lumbar pain today. Pain has been on and off, no major change with doing the prone lying which she did for 2 mins, three times a day.      Objective: See treatment diary below      Assessment: Pain decreased to 3/10 within 3 minutes of lying prone at the beginning of her session. Her pain returned as soon as she transitioned to standing via trunk twisting and flexing. Lumbar extension initially stiff and painful, but then loosened and the pain eased by repetition 50. L slump test positive with ankle DF recreating her pain. No pain with lumbar segmental testing today, she was able to tolerate grade 4 mobilizations without issue. HEP updated to include standing lumbar extension at a countertop for support followed by seated sciatic glides. Tolerated treatment well. Patient demonstrated fatigue post treatment and would benefit from continued PT      Plan: Continue per plan of care.      Dx: low back pain with mobility deficits; R sided leg pain  EPOC: 5/10  F/u with referrin/16 with Britney  Precautions: n/a  Comorbidities: h/o DM2, thyroid disease, HTN  Main concerns: she might fall   Main goals: kneeling to garden, better going up and down the stairs  FOTO goal: 64 in 13  FOTO progress: 47 on 3/25      HEP:  Access Code: [email or web address]  URL: [email or web address]  Date: 2024  Prepared by: Lauro Tejeda    Exercises  - Standing Lumbar Extension with Counter  - 20 reps  - Seated Sciatic Tensioner  - 20 reps      Manuals 3/25 4/1           R glute, lumbar STM In L sidelying In prone           Lumbar central PA mob L3-5 grade 1 L3-5 grade 4                                     Neuro Re-Ed                                                                                                         Ther Ex             Prone lying  2' 3'           Prone press up             Standing lumbar ext  Against table 5x10           Sciatic glide  Seated x20 ea           Prone glute set  x20           PHE  X10 ea           3way hip             Step up fwd             Knee ext machine             HSC machine             Floor transfer training             Stair training                                       Ther Activity                                       Gait Training                                       Modalities

## 2024-04-08 ENCOUNTER — APPOINTMENT (OUTPATIENT)
Dept: PHYSICAL THERAPY | Facility: CLINIC | Age: 79
End: 2024-04-08
Payer: COMMERCIAL

## 2024-04-12 ENCOUNTER — OFFICE VISIT (OUTPATIENT)
Dept: PHYSICAL THERAPY | Facility: CLINIC | Age: 79
End: 2024-04-12
Payer: COMMERCIAL

## 2024-04-12 DIAGNOSIS — M54.16 LUMBAR RADICULOPATHY: ICD-10-CM

## 2024-04-12 DIAGNOSIS — M25.552 BILATERAL HIP PAIN: Primary | ICD-10-CM

## 2024-04-12 DIAGNOSIS — M25.551 BILATERAL HIP PAIN: Primary | ICD-10-CM

## 2024-04-12 DIAGNOSIS — M25.541 ARTHRALGIA OF BOTH HANDS: ICD-10-CM

## 2024-04-12 DIAGNOSIS — M25.542 ARTHRALGIA OF BOTH HANDS: ICD-10-CM

## 2024-04-12 PROCEDURE — 97140 MANUAL THERAPY 1/> REGIONS: CPT | Performed by: PHYSICAL THERAPIST

## 2024-04-12 PROCEDURE — 97110 THERAPEUTIC EXERCISES: CPT | Performed by: PHYSICAL THERAPIST

## 2024-04-12 NOTE — PROGRESS NOTES
"Daily Note     Today's date: 2024  Patient name: Madeleine Jiménez  : 1945  MRN: [id number]  Referring provider: Reina Chang DO  Dx:   Encounter Diagnosis     ICD-10-CM    1. Bilateral hip pain  M25.551     M25.552       2. Lumbar radiculopathy  M54.16       3. Arthralgia of both hands  M25.541     M25.542           Start Time: 1303          Subjective: Feeling \"creeky\" in the L hip with the weather today. Able to get out of a chair without feeling like she will scream. She's doing the seated sciatic glides several times a day. She is having cramping in the L calf. Avg pain has been 4/10.      Objective: See treatment diary below      Assessment: Appropriately challenged with no hand assist STS and kneeling to standing from a 6in step +1 airex pad to simulate kneeling while gardening. She notes discomfort at the L5-S1 region with PA mobs. Favorable to seated sciatic glides. Tolerated treatment well. Patient demonstrated fatigue post treatment and would benefit from continued PT      Plan: Continue per plan of care.      Dx: low back pain with mobility deficits; R sided leg pain  EPOC: 5/10  F/u with referrin/16 with Britney  Precautions: n/a  Comorbidities: h/o DM2, thyroid disease, HTN  Main concerns: she might fall   Main goals: kneeling to garden, better going up and down the stairs  FOTO goal: 64 in 13  FOTO progress: 47 on 3/25      HEP:  Access Code: [email or web address]  URL: [email or web address]  Date: 2024  Prepared by: Lauro Tejeda    Exercises  - Standing Lumbar Extension with Counter  - 20 reps  - Seated Sciatic Tensioner  - 20 reps  - Sit to Stand Without Arm Support  - 20 reps  - Standing Hip Abduction with Counter Support  - 20 reps      Manuals 3/25 4/1 4/12          R glute, lumbar STM In L sidelying In prone           Lumbar central PA mob L3-5 grade 1 L3-5 grade 4 L3-5 grade 4                                    Neuro Re-Ed                                                  "                                                       Ther Ex             TM   0.7 mph 5'          Prone lying  2' 3'           Prone press up             Standing lumbar ext  Against table 5x10 Against table 10          Sciatic glide  Seated x20 ea Seated x20 ea          Prone glute set  x20           PHE  X10 ea X10 ea          STS   No UE 2x10          3way hip standing   X20 ea          Kneeling to standing   6in step +1 airex x10          Step up fwd             Knee ext machine             HSC machine             Floor transfer training             Stair training                                       Ther Activity                                       Gait Training                                       Modalities

## 2024-04-15 ENCOUNTER — OFFICE VISIT (OUTPATIENT)
Dept: PHYSICAL THERAPY | Facility: CLINIC | Age: 79
End: 2024-04-15
Payer: COMMERCIAL

## 2024-04-15 DIAGNOSIS — M25.551 BILATERAL HIP PAIN: Primary | ICD-10-CM

## 2024-04-15 DIAGNOSIS — M25.541 ARTHRALGIA OF BOTH HANDS: ICD-10-CM

## 2024-04-15 DIAGNOSIS — M25.542 ARTHRALGIA OF BOTH HANDS: ICD-10-CM

## 2024-04-15 DIAGNOSIS — M54.16 LUMBAR RADICULOPATHY: ICD-10-CM

## 2024-04-15 DIAGNOSIS — M25.552 BILATERAL HIP PAIN: Primary | ICD-10-CM

## 2024-04-15 PROCEDURE — 97110 THERAPEUTIC EXERCISES: CPT

## 2024-04-15 NOTE — PROGRESS NOTES
Daily Note     Today's date: 4/15/2024  Patient name: Madeleine Jiménez  : 1945  MRN: [id number]  Referring provider: Reina Chang DO  Dx:   Encounter Diagnosis     ICD-10-CM    1. Bilateral hip pain  M25.551     M25.552       2. Lumbar radiculopathy  M54.16       3. Arthralgia of both hands  M25.541     M25.542           Start Time: 1152  Stop Time: 1230  Total time in clinic (min): 38 minutes    Subjective: Patient reports that she has achiness today but denies any complaints of pain. She reports that she did some gardening this morning without issue.       Objective: See treatment diary below      Assessment: Increased speed and time on TM as indicated below with good tolerance although patient did require cues for patterning. Added lunges to address strength deficits and modified PHE to edge of table. Patient demonstrates good understanding of program and good posturing throughout. She was able to tolerate kneeling to standing from step without issue, although is challenged. Tolerated treatment well. Patient demonstrated fatigue post treatment, exhibited good technique with therapeutic exercises, and would benefit from continued PT      Plan: Continue per plan of care.  Progress treatment as tolerated.       Dx: low back pain with mobility deficits; R sided leg pain  EPOC: 5/10  F/u with referrin/16 with Britney  Precautions: n/a  Comorbidities: h/o DM2, thyroid disease, HTN  Main concerns: she might fall   Main goals: kneeling to garden, better going up and down the stairs  FOTO goal: 64 in 13  FOTO progress: 47 on 3/25      HEP:  Access Code: [email or web address]  URL: [email or web address]  Date: 2024  Prepared by: Lauro Tejeda    Exercises  - Standing Lumbar Extension with Counter  - 20 reps  - Seated Sciatic Tensioner  - 20 reps  - Sit to Stand Without Arm Support  - 20 reps  - Standing Hip Abduction with Counter Support  - 20 reps      Manuals 3/25 4/1 4/12 4/15         R glute, lumbar  "STM In L sidelying In prone           Lumbar central PA mob L3-5 grade 1 L3-5 grade 4 L3-5 grade 4 NP                                   Neuro Re-Ed                                                                                                        Ther Ex             TM   0.7 mph 5' 0.7-0.8 mph 6'         Prone lying  2' 3'           Prone press up             Standing lumbar ext  Against table 5x10 Against table 10 Against table 5\"x10         Sciatic glide  Seated x20 ea Seated x20 ea Seated x20 ea         Prone glute set  x20           PHE  X10 ea X10 ea On edge of table 3\" x10 ea         STS   No UE 2x10 No UE 2x10         3way hip standing   X20 ea X20 ea         Kneeling to standing   6in step +1 airex x10 6in step +1 airex x10         Step up fwd             Knee ext machine             HSC machine             Floor transfer training             Stair training             Forward Lunges    X10 ea                      Ther Activity                                       Gait Training                                       Modalities                                              "

## 2024-04-16 ENCOUNTER — OFFICE VISIT (OUTPATIENT)
Dept: FAMILY MEDICINE CLINIC | Facility: HOSPITAL | Age: 79
End: 2024-04-16
Payer: COMMERCIAL

## 2024-04-16 VITALS
HEART RATE: 77 BPM | HEIGHT: 63 IN | OXYGEN SATURATION: 97 % | DIASTOLIC BLOOD PRESSURE: 58 MMHG | BODY MASS INDEX: 30.58 KG/M2 | TEMPERATURE: 97.7 F | WEIGHT: 172.6 LBS | SYSTOLIC BLOOD PRESSURE: 122 MMHG

## 2024-04-16 DIAGNOSIS — E11.65 UNCONTROLLED TYPE 2 DIABETES MELLITUS WITH HYPERGLYCEMIA (HCC): ICD-10-CM

## 2024-04-16 DIAGNOSIS — E03.9 HYPOTHYROIDISM, UNSPECIFIED TYPE: ICD-10-CM

## 2024-04-16 DIAGNOSIS — E66.9 OBESITY (BMI 30.0-34.9): ICD-10-CM

## 2024-04-16 DIAGNOSIS — Z00.00 MEDICARE ANNUAL WELLNESS VISIT, SUBSEQUENT: ICD-10-CM

## 2024-04-16 DIAGNOSIS — E11.65 TYPE 2 DIABETES MELLITUS WITH HYPERGLYCEMIA, WITHOUT LONG-TERM CURRENT USE OF INSULIN (HCC): Primary | ICD-10-CM

## 2024-04-16 DIAGNOSIS — R74.01 TRANSAMINITIS: ICD-10-CM

## 2024-04-16 DIAGNOSIS — E78.5 DYSLIPIDEMIA: ICD-10-CM

## 2024-04-16 DIAGNOSIS — R79.89 ELEVATED TSH: ICD-10-CM

## 2024-04-16 DIAGNOSIS — M54.6 ACUTE RIGHT-SIDED THORACIC BACK PAIN: ICD-10-CM

## 2024-04-16 DIAGNOSIS — Z12.31 ENCOUNTER FOR SCREENING MAMMOGRAM FOR MALIGNANT NEOPLASM OF BREAST: ICD-10-CM

## 2024-04-16 DIAGNOSIS — I10 ESSENTIAL HYPERTENSION: ICD-10-CM

## 2024-04-16 PROBLEM — K25.3 ACUTE GASTRIC ULCER WITHOUT HEMORRHAGE OR PERFORATION: Status: RESOLVED | Noted: 2021-06-25 | Resolved: 2024-04-16

## 2024-04-16 PROCEDURE — 99214 OFFICE O/P EST MOD 30 MIN: CPT | Performed by: INTERNAL MEDICINE

## 2024-04-16 PROCEDURE — G0439 PPPS, SUBSEQ VISIT: HCPCS | Performed by: INTERNAL MEDICINE

## 2024-04-16 RX ORDER — GLIPIZIDE 5 MG/1
5 TABLET ORAL
Qty: 90 TABLET | Refills: 1 | Status: SHIPPED | OUTPATIENT
Start: 2024-04-16

## 2024-04-16 RX ORDER — BLOOD SUGAR DIAGNOSTIC
STRIP MISCELLANEOUS
Qty: 200 EACH | Refills: 3 | Status: SHIPPED | OUTPATIENT
Start: 2024-04-16

## 2024-04-16 RX ORDER — AMLODIPINE BESYLATE 10 MG/1
10 TABLET ORAL DAILY
Qty: 90 TABLET | Refills: 2 | Status: SHIPPED | OUTPATIENT
Start: 2024-04-16

## 2024-04-16 RX ORDER — LEVOTHYROXINE SODIUM 0.07 MG/1
75 TABLET ORAL EVERY MORNING
Qty: 90 TABLET | Refills: 2 | Status: SHIPPED | OUTPATIENT
Start: 2024-04-16

## 2024-04-16 RX ORDER — LISINOPRIL 40 MG/1
60 TABLET ORAL DAILY
Qty: 135 TABLET | Refills: 2 | Status: SHIPPED | OUTPATIENT
Start: 2024-04-16

## 2024-04-16 RX ORDER — METOPROLOL SUCCINATE 100 MG/1
150 TABLET, EXTENDED RELEASE ORAL DAILY
Qty: 135 TABLET | Refills: 2 | Status: SHIPPED | OUTPATIENT
Start: 2024-04-16

## 2024-04-16 RX ORDER — LANCETS 33 GAUGE
EACH MISCELLANEOUS
Qty: 200 EACH | Refills: 3 | Status: SHIPPED | OUTPATIENT
Start: 2024-04-16

## 2024-04-16 NOTE — ASSESSMENT & PLAN NOTE
Much improved with PT, currently using Tylenol as needed, Xray results reviewed with pt, encouraged HEP, call with new/worse symptoms

## 2024-04-16 NOTE — PATIENT INSTRUCTIONS
Medicare Preventive Visit Patient Instructions  Thank you for completing your Welcome to Medicare Visit or Medicare Annual Wellness Visit today. Your next wellness visit will be due in one year (4/17/2025).  The screening/preventive services that you may require over the next 5-10 years are detailed below. Some tests may not apply to you based off risk factors and/or age. Screening tests ordered at today's visit but not completed yet may show as past due. Also, please note that scanned in results may not display below.  Preventive Screenings:  Service Recommendations Previous Testing/Comments   Colorectal Cancer Screening  * Colonoscopy    * Fecal Occult Blood Test (FOBT)/Fecal Immunochemical Test (FIT)  * Fecal DNA/Cologuard Test  * Flexible Sigmoidoscopy Age: 45-75 years old   Colonoscopy: every 10 years (may be performed more frequently if at higher risk)  OR  FOBT/FIT: every 1 year  OR  Cologuard: every 3 years  OR  Sigmoidoscopy: every 5 years  Screening may be recommended earlier than age 45 if at higher risk for colorectal cancer. Also, an individualized decision between you and your healthcare provider will decide whether screening between the ages of 76-85 would be appropriate. Colonoscopy: 04/06/2021  FOBT/FIT: Not on file  Cologuard: Not on file  Sigmoidoscopy: Not on file          Breast Cancer Screening Age: 40+ years old  Frequency: every 1-2 years  Not required if history of left and right mastectomy Mammogram: 08/31/2023    Screening Current   Cervical Cancer Screening Between the ages of 21-29, pap smear recommended once every 3 years.   Between the ages of 30-65, can perform pap smear with HPV co-testing every 5 years.   Recommendations may differ for women with a history of total hysterectomy, cervical cancer, or abnormal pap smears in past. Pap Smear: Not on file    Screening Not Indicated   Hepatitis C Screening Once for adults born between 1945 and 1965  More frequently in patients at high risk  for Hepatitis C Hep C Antibody: 07/15/2023    Screening Current   Diabetes Screening 1-2 times per year if you're at risk for diabetes or have pre-diabetes Fasting glucose: No results in last 5 years (No results in last 5 years)  A1C: 8.4 % (3/9/2024)  Screening Not Indicated  History Diabetes   Cholesterol Screening Once every 5 years if you don't have a lipid disorder. May order more often based on risk factors. Lipid panel: 03/09/2024    Screening Current     Other Preventive Screenings Covered by Medicare:  Abdominal Aortic Aneurysm (AAA) Screening: covered once if your at risk. You're considered to be at risk if you have a family history of AAA.  Lung Cancer Screening: covers low dose CT scan once per year if you meet all of the following conditions: (1) Age 55-77; (2) No signs or symptoms of lung cancer; (3) Current smoker or have quit smoking within the last 15 years; (4) You have a tobacco smoking history of at least 20 pack years (packs per day multiplied by number of years you smoked); (5) You get a written order from a healthcare provider.  Glaucoma Screening: covered annually if you're considered high risk: (1) You have diabetes OR (2) Family history of glaucoma OR (3)  aged 50 and older OR (4)  American aged 65 and older  Osteoporosis Screening: covered every 2 years if you meet one of the following conditions: (1) You're estrogen deficient and at risk for osteoporosis based off medical history and other findings; (2) Have a vertebral abnormality; (3) On glucocorticoid therapy for more than 3 months; (4) Have primary hyperparathyroidism; (5) On osteoporosis medications and need to assess response to drug therapy.   Last bone density test (DXA Scan): 08/31/2023.  HIV Screening: covered annually if you're between the age of 15-65. Also covered annually if you are younger than 15 and older than 65 with risk factors for HIV infection. For pregnant patients, it is covered up to 3  times per pregnancy.    Immunizations:  Immunization Recommendations   Influenza Vaccine Annual influenza vaccination during flu season is recommended for all persons aged >= 6 months who do not have contraindications   Pneumococcal Vaccine   * Pneumococcal conjugate vaccine = PCV13 (Prevnar 13), PCV15 (Vaxneuvance), PCV20 (Prevnar 20)  * Pneumococcal polysaccharide vaccine = PPSV23 (Pneumovax) Adults 19-65 yo with certain risk factors or if 65+ yo  If never received any pneumonia vaccine: recommend Prevnar 20 (PCV20)  Give PCV20 if previously received 1 dose of PCV13 or PPSV23   Hepatitis B Vaccine 3 dose series if at intermediate or high risk (ex: diabetes, end stage renal disease, liver disease)   Respiratory syncytial virus (RSV) Vaccine - COVERED BY MEDICARE PART D  * RSVPreF3 (Arexvy) CDC recommends that adults 60 years of age and older may receive a single dose of RSV vaccine using shared clinical decision-making (SCDM)   Tetanus (Td) Vaccine - COST NOT COVERED BY MEDICARE PART B Following completion of primary series, a booster dose should be given every 10 years to maintain immunity against tetanus. Td may also be given as tetanus wound prophylaxis.   Tdap Vaccine - COST NOT COVERED BY MEDICARE PART B Recommended at least once for all adults. For pregnant patients, recommended with each pregnancy.   Shingles Vaccine (Shingrix) - COST NOT COVERED BY MEDICARE PART B  2 shot series recommended in those 19 years and older who have or will have weakened immune systems or those 50 years and older     Health Maintenance Due:      Topic Date Due   • Breast Cancer Screening: Mammogram  08/31/2024   • DXA SCAN  08/31/2025   • Hepatitis C Screening  Completed   • Colorectal Cancer Screening  Discontinued     Immunizations Due:      Topic Date Due   • COVID-19 Vaccine (7 - 2023-24 season) 01/04/2024     Advance Directives   What are advance directives?  Advance directives are legal documents that state your wishes and  plans for medical care. These plans are made ahead of time in case you lose your ability to make decisions for yourself. Advance directives can apply to any medical decision, such as the treatments you want, and if you want to donate organs.   What are the types of advance directives?  There are many types of advance directives, and each state has rules about how to use them. You may choose a combination of any of the following:  Living will:  This is a written record of the treatment you want. You can also choose which treatments you do not want, which to limit, and which to stop at a certain time. This includes surgery, medicine, IV fluid, and tube feedings.   Durable power of  for healthcare (DPAHC):  This is a written record that states who you want to make healthcare choices for you when you are unable to make them for yourself. This person, called a proxy, is usually a family member or a friend. You may choose more than 1 proxy.  Do not resuscitate (DNR) order:  A DNR order is used in case your heart stops beating or you stop breathing. It is a request not to have certain forms of treatment, such as CPR. A DNR order may be included in other types of advance directives.  Medical directive:  This covers the care that you want if you are in a coma, near death, or unable to make decisions for yourself. You can list the treatments you want for each condition. Treatment may include pain medicine, surgery, blood transfusions, dialysis, IV or tube feedings, and a ventilator (breathing machine).  Values history:  This document has questions about your views, beliefs, and how you feel and think about life. This information can help others choose the care that you would choose.  Why are advance directives important?  An advance directive helps you control your care. Although spoken wishes may be used, it is better to have your wishes written down. Spoken wishes can be misunderstood, or not followed. Treatments  may be given even if you do not want them. An advance directive may make it easier for your family to make difficult choices about your care.   Weight Management   Why it is important to manage your weight:  Being overweight increases your risk of health conditions such as heart disease, high blood pressure, type 2 diabetes, and certain types of cancer. It can also increase your risk for osteoarthritis, sleep apnea, and other respiratory problems. Aim for a slow, steady weight loss. Even a small amount of weight loss can lower your risk of health problems.  How to lose weight safely:  A safe and healthy way to lose weight is to eat fewer calories and get regular exercise. You can lose up about 1 pound a week by decreasing the number of calories you eat by 500 calories each day.   Healthy meal plan for weight management:  A healthy meal plan includes a variety of foods, contains fewer calories, and helps you stay healthy. A healthy meal plan includes the following:  Eat whole-grain foods more often.  A healthy meal plan should contain fiber. Fiber is the part of grains, fruits, and vegetables that is not broken down by your body. Whole-grain foods are healthy and provide extra fiber in your diet. Some examples of whole-grain foods are whole-wheat breads and pastas, oatmeal, brown rice, and bulgur.  Eat a variety of vegetables every day.  Include dark, leafy greens such as spinach, kale, humberto greens, and mustard greens. Eat yellow and orange vegetables such as carrots, sweet potatoes, and winter squash.   Eat a variety of fruits every day.  Choose fresh or canned fruit (canned in its own juice or light syrup) instead of juice. Fruit juice has very little or no fiber.  Eat low-fat dairy foods.  Drink fat-free (skim) milk or 1% milk. Eat fat-free yogurt and low-fat cottage cheese. Try low-fat cheeses such as mozzarella and other reduced-fat cheeses.  Choose meat and other protein foods that are low in fat.  Choose  beans or other legumes such as split peas or lentils. Choose fish, skinless poultry (chicken or turkey), or lean cuts of red meat (beef or pork). Before you cook meat or poultry, cut off any visible fat.   Use less fat and oil.  Try baking foods instead of frying them. Add less fat, such as margarine, sour cream, regular salad dressing and mayonnaise to foods. Eat fewer high-fat foods. Some examples of high-fat foods include french fries, doughnuts, ice cream, and cakes.  Eat fewer sweets.  Limit foods and drinks that are high in sugar. This includes candy, cookies, regular soda, and sweetened drinks.  Exercise:  Exercise at least 30 minutes per day on most days of the week. Some examples of exercise include walking, biking, dancing, and swimming. You can also fit in more physical activity by taking the stairs instead of the elevator or parking farther away from stores. Ask your healthcare provider about the best exercise plan for you.      © Copyright [employer] 2018 Information is for End User's use only and may not be sold, redistributed or otherwise used for commercial purposes. All illustrations and images included in CareNotes® are the copyrighted property of [employer] or [employer]      Medicare Preventive Visit Patient Instructions  Thank you for completing your Welcome to Medicare Visit or Medicare Annual Wellness Visit today. Your next wellness visit will be due in one year (4/17/2025).  The screening/preventive services that you may require over the next 5-10 years are detailed below. Some tests may not apply to you based off risk factors and/or age. Screening tests ordered at today's visit but not completed yet may show as past due. Also, please note that scanned in results may not display below.  Preventive Screenings:  Service Recommendations Previous Testing/Comments   Colorectal Cancer Screening  * Colonoscopy    * Fecal Occult Blood Test (FOBT)/Fecal Immunochemical Test (FIT)  *  Fecal DNA/Cologuard Test  * Flexible Sigmoidoscopy Age: 45-75 years old   Colonoscopy: every 10 years (may be performed more frequently if at higher risk)  OR  FOBT/FIT: every 1 year  OR  Cologuard: every 3 years  OR  Sigmoidoscopy: every 5 years  Screening may be recommended earlier than age 45 if at higher risk for colorectal cancer. Also, an individualized decision between you and your healthcare provider will decide whether screening between the ages of 76-85 would be appropriate. Colonoscopy: 04/06/2021  FOBT/FIT: Not on file  Cologuard: Not on file  Sigmoidoscopy: Not on file          Breast Cancer Screening Age: 40+ years old  Frequency: every 1-2 years  Not required if history of left and right mastectomy Mammogram: 08/31/2023    Screening Current   Cervical Cancer Screening Between the ages of 21-29, pap smear recommended once every 3 years.   Between the ages of 30-65, can perform pap smear with HPV co-testing every 5 years.   Recommendations may differ for women with a history of total hysterectomy, cervical cancer, or abnormal pap smears in past. Pap Smear: Not on file    Screening Not Indicated   Hepatitis C Screening Once for adults born between 1945 and 1965  More frequently in patients at high risk for Hepatitis C Hep C Antibody: 07/15/2023    Screening Current   Diabetes Screening 1-2 times per year if you're at risk for diabetes or have pre-diabetes Fasting glucose: No results in last 5 years (No results in last 5 years)  A1C: 8.4 % (3/9/2024)  Screening Not Indicated  History Diabetes   Cholesterol Screening Once every 5 years if you don't have a lipid disorder. May order more often based on risk factors. Lipid panel: 03/09/2024    Screening Current     Other Preventive Screenings Covered by Medicare:  Abdominal Aortic Aneurysm (AAA) Screening: covered once if your at risk. You're considered to be at risk if you have a family history of AAA.  Lung Cancer Screening: covers low dose CT scan once per  year if you meet all of the following conditions: (1) Age 55-77; (2) No signs or symptoms of lung cancer; (3) Current smoker or have quit smoking within the last 15 years; (4) You have a tobacco smoking history of at least 20 pack years (packs per day multiplied by number of years you smoked); (5) You get a written order from a healthcare provider.  Glaucoma Screening: covered annually if you're considered high risk: (1) You have diabetes OR (2) Family history of glaucoma OR (3)  aged 50 and older OR (4)  American aged 65 and older  Osteoporosis Screening: covered every 2 years if you meet one of the following conditions: (1) You're estrogen deficient and at risk for osteoporosis based off medical history and other findings; (2) Have a vertebral abnormality; (3) On glucocorticoid therapy for more than 3 months; (4) Have primary hyperparathyroidism; (5) On osteoporosis medications and need to assess response to drug therapy.   Last bone density test (DXA Scan): 08/31/2023.  HIV Screening: covered annually if you're between the age of 15-65. Also covered annually if you are younger than 15 and older than 65 with risk factors for HIV infection. For pregnant patients, it is covered up to 3 times per pregnancy.    Immunizations:  Immunization Recommendations   Influenza Vaccine Annual influenza vaccination during flu season is recommended for all persons aged >= 6 months who do not have contraindications   Pneumococcal Vaccine   * Pneumococcal conjugate vaccine = PCV13 (Prevnar 13), PCV15 (Vaxneuvance), PCV20 (Prevnar 20)  * Pneumococcal polysaccharide vaccine = PPSV23 (Pneumovax) Adults 19-65 yo with certain risk factors or if 65+ yo  If never received any pneumonia vaccine: recommend Prevnar 20 (PCV20)  Give PCV20 if previously received 1 dose of PCV13 or PPSV23   Hepatitis B Vaccine 3 dose series if at intermediate or high risk (ex: diabetes, end stage renal disease, liver disease)    Respiratory syncytial virus (RSV) Vaccine - COVERED BY MEDICARE PART D  * RSVPreF3 (Arexvy) CDC recommends that adults 60 years of age and older may receive a single dose of RSV vaccine using shared clinical decision-making (SCDM)   Tetanus (Td) Vaccine - COST NOT COVERED BY MEDICARE PART B Following completion of primary series, a booster dose should be given every 10 years to maintain immunity against tetanus. Td may also be given as tetanus wound prophylaxis.   Tdap Vaccine - COST NOT COVERED BY MEDICARE PART B Recommended at least once for all adults. For pregnant patients, recommended with each pregnancy.   Shingles Vaccine (Shingrix) - COST NOT COVERED BY MEDICARE PART B  2 shot series recommended in those 19 years and older who have or will have weakened immune systems or those 50 years and older     Health Maintenance Due:      Topic Date Due   • Breast Cancer Screening: Mammogram  08/31/2024   • DXA SCAN  08/31/2025   • Hepatitis C Screening  Completed   • Colorectal Cancer Screening  Discontinued     Immunizations Due:      Topic Date Due   • COVID-19 Vaccine (7 - 2023-24 season) 01/04/2024     Advance Directives   What are advance directives?  Advance directives are legal documents that state your wishes and plans for medical care. These plans are made ahead of time in case you lose your ability to make decisions for yourself. Advance directives can apply to any medical decision, such as the treatments you want, and if you want to donate organs.   What are the types of advance directives?  There are many types of advance directives, and each state has rules about how to use them. You may choose a combination of any of the following:  Living will:  This is a written record of the treatment you want. You can also choose which treatments you do not want, which to limit, and which to stop at a certain time. This includes surgery, medicine, IV fluid, and tube feedings.   Durable power of  for  healthcare ([facility]):  This is a written record that states who you want to make healthcare choices for you when you are unable to make them for yourself. This person, called a proxy, is usually a family member or a friend. You may choose more than 1 proxy.  Do not resuscitate (DNR) order:  A DNR order is used in case your heart stops beating or you stop breathing. It is a request not to have certain forms of treatment, such as CPR. A DNR order may be included in other types of advance directives.  Medical directive:  This covers the care that you want if you are in a coma, near death, or unable to make decisions for yourself. You can list the treatments you want for each condition. Treatment may include pain medicine, surgery, blood transfusions, dialysis, IV or tube feedings, and a ventilator (breathing machine).  Values history:  This document has questions about your views, beliefs, and how you feel and think about life. This information can help others choose the care that you would choose.  Why are advance directives important?  An advance directive helps you control your care. Although spoken wishes may be used, it is better to have your wishes written down. Spoken wishes can be misunderstood, or not followed. Treatments may be given even if you do not want them. An advance directive may make it easier for your family to make difficult choices about your care.   Weight Management   Why it is important to manage your weight:  Being overweight increases your risk of health conditions such as heart disease, high blood pressure, type 2 diabetes, and certain types of cancer. It can also increase your risk for osteoarthritis, sleep apnea, and other respiratory problems. Aim for a slow, steady weight loss. Even a small amount of weight loss can lower your risk of health problems.  How to lose weight safely:  A safe and healthy way to lose weight is to eat fewer calories and get regular exercise. You can lose up about  1 pound a week by decreasing the number of calories you eat by 500 calories each day.   Healthy meal plan for weight management:  A healthy meal plan includes a variety of foods, contains fewer calories, and helps you stay healthy. A healthy meal plan includes the following:  Eat whole-grain foods more often.  A healthy meal plan should contain fiber. Fiber is the part of grains, fruits, and vegetables that is not broken down by your body. Whole-grain foods are healthy and provide extra fiber in your diet. Some examples of whole-grain foods are whole-wheat breads and pastas, oatmeal, brown rice, and bulgur.  Eat a variety of vegetables every day.  Include dark, leafy greens such as spinach, kale, humberto greens, and mustard greens. Eat yellow and orange vegetables such as carrots, sweet potatoes, and winter squash.   Eat a variety of fruits every day.  Choose fresh or canned fruit (canned in its own juice or light syrup) instead of juice. Fruit juice has very little or no fiber.  Eat low-fat dairy foods.  Drink fat-free (skim) milk or 1% milk. Eat fat-free yogurt and low-fat cottage cheese. Try low-fat cheeses such as mozzarella and other reduced-fat cheeses.  Choose meat and other protein foods that are low in fat.  Choose beans or other legumes such as split peas or lentils. Choose fish, skinless poultry (chicken or turkey), or lean cuts of red meat (beef or pork). Before you cook meat or poultry, cut off any visible fat.   Use less fat and oil.  Try baking foods instead of frying them. Add less fat, such as margarine, sour cream, regular salad dressing and mayonnaise to foods. Eat fewer high-fat foods. Some examples of high-fat foods include french fries, doughnuts, ice cream, and cakes.  Eat fewer sweets.  Limit foods and drinks that are high in sugar. This includes candy, cookies, regular soda, and sweetened drinks.  Exercise:  Exercise at least 30 minutes per day on most days of the week. Some examples of  exercise include walking, biking, dancing, and swimming. You can also fit in more physical activity by taking the stairs instead of the elevator or parking farther away from stores. Ask your healthcare provider about the best exercise plan for you.      © Copyright [employer] 2018 Information is for End User's use only and may not be sold, redistributed or otherwise used for commercial purposes. All illustrations and images included in CareNotes® are the copyrighted property of [employer] or [employer]

## 2024-04-16 NOTE — ASSESSMENT & PLAN NOTE
TSH upper end of goal but pt clinically euthyroid, con't current levothyroxine for now, recheck TSH in 6 mo - WILL ORDER AT FUTURE APPT

## 2024-04-16 NOTE — ASSESSMENT & PLAN NOTE
TSH upper end of nml but back wnl, clinically euthyroid so con't current levothyroxine for now, recheck TSH in 6 mos - WILL ORDER AT FUTURE APPT

## 2024-04-16 NOTE — ASSESSMENT & PLAN NOTE
Lab Results   Component Value Date    HGBA1C 8.4 (H) 03/09/2024   DM type 2 with hyperglycemia - uncontrolled with A1C jumping up to 8.4 - pt on max DPP-4i Januvia and metformin, UTT SGLT2i Jardiance d/t urinary symptoms, insurance won't cover Mounjaro GLP-1 agonist and pt doesn't want injectable like Ozempic, will therefore add Glipizide 5 mg 1 tab PO q am, s/sx of hypoglycemia reviewed, referral to DM edu placed, check BW in 3 mos, DM foot exam done in office today, eye exam 5/23 and has appt coming up, on an ACE but refuses statin, will follow

## 2024-04-16 NOTE — ASSESSMENT & PLAN NOTE
LDL again above goal, 10 yr ASCVD risk score reviewed with pt at 41.2%, statin again encouraged - pt again refusing, diet/exercise encouraged, call if she changes her mind

## 2024-04-19 ENCOUNTER — OFFICE VISIT (OUTPATIENT)
Dept: PHYSICAL THERAPY | Facility: CLINIC | Age: 79
End: 2024-04-19
Payer: COMMERCIAL

## 2024-04-19 DIAGNOSIS — M25.552 BILATERAL HIP PAIN: Primary | ICD-10-CM

## 2024-04-19 DIAGNOSIS — M54.16 LUMBAR RADICULOPATHY: ICD-10-CM

## 2024-04-19 DIAGNOSIS — M25.551 BILATERAL HIP PAIN: Primary | ICD-10-CM

## 2024-04-19 PROCEDURE — 97110 THERAPEUTIC EXERCISES: CPT | Performed by: PHYSICAL THERAPIST

## 2024-04-19 NOTE — PROGRESS NOTES
Daily Note     Today's date: 2024  Patient name: Madeleine Jiménez  : 1945  MRN: [id number]  Referring provider: Reina Chang DO  Dx:   Encounter Diagnosis     ICD-10-CM    1. Bilateral hip pain  M25.551     M25.552       2. Lumbar radiculopathy  M54.16           Start Time: 1256          Subjective: Lunges added to HEP last visit have been challenging for her balance. She's been getting out each day to refill her bird baths and pull weeds and it's going okay. She has intermittent R calf cramping. Little achy in the back in the morning but it loosens and has not been bothering her much. Overall feels like she's getting stronger.      Objective: See treatment diary below      Assessment: Doing well with program progressions as noted. Continued with lumbar extension and sciatic glides despite presence of continued back pain for general mobility. She had difficulty today with mechanics of kneeling down onto the step as performed in previous sessions. She kept doing an uncontrolled descent onto both knees and was unable to perform the lunge-type movement as verbally and visually described. We did not continue these out of caution for her knees. She did fair with lunges though did require tabletop support for balance. Ended with glute stretch bilaterally. HEP updated.Tolerated treatment well. Patient demonstrated fatigue post treatment, exhibited good technique with therapeutic exercises, and would benefit from continued PT      Plan: Continue per plan of care.  Progress treatment as tolerated.       Dx: low back pain with mobility deficits; R sided leg pain  EPOC: 5/10  F/u with referrin/16 with [facility]  Precautions: n/a  Comorbidities: h/o DM2, thyroid disease, HTN  Main concerns: she might fall   Main goals: kneeling to garden, better going up and down the stairs  FOTO goal: 64 in   FOTO progress: 47 on 3/25      HEP:  Access Code: [email or web address]  URL: [email or web address]  Date:  "04/19/2024  Prepared by: Lauro Tejeda    Exercises  - Standing Lumbar Extension with Counter  - 20 reps  - Standing Hip Abduction with Counter Support  - 20 reps  - Lunge with Counter Support  - 20 reps  - Heel Raises with Counter Support  - 20 reps      Manuals 3/25 4/1 4/12 4/15 4/19        R glute, lumbar STM In L sidelying In prone           Lumbar central PA mob L3-5 grade 1 L3-5 grade 4 L3-5 grade 4 NP                                   Neuro Re-Ed                                                                                                        Ther Ex             TM   0.7 mph 5' 0.7-0.8 mph 6' 0.8 mph 6'        Glute stretch     Seated 30\"x2 ea        Prone lying  2' 3'           Prone press up             Standing lumbar ext  Against table 5x10 Against table 10 Against table 5\"x10 Against table x10        Sciatic glide  Seated x20 ea Seated x20 ea Seated x20 ea Seated x20 ea        Prone glute set  x20           PHE  X10 ea X10 ea On edge of table 3\" x10 ea X15 ea        STS   No UE 2x10 No UE 2x10 2x12 no UE        3way hip standing   X20 ea X20 ea 1# x20 ea        HR     X20 ea        Kneeling to standing   6in step +1 airex x10 6in step +1 airex x10 Unable to perform , poor mechanics        Step up fwd             Knee ext machine             HSC machine             Floor transfer training             Stair training             Forward Lunges    X10 ea X10 ea                     Ther Activity                                       Gait Training                                       Modalities                                              "

## 2024-04-22 ENCOUNTER — OFFICE VISIT (OUTPATIENT)
Dept: PHYSICAL THERAPY | Facility: CLINIC | Age: 79
End: 2024-04-22
Payer: COMMERCIAL

## 2024-04-22 DIAGNOSIS — M25.551 BILATERAL HIP PAIN: Primary | ICD-10-CM

## 2024-04-22 DIAGNOSIS — M54.16 LUMBAR RADICULOPATHY: ICD-10-CM

## 2024-04-22 DIAGNOSIS — M25.552 BILATERAL HIP PAIN: Primary | ICD-10-CM

## 2024-04-22 PROCEDURE — 97110 THERAPEUTIC EXERCISES: CPT | Performed by: PHYSICAL THERAPIST

## 2024-04-22 NOTE — PROGRESS NOTES
"Daily Note     Today's date: 2024  Patient name: Madeleine Jiménez  : 1945  MRN: [id number]  Referring provider: Reina Chang DO  Dx:   Encounter Diagnosis     ICD-10-CM    1. Bilateral hip pain  M25.551     M25.552       2. Lumbar radiculopathy  M54.16           Start Time: 1155          Subjective: Felt really good the day after last session. Was able to kneel and be in the gardens. Then  walked too much at [employer] and the calf cramping has been severe.      Objective: See treatment diary below      Assessment: Held on TM today per pt request and substituted bike. Minimal progression made in program today secondary to calf soreness from increased exercise yesterday. She did well. If she continues to do well by nv, plan to progress. Tolerated treatment well. Patient demonstrated fatigue post treatment, exhibited good technique with therapeutic exercises, and would benefit from continued PT      Plan: Continue per plan of care.  Progress treatment as tolerated.       Dx: low back pain with mobility deficits; R sided leg pain  EPOC: 5/10  F/u with referrin/16 with [facility]  Precautions: n/a  Comorbidities: h/o DM2, thyroid disease, HTN  Main concerns: she might fall   Main goals: kneeling to garden, better going up and down the stairs  FOTO goal: 64 in 13  FOTO progress: 47 on 3/25      HEP:  Access Code: [email or web address]  URL: [email or web address]  Date: 2024  Prepared by: Lauro Tejeda    Exercises  - Standing Lumbar Extension with Counter  - 20 reps  - Standing Hip Abduction with Counter Support  - 20 reps  - Lunge with Counter Support  - 20 reps  - Heel Raises with Counter Support  - 20 reps      Manuals 3/25 4/1 4/12 4/15 4/19 4/22       R glute, lumbar STM In L sidelying In prone           Lumbar central PA mob L3-5 grade 1 L3-5 grade 4 L3-5 grade 4 NP                                   Neuro Re-Ed             tandem      30\" ea       Foam march      X20 ea 1UE          " "                                                              Ther Ex             TM   0.7 mph 5' 0.7-0.8 mph 6' 0.8 mph 6' declined       Bike - cardio      6'       Glute stretch     Seated 30\"x2 ea Seated 30\"x2 ea       Prone lying  2' 3'           Standing lumbar ext  Against table 5x10 Against table 10 Against table 5\"x10 Against table x10 Against table x20       Sciatic glide  Seated x20 ea Seated x20 ea Seated x20 ea Seated x20 ea Seated x20 ea       Prone glute set  x20           PHE  X10 ea X10 ea On edge of table 3\" x10 ea X15 ea X15 ea       STS   No UE 2x10 No UE 2x10 2x12 no UE 2x12 no UE Make more difficult      3way hip standing   X20 ea X20 ea 1# x20 ea 1# x20 ea       HR     X20 ea X20 ea       Kneeling to standing   6in step +1 airex x10 6in step +1 airex x10 Unable to perform , poor mechanics        Step up fwd             Knee ext machine             HSC machine             Floor transfer training             Stair training             Forward Lunges    X10 ea X10 ea X10 ea                    Ther Activity                                       Gait Training                                       Modalities                                              "

## 2024-04-26 ENCOUNTER — OFFICE VISIT (OUTPATIENT)
Dept: PHYSICAL THERAPY | Facility: CLINIC | Age: 79
End: 2024-04-26
Payer: COMMERCIAL

## 2024-04-26 DIAGNOSIS — M25.552 BILATERAL HIP PAIN: Primary | ICD-10-CM

## 2024-04-26 DIAGNOSIS — M25.551 BILATERAL HIP PAIN: Primary | ICD-10-CM

## 2024-04-26 DIAGNOSIS — M54.16 LUMBAR RADICULOPATHY: ICD-10-CM

## 2024-04-26 PROCEDURE — 97110 THERAPEUTIC EXERCISES: CPT | Performed by: PHYSICAL THERAPIST

## 2024-04-26 NOTE — PROGRESS NOTES
"Daily Note     Today's date: 2024  Patient name: Madeleine Jiménez  : 1945  MRN: [id number]  Referring provider: Reina Chang DO  Dx:   Encounter Diagnosis     ICD-10-CM    1. Bilateral hip pain  M25.551     M25.552       2. Lumbar radiculopathy  M54.16           Start Time: 1300          Subjective: She's getting a new mattress and box spring so she was up and down from the floor this morning and it didn't go too bad. Calf is feeling better.      Objective: See treatment diary below      Assessment: Doing well with volume progressions and added balance and hip strengthening with lateral step up today. Tolerated treatment well. Patient demonstrated fatigue post treatment, exhibited good technique with therapeutic exercises, and would benefit from continued PT      Plan: Continue per plan of care.  Progress treatment as tolerated.       Dx: low back pain with mobility deficits; R sided leg pain  EPOC: 5/10  F/u with referrin/16 with Britney  Precautions: n/a  Comorbidities: h/o DM2, thyroid disease, HTN  Main concerns: she might fall   Main goals: kneeling to garden, better going up and down the stairs  FOTO goal: 64 in   FOTO progress: 47 on 3/25      HEP:  Access Code: [email or web address]  URL: [email or web address]  Date: 2024  Prepared by: Lauro Tejeda    Exercises  - Standing Lumbar Extension with Counter  - 20 reps  - Standing Hip Abduction with Counter Support  - 20 reps  - Lunge with Counter Support  - 20 reps  - Heel Raises with Counter Support  - 20 reps      Manuals 3/25 4/1 4/12 4/15 4/19 4/22 4/26      R glute, lumbar STM In L sidelying In prone           Lumbar central PA mob L3-5 grade 1 L3-5 grade 4 L3-5 grade 4 NP                                   Neuro Re-Ed             tandem      30\" ea 30\"x2 ea      Foam  ea 1UE       Foam walk       S-s, f-b x3 laps ea                                                          Ther Ex             TM   0.7 mph 5' 0.7-0.8 mph " "6' 0.8 mph 6' declined       Bike - cardio      6' 6'      Glute stretch     Seated 30\"x2 ea Seated 30\"x2 ea Seated 30\"x2 ea      Prone lying  2' 3'           Standing lumbar ext  Against table 5x10 Against table 10 Against table 5\"x10 Against table x10 Against table x20 Against table x20      Sciatic glide  Seated x20 ea Seated x20 ea Seated x20 ea Seated x20 ea Seated x20 ea Seated x20 ea      Prone glute set  x20           PHE  X10 ea X10 ea On edge of table 3\" x10 ea X15 ea X15 ea X18 ea      STS   No UE 2x10 No UE 2x10 2x12 no UE 2x12 no UE Make more difficult      3way hip standing   X20 ea X20 ea 1# x20 ea 1# x20 ea 1.5# x20 ea      HR     X20 X20 X20      Kneeling to standing   6in step +1 airex x10 6in step +1 airex x10 Unable to perform , poor mechanics        Step up +over lat       x10      Knee ext machine             HSC machine             Floor transfer training             Forward Lunges    X10 ea X10 ea X10 ea X10 ea                                                          Ther Activity                                       Gait Training                                       Modalities                                              "

## 2024-04-29 ENCOUNTER — APPOINTMENT (OUTPATIENT)
Dept: PHYSICAL THERAPY | Facility: CLINIC | Age: 79
End: 2024-04-29
Payer: COMMERCIAL

## 2024-05-03 ENCOUNTER — OFFICE VISIT (OUTPATIENT)
Dept: PHYSICAL THERAPY | Facility: CLINIC | Age: 79
End: 2024-05-03
Payer: COMMERCIAL

## 2024-05-03 DIAGNOSIS — M25.551 BILATERAL HIP PAIN: Primary | ICD-10-CM

## 2024-05-03 DIAGNOSIS — M25.552 BILATERAL HIP PAIN: Primary | ICD-10-CM

## 2024-05-03 DIAGNOSIS — M54.16 LUMBAR RADICULOPATHY: ICD-10-CM

## 2024-05-03 PROCEDURE — 97110 THERAPEUTIC EXERCISES: CPT | Performed by: PHYSICAL THERAPIST

## 2024-05-03 NOTE — PROGRESS NOTES
"Daily Note     Today's date: 5/3/2024  Patient name: Madeleine Jiménez  : 1945  MRN: [id number]  Referring provider: Reina Chang DO  Dx:   Encounter Diagnosis     ICD-10-CM    1. Bilateral hip pain  M25.551     M25.552       2. Lumbar radiculopathy  M54.16                      Subjective: Doing very well. Consistent with the HEP and has been able to get out and garden, etc. Ready for d/c next week.      Objective: See treatment diary below      Assessment: Doing well with volume progressions and added balance and hip strengthening. Tolerated treatment well. Patient demonstrated fatigue post treatment, exhibited good technique with therapeutic exercises, and would benefit from continued PT      Plan: Continue per plan of care.  Progress treatment as tolerated.       Dx: low back pain with mobility deficits; R sided leg pain  EPOC: 5/10  F/u with referrin/16 with Britney  Precautions: n/a  Comorbidities: h/o DM2, thyroid disease, HTN  Main concerns: she might fall   Main goals: kneeling to garden, better going up and down the stairs  FOTO goal: 64 in   FOTO progress: 47 on 3/25      HEP:  Access Code: [email or web address]  URL: [email or web address]  Date: 2024  Prepared by: Lauro Tejeda    Exercises  - Standing Lumbar Extension with Counter  - 20 reps  - Standing Hip Abduction with Counter Support  - 20 reps  - Lunge with Counter Support  - 20 reps  - Heel Raises with Counter Support  - 20 reps      Manuals 3/25 4/1 4/12 4/15 4/19 4/22 4/26 5/3     R glute, lumbar STM In L sidelying In prone           Lumbar central PA mob L3-5 grade 1 L3-5 grade 4 L3-5 grade 4 NP                                   Neuro Re-Ed             tandem      30\" ea 30\"x2 ea 30\"x2 ea     Foam  ea 1UE       Foam walk       S-s, f-b x3 laps ea S-s, f-b x3 laps ea                                                         Ther Ex             TM   0.7 mph 5' 0.7-0.8 mph 6' 0.8 mph 6' declined       Bike - cardio    " "  6' 6' 6'     Glute stretch     Seated 30\"x2 ea Seated 30\"x2 ea Seated 30\"x2 ea Seated 30\"x2 ea     Prone lying  2' 3'           Standing lumbar ext  Against table 5x10 Against table 10 Against table 5\"x10 Against table x10 Against table x20 Against table x20 Against table x20     Sciatic glide  Seated x20 ea Seated x20 ea Seated x20 ea Seated x20 ea Seated x20 ea Seated x20 ea Seated x20 ea     Prone glute set  x20           PHE  X10 ea X10 ea On edge of table 3\" x10 ea X15 ea X15 ea X18 ea X20 ea     STS   No UE 2x10 No UE 2x10 2x12 no UE 2x12 no UE Make more difficult 5# x20     3way hip standing   X20 ea X20 ea 1# x20 ea 1# x20 ea 1.5# x20 ea X20 ea     HR     X20 X20 X20 x20     TR        x20     Kneeling to standing   6in step +1 airex x10 6in step +1 airex x10 Unable to perform , poor mechanics        Step up +over lat       x10      Knee ext machine             HSC machine             Floor transfer training             Forward Lunges    X10 ea X10 ea X10 ea X10 ea X12 ea                                                         Ther Activity                                       Gait Training                                       Modalities                                              "

## 2024-05-06 ENCOUNTER — APPOINTMENT (OUTPATIENT)
Dept: PHYSICAL THERAPY | Facility: CLINIC | Age: 79
End: 2024-05-06
Payer: COMMERCIAL

## 2024-05-07 ENCOUNTER — OFFICE VISIT (OUTPATIENT)
Dept: PHYSICAL THERAPY | Facility: CLINIC | Age: 79
End: 2024-05-07
Payer: COMMERCIAL

## 2024-05-07 DIAGNOSIS — M25.552 BILATERAL HIP PAIN: Primary | ICD-10-CM

## 2024-05-07 DIAGNOSIS — M54.16 LUMBAR RADICULOPATHY: ICD-10-CM

## 2024-05-07 DIAGNOSIS — M25.551 BILATERAL HIP PAIN: Primary | ICD-10-CM

## 2024-05-07 PROCEDURE — 97110 THERAPEUTIC EXERCISES: CPT | Performed by: PHYSICAL THERAPIST

## 2024-05-07 NOTE — PROGRESS NOTES
"Daily Note     Today's date: 2024  Patient name: Madeleine Jiménez  : 1945  MRN: [id number]  Referring provider: Reina Chang DO  Dx:   Encounter Diagnosis     ICD-10-CM    1. Bilateral hip pain  M25.551     M25.552       2. Lumbar radiculopathy  M54.16           Start Time: 0815          Subjective: L glute has been bothering her as she got a new couch over the weekend. But overall doing very well. Consistent with the HEP and has been able to get out and garden, etc. Ready for d/c week.      Objective: See treatment diary below      Assessment: Doing well with volume progressions and added balance and hip strengthening. Tolerated treatment well. Patient demonstrated fatigue post treatment, exhibited good technique with therapeutic exercises, and would benefit from continued PT      Plan: Continue per plan of care.  Progress treatment as tolerated.       Dx: low back pain with mobility deficits; R sided leg pain  EPOC: 5/10  F/u with referrin/16 with Britney  Precautions: n/a  Comorbidities: h/o DM2, thyroid disease, HTN  Main concerns: she might fall   Main goals: kneeling to garden, better going up and down the stairs  FOTO goal: 64 in   FOTO progress: 47 on 3/25      HEP:  Access Code: [email or web address]  URL: [email or web address]  Date: 2024  Prepared by: Lauro Tejeda    Exercises  - Standing Lumbar Extension with Counter  - 20 reps  - Standing Hip Abduction with Counter Support  - 20 reps  - Lunge with Counter Support  - 20 reps  - Heel Raises with Counter Support  - 20 reps      Manuals 3/25 4/1 4/12 4/15 4/19 4/22 4/26 5/3 5/7    R glute, lumbar STM In L sidelying In prone           Lumbar central PA mob L3-5 grade 1 L3-5 grade 4 L3-5 grade 4 NP                                   Neuro Re-Ed             tandem      30\" ea 30\"x2 ea 30\"x2 ea 30\"x2 ea    Foam  ea 1UE       Foam walk       S-s, f-b x3 laps ea S-s, f-b x3 laps ea                                              " "           Ther Ex             TM   0.7 mph 5' 0.7-0.8 mph 6' 0.8 mph 6' declined       Bike - cardio      6' 6' 6' 6'    Glute stretch     Seated 30\"x2 ea Seated 30\"x2 ea Seated 30\"x2 ea Seated 30\"x2 ea Seated 30\"x2 ea    Prone lying  2' 3'           Standing lumbar ext  Against table 5x10 Against table 10 Against table 5\"x10 Against table x10 Against table x20 Against table x20 Against table x20 Against table x20    Sciatic glide  Seated x20 ea Seated x20 ea Seated x20 ea Seated x20 ea Seated x20 ea Seated x20 ea Seated x20 ea Seated x20 ea    Prone glute set  x20           PHE  X10 ea X10 ea On edge of table 3\" x10 ea X15 ea X15 ea X18 ea X20 ea X20 ea    STS   No UE 2x10 No UE 2x10 2x12 no UE 2x12 no UE Make more difficult 5# x20 5# x20    3way hip standing   X20 ea X20 ea 1# x20 ea 1# x20 ea 1.5# x20 ea X20 ea Ytb loop x20 ea    HR     X20 X20 X20 x20 x25    TR        x20 x25    Kneeling to standing   6in step +1 airex x10 6in step +1 airex x10 Unable to perform , poor mechanics        Step up +over lat       x10      Knee ext machine             HSC machine             Floor transfer training             Forward Lunges    X10 ea X10 ea X10 ea X10 ea X12 ea X12 ea                                                        Ther Activity                                       Gait Training                                       Modalities                                              "

## 2024-05-10 ENCOUNTER — OFFICE VISIT (OUTPATIENT)
Dept: PHYSICAL THERAPY | Facility: CLINIC | Age: 79
End: 2024-05-10
Payer: COMMERCIAL

## 2024-05-10 DIAGNOSIS — M25.551 BILATERAL HIP PAIN: Primary | ICD-10-CM

## 2024-05-10 DIAGNOSIS — M54.16 LUMBAR RADICULOPATHY: ICD-10-CM

## 2024-05-10 DIAGNOSIS — M25.552 BILATERAL HIP PAIN: Primary | ICD-10-CM

## 2024-05-10 PROCEDURE — 97110 THERAPEUTIC EXERCISES: CPT | Performed by: PHYSICAL THERAPIST

## 2024-05-10 NOTE — PROGRESS NOTES
Daily Note - d/c to HEP     Today's date: 5/10/2024  Patient name: Madeleine Jiménez  : 1945  MRN: [id number]  Referring provider: Reina Chang DO  Dx:   Encounter Diagnosis     ICD-10-CM    1. Bilateral hip pain  M25.551     M25.552       2. Lumbar radiculopathy  M54.16           Start Time: 1504          Subjective: Overall doing very well. Consistent with the HEP and has been able to get out and garden, etc. Ready for d/c to HEP.      Objective: See treatment diary below      Assessment: Madeleine Jiménez has attended physical therapy for 10 visits. In this time, they have made significant improvements in symptoms and function, as noted by subjective report, improved balance, ROM, strength, flexibility and activity tolerance. They have made positive goal progress with FOTO score improvement from 47 at initial evaluation to 82 currently. Recommend d/c to HEP at this time.    Goals  ST.  Independent with HEP in 2 weeks - met  2. Decrease pain by 50% in 3 wks - met  3. Able to ambulate for household distances without pain in 3 weeks - met    LT. Achieve FOTO score of 64/100 in 6 weeks - met  2.  Able to ascend stairs with alternate gait confidently in 6 weeks - met  3.  Strength = 5/5 hip flexion bilat in 6 weeks - met    Plan: D/c to HEP.     Dx: low back pain with mobility deficits; R sided leg pain  EPOC: 5/10  F/u with referrin/16 with Britney  Precautions: n/a  Comorbidities: h/o DM2, thyroid disease, HTN  Main concerns: she might fall   Main goals: kneeling to garden, better going up and down the stairs  FOTO goal: 64 in 13  FOTO progress: 47 on 3/25      HEP:  Access Code: [email or web address]  URL: [email or web address]  Date: 2024  Prepared by: Lauro Tejeda    Exercises  - Standing Lumbar Extension with Counter  - 20 reps  - Standing Hip Abduction with Counter Support  - 20 reps  - Lunge with Counter Support  - 20 reps  - Heel Raises with Counter Support  - 20 reps      Manuals  "3/25 4/1 4/12 4/15 4/19 4/22 4/26 5/3 5/7 5/10   RE          OPAL   FOTO          OPAL   R glute, lumbar STM In L sidelying In prone           Lumbar central PA mob L3-5 grade 1 L3-5 grade 4 L3-5 grade 4 NP                                   Neuro Re-Ed             tandem      30\" ea 30\"x2 ea 30\"x2 ea 30\"x2 ea 30\"x2 ea   Foam march      X20 ea 1UE       Foam walk       S-s, f-b x3 laps ea S-s, f-b x3 laps ea                                                         Ther Ex             TM   0.7 mph 5' 0.7-0.8 mph 6' 0.8 mph 6' declined       Bike - cardio      6' 6' 6' 6' 6'   Glute stretch     Seated 30\"x2 ea Seated 30\"x2 ea Seated 30\"x2 ea Seated 30\"x2 ea Seated 30\"x2 ea Seated 30\"x2 ea   Prone lying  2' 3'           Standing lumbar ext  Against table 5x10 Against table 10 Against table 5\"x10 Against table x10 Against table x20 Against table x20 Against table x20 Against table x20 Against table x20   Sciatic glide  Seated x20 ea Seated x20 ea Seated x20 ea Seated x20 ea Seated x20 ea Seated x20 ea Seated x20 ea Seated x20 ea Seated x20 ea   Prone glute set  x20           PHE  X10 ea X10 ea On edge of table 3\" x10 ea X15 ea X15 ea X18 ea X20 ea X20 ea X20 ea   STS   No UE 2x10 No UE 2x10 2x12 no UE 2x12 no UE Make more difficult 5# x20 5# x20 5# x20   3way hip standing   X20 ea X20 ea 1# x20 ea 1# x20 ea 1.5# x20 ea X20 ea Ytb loop x20 ea Ytb loop x20 ea   HR     X20 X20 X20 x20 x25 x25   TR        x20 x25 x25   Kneeling to standing   6in step +1 airex x10 6in step +1 airex x10 Unable to perform , poor mechanics        Step up +over lat       x10      Knee ext machine             HSC machine             Floor transfer training             Forward Lunges    X10 ea X10 ea X10 ea X10 ea X12 ea X12 ea X15 ea                                                       Ther Activity                                       Gait Training                                       Modalities                                              "

## 2024-05-23 ENCOUNTER — OFFICE VISIT (OUTPATIENT)
Dept: DIABETES SERVICES | Facility: HOSPITAL | Age: 79
End: 2024-05-23
Payer: COMMERCIAL

## 2024-05-23 VITALS — WEIGHT: 172 LBS | HEIGHT: 63 IN | BODY MASS INDEX: 30.48 KG/M2

## 2024-05-23 DIAGNOSIS — E11.65 TYPE 2 DIABETES MELLITUS WITH HYPERGLYCEMIA, WITHOUT LONG-TERM CURRENT USE OF INSULIN (HCC): Primary | ICD-10-CM

## 2024-05-23 PROCEDURE — G0108 DIAB MANAGE TRN  PER INDIV: HCPCS | Performed by: DIETITIAN, REGISTERED

## 2024-05-23 NOTE — PROGRESS NOTES
Type 2 Diabetes Class Assessment    HPI: Met with Maedleine Jiménez for DSME Initial visit. Madeleine has Type 2 Diabetes.    Diabetes Assessment  Visit Type: Initial visit  Present at Session: patient   Medical Diagnosis/ICD 10: E11.65  Special Learning Needs: No  Barriers to Learning: no barriers    How do you learn best? Hands on  What are you most interested in learning about regards to your diabetes? eating  How do you feel about making lifestyle changes at this time? ready  How would you rate your current knowledge of diabetes? fair  How confident are you that will be able to take better control of your diabetes?: fair    How long have you had diabetes? 2 years  Have you had diabetes education in the past?: No  Do you have any family members with diabetes?: Yes - mother and grandmother   Do you monitor your blood sugar? yes  Type of blood sugar monitor: accu chek   How old is your meter?: 2 years old   How often do you test your blood sugars?:fasting daily   Do you keep a written record of your blood sugars? Yes   Blood sugar log with patient today and reviewed by educator?: Yes   Blood Sugar ranges:    Fastin-180   Before meals:    2 hours after meals:   Any financial concerns pertaining to your diabetes supplies, medication or care?: No  Have you ever experienced hypoglycemia?:  Yes - just once she thought she was low  Have you ever been hospitalized or gone to the ER due to your blood sugars?: No  How do you treat low blood sugars?: OJ   How do you treat high blood sugars? No idea  Do you wear a Diabetes I.D.?: no  Where do you dispose of your sharps (needles,lancetes)?: in tissue then in the trash     Lab Results   Component Value Date    HGBA1C 8.4 (H) 2024    HGBA1C 7.6 (H) 2023    HGBA1C 7.8 (H) 07/15/2023       Lab Results   Component Value Date    CHOL 177 2017    CHOL 203 (H) 2016    CHOL 228 (H) 2016     Lab Results   Component Value Date    HDL 33 (L) 2024     "HDL 28 (L) 04/08/2023    HDL 34 (L) 03/23/2022     Lab Results   Component Value Date    LDLCALC 113 (H) 03/09/2024    LDLCALC 94 04/08/2023    LDLCALC 109 (H) 03/23/2022     Lab Results   Component Value Date    TRIG 366 (H) 03/09/2024    TRIG 385 (H) 04/08/2023    TRIG 353 (H) 03/23/2022     Lab Results   Component Value Date    CHOLHDL 6.6 (H) 04/08/2023    CHOLHDL 6.0 (H) 03/23/2022    CHOLHDL 6.0 (H) 01/15/2021     No results found for: \"MICROALBUR\", \"[id number]\"    Body mass index is 30.47 kg/m².    Ht Readings from Last 1 Encounters:   05/23/24 5' 3\" (1.6 m)     Wt Readings from Last 1 Encounters:   05/23/24 78 kg (172 lb)     Weight Change: No    Diet Assessment    Do you follow any special diet presently?: No  Who cooks at home?:  spouse  Who does the grocery shopping?: spouse   How frequently do you eat out?: a little     Activity Assessment    Exercise: gardening now, winter not so much , was doing PT recently     Lifestyle/Social Assessment    Racial/ethnic group:                                       Primary Language: English  Marital Status:   Education Level: Bachelor's Degree   Work status: Retired  Type of job and hours: retired  Who lives in your household?: spouse  Who is you primary support person(s): spouse   Describe your quality of life currently?: good  Any concerns for your safety?: No  Any Mosque or cultural practices that may affect your diabetes care: No  Do you have a decrease or loss of hearing?: No  Do you have a decrease or loss of vision?: No  When was the last time you had an ophthalmology exam?: next week, 1 year ago   When was the last time you had dental exam?: 2 weeks ago,. All good   Do you check your feet for cracks, sores, debris?: No  When was the last time you had podiatry or foot exam?: PCP did  it April 16th.   Last flu shot?: yes in the fall   Pneumonia shot?: Yes     The patient's history was reviewed and updated as appropriate: allergies, current " medications.    Intervention    Diabetes Overview :   Madeleine was instructed on basic concepts of diabetes, including identifying role of diabetes self management, basic pathophysiology and types of diabetes, A1c and blood sugar targets. Madeleine has good understanding of material covered.    Taking Medications: Instructed patient on action, side effects, efficacy, prescribed dosage and appropriate timing and frequency of administration of her diabetes medication. Madeleine has good understanding of material covered.     Monitoring Blood Sugars  Instructions for Meter Teaching- Patient instructed in the following:  Site selection and skin preparation, Loading strips and lancet device, meter activation, obtaining blood sample, test strip and lancet disposal and recording log book entries. Patient has good understanding of material covered     Testing frequency: Encouraged pair testing. Test sugars before a meal and 2hr after the same meal, rotating between breakfast, lunch, and dinner. Test sugars twice a day (3 days a week).     Goal Blood Sugars:   Premeal , even better <110  2hr after a meal <180, even better <140  A1C <7%, even better <6.5%.    Hypoglycemia: Instructed patient on definition/risk of hypoglycemia, treatment, causes/symptoms, when to notify provider of lows, prevention of hypoglycemia and exercise precautions.  Comments: Madeleine verbalizes understanding of hypoglycemia concepts      Physical Activity: Discussed benefits of physical activity to optimize blood glucose control, encouraged activity at patient is physically able. Always consult a physician prior to starting an exercise program.  Comments: Madeleine verbalizes understanding of hypoglycemia concepts        Diabetes Education Record  Madeleine received the following handouts: assessment packet      Patient response to instruction    Comprehensiongood  Motivationgood  Expected Compliancegood  Response to Teachback: 100%, demonstrated  understanding    Begin Time: 10:45am  End Time: 11:45am  Referring Provider: Britney    Thank you for referring your patient to [facility] [place], it was a pleasure working with them today. Please feel free to call with any questions or concerns.  Kadi Soliz, RD  [address]  [facility] [address]  [place] [address]

## 2024-05-29 ENCOUNTER — TELEPHONE (OUTPATIENT)
Dept: DIABETES SERVICES | Facility: HOSPITAL | Age: 79
End: 2024-05-29

## 2024-05-29 NOTE — TELEPHONE ENCOUNTER
Can you please schedule Madeleine for Dena Daytime 1-3pm virtual classes with Cheri and myself? Thanks! Douglas

## 2024-05-30 ENCOUNTER — TELEPHONE (OUTPATIENT)
Age: 79
End: 2024-05-30

## 2024-05-30 DIAGNOSIS — E11.65 TYPE 2 DIABETES MELLITUS WITH HYPERGLYCEMIA, WITHOUT LONG-TERM CURRENT USE OF INSULIN (HCC): Primary | ICD-10-CM

## 2024-05-30 RX ORDER — BLOOD SUGAR DIAGNOSTIC
STRIP MISCELLANEOUS
Qty: 100 STRIP | Refills: 3 | Status: SHIPPED | OUTPATIENT
Start: 2024-05-30

## 2024-05-30 RX ORDER — LANCETS
EACH MISCELLANEOUS
Qty: 100 EACH | Refills: 3 | Status: SHIPPED | OUTPATIENT
Start: 2024-05-30

## 2024-05-30 NOTE — TELEPHONE ENCOUNTER
Pt called asking for a refill on her test strips and lancets. Pt wanted to make note that she does not have the one touch. She needs them for the accu check machine.     Please send in the correct order when possible

## 2024-06-05 ENCOUNTER — TELEPHONE (OUTPATIENT)
Age: 79
End: 2024-06-05

## 2024-06-05 NOTE — TELEPHONE ENCOUNTER
Patient has been rescheduled for LW class #1 on 7/8 from 6-8 pm with Cheri.  I confirmed email and reminded her to look there for the link to enter the classroom.

## 2024-06-05 NOTE — TELEPHONE ENCOUNTER
I sent the email to everyone in class yesterday, using the email in her chart, which I confirmed at our appt. It looks like she may have tried to log into [employer] rather than teams.  I will have the office call her to reschedule class #1 and reminder her that it goes through TEAMS

## 2024-06-05 NOTE — TELEPHONE ENCOUNTER
Patient calling because she has a Living well class virtually scheduled for right now. She has not received a link.   I verified patient's email.     Attempted to reach office. Unable to get anyone.     My manager Reina is reaching out to admin. Told patient someone will be getting back to her.

## 2024-06-12 ENCOUNTER — TELEMEDICINE (OUTPATIENT)
Dept: DIABETES SERVICES | Facility: CLINIC | Age: 79
End: 2024-06-12
Payer: COMMERCIAL

## 2024-06-12 DIAGNOSIS — E11.65 TYPE 2 DIABETES MELLITUS WITH HYPERGLYCEMIA, WITHOUT LONG-TERM CURRENT USE OF INSULIN (HCC): Primary | ICD-10-CM

## 2024-06-12 PROCEDURE — G0109 DIAB MANAGE TRN IND/GROUP: HCPCS

## 2024-06-12 NOTE — PROGRESS NOTES
Living Well with Diabetes Group Class #2    Madeleine Jiménez attended the virtual Living Well with Diabetes Group Class #2.    During class, Madeleine was instructed on the following topics: Macronutrients, Carbohydrate sources, What one serving of carbohydrate equals, effects of diet on blood glucose levels, effect of carbohydrates on blood glucose levels, basics of meal planning: balance, portions, meal times, measurements, reading food labels to determine carbohydrates.     Madeleine participated in group activities of reading labels together and completing the meal planning activity.     Madeleine will follow up with class #3. Will call with any questions or concerns prior to next session.     The patient's history was reviewed and updated as appropriate: allergies, current medications.    Lab Results   Component Value Date    HGBA1C 8.4 (H) 03/09/2024     Diabetes Education Record    Madeleine was provided Living Well with Diabetes Class #2 class slides and portion booklet via email.    Patient response to instruction    Comprehension: good  Motivation: good  Expected Compliance: good    Start- Stop: 1:00-3:00  Total Minutes: 120 Minutes  Group or Individual Instruction: DSME-G  Other: Xochitl Tan, DO    Thank you for referring your patient to [facility], it was a pleasure working with them today. Please feel free to call with any questions or concerns.    Cheri Mendoza, MS, RD, LDN  [address]  [address]  [place] [id number]

## 2024-06-19 ENCOUNTER — TELEMEDICINE (OUTPATIENT)
Dept: DIABETES SERVICES | Facility: HOSPITAL | Age: 79
End: 2024-06-19

## 2024-06-19 DIAGNOSIS — E11.65 TYPE 2 DIABETES MELLITUS WITH HYPERGLYCEMIA, WITHOUT LONG-TERM CURRENT USE OF INSULIN (HCC): Primary | ICD-10-CM

## 2024-06-19 NOTE — PROGRESS NOTES
Virtual Regular Visit    Verification of patient location:    Patient is located at Home in the following state in which I hold an active license [place]      Assessment/Plan:    Problem List Items Addressed This Visit       Type 2 diabetes mellitus with hyperglycemia, without long-term current use of insulin (HCC) - Primary            Reason for visit is   Chief Complaint   Patient presents with    Virtual Regular Visit          Encounter provider Kadi Soliz RD      Recent Visits  No visits were found meeting these conditions.  Showing recent visits within past 7 days and meeting all other requirements  Today's Visits  Date Type Provider Dept   06/19/24 Telemedicine Kadi Soliz RD  [facility] [place]   Showing today's visits and meeting all other requirements  Future Appointments  No visits were found meeting these conditions.  Showing future appointments within next 150 days and meeting all other requirements       The patient was identified by name and date of birth. Madeleine Jiménez was informed that this is a telemedicine visit and that the visit is being conducted through the [employer] Teams platform. She agrees to proceed..  My office door was closed. No one else was in the room.  She acknowledged consent and understanding of privacy and security of the video platform. The patient has agreed to participate and understands they can discontinue the visit at any time.    Patient is aware this is a billable service.     Living Well with Diabetes Group Class #3    Madeleine Jiménez attended the Living Well with Diabetes Group Class #3.    During class, Madeleine was instructed on the following topics: Oral and injectable medications, short term complications of diabetes, long term complications of diabetes, prevention of complications, foot care, sick day management, stress management, and traveling with diabetes. Madeleine participated in group activities.    Madeleine will follow up with class #4. Will call  with any questions or concerns prior to next session.     The patient's history was reviewed and updated as appropriate: allergies, current medications.    Lab Results   Component Value Date    HGBA1C 8.4 (H) 03/09/2024       Diabetes Education Record  Madeleine was provided Living Well with Diabetes Class #3 book      Patient response to instruction    Comprehensiongood  Motivationgood  Expected Compliancegood    Begin Time: 1pm  End Time: 3pm  Referring Provider: Britney    Thank you for referring your patient to [facility] [place], it was a pleasure working with them today. Please feel free to call with any questions or concerns.    Kadi Soliz, RD  [address]  [address]  [facility] [id number]      Subjective  Madeleine Jiménez is a 78 y.o. female see notes above .      HPI     Past Medical History:   Diagnosis Date    Kidney stone     Migraine     Osteopenia     Wrist fracture     left        Past Surgical History:   Procedure Laterality Date    APPENDECTOMY      CATARACT EXTRACTION, BILATERAL Bilateral 2020    COLONOSCOPY      complete 11/29/11- 5 years / complete 6/9/04 -10years    COLONOSCOPY      Exam in November 2011 revealed small polyp, sigmoid diverticulosis, internal and external hemorrhoids.  The polyp was  inflammatory polyp so followup in 10 years recommended.    FL RETROGRADE PYELOGRAM  12/18/2020    HEMORRHOID SURGERY      KIDNEY STONE SURGERY      IA CYSTO/URETERO W/LITHOTRIPSY &INDWELL STENT INSRT Right 12/18/2020    Procedure: CYSTOSCOPY URETEROSCOPY RETROGRADE PYELOGRAM AND INSERTION STENT URETERAL;  Surgeon: Hayder Molina MD;  Location: [facility] OR;  Service: Urology    TONSILLECTOMY      TUBAL LIGATION      UPPER GASTROINTESTINAL ENDOSCOPY      April and July 2021:  Antral ulcer on first examination biopsies negative for H pylori, repeat exam with healed ulcer just gastritis.  November 2011 with hiatal hernia irregular Z-line and gastritis.  Biopsies show question of  intestinal metaplasia verses just reflux inflammation, H pylori was negative, nonspecific duodenitis.       Current Outpatient Medications   Medication Sig Dispense Refill    Accu-Chek Guide test strip Test twice daily 100 strip 3    Accu-Chek Softclix Lancets lancets Test twice daily 100 each 3    acetaminophen (TYLENOL) 500 mg tablet Take 500 mg by mouth every 6 (six) hours as needed for mild pain      amLODIPine (NORVASC) 10 mg tablet Take 1 tablet (10 mg total) by mouth daily 90 tablet 2    Blood Glucose Monitoring Suppl (Accu-Chek Guide Me) w/Device KIT Test twice daily      Cetirizine HCl 10 MG CAPS daily as needed ZyrTEC Allergy 10 MG Oral Tablet TAKE 1 TABLET DAILY AS DIRECTED.  Quantity: 30;  Refills: 0    Lynnette Saucedo;  Started 12-May-2014 Active      Cholecalciferol (VITAMIN D-3) 1000 UNITS CAPS Vitamin D3 1000 UNIT Oral Tablet TAKE 1 TABLET DAILY.  Quantity: 30;  Refills: 0    Xochitl Tan DO;  Started 25-Sep-2012 Active      clobetasol (TEMOVATE) 0.05 % ointment apply to affected area sparingly twice a day for 4 weeks then onc...  (REFER TO PRESCRIPTION NOTES). (Patient not taking: Reported on 1/16/2023)      Diclofenac Sodium (VOLTAREN) 1 % Apply 2 g topically 4 (four) times a day 100 g 2    glipiZIDE (GLUCOTROL) 5 mg tablet Take 1 tablet (5 mg total) by mouth daily with breakfast 90 tablet 1    glucose blood (OneTouch Verio) test strip Check blood sugars once daily. Please substitute with appropriate alternative as covered by patient's insurance. Dx: E11.65 (Patient not taking: Reported on 5/23/2024) 100 each 3    glucose blood (OneTouch Verio) test strip Check blood sugars twice daily. Please substitute with appropriate alternative as covered by patient's insurance. Dx: E11.65 (Patient not taking: Reported on 5/23/2024) 200 each 3    levothyroxine 75 mcg tablet Take 1 tablet (75 mcg total) by mouth every morning 90 tablet 2    lisinopril (ZESTRIL) 40 mg tablet Take 1.5 tablets (60 mg  total) by mouth daily 135 tablet 2    metFORMIN (GLUCOPHAGE) 1000 MG tablet Take 1 tablet (1,000 mg total) by mouth 2 (two) times a day with meals 180 tablet 2    metoprolol succinate (TOPROL-XL) 100 mg 24 hr tablet Take 1.5 tablets (150 mg total) by mouth daily 135 tablet 2    Multiple Vitamins-Minerals (CENTRUM ADULTS PO) Centrum Oral Tablet TAKE 1 TABLET DAILY.  Refills: 0    GrazynabreonnaXochitl yeung DO;  Started 25-Sep-2012 Active      OneTouch Delica Lancets 33G MISC Check blood sugars once daily. Please substitute with appropriate alternative as covered by patient's insurance. Dx: E11.65 (Patient not taking: Reported on 5/23/2024) 100 each 3    OneTouch Delica Lancets 33G MISC Check blood sugars twice daily. Please substitute with appropriate alternative as covered by patient's insurance. Dx: E11.65 (Patient not taking: Reported on 5/23/2024) 200 each 3    pantoprazole (PROTONIX) 40 mg tablet Take 1 tablet (40 mg total) by mouth daily as needed (reflux) (Patient not taking: Reported on 2/26/2024) 30 tablet 12    sitaGLIPtin (Januvia) 100 mg tablet Take 1 tablet (100 mg total) by mouth daily 30 tablet 5     No current facility-administered medications for this visit.        Allergies   Allergen Reactions    Metronidazole Anaphylaxis    Tramadol Swelling    Acetazolamide Other (See Comments)     unknown    Penicillins Hives, Itching and Rash    Statins Myalgia    Hydrochlorothiazide Rash    Sulfa Antibiotics Rash    Tetracycline Rash       Review of Systems    Video Exam    There were no vitals filed for this visit.    Physical Exam     Visit Time  Total Visit Duration: 120min

## 2024-06-26 ENCOUNTER — TELEMEDICINE (OUTPATIENT)
Dept: DIABETES SERVICES | Facility: HOSPITAL | Age: 79
End: 2024-06-26
Payer: COMMERCIAL

## 2024-06-26 DIAGNOSIS — E11.65 TYPE 2 DIABETES MELLITUS WITH HYPERGLYCEMIA, WITHOUT LONG-TERM CURRENT USE OF INSULIN (HCC): Primary | ICD-10-CM

## 2024-06-26 PROCEDURE — 98961 EDU&TRN PT SLF-MGMT NQHP 2-4: CPT | Performed by: DIETITIAN, REGISTERED

## 2024-06-26 NOTE — PROGRESS NOTES
Virtual Regular Visit    Verification of patient location:    Patient is located at Home in the following state in which I hold an active license [place]      Assessment/Plan:    Problem List Items Addressed This Visit       Type 2 diabetes mellitus with hyperglycemia, without long-term current use of insulin (HCC) - Primary            Reason for visit is   Chief Complaint   Patient presents with    Virtual Regular Visit          Encounter provider Kadi Soliz RD      Recent Visits  Date Type Provider Dept   06/19/24 Telemedicine Kadi Soliz RD [facility]   Showing recent visits within past 7 days and meeting all other requirements  Today's Visits  Date Type Provider Dept   06/26/24 Telemedicine Kadi Soliz RD [facility]   Showing today's visits and meeting all other requirements  Future Appointments  No visits were found meeting these conditions.  Showing future appointments within next 150 days and meeting all other requirements       The patient was identified by name and date of birth. Madeleine Jiménez was informed that this is a telemedicine visit and that the visit is being conducted through the [employer] Teams platform. She agrees to proceed..  My office door was closed. No one else was in the room.  She acknowledged consent and understanding of privacy and security of the video platform. The patient has agreed to participate and understands they can discontinue the visit at any time.    Patient is aware this is a billable service.     Living Well with Diabetes Group Class #4    Madeleine Jiménez attended the Living Well with Diabetes Group Class #4.    During class, Madeleine was instructed on the following topics: Types of cholesterol, dietary sources of cholesterol, types of fat, types of fiber, reading food labels- in depth, healthy choices when dining out.     Madeleine participated in group activities of reading labels together and completed the dining out activity.      Madeleine will follow up with class #5 or additional DSMT/MNT as desired. Will call with any questions or concerns prior to next session.     The patient's history was reviewed and updated as appropriate: allergies, current medications.    Lab Results   Component Value Date    HGBA1C 8.4 (H) 03/09/2024       Diabetes Education Record  Madeleine was provided Living Well with Diabetes Class #4 book      Patient response to instruction    Comprehensiongood  Motivationgood  Expected Compliancegood    Begin Time: 1pm  End Time: 2:30pm  Referring Provider: Britney     Thank you for referring your patient to [facility], it was a pleasure working with them today. Please feel free to call with any questions or concerns.    Kadi Soliz, RD  [address]  [address]  [facility] [id number]      Subjective  Madeleine Jiménez is a 79 y.o. female see notes above .      HPI     Past Medical History:   Diagnosis Date    Kidney stone     Migraine     Osteopenia     Wrist fracture     left        Past Surgical History:   Procedure Laterality Date    APPENDECTOMY      CATARACT EXTRACTION, BILATERAL Bilateral 2020    COLONOSCOPY      complete 11/29/11- 5 years / complete 6/9/04 -10years    COLONOSCOPY      Exam in November 2011 revealed small polyp, sigmoid diverticulosis, internal and external hemorrhoids.  The polyp was  inflammatory polyp so followup in 10 years recommended.    FL RETROGRADE PYELOGRAM  12/18/2020    HEMORRHOID SURGERY      KIDNEY STONE SURGERY      IA CYSTO/URETERO W/LITHOTRIPSY &INDWELL STENT INSRT Right 12/18/2020    Procedure: CYSTOSCOPY URETEROSCOPY RETROGRADE PYELOGRAM AND INSERTION STENT URETERAL;  Surgeon: Hayder Molina MD;  Location:  MAIN OR;  Service: Urology    TONSILLECTOMY      TUBAL LIGATION      UPPER GASTROINTESTINAL ENDOSCOPY      April and July 2021:  Antral ulcer on first examination biopsies negative for H pylori, repeat exam with healed ulcer just gastritis.  November  2011 with hiatal hernia irregular Z-line and gastritis.  Biopsies show question of intestinal metaplasia verses just reflux inflammation, H pylori was negative, nonspecific duodenitis.       Current Outpatient Medications   Medication Sig Dispense Refill    Accu-Chek Guide test strip Test twice daily 100 strip 3    Accu-Chek Softclix Lancets lancets Test twice daily 100 each 3    acetaminophen (TYLENOL) 500 mg tablet Take 500 mg by mouth every 6 (six) hours as needed for mild pain      amLODIPine (NORVASC) 10 mg tablet Take 1 tablet (10 mg total) by mouth daily 90 tablet 2    Blood Glucose Monitoring Suppl (Accu-Chek Guide Me) w/Device KIT Test twice daily      Cetirizine HCl 10 MG CAPS daily as needed ZyrTEC Allergy 10 MG Oral Tablet TAKE 1 TABLET DAILY AS DIRECTED.  Quantity: 30;  Refills: 0    Lynnette Saucedo;  Started 12-May-2014 Active      Cholecalciferol (VITAMIN D-3) 1000 UNITS CAPS Vitamin D3 1000 UNIT Oral Tablet TAKE 1 TABLET DAILY.  Quantity: 30;  Refills: 0    Xochitl Tan DO;  Started 25-Sep-2012 Active      clobetasol (TEMOVATE) 0.05 % ointment apply to affected area sparingly twice a day for 4 weeks then onc...  (REFER TO PRESCRIPTION NOTES). (Patient not taking: Reported on 1/16/2023)      Diclofenac Sodium (VOLTAREN) 1 % Apply 2 g topically 4 (four) times a day 100 g 2    glipiZIDE (GLUCOTROL) 5 mg tablet Take 1 tablet (5 mg total) by mouth daily with breakfast 90 tablet 1    glucose blood (OneTouch Verio) test strip Check blood sugars once daily. Please substitute with appropriate alternative as covered by patient's insurance. Dx: E11.65 (Patient not taking: Reported on 5/23/2024) 100 each 3    glucose blood (OneTouch Verio) test strip Check blood sugars twice daily. Please substitute with appropriate alternative as covered by patient's insurance. Dx: E11.65 (Patient not taking: Reported on 5/23/2024) 200 each 3    levothyroxine 75 mcg tablet Take 1 tablet (75 mcg total) by mouth every  morning 90 tablet 2    lisinopril (ZESTRIL) 40 mg tablet Take 1.5 tablets (60 mg total) by mouth daily 135 tablet 2    metFORMIN (GLUCOPHAGE) 1000 MG tablet Take 1 tablet (1,000 mg total) by mouth 2 (two) times a day with meals 180 tablet 2    metoprolol succinate (TOPROL-XL) 100 mg 24 hr tablet Take 1.5 tablets (150 mg total) by mouth daily 135 tablet 2    Multiple Vitamins-Minerals (CENTRUM ADULTS PO) Centrum Oral Tablet TAKE 1 TABLET DAILY.  Refills: 0    Xochitl Tan DO;  Started 25-Sep-2012 Active      OneTouch Delica Lancets 33G MISC Check blood sugars once daily. Please substitute with appropriate alternative as covered by patient's insurance. Dx: E11.65 (Patient not taking: Reported on 5/23/2024) 100 each 3    OneTouch Delica Lancets 33G MISC Check blood sugars twice daily. Please substitute with appropriate alternative as covered by patient's insurance. Dx: E11.65 (Patient not taking: Reported on 5/23/2024) 200 each 3    pantoprazole (PROTONIX) 40 mg tablet Take 1 tablet (40 mg total) by mouth daily as needed (reflux) (Patient not taking: Reported on 2/26/2024) 30 tablet 12    sitaGLIPtin (Januvia) 100 mg tablet Take 1 tablet (100 mg total) by mouth daily 30 tablet 5     No current facility-administered medications for this visit.        Allergies   Allergen Reactions    Metronidazole Anaphylaxis    Tramadol Swelling    Acetazolamide Other (See Comments)     unknown    Penicillins Hives, Itching and Rash    Statins Myalgia    Hydrochlorothiazide Rash    Sulfa Antibiotics Rash    Tetracycline Rash       Review of Systems    Video Exam    There were no vitals filed for this visit.    Physical Exam     Visit Time  Total Visit Duration: 90min

## 2024-06-27 ENCOUNTER — TELEPHONE (OUTPATIENT)
Dept: FAMILY MEDICINE CLINIC | Facility: HOSPITAL | Age: 79
End: 2024-06-27

## 2024-07-30 LAB
ALBUMIN SERPL-MCNC: 4.5 G/DL (ref 3.8–4.8)
ALP SERPL-CCNC: 61 IU/L (ref 44–121)
ALT SERPL-CCNC: 37 IU/L (ref 0–32)
AST SERPL-CCNC: 30 IU/L (ref 0–40)
BILIRUB SERPL-MCNC: 0.5 MG/DL (ref 0–1.2)
BUN SERPL-MCNC: 11 MG/DL (ref 8–27)
BUN/CREAT SERPL: 14 (ref 12–28)
CALCIUM SERPL-MCNC: 9.7 MG/DL (ref 8.7–10.3)
CHLORIDE SERPL-SCNC: 102 MMOL/L (ref 96–106)
CO2 SERPL-SCNC: 26 MMOL/L (ref 20–29)
CREAT SERPL-MCNC: 0.81 MG/DL (ref 0.57–1)
EGFR: 75 ML/MIN/1.73
EST. AVERAGE GLUCOSE BLD GHB EST-MCNC: 157 MG/DL
GLOBULIN SER-MCNC: 2.4 G/DL (ref 1.5–4.5)
GLUCOSE SERPL-MCNC: 153 MG/DL (ref 70–99)
HBA1C MFR BLD: 7.1 % (ref 4.8–5.6)
POTASSIUM SERPL-SCNC: 4.5 MMOL/L (ref 3.5–5.2)
PROT SERPL-MCNC: 6.9 G/DL (ref 6–8.5)
SODIUM SERPL-SCNC: 142 MMOL/L (ref 134–144)

## 2024-08-05 ENCOUNTER — TELEMEDICINE (OUTPATIENT)
Dept: DIABETES SERVICES | Facility: CLINIC | Age: 79
End: 2024-08-05
Payer: COMMERCIAL

## 2024-08-05 DIAGNOSIS — E11.65 TYPE 2 DIABETES MELLITUS WITH HYPERGLYCEMIA, WITHOUT LONG-TERM CURRENT USE OF INSULIN (HCC): Primary | ICD-10-CM

## 2024-08-05 PROCEDURE — G0109 DIAB MANAGE TRN IND/GROUP: HCPCS

## 2024-08-06 NOTE — PROGRESS NOTES
Virtual Regular Visit  Name: Madeleine Jiménez      : 1945      MRN: [id number]  Encounter Provider: Cheri Mendoza  Encounter Date: 2024   Encounter department: [facility]    Verification of patient location:    Patient is located at home in the following state in which I hold an active license [place].    Assessment & Plan     Encounter provider Cheri Mendoza    The patient was identified by name and date of birth. Madeleine Jiménez was informed that this is a telemedicine visit and that the visit is being conducted through the [employer] Teams platform. She agrees to proceed. My office door was closed. No one else was in the room. She acknowledged consent and understanding of privacy and security of the video platform. The patient has agreed to participate and understands they can discontinue the visit at any time.    Patient is aware this is a billable service.     History of Present Illness     Madeleine Jiménez is a 79 y.o. female who presents with type 2 diabetes    Review of Systems  Current Outpatient Medications on File Prior to Visit   Medication Sig Dispense Refill    Accu-Chek Guide test strip Test twice daily 100 strip 3    Accu-Chek Softclix Lancets lancets Test twice daily 100 each 3    acetaminophen (TYLENOL) 500 mg tablet Take 500 mg by mouth every 6 (six) hours as needed for mild pain      amLODIPine (NORVASC) 10 mg tablet Take 1 tablet (10 mg total) by mouth daily 90 tablet 2    Blood Glucose Monitoring Suppl (Accu-Chek Guide Me) w/Device KIT Test twice daily      Cetirizine HCl 10 MG CAPS daily as needed ZyrTEC Allergy 10 MG Oral Tablet TAKE 1 TABLET DAILY AS DIRECTED.  Quantity: 30;  Refills: 0    Lynnette Saucedo;  Started 12-May-2014 Active      Cholecalciferol (VITAMIN D-3) 1000 UNITS CAPS Vitamin D3 1000 UNIT Oral Tablet TAKE 1 TABLET DAILY.  Quantity: 30;  Refills: 0    Xochitl Tan DO;  Started 25-Sep-2012 Active      clobetasol  (TEMOVATE) 0.05 % ointment apply to affected area sparingly twice a day for 4 weeks then onc...  (REFER TO PRESCRIPTION NOTES). (Patient not taking: Reported on 1/16/2023)      Diclofenac Sodium (VOLTAREN) 1 % Apply 2 g topically 4 (four) times a day 100 g 2    glipiZIDE (GLUCOTROL) 5 mg tablet Take 1 tablet (5 mg total) by mouth daily with breakfast 90 tablet 1    glucose blood (OneTouch Verio) test strip Check blood sugars once daily. Please substitute with appropriate alternative as covered by patient's insurance. Dx: E11.65 (Patient not taking: Reported on 5/23/2024) 100 each 3    glucose blood (OneTouch Verio) test strip Check blood sugars twice daily. Please substitute with appropriate alternative as covered by patient's insurance. Dx: E11.65 (Patient not taking: Reported on 5/23/2024) 200 each 3    levothyroxine 75 mcg tablet Take 1 tablet (75 mcg total) by mouth every morning 90 tablet 2    lisinopril (ZESTRIL) 40 mg tablet Take 1.5 tablets (60 mg total) by mouth daily 135 tablet 2    metFORMIN (GLUCOPHAGE) 1000 MG tablet Take 1 tablet (1,000 mg total) by mouth 2 (two) times a day with meals 180 tablet 2    metoprolol succinate (TOPROL-XL) 100 mg 24 hr tablet Take 1.5 tablets (150 mg total) by mouth daily 135 tablet 2    Multiple Vitamins-Minerals (CENTRUM ADULTS PO) Centrum Oral Tablet TAKE 1 TABLET DAILY.  Refills: 0    Xochitl Tan DO;  Started 25-Sep-2012 Active      OneTouch Delica Lancets 33G MISC Check blood sugars once daily. Please substitute with appropriate alternative as covered by patient's insurance. Dx: E11.65 (Patient not taking: Reported on 5/23/2024) 100 each 3    OneTouch Delica Lancets 33G MISC Check blood sugars twice daily. Please substitute with appropriate alternative as covered by patient's insurance. Dx: E11.65 (Patient not taking: Reported on 5/23/2024) 200 each 3    pantoprazole (PROTONIX) 40 mg tablet Take 1 tablet (40 mg total) by mouth daily as needed (reflux) (Patient not  taking: Reported on 2/26/2024) 30 tablet 12    sitaGLIPtin (Januvia) 100 mg tablet Take 1 tablet (100 mg total) by mouth daily 30 tablet 5     No current facility-administered medications on file prior to visit.      Objective     There were no vitals taken for this visit.  Physical Exam    Visit Time  Total Visit Duration: 105 minutes    Living Well with Diabetes Group Class #1    Madeleine Jiménez attended the virtual Living Well with Diabetes Group Class #1.    Topics Covered in class today include: What is diabetes; Types of Diabetes; How Diabetes is diagnosed; Management skills; the role of exercise in blood sugar managements, Home glucose monitoring, and target ranges.    Madeleine participated in group activities    The patient's history was reviewed and updated as appropriate: allergies, current medications.    Lab Results   Component Value Date    HGBA1C 7.1 (H) 07/29/2024     Diabetes Education Record    Madeleine was provided Living Well with Diabetes Class #1 slides via email.    Patient response to instruction    Comprehension: good  Motivation: good  Expected Compliance: good    Start- Stop: 6:00-7:45  Total Minutes: 105 Minutes  Group or Individual Instruction: DSME-G  Other: Xochitl Tan,     Thank you for referring your patient to [facility], it was a pleasure working with them today. Please feel free to call with any questions or concerns.    Cheri Mendoza, MS, RD, LDN  [address]  [address]  [place] [id number]

## 2024-08-09 ENCOUNTER — OFFICE VISIT (OUTPATIENT)
Dept: FAMILY MEDICINE CLINIC | Facility: HOSPITAL | Age: 79
End: 2024-08-09
Payer: COMMERCIAL

## 2024-08-09 VITALS
HEIGHT: 63 IN | WEIGHT: 166.4 LBS | SYSTOLIC BLOOD PRESSURE: 138 MMHG | TEMPERATURE: 98.5 F | OXYGEN SATURATION: 96 % | BODY MASS INDEX: 29.48 KG/M2 | DIASTOLIC BLOOD PRESSURE: 72 MMHG | HEART RATE: 76 BPM

## 2024-08-09 DIAGNOSIS — E03.9 HYPOTHYROIDISM, UNSPECIFIED TYPE: ICD-10-CM

## 2024-08-09 DIAGNOSIS — I10 ESSENTIAL HYPERTENSION: ICD-10-CM

## 2024-08-09 DIAGNOSIS — E11.65 TYPE 2 DIABETES MELLITUS WITH HYPERGLYCEMIA, WITHOUT LONG-TERM CURRENT USE OF INSULIN (HCC): Primary | ICD-10-CM

## 2024-08-09 DIAGNOSIS — R79.89 ELEVATED TSH: ICD-10-CM

## 2024-08-09 DIAGNOSIS — R74.01 ELEVATED ALT MEASUREMENT: ICD-10-CM

## 2024-08-09 PROCEDURE — G2211 COMPLEX E/M VISIT ADD ON: HCPCS | Performed by: INTERNAL MEDICINE

## 2024-08-09 PROCEDURE — 99214 OFFICE O/P EST MOD 30 MIN: CPT | Performed by: INTERNAL MEDICINE

## 2024-08-09 NOTE — ASSESSMENT & PLAN NOTE
Lab Results   Component Value Date    HGBA1C 7.1 (H) 07/29/2024   DM type 2 with hyperglycemia - uncontrolled but greatly improved with A1C down to 7.1! - Congratulated on lifestyle changes and encouraged to con't with them, con't current DM meds, recheck BW in 6 mos - BW order given, UTD on DM foot exam (4/24) and eye exam (5/24), she is on an ACE but no statin, will follow

## 2024-08-09 NOTE — PROGRESS NOTES
Ambulatory Visit  Name: Madeleine Jiménez      : 1945      MRN: [id number]  Encounter Provider: Xochitl Tan DO  Encounter Date: 2024   Encounter department: [facility] [address]     Assessment & Plan   1. Type 2 diabetes mellitus with hyperglycemia, without long-term current use of insulin (HCC)  Assessment & Plan:    Lab Results   Component Value Date    HGBA1C 7.1 (H) 2024   DM type 2 with hyperglycemia - uncontrolled but greatly improved with A1C down to 7.1! - Congratulated on lifestyle changes and encouraged to con't with them, con't current DM meds, recheck BW in 6 mos - BW order given, UTD on DM foot exam () and eye exam (), she is on an ACE but no statin, will follow  Orders:  -     CBC and Platelet; Future; Expected date: 2025  -     Comprehensive metabolic panel; Future; Expected date: 2025  -     Hemoglobin A1C; Future; Expected date: 2025  -     Lipid Panel with Direct LDL reflex; Future; Expected date: 2025  -     TSH, 3rd generation with Free T4 reflex; Future; Expected date: 2025  -     Albumin / creatinine urine ratio; Future; Expected date: 2025  -     CBC and Platelet  -     Comprehensive metabolic panel  -     Hemoglobin A1C  -     Lipid Panel with Direct LDL reflex  -     TSH, 3rd generation with Free T4 reflex  -     Albumin / creatinine urine ratio  2. Elevated ALT measurement  Comments:  Gradually coming down, CT A/P  did not hepatic steatosis, will montior BW and poss re-image if trends up  Orders:  -     CBC and Platelet; Future; Expected date: 2025  -     Comprehensive metabolic panel; Future; Expected date: 2025  -     Hemoglobin A1C; Future; Expected date: 2025  -     Lipid Panel with Direct LDL reflex; Future; Expected date: 2025  -     TSH, 3rd generation with Free T4 reflex; Future; Expected date: 2025  -     Albumin / creatinine urine ratio; Future; Expected  date: 02/05/2025  -     CBC and Platelet  -     Comprehensive metabolic panel  -     Hemoglobin A1C  -     Lipid Panel with Direct LDL reflex  -     TSH, 3rd generation with Free T4 reflex  -     Albumin / creatinine urine ratio  3. Essential hypertension  Assessment & Plan:  BP upper range of goal but acceptable, con't current meds for now, con't healthy diet and keep active, will follow  Orders:  -     CBC and Platelet; Future; Expected date: 02/05/2025  -     Comprehensive metabolic panel; Future; Expected date: 02/05/2025  -     Hemoglobin A1C; Future; Expected date: 02/05/2025  -     Lipid Panel with Direct LDL reflex; Future; Expected date: 02/05/2025  -     TSH, 3rd generation with Free T4 reflex; Future; Expected date: 02/05/2025  -     Albumin / creatinine urine ratio; Future; Expected date: 02/05/2025  -     CBC and Platelet  -     Comprehensive metabolic panel  -     Hemoglobin A1C  -     Lipid Panel with Direct LDL reflex  -     TSH, 3rd generation with Free T4 reflex  -     Albumin / creatinine urine ratio  4. Hypothyroidism, unspecified type  Assessment & Plan:  TFT's annually - order given to be done with labs in early 2025, con't current levothyroxine for now, will follow  Orders:  -     CBC and Platelet; Future; Expected date: 02/05/2025  -     Comprehensive metabolic panel; Future; Expected date: 02/05/2025  -     Hemoglobin A1C; Future; Expected date: 02/05/2025  -     Lipid Panel with Direct LDL reflex; Future; Expected date: 02/05/2025  -     TSH, 3rd generation with Free T4 reflex; Future; Expected date: 02/05/2025  -     Albumin / creatinine urine ratio; Future; Expected date: 02/05/2025  -     CBC and Platelet  -     Comprehensive metabolic panel  -     Hemoglobin A1C  -     Lipid Panel with Direct LDL reflex  -     TSH, 3rd generation with Free T4 reflex  -     Albumin / creatinine urine ratio  5. Elevated TSH  Assessment & Plan:  Recheck TFT's with labs in 2025 - BW order given, con't  current levothyroxine for now, will follow  Orders:  -     CBC and Platelet; Future; Expected date: 02/05/2025  -     Comprehensive metabolic panel; Future; Expected date: 02/05/2025  -     Hemoglobin A1C; Future; Expected date: 02/05/2025  -     Lipid Panel with Direct LDL reflex; Future; Expected date: 02/05/2025  -     TSH, 3rd generation with Free T4 reflex; Future; Expected date: 02/05/2025  -     Albumin / creatinine urine ratio; Future; Expected date: 02/05/2025  -     CBC and Platelet  -     Comprehensive metabolic panel  -     Hemoglobin A1C  -     Lipid Panel with Direct LDL reflex  -     TSH, 3rd generation with Free T4 reflex  -     Albumin / creatinine urine ratio         Colonoscopy 4/21 - 10 yrs    Mammo 8/23 - scheduled for 2024    Dexa 8/23 - osteopenia    BW 7/24 (A1C 7.1)  DM foot exam 4/24  DM eye exam 5/24    Has appt coming up with GYN    History of Present Illness     HPI Pt here for follow up appt and BW results    BW results were d/w pt in detail: FBS/A1C 153/7.1, ALT 37, rest of CMP was wnl    Def of controlled vs uncontrolled DM was reviewed.  Diet was reviewed - wgt down 6 lbs from April 24.  She is taking her DM meds as directed - last visit Glipizide was added at 5 mg 1 tab PO q day.  She has seen DM Edu/nitritions since last visit and notes it was very helpful and she has changed her diet.  She notes no SE with the medication. She has been checking her sugars once a day and readings have come down from 170's to 140-120's.  She is UTD on DM foot exam (4/24) and eye exam (5/24).  She is on an ACE but is not on a statin.     Nml range for LFT's reviewed.  CT A/P Oct 20 did show some hepatic steatosis.     BP at goal today and meds were reviewed and no changes have occurred.  She denies missing doses of meds or SE with the meds.  She does not check her BP outside the office.  She notes no frequent Ha's/dizziness/double vision/CP.       Review of Systems   Constitutional:  Negative for  "chills, fever and unexpected weight change.   HENT:  Negative for congestion and trouble swallowing.    Eyes:  Negative for pain and visual disturbance.   Respiratory:  Negative for cough and shortness of breath.    Cardiovascular:  Negative for chest pain and palpitations.   Gastrointestinal:  Negative for abdominal pain, diarrhea and nausea.   Genitourinary:  Negative for difficulty urinating and dysuria.   Musculoskeletal:  Negative for back pain and neck pain.   Skin:  Negative for rash and wound.   Neurological:  Negative for dizziness, light-headedness and headaches.   Hematological:  Does not bruise/bleed easily.   Psychiatric/Behavioral:  Negative for confusion.        Objective     /72 (BP Location: Left arm, Patient Position: Sitting, Cuff Size: Large)   Pulse 76   Temp 98.5 °F (36.9 °C) (Tympanic)   Ht 5' 3\" (1.6 m)   Wt 75.5 kg (166 lb 6.4 oz)   SpO2 96%   BMI 29.48 kg/m²     Physical Exam  Vitals and nursing note reviewed.   Constitutional:       General: She is not in acute distress.     Appearance: She is not ill-appearing.   HENT:      Head: Normocephalic and atraumatic.   Eyes:      General:         Right eye: No discharge.         Left eye: No discharge.      Conjunctiva/sclera: Conjunctivae normal.   Pulmonary:      Effort: Pulmonary effort is normal. No respiratory distress.   Skin:     Coloration: Skin is not pale.      Findings: No rash.   Neurological:      Gait: Gait normal.   Psychiatric:         Behavior: Behavior normal.         Thought Content: Thought content normal.         Judgment: Judgment normal.       Administrative Statements           "

## 2024-08-09 NOTE — ASSESSMENT & PLAN NOTE
TFT's annually - order given to be done with labs in early 2025, con't current levothyroxine for now, will follow

## 2024-08-09 NOTE — PATIENT INSTRUCTIONS
"Patient Education     Type 2 diabetes   The Basics   Written by the doctors and editors at [facility]   What is type 2 diabetes? -- This is a disorder that disrupts the way the body uses sugar. It is sometimes called type 2 diabetes mellitus.  All of the cells in the body need sugar to work normally. Sugar gets into the cells with the help of a hormone called insulin. Insulin is made by the pancreas, an organ in the belly. If there is not enough insulin, or if cells in the body don't respond normally to insulin, sugar builds up in the blood. That is what happens to people with diabetes.  There are 2 different types of diabetes:   In type 1 diabetes, the pancreas makes little or no insulin.   In type 2 diabetes, the pancreas still makes some insulin, but the cells in the body stop responding normally. Eventually, the pancreas cannot make enough insulin to keep up.  Having excess body weight or obesity increases a person's risk of developing type 2 diabetes. But people without excess body weight can get diabetes, too.  What are the symptoms of type 2 diabetes? -- Type 2 diabetes usually causes no symptoms. When symptoms do happen, they include:   Needing to urinate often   Intense thirst   Blurry vision  Can diabetes lead to other health problems? -- Yes. Type 2 diabetes might not make you feel sick. But if it is not managed, it can lead to serious problems over time, such as:   Heart attacks   Strokes   Kidney disease   Vision problems (or even blindness)   Pain or loss of feeling in the hands and feet   Needing to have fingers, toes, or other body parts removed (amputated)  How do I know if I have type 2 diabetes? -- Your doctor or nurse can do a blood test. There are 2 tests that can be used for this. Both involve measuring the amount of sugar in your blood, called your \"blood sugar\" or \"blood glucose\":   One of the tests measures your blood sugar at the time the blood sample is taken. This test is done in the " "morning. You can't eat or drink anything except water for at least 8 hours before the test.   The other test shows what your average blood sugar has been for the past 2 to 3 months. This blood test is called \"hemoglobin A1C\" or just \"A1C.\" It can be checked at any time of the day, even if you have recently eaten.  How is type 2 diabetes treated? -- The goals of treatment are to manage your blood sugar and lower the risk of future problems that can happen in people with diabetes.  Treatment might include:   Lifestyle changes - This is an important part of managing diabetes. It includes eating healthy foods and getting plenty of physical activity.   Medicines - There are a few medicines that help lower blood sugar. Some people need to take pills that help the body make more insulin or that help insulin do its job. Others need insulin shots.  Depending on what medicines you take, you might need to check your blood sugar regularly at home. But not everyone with type 2 diabetes needs to do this. Your doctor or nurse will tell you if you should be checking your blood sugar, and when and how to do this.  Sometimes, people with type 2 diabetes also need medicines to help prevent problems caused by the disease. For instance, medicines used to lower blood pressure can reduce the chances of a heart attack or stroke.   General medical care - It's also important to take care of other areas of your health. This includes watching your blood pressure and cholesterol levels. You should also get certain vaccines, such as vaccines to protect against the flu and coronavirus disease 2019 (\"COVID-19\"). Some people also need a vaccine to prevent pneumonia.  Can type 2 diabetes be prevented? -- Yes. To lower your chances of getting type 2 diabetes, the most important thing you can do is eat a healthy diet and get plenty of physical activity. This can help you lose weight if you are overweight. But eating well and being active are also good " for your overall health. Even gentle activity, like walking, has benefits.  If you smoke, quitting can also lower your risk of type 2 diabetes. Quitting smoking can be difficult, but your doctor or nurse can help.  All topics are updated as new evidence becomes available and our peer review process is complete.  This topic retrieved from [employer] on: Apr 24, 2024.  Topic 11651 Version 23.0  Release: 32.3.2 - C32.113  © 2024 UpToDate, Inc. and/or its affiliates. All rights reserved.  Consumer Information Use and Disclaimer   Disclaimer: This generalized information is a limited summary of diagnosis, treatment, and/or medication information. It is not meant to be comprehensive and should be used as a tool to help the user understand and/or assess potential diagnostic and treatment options. It does NOT include all information about conditions, treatments, medications, side effects, or risks that may apply to a specific patient. It is not intended to be medical advice or a substitute for the medical advice, diagnosis, or treatment of a health care provider based on the health care provider's examination and assessment of a patient's specific and unique circumstances. Patients must speak with a health care provider for complete information about their health, medical questions, and treatment options, including any risks or benefits regarding use of medications. This information does not endorse any treatments or medications as safe, effective, or approved for treating a specific patient. UpToDate, Inc. and its affiliates disclaim any warranty or liability relating to this information or the use thereof.The use of this information is governed by the Terms of Use, available at https://www.wolterskluwer.com/en/know/clinical-effectiveness-terms. 2024© UpToDate, Inc. and its affiliates and/or licensors. All rights reserved.  Copyright   © 2024 UpToDate, Inc. and/or its affiliates. All rights reserved.

## 2024-08-09 NOTE — ASSESSMENT & PLAN NOTE
BP upper range of goal but acceptable, con't current meds for now, con't healthy diet and keep active, will follow

## 2024-08-09 NOTE — ASSESSMENT & PLAN NOTE
Recheck TFT's with labs in 2025 - BW order given, con't current levothyroxine for now, will follow

## 2024-09-12 ENCOUNTER — HOSPITAL ENCOUNTER (OUTPATIENT)
Dept: MAMMOGRAPHY | Facility: IMAGING CENTER | Age: 79
Discharge: HOME | End: 2024-09-12
Payer: COMMERCIAL

## 2024-09-12 VITALS — HEIGHT: 63 IN | WEIGHT: 166 LBS | BODY MASS INDEX: 29.41 KG/M2

## 2024-09-12 DIAGNOSIS — Z12.31 ENCOUNTER FOR SCREENING MAMMOGRAM FOR MALIGNANT NEOPLASM OF BREAST: ICD-10-CM

## 2024-09-12 PROCEDURE — 77063 BREAST TOMOSYNTHESIS BI: CPT

## 2024-09-12 PROCEDURE — 77067 SCR MAMMO BI INCL CAD: CPT

## 2024-09-22 DIAGNOSIS — E11.65 UNCONTROLLED TYPE 2 DIABETES MELLITUS WITH HYPERGLYCEMIA (HCC): ICD-10-CM

## 2024-09-23 RX ORDER — SITAGLIPTIN 100 MG/1
100 TABLET, FILM COATED ORAL DAILY
Qty: 90 TABLET | Refills: 1 | Status: SHIPPED | OUTPATIENT
Start: 2024-09-23

## 2024-09-30 DIAGNOSIS — E11.65 TYPE 2 DIABETES MELLITUS WITH HYPERGLYCEMIA, WITHOUT LONG-TERM CURRENT USE OF INSULIN (HCC): ICD-10-CM

## 2024-09-30 RX ORDER — GLIPIZIDE 5 MG/1
5 TABLET ORAL
Qty: 90 TABLET | Refills: 1 | Status: SHIPPED | OUTPATIENT
Start: 2024-09-30

## 2024-09-30 NOTE — TELEPHONE ENCOUNTER
Reason for call:   [x] Refill   [] Prior Auth  [] Other:     Office:   [x] PCP/Provider - Xochitl Tan DO   [] Specialty/Provider -     Medication: glipiZIDE (GLUCOTROL) 5 mg tablet     Dose/Frequency: Take 1 tablet (5 mg total) by mouth daily with breakfast     Quantity: 90    Pharmacy: [id number] - [place] - [id number] [facility] [phone]    Does the patient have enough for 3 days?   [x] Yes   [] No - Send as HP to POD

## 2025-01-31 DIAGNOSIS — I10 ESSENTIAL HYPERTENSION: ICD-10-CM

## 2025-01-31 RX ORDER — LISINOPRIL 40 MG/1
60 TABLET ORAL DAILY
Qty: 135 TABLET | Refills: 1 | Status: SHIPPED | OUTPATIENT
Start: 2025-01-31

## 2025-01-31 NOTE — TELEPHONE ENCOUNTER
Reason for call:   [x] Refill   [] Prior Auth  [] Other:     Office:   [x] PCP/Provider -   [] Specialty/Provider -     Medication:     lisinopril (ZESTRIL) 40 mg Take 1.5 tablets (60 mg total) by mouth daily          Pharmacy: [facility]     Does the patient have enough for 3 days?   [x] Yes   [] No - Send as HP to POD

## 2025-02-11 ENCOUNTER — TELEPHONE (OUTPATIENT)
Dept: FAMILY MEDICINE CLINIC | Facility: HOSPITAL | Age: 80
End: 2025-02-11

## 2025-02-11 ENCOUNTER — RESULTS FOLLOW-UP (OUTPATIENT)
Dept: FAMILY MEDICINE CLINIC | Facility: HOSPITAL | Age: 80
End: 2025-02-11

## 2025-02-11 LAB
ALBUMIN SERPL-MCNC: 4.6 G/DL (ref 3.8–4.8)
ALBUMIN/CREAT UR: 25 MG/G CREAT (ref 0–29)
ALP SERPL-CCNC: 69 IU/L (ref 44–121)
ALT SERPL-CCNC: 37 IU/L (ref 0–32)
AST SERPL-CCNC: 28 IU/L (ref 0–40)
BILIRUB SERPL-MCNC: 0.3 MG/DL (ref 0–1.2)
BUN SERPL-MCNC: 13 MG/DL (ref 8–27)
BUN/CREAT SERPL: 16 (ref 12–28)
CALCIUM SERPL-MCNC: 9.7 MG/DL (ref 8.7–10.3)
CHLORIDE SERPL-SCNC: 94 MMOL/L (ref 96–106)
CHOLEST SERPL-MCNC: 191 MG/DL (ref 100–199)
CO2 SERPL-SCNC: 23 MMOL/L (ref 20–29)
CREAT SERPL-MCNC: 0.82 MG/DL (ref 0.57–1)
CREAT UR-MCNC: 38.1 MG/DL
EGFR: 74 ML/MIN/1.73
ERYTHROCYTE [DISTWIDTH] IN BLOOD BY AUTOMATED COUNT: 12.2 % (ref 11.7–15.4)
EST. AVERAGE GLUCOSE BLD GHB EST-MCNC: 171 MG/DL
GLOBULIN SER-MCNC: 2.4 G/DL (ref 1.5–4.5)
GLUCOSE SERPL-MCNC: 159 MG/DL (ref 70–99)
HBA1C MFR BLD: 7.6 % (ref 4.8–5.6)
HCT VFR BLD AUTO: 43.1 % (ref 34–46.6)
HDLC SERPL-MCNC: 34 MG/DL
HGB BLD-MCNC: 14.9 G/DL (ref 11.1–15.9)
LDLC SERPL CALC-MCNC: 111 MG/DL (ref 0–99)
LDLC/HDLC SERPL: 3.3 RATIO (ref 0–3.2)
MCH RBC QN AUTO: 31.7 PG (ref 26.6–33)
MCHC RBC AUTO-ENTMCNC: 34.6 G/DL (ref 31.5–35.7)
MCV RBC AUTO: 92 FL (ref 79–97)
MICROALBUMIN UR-MCNC: 9.5 UG/ML
PLATELET # BLD AUTO: 350 X10E3/UL (ref 150–450)
POTASSIUM SERPL-SCNC: 4 MMOL/L (ref 3.5–5.2)
PROT SERPL-MCNC: 7 G/DL (ref 6–8.5)
RBC # BLD AUTO: 4.7 X10E6/UL (ref 3.77–5.28)
SL AMB VLDL CHOLESTEROL CALC: 46 MG/DL (ref 5–40)
SODIUM SERPL-SCNC: 134 MMOL/L (ref 134–144)
TRIGL SERPL-MCNC: 264 MG/DL (ref 0–149)
TSH SERPL DL<=0.005 MIU/L-ACNC: 2.26 UIU/ML (ref 0.45–4.5)
WBC # BLD AUTO: 7.5 X10E3/UL (ref 3.4–10.8)

## 2025-02-11 NOTE — TELEPHONE ENCOUNTER
Patient called the RX Refill Line. Message is being forwarded to the office.     Patient is requesting a call back regarding her lab work. The patient stated she was to come in for an appointment today but it was canceled because the office did not get the lab work back yet that she had done on Friday. She is called to see if the office was able to get her lab work at this time    Please contact patient at

## 2025-02-18 DIAGNOSIS — E11.65 UNCONTROLLED TYPE 2 DIABETES MELLITUS WITH HYPERGLYCEMIA (HCC): ICD-10-CM

## 2025-02-26 DIAGNOSIS — I10 ESSENTIAL HYPERTENSION: ICD-10-CM

## 2025-02-26 DIAGNOSIS — E03.9 HYPOTHYROIDISM, UNSPECIFIED TYPE: ICD-10-CM

## 2025-02-26 DIAGNOSIS — R79.89 ELEVATED TSH: ICD-10-CM

## 2025-02-27 RX ORDER — LEVOTHYROXINE SODIUM 75 UG/1
75 TABLET ORAL EVERY MORNING
Qty: 90 TABLET | Refills: 1 | Status: SHIPPED | OUTPATIENT
Start: 2025-02-27

## 2025-02-27 RX ORDER — METOPROLOL SUCCINATE 100 MG/1
150 TABLET, EXTENDED RELEASE ORAL DAILY
Qty: 135 TABLET | Refills: 0 | Status: SHIPPED | OUTPATIENT
Start: 2025-02-27

## 2025-04-01 DIAGNOSIS — I10 ESSENTIAL HYPERTENSION: ICD-10-CM

## 2025-04-02 RX ORDER — AMLODIPINE BESYLATE 10 MG/1
10 TABLET ORAL DAILY
Qty: 90 TABLET | Refills: 1 | Status: SHIPPED | OUTPATIENT
Start: 2025-04-02

## 2025-04-10 DIAGNOSIS — E11.65 TYPE 2 DIABETES MELLITUS WITH HYPERGLYCEMIA, WITHOUT LONG-TERM CURRENT USE OF INSULIN (HCC): ICD-10-CM

## 2025-04-10 RX ORDER — GLIPIZIDE 5 MG/1
5 TABLET ORAL
Qty: 90 TABLET | Refills: 1 | Status: SHIPPED | OUTPATIENT
Start: 2025-04-10

## 2025-04-10 NOTE — TELEPHONE ENCOUNTER
From: Mona Dodd  To: Yolanda Golden  Sent: 12/29/2022 6:05 PM CST  Subject: blood pressure creeping up    Dear Dr. Alvaro Taylor,    Thank you for your care for [facility]. We deeply appreciate that [facility] calls us each week to explain results and check on how [facility] is doing. We sent the below message to his primary care doc, but wanted to send it to you as well--to keep you informed (and because his GP isn't always responsive. ..). We have noticed that Hossein's blood pressure has been creeping up. For the last few weeks it has been hanging out around 170/90 on his home blood pressure cuff (sometimes higher), and you should be able to see a similar trend in his weekly blood pressures recorded before his Wednesday iron infusions in his [employer] chart. 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 Does he need a change in dose? We want to do everything we can to give his kidneys the best possible conditions to improve or maintain--and we understand that controlling his BP is an important part of that effort. Please advise. We are excited to see his hemoglobin improving without needing transfusions. We are sometimes concerned that his WBC are running a little low.     Thank you again for the excellent care and have a happy new year,  Vin Sue and [facility] Reason for call:   [x] Refill   [] Prior Auth  [] Other:     Office: [facility] [address]  [x] PCP/Provider - Xochitl Tan   [] Specialty/Provider -     Medication: glipizide     Dose/Frequency: 5 mg/ daily     Quantity: 90 day supply     Pharmacy: [facility]   Does the patient have enough for 3 days?   [] Yes   [x] No - Send as HP to POD    Mail Away Pharmacy   Does the patient have enough for 10 days?   [] Yes   [] No - Send as HP to POD

## 2025-04-25 ENCOUNTER — OFFICE VISIT (OUTPATIENT)
Dept: FAMILY MEDICINE CLINIC | Facility: HOSPITAL | Age: 80
End: 2025-04-25
Payer: COMMERCIAL

## 2025-04-25 ENCOUNTER — RESULTS FOLLOW-UP (OUTPATIENT)
Dept: FAMILY MEDICINE CLINIC | Facility: HOSPITAL | Age: 80
End: 2025-04-25

## 2025-04-25 VITALS
DIASTOLIC BLOOD PRESSURE: 72 MMHG | BODY MASS INDEX: 30.58 KG/M2 | HEIGHT: 63 IN | SYSTOLIC BLOOD PRESSURE: 123 MMHG | OXYGEN SATURATION: 97 % | WEIGHT: 172.6 LBS | TEMPERATURE: 98.3 F | HEART RATE: 78 BPM

## 2025-04-25 DIAGNOSIS — E66.9 OBESITY (BMI 30-39.9): ICD-10-CM

## 2025-04-25 DIAGNOSIS — R74.01 ALT (SGPT) LEVEL RAISED: ICD-10-CM

## 2025-04-25 DIAGNOSIS — E78.5 DYSLIPIDEMIA: ICD-10-CM

## 2025-04-25 DIAGNOSIS — Z00.00 MEDICARE ANNUAL WELLNESS VISIT, SUBSEQUENT: ICD-10-CM

## 2025-04-25 DIAGNOSIS — M85.80 OSTEOPENIA AFTER MENOPAUSE: ICD-10-CM

## 2025-04-25 DIAGNOSIS — E03.9 HYPOTHYROIDISM, UNSPECIFIED TYPE: ICD-10-CM

## 2025-04-25 DIAGNOSIS — E11.42 DIABETIC POLYNEUROPATHY ASSOCIATED WITH TYPE 2 DIABETES MELLITUS (HCC): ICD-10-CM

## 2025-04-25 DIAGNOSIS — E11.65 TYPE 2 DIABETES MELLITUS WITH HYPERGLYCEMIA, WITHOUT LONG-TERM CURRENT USE OF INSULIN (HCC): Primary | ICD-10-CM

## 2025-04-25 DIAGNOSIS — Z78.0 OSTEOPENIA AFTER MENOPAUSE: ICD-10-CM

## 2025-04-25 DIAGNOSIS — I10 ESSENTIAL HYPERTENSION: ICD-10-CM

## 2025-04-25 LAB
LEFT EYE DIABETIC RETINOPATHY: NORMAL
LEFT EYE IMAGE QUALITY: NORMAL
LEFT EYE MACULAR EDEMA: NORMAL
LEFT EYE OTHER RETINOPATHY: NORMAL
RIGHT EYE DIABETIC RETINOPATHY: NORMAL
RIGHT EYE IMAGE QUALITY: NORMAL
RIGHT EYE MACULAR EDEMA: NORMAL
RIGHT EYE OTHER RETINOPATHY: NORMAL
SEVERITY (EYE EXAM): NORMAL

## 2025-04-25 PROCEDURE — 92250 FUNDUS PHOTOGRAPHY W/I&R: CPT | Performed by: INTERNAL MEDICINE

## 2025-04-25 PROCEDURE — 99214 OFFICE O/P EST MOD 30 MIN: CPT | Performed by: INTERNAL MEDICINE

## 2025-04-25 PROCEDURE — G0439 PPPS, SUBSEQ VISIT: HCPCS | Performed by: INTERNAL MEDICINE

## 2025-04-25 RX ORDER — METOPROLOL SUCCINATE 100 MG/1
150 TABLET, EXTENDED RELEASE ORAL DAILY
Qty: 135 TABLET | Refills: 1 | Status: SHIPPED | OUTPATIENT
Start: 2025-04-25

## 2025-04-25 NOTE — PROGRESS NOTES
"Name: Madeleine Jiménez      : 1945      MRN: [id number]  Encounter Provider: Xochitl Tan DO  Encounter Date: 2025   Encounter department: [facility] [address]   :  Assessment & Plan  Type 2 diabetes mellitus with hyperglycemia, without long-term current use of insulin (HCC)    Lab Results   Component Value Date    HGBA1C 7.6 (H) 2025   DM type 2 with hyperglycemia and neuropathy - uncontrolled with A1C 7.6 - urged to get back on track with diet and exercise, will con't current DM meds for now, recheck BW in 3 mos - BW order given, if A1C not better will need to increase Glipizide, DM foot exam done today, has appt for DM eye exam in  but will do IRIS exam today, on an ACE but deferring statin, will follow  Orders:    Hemoglobin A1C; Future    Basic metabolic panel; Future    IRIS Diabetic eye exam    Diabetic polyneuropathy associated with type 2 diabetes mellitus (HCC)    Lab Results   Component Value Date    HGBA1C 7.6 (H) 2025   DM type 2 with hyperglycemia and neuropathy - uncontrolled with A1C 7.6 - urged to get back on track with diet and exercise, will con't current DM meds for now, recheck BW in 3 mos - BW order given, if A1C not better will need to increase Glipizide, DM foot exam done today, has appt for DM eye exam in  but will do IRIS exam today, urged to avoid walking barefoot and check feet regularly - call with any sores/ulcers, on an ACE but deferring statin, will follow  Orders:    sitaGLIPtin (Januvia) 100 mg tablet; Take 1 tablet (100 mg total) by mouth daily    Hemoglobin A1C; Future    Basic metabolic panel; Future    Dyslipidemia  LDL still above goal and HDL low, pt deferring statin, diet and regular exercise encouraged, will follow       ALT (SGPT) level raised  Stable and rest of LFT\"s wnl, will monitor for now every 6 mos or so, if other LFT\"s go up or ALT rises will check US and further labs       Hypothyroidism, " unspecified type  Clinically euthyroid and TSH at goal, con't current Levothyroxine, monitor TSH annually       Essential hypertension  BP great today, con't current meds, healthy diet and regular formal exercise encouraged   Orders:    metoprolol succinate (TOPROL-XL) 100 mg 24 hr tablet; Take 1.5 tablets (150 mg total) by mouth daily    Medicare annual wellness visit, subsequent         Osteopenia after menopause    Orders:    DXA bone density spine hip and pelvis; Future    Obesity (BMI 30-39.9)    Healthy diet and regular formal exercise encouraged         Depression Screening and Follow-up Plan: Patient was screened for depression during today's encounter. They screened negative with a PHQ-2 score of 0.        Preventive health issues were discussed with patient, and age appropriate screening tests were ordered as noted in patient's After Visit Summary. Personalized health advice and appropriate referrals for health education or preventive services given if needed, as noted in patient's After Visit Summary.    Colonoscopy 4/21 - 10 yrs    Mammo 9/24 - order given for 2025    Dexa 8/23 - osteopenia - order given for 2025    Thyroid US 10/23 - no further f/u needed    BW 2/25 (A1C 7.6) - repeat in 3 mo  DM foot exam done in office today  DM eye exam done  in office today  Ur microalbumin:Cr 2/25      History of Present Illness     HPI Pt here for follow up appt/BW results and AWV    BW results from Feb 25 were d/w pt in detail: FBS/A1C 159/7.6, ALT 37, rest of CMP/CBCTSH/Ur microalbumin:Cr were wnl, FLP with , HDL 34 and      Def of controlled vs uncontrolled DM was reviewed.  Diet was reviewed - wgt up 6 lbs from Aug 24.  She admits she is less active over winter mos and was eating more carbs and jelly beans from [facility].  She con't to check her sugars once a day - has trended up from 140-150's to more 150-160 and even a few 170's.  She is taking her DM meds as directed.  She is due and coming up due  for her DM foot exam (4/24) and eye exam (6/24).  She notes some numbness and tingling in the feet and notes intermittent pain depending on what shoes she wears.  She notes no sores/ulcers.  She has eye appt June 4th.  She is on an ACE but no statin.     Goal FLP was d/w pt in detail.  Diet/exercise reviewed as noted above.  She is not on a statin daily despite recommendations.  She notes no stroke/TIA symptoms/CP.     Nml range for LFT's reviewed.      Pt is taking their thyroid medication daily w/o any other meds and prior to eating.  They deny any significant wgt changes/fatigue/tremor/palp/hair loss or skin changes.  She notes chronic alternating C/D    BP at goal today and meds were reviewed and no changes have occurred.  She denies missing doses of meds or SE with the meds.  She does not check her BP outside the office.  She notes no frequent Ha's/dizziness/double vision/CP.         Patient Care Team:  Xochitl Tan DO as PCP - General Shania Cortes (Obstetrics and Gynecology)  Mami Haynes MD (Gastroenterology)  KLAUDIA Aleman as Nurse Practitioner (Urology)  Kadi Soliz RD as Diabetes Educator (Dietician)  Cheri Mendoza RD as Diabetes Educator (Diabetes Services)    Review of Systems   Constitutional:  Negative for chills, fatigue, fever and unexpected weight change.   HENT:  Positive for hearing loss, postnasal drip, rhinorrhea and sore throat. Negative for congestion.    Eyes:  Negative for pain and visual disturbance.   Respiratory:  Positive for cough. Negative for shortness of breath and wheezing.    Cardiovascular:  Positive for leg swelling. Negative for chest pain and palpitations.        Swelling in LLE at end of day and better by am   Gastrointestinal:  Positive for constipation and diarrhea. Negative for abdominal pain, blood in stool, nausea and vomiting.   Genitourinary:  Negative for difficulty urinating, dysuria, hematuria, vaginal bleeding and vaginal pain.  "  Musculoskeletal:  Negative for arthralgias, back pain and gait problem.   Skin:  Negative for rash and wound.   Neurological:  Negative for dizziness and headaches.   Hematological:  Does not bruise/bleed easily.   Psychiatric/Behavioral:  Negative for confusion and dysphoric mood.      Medical History Reviewed by provider this encounter:  Tobacco  Allergies  Meds  Problems  Med Hx  Surg Hx  Fam Hx       Annual Wellness Visit Questionnaire   Madeleine is here for her Subsequent Wellness visit. Last Medicare Wellness visit information reviewed, patient interviewed and updates made to the record today.      Health Risk Assessment:   Patient rates overall health as good. Patient feels that their physical health rating is same. Patient is satisfied with their life. Eyesight was rated as same. Hearing was rated as slightly worse. Patient feels that their emotional and mental health rating is same. Patients states they are never, rarely angry. Patient states they are never, rarely unusually tired/fatigued. Pain experienced in the last 7 days has been some. Patient's pain rating has been 2/10. Patient states that she has experienced no weight loss or gain in last 6 months. Hearing worse - \"old people type hearing problems\", deferring hearing aids today    Depression Screening:   PHQ-2 Score: 0      Fall Risk Screening:   In the past year, patient has experienced: no history of falling in past year      Urinary Incontinence Screening:   Patient has not leaked urine accidently in the last six months.     Home Safety:  Patient does not have trouble with stairs inside or outside of their home. Patient has working smoke alarms and has working carbon monoxide detector. Home safety hazards include: none.     Nutrition:   Current diet is Regular.     Medications:   Patient is not currently taking any over-the-counter supplements. Patient is able to manage medications.     Activities of Daily Living (ADLs)/Instrumental " Activities of Daily Living (IADLs):   Walk and transfer into and out of bed and chair?: Yes  Dress and groom yourself?: Yes    Bathe or shower yourself?: Yes    Feed yourself? Yes  Do your laundry/housekeeping?: Yes  Manage your money, pay your bills and track your expenses?: Yes  Make your own meals?: Yes    Do your own shopping?: Yes    Previous Hospitalizations:   Any hospitalizations or ED visits within the last 12 months?: No      Advance Care Planning:   Living will: Yes    Durable POA for healthcare: Yes    Advanced directive: Yes      Cognitive Screening:   Provider or family/friend/caregiver concerned regarding cognition?: No    Preventive Screenings      Cardiovascular Screening:    General: Screening Current, Risks and Benefits Discussed and History Lipid Disorder      Diabetes Screening:     General: History Diabetes, Risks and Benefits Discussed and Screening Current      Colorectal Cancer Screening:     General: Risks and Benefits Discussed and Screening Current      Breast Cancer Screening:     General: Screening Current and Risks and Benefits Discussed      Cervical Cancer Screening:    General: Screening Not Indicated and Risks and Benefits Discussed      Osteoporosis Screening:    General: Risks and Benefits Discussed    Due for: DXA Axial      Abdominal Aortic Aneurysm (AAA) Screening:        General: Risks and Benefits Discussed and Screening Not Indicated      Lung Cancer Screening:     General: Screening Not Indicated and Risks and Benefits Discussed      Hepatitis C Screening:    General: Screening Current and Risks and Benefits Discussed    Immunizations:  - Immunizations due: Influenza and Zoster (Shingrix)    Screening, Brief Intervention, and Referral to Treatment (SBIRT)     Screening  Typical number of drinks in a day: 0  Typical number of drinks in a week: 0  Interpretation: Low risk drinking behavior.    Single Item Drug Screening:  How often have you used an illegal drug (including  "marijuana) or a prescription medication for non-medical reasons in the past year? never    Single Item Drug Screen Score: 0  Interpretation: Negative screen for possible drug use disorder    Other Counseling Topics:   Regular weightbearing exercise and calcium and vitamin D intake.     Social Drivers of Health     Financial Resource Strain: Low Risk  (4/11/2023)    Overall Financial Resource Strain (CARDIA)     Difficulty of Paying Living Expenses: Not hard at all   Food Insecurity: No Food Insecurity (4/25/2025)    Hunger Vital Sign     Worried About Running Out of Food in the Last Year: Never true     Ran Out of Food in the Last Year: Never true   Transportation Needs: No Transportation Needs (4/25/2025)    PRAPARE - Transportation     Lack of Transportation (Medical): No     Lack of Transportation (Non-Medical): No   Housing Stability: Low Risk  (4/25/2025)    Housing Stability Vital Sign     Unable to Pay for Housing in the Last Year: No     Number of Times Moved in the Last Year: 0     Homeless in the Last Year: No   Utilities: Not At Risk (4/25/2025)    [facility] Utilities     Threatened with loss of utilities: No     Vision Screening    Right eye Left eye Both eyes   Without correction      With correction 20/40 20/70 20/40       Objective   /72 (BP Location: Left arm, Patient Position: Sitting, Cuff Size: Large)   Pulse 78   Temp 98.3 °F (36.8 °C)   Ht 5' 3\" (1.6 m)   Wt 78.3 kg (172 lb 9.6 oz)   SpO2 97%   BMI 30.57 kg/m²     Physical Exam  Vitals and nursing note reviewed.   Constitutional:       General: She is not in acute distress.     Appearance: She is well-developed. She is obese. She is not ill-appearing.   HENT:      Head: Normocephalic and atraumatic.      Right Ear: Tympanic membrane and external ear normal. There is no impacted cerumen.      Left Ear: Tympanic membrane and external ear normal. There is no impacted cerumen.      Mouth/Throat:      Mouth: Mucous membranes are moist.      " Pharynx: Oropharynx is clear. No oropharyngeal exudate.   Eyes:      General:         Right eye: No discharge.         Left eye: No discharge.      Conjunctiva/sclera: Conjunctivae normal.   Neck:      Thyroid: No thyromegaly.      Vascular: No carotid bruit.      Trachea: No tracheal deviation.   Cardiovascular:      Rate and Rhythm: Normal rate and regular rhythm.      Pulses: no weak pulses.           Dorsalis pedis pulses are 2+ on the right side and 2+ on the left side.      Heart sounds: Normal heart sounds. No murmur heard.  Pulmonary:      Effort: Pulmonary effort is normal. No respiratory distress.      Breath sounds: Normal breath sounds. No wheezing, rhonchi or rales.   Abdominal:      General: There is no distension.      Palpations: Abdomen is soft.      Tenderness: There is no abdominal tenderness. There is no guarding or rebound.   Musculoskeletal:         General: No deformity or signs of injury.      Cervical back: Neck supple.        Feet:    Feet:      Right foot:      Skin integrity: Callus present. No ulcer, skin breakdown, erythema, warmth or dry skin.      Left foot:      Skin integrity: Callus present. No ulcer, skin breakdown, erythema, warmth or dry skin.   Lymphadenopathy:      Cervical: No cervical adenopathy.   Skin:     General: Skin is warm and dry.      Coloration: Skin is not pale.      Findings: No bruising or rash.   Neurological:      General: No focal deficit present.      Mental Status: She is alert. Mental status is at baseline.      Motor: No abnormal muscle tone.      Gait: Gait normal.   Psychiatric:         Mood and Affect: Mood normal.         Behavior: Behavior normal.         Thought Content: Thought content normal.         Judgment: Judgment normal.     Diabetic Foot Exam    Patient's shoes and socks removed.    Right Foot/Ankle   Right Foot Inspection  Skin Exam: skin normal, skin intact, callus and callus. No dry skin, no warmth, no erythema, no maceration, no  abnormal color, no pre-ulcer and no ulcer.     Toe Exam: ROM and strength within normal limits.     Sensory   Vibration: intact  Monofilament testing: intact    Vascular  The right DP pulse is 2+.     Left Foot/Ankle  Left Foot Inspection  Skin Exam: skin normal, skin intact and callus. No dry skin, no warmth, no erythema, no maceration, normal color, no pre-ulcer and no ulcer.     Toe Exam: ROM and strength within normal limits.     Sensory   Vibration: diminished  Monofilament testing: diminished    Vascular  The left DP pulse is 2+.     Assign Risk Category  No deformity present  No loss of protective sensation  No weak pulses  Risk: 0

## 2025-04-25 NOTE — ASSESSMENT & PLAN NOTE
BP great today, con't current meds, healthy diet and regular formal exercise encouraged   Orders:    metoprolol succinate (TOPROL-XL) 100 mg 24 hr tablet; Take 1.5 tablets (150 mg total) by mouth daily

## 2025-04-25 NOTE — PATIENT INSTRUCTIONS
Medicare Preventive Visit Patient Instructions  Thank you for completing your Welcome to Medicare Visit or Medicare Annual Wellness Visit today. Your next wellness visit will be due in one year (4/26/2026).  The screening/preventive services that you may require over the next 5-10 years are detailed below. Some tests may not apply to you based off risk factors and/or age. Screening tests ordered at today's visit but not completed yet may show as past due. Also, please note that scanned in results may not display below.  Preventive Screenings:  Service Recommendations Previous Testing/Comments   Colorectal Cancer Screening  * Colonoscopy    * Fecal Occult Blood Test (FOBT)/Fecal Immunochemical Test (FIT)  * Fecal DNA/Cologuard Test  * Flexible Sigmoidoscopy Age: 45-75 years old   Colonoscopy: every 10 years (may be performed more frequently if at higher risk)  OR  FOBT/FIT: every 1 year  OR  Cologuard: every 3 years  OR  Sigmoidoscopy: every 5 years  Screening may be recommended earlier than age 45 if at higher risk for colorectal cancer. Also, an individualized decision between you and your healthcare provider will decide whether screening between the ages of 76-85 would be appropriate. Colonoscopy: 04/06/2021  FOBT/FIT: Not on file  Cologuard: Not on file  Sigmoidoscopy: Not on file          Breast Cancer Screening Age: 40+ years old  Frequency: every 1-2 years  Not required if history of left and right mastectomy Mammogram: 09/12/2024    Screening Current   Cervical Cancer Screening Between the ages of 21-29, pap smear recommended once every 3 years.   Between the ages of 30-65, can perform pap smear with HPV co-testing every 5 years.   Recommendations may differ for women with a history of total hysterectomy, cervical cancer, or abnormal pap smears in past. Pap Smear: Not on file    Screening Not Indicated   Hepatitis C Screening Once for adults born between 1945 and 1965  More frequently in patients at high risk  for Hepatitis C Hep C Antibody: 07/15/2023    Screening Current   Diabetes Screening 1-2 times per year if you're at risk for diabetes or have pre-diabetes Fasting glucose: No results in last 5 years (No results in last 5 years)  A1C: 7.6 % (2/7/2025)  Screening Not Indicated  History Diabetes   Cholesterol Screening Once every 5 years if you don't have a lipid disorder. May order more often based on risk factors. Lipid panel: 02/07/2025    Screening Current     Other Preventive Screenings Covered by Medicare:  Abdominal Aortic Aneurysm (AAA) Screening: covered once if your at risk. You're considered to be at risk if you have a family history of AAA.  Lung Cancer Screening: covers low dose CT scan once per year if you meet all of the following conditions: (1) Age 55-77; (2) No signs or symptoms of lung cancer; (3) Current smoker or have quit smoking within the last 15 years; (4) You have a tobacco smoking history of at least 20 pack years (packs per day multiplied by number of years you smoked); (5) You get a written order from a healthcare provider.  Glaucoma Screening: covered annually if you're considered high risk: (1) You have diabetes OR (2) Family history of glaucoma OR (3)  aged 50 and older OR (4)  American aged 65 and older  Osteoporosis Screening: covered every 2 years if you meet one of the following conditions: (1) You're estrogen deficient and at risk for osteoporosis based off medical history and other findings; (2) Have a vertebral abnormality; (3) On glucocorticoid therapy for more than 3 months; (4) Have primary hyperparathyroidism; (5) On osteoporosis medications and need to assess response to drug therapy.   Last bone density test (DXA Scan): 08/31/2023.  HIV Screening: covered annually if you're between the age of 15-65. Also covered annually if you are younger than 15 and older than 65 with risk factors for HIV infection. For pregnant patients, it is covered up to 3  times per pregnancy.    Immunizations:  Immunization Recommendations   Influenza Vaccine Annual influenza vaccination during flu season is recommended for all persons aged >= 6 months who do not have contraindications   Pneumococcal Vaccine   * Pneumococcal conjugate vaccine = PCV13 (Prevnar 13), PCV15 (Vaxneuvance), PCV20 (Prevnar 20)  * Pneumococcal polysaccharide vaccine = PPSV23 (Pneumovax) Adults 19-63 yo with certain risk factors or if 65+ yo  If never received any pneumonia vaccine: recommend Prevnar 20 (PCV20)  Give PCV20 if previously received 1 dose of PCV13 or PPSV23   Hepatitis B Vaccine 3 dose series if at intermediate or high risk (ex: diabetes, end stage renal disease, liver disease)   Respiratory syncytial virus (RSV) Vaccine - COVERED BY MEDICARE PART D  * RSVPreF3 (Arexvy) CDC recommends that adults 60 years of age and older may receive a single dose of RSV vaccine using shared clinical decision-making (SCDM)   Tetanus (Td) Vaccine - COST NOT COVERED BY MEDICARE PART B Following completion of primary series, a booster dose should be given every 10 years to maintain immunity against tetanus. Td may also be given as tetanus wound prophylaxis.   Tdap Vaccine - COST NOT COVERED BY MEDICARE PART B Recommended at least once for all adults. For pregnant patients, recommended with each pregnancy.   Shingles Vaccine (Shingrix) - COST NOT COVERED BY MEDICARE PART B  2 shot series recommended in those 19 years and older who have or will have weakened immune systems or those 50 years and older     Health Maintenance Due:      Topic Date Due   • DXA SCAN  08/31/2025   • Breast Cancer Screening: Mammogram  09/12/2025   • Hepatitis C Screening  Completed   • Colorectal Cancer Screening  Discontinued     Immunizations Due:      Topic Date Due   • Influenza Vaccine (1) 09/01/2024   • COVID-19 Vaccine (7 - 2024-25 season) 09/01/2024     Advance Directives   What are advance directives?  Advance directives are legal  documents that state your wishes and plans for medical care. These plans are made ahead of time in case you lose your ability to make decisions for yourself. Advance directives can apply to any medical decision, such as the treatments you want, and if you want to donate organs.   What are the types of advance directives?  There are many types of advance directives, and each state has rules about how to use them. You may choose a combination of any of the following:  Living will:  This is a written record of the treatment you want. You can also choose which treatments you do not want, which to limit, and which to stop at a certain time. This includes surgery, medicine, IV fluid, and tube feedings.   Durable power of  for healthcare (DPAHC):  This is a written record that states who you want to make healthcare choices for you when you are unable to make them for yourself. This person, called a proxy, is usually a family member or a friend. You may choose more than 1 proxy.  Do not resuscitate (DNR) order:  A DNR order is used in case your heart stops beating or you stop breathing. It is a request not to have certain forms of treatment, such as CPR. A DNR order may be included in other types of advance directives.  Medical directive:  This covers the care that you want if you are in a coma, near death, or unable to make decisions for yourself. You can list the treatments you want for each condition. Treatment may include pain medicine, surgery, blood transfusions, dialysis, IV or tube feedings, and a ventilator (breathing machine).  Values history:  This document has questions about your views, beliefs, and how you feel and think about life. This information can help others choose the care that you would choose.  Why are advance directives important?  An advance directive helps you control your care. Although spoken wishes may be used, it is better to have your wishes written down. Spoken wishes can be  misunderstood, or not followed. Treatments may be given even if you do not want them. An advance directive may make it easier for your family to make difficult choices about your care.   Weight Management   Why it is important to manage your weight:  Being overweight increases your risk of health conditions such as heart disease, high blood pressure, type 2 diabetes, and certain types of cancer. It can also increase your risk for osteoarthritis, sleep apnea, and other respiratory problems. Aim for a slow, steady weight loss. Even a small amount of weight loss can lower your risk of health problems.  How to lose weight safely:  A safe and healthy way to lose weight is to eat fewer calories and get regular exercise. You can lose up about 1 pound a week by decreasing the number of calories you eat by 500 calories each day.   Healthy meal plan for weight management:  A healthy meal plan includes a variety of foods, contains fewer calories, and helps you stay healthy. A healthy meal plan includes the following:  Eat whole-grain foods more often.  A healthy meal plan should contain fiber. Fiber is the part of grains, fruits, and vegetables that is not broken down by your body. Whole-grain foods are healthy and provide extra fiber in your diet. Some examples of whole-grain foods are whole-wheat breads and pastas, oatmeal, brown rice, and bulgur.  Eat a variety of vegetables every day.  Include dark, leafy greens such as spinach, kale, humberto greens, and mustard greens. Eat yellow and orange vegetables such as carrots, sweet potatoes, and winter squash.   Eat a variety of fruits every day.  Choose fresh or canned fruit (canned in its own juice or light syrup) instead of juice. Fruit juice has very little or no fiber.  Eat low-fat dairy foods.  Drink fat-free (skim) milk or 1% milk. Eat fat-free yogurt and low-fat cottage cheese. Try low-fat cheeses such as mozzarella and other reduced-fat cheeses.  Choose meat and other  protein foods that are low in fat.  Choose beans or other legumes such as split peas or lentils. Choose fish, skinless poultry (chicken or turkey), or lean cuts of red meat (beef or pork). Before you cook meat or poultry, cut off any visible fat.   Use less fat and oil.  Try baking foods instead of frying them. Add less fat, such as margarine, sour cream, regular salad dressing and mayonnaise to foods. Eat fewer high-fat foods. Some examples of high-fat foods include french fries, doughnuts, ice cream, and cakes.  Eat fewer sweets.  Limit foods and drinks that are high in sugar. This includes candy, cookies, regular soda, and sweetened drinks.  Exercise:  Exercise at least 30 minutes per day on most days of the week. Some examples of exercise include walking, biking, dancing, and swimming. You can also fit in more physical activity by taking the stairs instead of the elevator or parking farther away from stores. Ask your healthcare provider about the best exercise plan for you.      © Copyright [employer] 2018 Information is for End User's use only and may not be sold, redistributed or otherwise used for commercial purposes. All illustrations and images included in CareNotes® are the copyrighted property of A.D.A[employer]M., Inc. or [employer]

## 2025-04-25 NOTE — ASSESSMENT & PLAN NOTE
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

## 2025-04-25 NOTE — ASSESSMENT & PLAN NOTE
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

## 2025-04-25 NOTE — ASSESSMENT & PLAN NOTE
LDL still above goal and HDL low, pt deferring statin, diet and regular exercise encouraged, will follow

## 2025-04-29 ENCOUNTER — OFFICE VISIT (OUTPATIENT)
Dept: FAMILY MEDICINE CLINIC | Facility: HOSPITAL | Age: 80
End: 2025-04-29
Payer: COMMERCIAL

## 2025-04-29 VITALS
HEART RATE: 75 BPM | TEMPERATURE: 97.6 F | SYSTOLIC BLOOD PRESSURE: 128 MMHG | WEIGHT: 175.4 LBS | HEIGHT: 63 IN | BODY MASS INDEX: 31.08 KG/M2 | OXYGEN SATURATION: 99 % | DIASTOLIC BLOOD PRESSURE: 78 MMHG

## 2025-04-29 DIAGNOSIS — H10.13 ALLERGIC CONJUNCTIVITIS OF BOTH EYES: ICD-10-CM

## 2025-04-29 DIAGNOSIS — J30.9 ALLERGIC RHINITIS, UNSPECIFIED SEASONALITY, UNSPECIFIED TRIGGER: Primary | ICD-10-CM

## 2025-04-29 PROCEDURE — 99213 OFFICE O/P EST LOW 20 MIN: CPT | Performed by: INTERNAL MEDICINE

## 2025-04-29 RX ORDER — OLOPATADINE HYDROCHLORIDE 1 MG/ML
1 SOLUTION/ DROPS OPHTHALMIC 2 TIMES DAILY
Qty: 5 ML | Refills: 3 | Status: SHIPPED | OUTPATIENT
Start: 2025-04-29

## 2025-04-29 NOTE — PROGRESS NOTES
Name: Madeleine Jiménez      : 1945      MRN: [id number]  Encounter Provider: Xochitl Tan DO  Encounter Date: 2025   Encounter department: [facility] [address]   :  Assessment & Plan  Allergic rhinitis, unspecified seasonality, unspecified trigger  Con't OTC anthistamine, advised to close windows and stay inside while allergens are high, gargles/hot tea/lozenges also encouraged, pt deferring Flonase as it causes incontinence, call with new/worse symptoms       Allergic conjunctivitis of both eyes  Patanol eye gtt sent, avoid rubbing eyes, cool compresses prn, red flag optho symptoms reviewed - call if they occur  Orders:    olopatadine (PATANOL) 0.1 % ophthalmic solution; Administer 1 drop to both eyes 2 (two) times a day         Colonoscopy  - 10 yrs     Mammo  - scheduled for     Dexa  - osteopenia - scheduled for      Thyroid US 10/23 - no further f/u needed     BW  (A1C 7.6) - repeat in 3 mo  DM foot exam   DM eye exam   Ur microalbumin:Cr       History of Present Illness   HPI Pt here for an acute visit    Pt here for allergy symptoms. She has had runny nose and itchy eyes.  She has post nasal drip and cough.  She notes some clear drainage from the eyes and a little crusty in the am. She notes they are not very goopy or stuck shut. She notes no eye eye pain/decrease in visual acuity.  She has been using OTC antihistamine and ice for the eyes. She notes no F/C/SOB.       Review of Systems   Constitutional:  Negative for chills and fever.   HENT:  Positive for congestion, postnasal drip, rhinorrhea and sore throat.    Eyes:  Positive for discharge and itching. Negative for photophobia, pain and visual disturbance.   Respiratory:  Positive for cough. Negative for shortness of breath and wheezing.    Cardiovascular:  Negative for chest pain and palpitations.   Neurological:  Negative for dizziness and headaches.  "  Psychiatric/Behavioral:  Negative for behavioral problems and confusion.        Objective   /78 (BP Location: Left arm, Patient Position: Sitting, Cuff Size: Standard)   Pulse 75   Temp 97.6 °F (36.4 °C)   Ht 5' 3\" (1.6 m)   Wt 79.6 kg (175 lb 6.4 oz)   SpO2 99%   BMI 31.07 kg/m²      Physical Exam  Vitals and nursing note reviewed.   Constitutional:       General: She is not in acute distress.     Appearance: She is well-developed. She is not ill-appearing.   HENT:      Head: Normocephalic and atraumatic.      Right Ear: Tympanic membrane and external ear normal. There is no impacted cerumen.      Left Ear: Tympanic membrane and external ear normal. There is no impacted cerumen.      Mouth/Throat:      Mouth: Mucous membranes are moist.      Pharynx: Oropharynx is clear. No oropharyngeal exudate.   Eyes:      General:         Right eye: No discharge.         Left eye: No discharge.      Conjunctiva/sclera: Conjunctivae normal.      Comments: Watery eyes but no conjunctiva erythema/crusting or actual discharge   Neck:      Thyroid: No thyromegaly.      Trachea: No tracheal deviation.   Cardiovascular:      Rate and Rhythm: Normal rate and regular rhythm.      Heart sounds: Normal heart sounds. No murmur heard.  Pulmonary:      Effort: Pulmonary effort is normal. No respiratory distress.      Breath sounds: Normal breath sounds. No wheezing, rhonchi or rales.   Musculoskeletal:      Cervical back: Neck supple.   Lymphadenopathy:      Cervical: No cervical adenopathy.   Skin:     General: Skin is warm and dry.      Coloration: Skin is not pale.      Findings: No rash.   Neurological:      General: No focal deficit present.      Mental Status: She is alert. Mental status is at baseline.      Motor: No abnormal muscle tone.      Gait: Gait normal.   Psychiatric:         Mood and Affect: Mood normal.         Behavior: Behavior normal.         Thought Content: Thought content normal.         Judgment: " Judgment normal.

## 2025-04-29 NOTE — ASSESSMENT & PLAN NOTE
Con't OTC anthistamine, advised to close windows and stay inside while allergens are high, gargles/hot tea/lozenges also encouraged, pt deferring Flonase as it causes incontinence, call with new/worse symptoms

## 2025-05-21 DIAGNOSIS — E11.65 TYPE 2 DIABETES MELLITUS WITH HYPERGLYCEMIA, WITHOUT LONG-TERM CURRENT USE OF INSULIN (HCC): ICD-10-CM

## 2025-05-21 DIAGNOSIS — E11.42 DIABETIC POLYNEUROPATHY ASSOCIATED WITH TYPE 2 DIABETES MELLITUS (HCC): ICD-10-CM

## 2025-05-22 RX ORDER — GLUCOSAMINE HCL/CHONDROITIN SU 500-400 MG
CAPSULE ORAL
Qty: 100 STRIP | Refills: 3 | Status: SHIPPED | OUTPATIENT
Start: 2025-05-22

## 2025-06-13 DIAGNOSIS — R79.89 ELEVATED TSH: ICD-10-CM

## 2025-06-13 DIAGNOSIS — E03.9 HYPOTHYROIDISM, UNSPECIFIED TYPE: ICD-10-CM

## 2025-06-13 DIAGNOSIS — I10 ESSENTIAL HYPERTENSION: ICD-10-CM

## 2025-06-13 RX ORDER — AMLODIPINE BESYLATE 10 MG/1
10 TABLET ORAL DAILY
Qty: 90 TABLET | Refills: 1 | Status: SHIPPED | OUTPATIENT
Start: 2025-06-13

## 2025-06-13 RX ORDER — LEVOTHYROXINE SODIUM 75 UG/1
75 TABLET ORAL EVERY MORNING
Qty: 90 TABLET | Refills: 1 | Status: SHIPPED | OUTPATIENT
Start: 2025-06-13

## 2025-06-13 NOTE — TELEPHONE ENCOUNTER
This is not a duplicate.   Change pharmacy-[facility] Closed    Reason for call:   [x] Refill   [] Prior Auth  [] Other:     Office:   [x] PCP/Provider - [facility] [place] [address]  Authorized By: Xochitl Tan DO    [] Specialty/Provider -     Medication: levothyroxine 75 mcg tablet    Dose/Frequency:  take 1 tablet by mouth every morning    Quantity: 90    Pharmacy: [facility] [address]   Does the patient have enough for 3 days?   [x] Yes   [] No - Send as HP to POD    Mail Away Pharmacy   Does the patient have enough for 10 days?   [] Yes   [] No - Send as HP to POD    Reason for call:   [x] Refill   [] Prior Auth  [] Other:     Office:   [x] PCP/Provider - [place] [address]  Authorized By: Xochitl Tan DO    [] Specialty/Provider -     Medication: amLODIPine (NORVASC) 10 mg tablet    Dose/Frequency:  take 1 tablet by mouth once daily,    Quantity: 90    Pharmacy: [facility] [address] [place] [address]   Does the patient have enough for 3 days?   [x] Yes   [] No - Send as HP to POD    Mail Away Pharmacy   Does the patient have enough for 10 days?   [] Yes   [] No - Send as HP to POD

## 2025-07-07 DIAGNOSIS — E11.65 TYPE 2 DIABETES MELLITUS WITH HYPERGLYCEMIA, WITHOUT LONG-TERM CURRENT USE OF INSULIN (HCC): ICD-10-CM

## 2025-07-09 RX ORDER — GLIPIZIDE 5 MG/1
5 TABLET ORAL
Qty: 90 TABLET | Refills: 1 | Status: SHIPPED | OUTPATIENT
Start: 2025-07-09

## 2025-07-24 DIAGNOSIS — I10 ESSENTIAL HYPERTENSION: ICD-10-CM

## 2025-07-25 ENCOUNTER — NURSE TRIAGE (OUTPATIENT)
Age: 80
End: 2025-07-25

## 2025-07-25 DIAGNOSIS — R35.0 URINARY FREQUENCY: Primary | ICD-10-CM

## 2025-07-25 NOTE — TELEPHONE ENCOUNTER
"REASON FOR CONVERSATION: Urinary Symptoms and Vaginitis    SYMPTOMS: vulvar swelling, irritation, discomfort with sitting, urinary frequency    OTHER HEALTH INFORMATION: Symptoms have been occurring on and off for a week or so.     Denies fevers, chills, nausea/vomiting, pain or burning with urination.     Has had previous UTIs and does not feel like she has one.     Does not feel symptoms are consistent with yeast infection.     Attempted warm transfer to office clerical staff to schedule office visit for evaluation.     PROTOCOL DISPOSITION: Order Lab Work (overriding See Today in Office), See Within 2 Weeks in Office    CARE ADVICE PROVIDED: Apply vaseline or aquaphor to external vulva to protect skin from moisture. Call back if symptoms worsen or fail to improve.     If develops other s/s of UTI advised urgent care over the weekend.    PRACTICE FOLLOW-UP: Schedule office visit for evaluation    Reason for Disposition   Urinating more frequently than usual (i.e., frequency) OR new-onset of the feeling of an urgent need to urinate (i.e., urgency)   ALL other vulvar symptoms  (Exception: Feels like prior yeast infection, or rash < 24 hour duration.)    Answer Assessment - Initial Assessment Questions  1. SYMPTOM: \"What's the main symptom you're concerned about?\" (e.g., rash, itching, swelling, dryness)      Vulvar swelling, irritation, urinary frequency     2. LOCATION: \"Where is the  s/s located?\" (e.g., inside/outside, left/right)      External     3. ONSET: \"When did the  s/s  start?\"      Within last week or so but comes and goes    4. PAIN: \"Is there any pain?\" If Yes, ask: \"How bad is it?\" (Scale: 1-10; mild, moderate, severe)      Very uncomfortable when she sits     5. CAUSE: \"What do you think is causing the symptoms?\"      Doesn't think similar to past yeast infections. Also does not feel like she has UTI, has had in the past and symptoms do no feel similar.    6. OTHER SYMPTOMS: \"Do you have any other " "symptoms?\" (e.g., fever, vaginal bleeding, pain with urination)      Denies vulvar itching more of a burning sensation. No vaginal discharge. Denies pain with urination, fevers, or chills.    Answer Assessment - Initial Assessment Questions  1. SYMPTOM: \"What's the main symptom you're concerned about?\" (e.g., frequency, incontinence)      Frequency     2. ONSET: \"When did the  s/s  start?\"      Within the last week or so    3. PAIN: \"Is there any pain?\" If Yes, ask: \"How bad is it?\" (Scale: 1-10; mild, moderate, severe)      Denies pain     4. CAUSE: \"What do you think is causing the symptoms?\"      Doesn't feel like a UTI to her     5. OTHER SYMPTOMS: \"Do you have any other symptoms?\" (e.g., blood in urine, fever, flank pain, pain with urination)      Denies fevers or chills, burning/pain with urination    Protocols used: Urinary Symptoms-Adult-OH, Vulvar Symptoms-Adult-[place]    "

## 2025-07-27 LAB
BUN SERPL-MCNC: 12 MG/DL (ref 8–27)
BUN/CREAT SERPL: 14 (ref 12–28)
CALCIUM SERPL-MCNC: 9.7 MG/DL (ref 8.7–10.3)
CHLORIDE SERPL-SCNC: 99 MMOL/L (ref 96–106)
CO2 SERPL-SCNC: 23 MMOL/L (ref 20–29)
CREAT SERPL-MCNC: 0.85 MG/DL (ref 0.57–1)
EGFR: 70 ML/MIN/1.73
EST. AVERAGE GLUCOSE BLD GHB EST-MCNC: 174 MG/DL
GLUCOSE SERPL-MCNC: 175 MG/DL (ref 70–99)
HBA1C MFR BLD: 7.7 % (ref 4.8–5.6)
POTASSIUM SERPL-SCNC: 4.6 MMOL/L (ref 3.5–5.2)
SODIUM SERPL-SCNC: 140 MMOL/L (ref 134–144)

## 2025-07-27 RX ORDER — LISINOPRIL 40 MG/1
60 TABLET ORAL DAILY
Qty: 135 TABLET | Refills: 1 | Status: SHIPPED | OUTPATIENT
Start: 2025-07-27

## 2025-07-28 LAB
APPEARANCE UR: ABNORMAL
BACTERIA UR CULT: NORMAL
BACTERIA URNS QL MICRO: ABNORMAL
BILIRUB UR QL STRIP: NEGATIVE
CASTS URNS QL MICRO: ABNORMAL /LPF
COLOR UR: YELLOW
EPI CELLS #/AREA URNS HPF: >10 /HPF (ref 0–10)
GLUCOSE UR QL: NEGATIVE
HGB UR QL STRIP: ABNORMAL
KETONES UR QL STRIP: NEGATIVE
LEUKOCYTE ESTERASE UR QL STRIP: ABNORMAL
Lab: NORMAL
MICRO URNS: ABNORMAL
NITRITE UR QL STRIP: NEGATIVE
PH UR STRIP: 8.5 [PH] (ref 5–7.5)
PROT UR QL STRIP: ABNORMAL
RBC #/AREA URNS HPF: ABNORMAL /HPF (ref 0–2)
SP GR UR: 1.01 (ref 1–1.03)
UROBILINOGEN UR STRIP-ACNC: 0.2 MG/DL (ref 0.2–1)
WBC #/AREA URNS HPF: ABNORMAL /HPF (ref 0–5)

## 2025-07-29 ENCOUNTER — OFFICE VISIT (OUTPATIENT)
Dept: FAMILY MEDICINE CLINIC | Facility: HOSPITAL | Age: 80
End: 2025-07-29
Payer: COMMERCIAL

## 2025-07-29 VITALS
WEIGHT: 170 LBS | BODY MASS INDEX: 30.12 KG/M2 | HEIGHT: 63 IN | DIASTOLIC BLOOD PRESSURE: 83 MMHG | HEART RATE: 79 BPM | TEMPERATURE: 97.7 F | SYSTOLIC BLOOD PRESSURE: 139 MMHG | OXYGEN SATURATION: 96 %

## 2025-07-29 DIAGNOSIS — N89.8 VAGINAL IRRITATION: Primary | ICD-10-CM

## 2025-07-29 DIAGNOSIS — R39.9 UTI SYMPTOMS: ICD-10-CM

## 2025-07-29 DIAGNOSIS — R31.29 MICROSCOPIC HEMATURIA: ICD-10-CM

## 2025-07-29 LAB
SL AMB  POCT GLUCOSE, UA: ABNORMAL
SL AMB LEUKOCYTE ESTERASE,UA: 3
SL AMB POCT BILIRUBIN,UA: ABNORMAL
SL AMB POCT BLOOD,UA: ABNORMAL
SL AMB POCT CLARITY,UA: ABNORMAL
SL AMB POCT COLOR,UA: YELLOW
SL AMB POCT KETONES,UA: ABNORMAL
SL AMB POCT NITRITE,UA: ABNORMAL
SL AMB POCT PH,UA: 7
SL AMB POCT SPECIFIC GRAVITY,UA: 1
SL AMB POCT URINE PROTEIN: ABNORMAL
SL AMB POCT UROBILINOGEN: ABNORMAL

## 2025-07-29 PROCEDURE — 99214 OFFICE O/P EST MOD 30 MIN: CPT | Performed by: INTERNAL MEDICINE

## 2025-07-29 PROCEDURE — 81002 URINALYSIS NONAUTO W/O SCOPE: CPT | Performed by: INTERNAL MEDICINE

## 2025-08-03 LAB
BACTERIA UR CULT: ABNORMAL
Lab: ABNORMAL
Lab: ABNORMAL
SL AMB ANTIMICROBIAL SUSCEPTIBILITY: ABNORMAL

## 2025-08-04 ENCOUNTER — OFFICE VISIT (OUTPATIENT)
Dept: FAMILY MEDICINE CLINIC | Facility: HOSPITAL | Age: 80
End: 2025-08-04
Payer: COMMERCIAL

## 2025-08-04 VITALS
TEMPERATURE: 97.6 F | HEART RATE: 72 BPM | OXYGEN SATURATION: 96 % | HEIGHT: 63 IN | DIASTOLIC BLOOD PRESSURE: 70 MMHG | SYSTOLIC BLOOD PRESSURE: 124 MMHG | WEIGHT: 170.6 LBS | BODY MASS INDEX: 30.23 KG/M2

## 2025-08-04 DIAGNOSIS — R39.9 UTI SYMPTOMS: ICD-10-CM

## 2025-08-04 DIAGNOSIS — E11.65 TYPE 2 DIABETES MELLITUS WITH HYPERGLYCEMIA, WITHOUT LONG-TERM CURRENT USE OF INSULIN (HCC): Primary | ICD-10-CM

## 2025-08-04 DIAGNOSIS — I10 ESSENTIAL HYPERTENSION: ICD-10-CM

## 2025-08-04 PROCEDURE — 99214 OFFICE O/P EST MOD 30 MIN: CPT | Performed by: INTERNAL MEDICINE

## 2025-08-04 RX ORDER — GLIPIZIDE 5 MG/1
5 TABLET ORAL
Qty: 180 TABLET | Refills: 1 | Status: SHIPPED | OUTPATIENT
Start: 2025-08-04

## 2025-08-04 RX ORDER — NITROFURANTOIN 25; 75 MG/1; MG/1
100 CAPSULE ORAL 2 TIMES DAILY
Qty: 10 CAPSULE | Refills: 0 | Status: SHIPPED | OUTPATIENT
Start: 2025-08-04 | End: 2025-08-09

## 2025-08-12 ENCOUNTER — OB ABSTRACT (OUTPATIENT)
Age: 80
End: 2025-08-12

## 2025-08-13 ENCOUNTER — OFFICE VISIT (OUTPATIENT)
Age: 80
End: 2025-08-13
Attending: INTERNAL MEDICINE
Payer: COMMERCIAL

## 2025-08-13 VITALS
WEIGHT: 169 LBS | BODY MASS INDEX: 29.95 KG/M2 | DIASTOLIC BLOOD PRESSURE: 66 MMHG | SYSTOLIC BLOOD PRESSURE: 132 MMHG | HEIGHT: 63 IN

## 2025-08-13 DIAGNOSIS — N89.8 VAGINAL IRRITATION: ICD-10-CM

## 2025-08-13 PROCEDURE — 99213 OFFICE O/P EST LOW 20 MIN: CPT | Performed by: NURSE PRACTITIONER

## 2025-08-13 RX ORDER — NYSTATIN AND TRIAMCINOLONE ACETONIDE 100000; 1 [USP'U]/G; MG/G
CREAM TOPICAL 2 TIMES DAILY
Qty: 60 G | Refills: 2 | Status: SHIPPED | OUTPATIENT
Start: 2025-08-13

## (undated) DEVICE — STANDARD SURGICAL GOWN, L: Brand: CONVERTORS

## (undated) DEVICE — CATH URETERAL 5FR X 70 CM FLEX TIP POLYUR BARD

## (undated) DEVICE — SYRINGE 10ML LL

## (undated) DEVICE — CYSTO TUBING TUR Y IRRIGATION

## (undated) DEVICE — 3M™ TEGADERM™ TRANSPARENT FILM DRESSING FRAME STYLE, 1624W, 2-3/8 IN X 2-3/4 IN (6 CM X 7 CM), 100/CT 4CT/CASE: Brand: 3M™ TEGADERM™

## (undated) DEVICE — SURGICAL GOWN, XL SMARTSLEEVE: Brand: CONVERTORS

## (undated) DEVICE — SPONGE STICK WITH PVP-I: Brand: KENDALL

## (undated) DEVICE — CYSTOSCOPY PACK: Brand: CONVERTORS

## (undated) DEVICE — SKIN MARKER DUAL TIP WITH RULER CAP, FLEXIBLE RULER AND LABELS: Brand: DEVON

## (undated) DEVICE — GAUZE SPONGES,16 PLY: Brand: CURITY

## (undated) DEVICE — SCD SEQUENTIAL COMPRESSION COMFORT SLEEVE MEDIUM KNEE LENGTH: Brand: KENDALL SCD

## (undated) DEVICE — GLOVE SRG BIOGEL ECLIPSE 7.5

## (undated) DEVICE — GUIDEWIRE STRGHT TIP 0.035 IN  SOLO PLUS

## (undated) DEVICE — UROCATCH BAG